# Patient Record
Sex: FEMALE | Race: WHITE | NOT HISPANIC OR LATINO | Employment: OTHER | URBAN - METROPOLITAN AREA
[De-identification: names, ages, dates, MRNs, and addresses within clinical notes are randomized per-mention and may not be internally consistent; named-entity substitution may affect disease eponyms.]

---

## 2017-01-03 ENCOUNTER — ANTICOAGULATION VISIT (OUTPATIENT)
Dept: CARDIOLOGY | Facility: CLINIC | Age: 77
End: 2017-01-03

## 2017-01-03 LAB — INR PPP: 3

## 2017-01-06 NOTE — PROGRESS NOTES
I have reviewed the anticoagulation track calender, labs, and dosage adjustments made by Sameera Parada RN and I agree.    Electronically signed by KAILASH Sutotn 01/06/17 12:51 PM

## 2017-01-24 ENCOUNTER — TRANSCRIBE ORDERS (OUTPATIENT)
Dept: LAB | Facility: HOSPITAL | Age: 77
End: 2017-01-24

## 2017-01-24 ENCOUNTER — LAB (OUTPATIENT)
Dept: LAB | Facility: HOSPITAL | Age: 77
End: 2017-01-24
Attending: INTERNAL MEDICINE

## 2017-01-24 DIAGNOSIS — Z79.899 HIGH RISK MEDICATION USE: ICD-10-CM

## 2017-01-24 DIAGNOSIS — E13.8 DIABETES MELLITUS OF OTHER TYPE WITH COMPLICATION: ICD-10-CM

## 2017-01-24 DIAGNOSIS — N18.9 CHRONIC KIDNEY DISEASE, UNSPECIFIED: Primary | ICD-10-CM

## 2017-01-24 DIAGNOSIS — N18.9 CHRONIC KIDNEY DISEASE, UNSPECIFIED: ICD-10-CM

## 2017-01-24 LAB
ALBUMIN SERPL-MCNC: 3.9 G/DL (ref 3.2–4.8)
ALBUMIN/GLOB SERPL: 1.1 G/DL (ref 1.5–2.5)
ALP SERPL-CCNC: 86 U/L (ref 25–100)
ALT SERPL W P-5'-P-CCNC: 20 U/L (ref 7–40)
ANION GAP SERPL CALCULATED.3IONS-SCNC: 6 MMOL/L (ref 3–11)
AST SERPL-CCNC: 32 U/L (ref 0–33)
BASOPHILS # BLD AUTO: 0.11 10*3/MM3 (ref 0–0.2)
BASOPHILS # BLD AUTO: 0.13 10*3/MM3 (ref 0–0.2)
BASOPHILS NFR BLD AUTO: 2 % (ref 0–1)
BASOPHILS NFR BLD AUTO: 2.4 % (ref 0–1)
BILIRUB SERPL-MCNC: 0.6 MG/DL (ref 0.3–1.2)
BUN BLD-MCNC: 54 MG/DL (ref 9–23)
BUN/CREAT SERPL: 31.8 (ref 7–25)
CALCIUM SPEC-SCNC: 10.2 MG/DL (ref 8.7–10.4)
CHLORIDE SERPL-SCNC: 103 MMOL/L (ref 99–109)
CO2 SERPL-SCNC: 32 MMOL/L (ref 20–31)
CREAT BLD-MCNC: 1.7 MG/DL (ref 0.6–1.3)
DEPRECATED RDW RBC AUTO: 57.8 FL (ref 37–54)
DEPRECATED RDW RBC AUTO: 58 FL (ref 37–54)
EOSINOPHIL # BLD AUTO: 0.38 10*3/MM3 (ref 0.1–0.3)
EOSINOPHIL # BLD AUTO: 0.39 10*3/MM3 (ref 0.1–0.3)
EOSINOPHIL NFR BLD AUTO: 7 % (ref 0–3)
EOSINOPHIL NFR BLD AUTO: 7.3 % (ref 0–3)
ERYTHROCYTE [DISTWIDTH] IN BLOOD BY AUTOMATED COUNT: 18.8 % (ref 11.3–14.5)
ERYTHROCYTE [DISTWIDTH] IN BLOOD BY AUTOMATED COUNT: 18.8 % (ref 11.3–14.5)
GFR SERPL CREATININE-BSD FRML MDRD: 29 ML/MIN/1.73
GLOBULIN UR ELPH-MCNC: 3.4 GM/DL
GLUCOSE BLD-MCNC: 139 MG/DL (ref 70–100)
HBA1C MFR BLD: 5.4 % (ref 4.8–5.6)
HCT VFR BLD AUTO: 32.3 % (ref 34.5–44)
HCT VFR BLD AUTO: 32.6 % (ref 34.5–44)
HGB BLD-MCNC: 9.7 G/DL (ref 11.5–15.5)
HGB BLD-MCNC: 9.7 G/DL (ref 11.5–15.5)
IMM GRANULOCYTES # BLD: 0.01 10*3/MM3 (ref 0–0.03)
IMM GRANULOCYTES # BLD: 0.01 10*3/MM3 (ref 0–0.03)
IMM GRANULOCYTES NFR BLD: 0.2 % (ref 0–0.6)
IMM GRANULOCYTES NFR BLD: 0.2 % (ref 0–0.6)
INR PPP: 1.41
IRON 24H UR-MRATE: 42 MCG/DL (ref 50–175)
IRON SATN MFR SERPL: 12 % (ref 15–50)
LYMPHOCYTES # BLD AUTO: 1.57 10*3/MM3 (ref 0.6–4.8)
LYMPHOCYTES # BLD AUTO: 1.58 10*3/MM3 (ref 0.6–4.8)
LYMPHOCYTES NFR BLD AUTO: 28.9 % (ref 24–44)
LYMPHOCYTES NFR BLD AUTO: 29.5 % (ref 24–44)
MCH RBC QN AUTO: 24.9 PG (ref 27–31)
MCH RBC QN AUTO: 25.1 PG (ref 27–31)
MCHC RBC AUTO-ENTMCNC: 29.8 G/DL (ref 32–36)
MCHC RBC AUTO-ENTMCNC: 30 G/DL (ref 32–36)
MCV RBC AUTO: 83.7 FL (ref 80–99)
MCV RBC AUTO: 83.8 FL (ref 80–99)
MONOCYTES # BLD AUTO: 0.47 10*3/MM3 (ref 0–1)
MONOCYTES # BLD AUTO: 0.56 10*3/MM3 (ref 0–1)
MONOCYTES NFR BLD AUTO: 10.3 % (ref 0–12)
MONOCYTES NFR BLD AUTO: 8.8 % (ref 0–12)
NEUTROPHILS # BLD AUTO: 2.78 10*3/MM3 (ref 1.5–8.3)
NEUTROPHILS # BLD AUTO: 2.8 10*3/MM3 (ref 1.5–8.3)
NEUTROPHILS NFR BLD AUTO: 51.6 % (ref 41–71)
NEUTROPHILS NFR BLD AUTO: 51.8 % (ref 41–71)
PLATELET # BLD AUTO: 244 10*3/MM3 (ref 150–450)
PLATELET # BLD AUTO: 261 10*3/MM3 (ref 150–450)
PMV BLD AUTO: 8.8 FL (ref 6–12)
PMV BLD AUTO: 9 FL (ref 6–12)
POTASSIUM BLD-SCNC: 5.8 MMOL/L (ref 3.5–5.5)
PROT SERPL-MCNC: 7.3 G/DL (ref 5.7–8.2)
PROTHROMBIN TIME: 15.5 SECONDS (ref 9.6–11.5)
RBC # BLD AUTO: 3.86 10*6/MM3 (ref 3.89–5.14)
RBC # BLD AUTO: 3.89 10*6/MM3 (ref 3.89–5.14)
SODIUM BLD-SCNC: 141 MMOL/L (ref 132–146)
TIBC SERPL-MCNC: 362 MCG/DL (ref 250–450)
WBC NRBC COR # BLD: 5.36 10*3/MM3 (ref 3.5–10.8)
WBC NRBC COR # BLD: 5.43 10*3/MM3 (ref 3.5–10.8)

## 2017-01-24 PROCEDURE — 83036 HEMOGLOBIN GLYCOSYLATED A1C: CPT | Performed by: INTERNAL MEDICINE

## 2017-01-24 PROCEDURE — 36415 COLL VENOUS BLD VENIPUNCTURE: CPT

## 2017-01-24 PROCEDURE — 85025 COMPLETE CBC W/AUTO DIFF WBC: CPT | Performed by: INTERNAL MEDICINE

## 2017-01-24 PROCEDURE — 80053 COMPREHEN METABOLIC PANEL: CPT | Performed by: INTERNAL MEDICINE

## 2017-01-24 PROCEDURE — 83550 IRON BINDING TEST: CPT | Performed by: INTERNAL MEDICINE

## 2017-01-24 PROCEDURE — 83540 ASSAY OF IRON: CPT | Performed by: INTERNAL MEDICINE

## 2017-01-24 PROCEDURE — 85610 PROTHROMBIN TIME: CPT | Performed by: INTERNAL MEDICINE

## 2017-01-25 ENCOUNTER — OFFICE VISIT (OUTPATIENT)
Dept: PULMONOLOGY | Facility: CLINIC | Age: 77
End: 2017-01-25

## 2017-01-25 ENCOUNTER — ANTICOAGULATION VISIT (OUTPATIENT)
Dept: CARDIOLOGY | Facility: CLINIC | Age: 77
End: 2017-01-25

## 2017-01-25 VITALS
DIASTOLIC BLOOD PRESSURE: 70 MMHG | BODY MASS INDEX: 24.42 KG/M2 | HEIGHT: 67 IN | OXYGEN SATURATION: 99 % | TEMPERATURE: 97.5 F | SYSTOLIC BLOOD PRESSURE: 122 MMHG | RESPIRATION RATE: 16 BRPM | WEIGHT: 155.6 LBS | HEART RATE: 72 BPM

## 2017-01-25 DIAGNOSIS — R06.02 SHORTNESS OF BREATH: Primary | ICD-10-CM

## 2017-01-25 PROCEDURE — 94620 PR PULMONARY STRESS TESTING,SIMPLE: CPT | Performed by: INTERNAL MEDICINE

## 2017-01-25 PROCEDURE — 99215 OFFICE O/P EST HI 40 MIN: CPT | Performed by: INTERNAL MEDICINE

## 2017-01-25 RX ORDER — ALBUTEROL SULFATE 90 UG/1
2 AEROSOL, METERED RESPIRATORY (INHALATION) EVERY 6 HOURS PRN
Qty: 1 INHALER | Refills: 5 | Status: SHIPPED | OUTPATIENT
Start: 2017-01-25 | End: 2017-04-21

## 2017-01-25 RX ORDER — FLUTICASONE PROPIONATE 50 MCG
2 SPRAY, SUSPENSION (ML) NASAL DAILY
Qty: 1 EACH | Refills: 11 | Status: SHIPPED | OUTPATIENT
Start: 2017-01-25 | End: 2017-02-24

## 2017-01-25 NOTE — LETTER
January 25, 2017     Wanda Matthews MD  [address] [address]    Patient: Sarita Alegre   YOB: 1940   Date of Visit: 1/25/2017       Dear Dr. Byron MD:    Sarita Alegre was in my office today. Below are the relevant portions of my assessment and plan of care.       Impression & Plan    1) COPD / Asthma - change to breo to help w/ compliance, d/c nebs, fortunately she doesn't need oxygen with exertion or at rest.  Will still keep it at night.    2) CIELO - Sleep study performed 11 2007 with an AHI of 57.8. She underwent a split-night polysomnogram on 3/10/2016 which demonstrated severe obstructive sleep apnea with an AHI of 33 and oxygen desaturations as low as 64%. She failed CPAP therapy and was switched to an auto titrating BiPAP which she needs to continue.  Bring card data next visit.    3) Allergic Rhinitis - flonase    4) Ascites - I think her most pressing issue is figuring out the etiology of her cirrhosis and recurrent large volume ascites.  Some workup has been completed at [facility].  Will refer to Dr. Espinoza.  Instructed her to hold aldactone given the hyperkalemia on recent labs.    5) Group 2+3 Pulmonary HTN - treat underlying causes, I do not think she has portopulmonary hypertension    RTC 6 months    If you have questions, please do not hesitate to call me. I look forward to following Sarita along with you.         Sincerely,        Johan Thao MD        CC: No Recipients

## 2017-01-25 NOTE — MR AVS SNAPSHOT
Sarita Jimenesl   1/25/2017 2:00 PM   Office Visit    Dept Phone:  [phone]   Encounter #:  [id number]    Provider:  Johan Thao MD   Department:  [facility]                Your Full Care Plan              Today's Medication Changes          These changes are accurate as of: 1/25/17  5:20 PM.  If you have any questions, ask your nurse or doctor.               New Medication(s)Ordered:     fluticasone 50 MCG/ACT nasal spray   Commonly known as:  FLONASE   2 sprays into each nostril Daily for 30 days.   Started by:  Johan Thao MD       Fluticasone Furoate-Vilanterol 100-25 MCG/INH aerosol powder    Inhale 1 puff Daily.   Started by:  Johan Thao MD            Where to Get Your Medications      These medications were sent to [address] - [facility]  - [phone] [place] [id number] [address] [address]     Phone:  [phone]     albuterol 108 (90 BASE) MCG/ACT inhaler    fluticasone 50 MCG/ACT nasal spray    Fluticasone Furoate-Vilanterol 100-25 MCG/INH aerosol powder                   Your Updated Medication List          This list is accurate as of: 1/25/17  5:20 PM.  Always use your most recent med list.                albuterol 108 (90 BASE) MCG/ACT inhaler   Commonly known as:  PROVENTIL HFA;VENTOLIN HFA   Inhale 2 puffs Every 6 (Six) Hours As Needed for wheezing or shortness of air.       ALIGN 4 MG capsule       arformoterol 15 MCG/2ML nebulizer solution   Commonly known as:  BROVANA       atorvastatin 40 MG tablet   Commonly known as:  LIPITOR   TAKE ONE TABLET BY MOUTH AT BEDTIME       bisacodyl 5 MG EC tablet   Commonly known as:  DULCOLAX       budesonide 0.5 MG/2ML nebulizer solution   Commonly known as:  PULMICORT       bumetanide 1 MG tablet   Commonly known as:  BUMEX       cholecalciferol 1000 UNITS tablet   Commonly known as:  VITAMIN D3        DULoxetine 30 MG capsule   Commonly known as:  CYMBALTA       fluticasone 50 MCG/ACT nasal spray   Commonly known as:  FLONASE   2 sprays into each nostril Daily for 30 days.       Fluticasone Furoate-Vilanterol 100-25 MCG/INH aerosol powder    Inhale 1 puff Daily.       HYDROcodone-acetaminophen 5-325 MG per tablet   Commonly known as:  NORCO       levothyroxine 100 MCG tablet   Commonly known as:  SYNTHROID, LEVOTHROID       metoprolol tartrate 25 MG tablet   Commonly known as:  LOPRESSOR   Take 1 tablet by mouth every 12 (twelve) hours.       montelukast 10 MG tablet   Commonly known as:  SINGULAIR       pantoprazole 40 MG EC tablet   Commonly known as:  PROTONIX   Take 1 tablet by mouth daily.       spironolactone 25 MG tablet   Commonly known as:  ALDACTONE       warfarin 1 MG tablet   Commonly known as:  COUMADIN   Take 1 tablet by mouth Daily. 1 tab po daily or as directed, Wed/ sat.  2 mg               We Performed the Following     Walking Oximetry       You Were Diagnosed With        Codes Comments    Shortness of breath    -  Primary ICD-10-CM: R06.02  ICD-9-CM: 786.05       Instructions     None    Patient Instructions History      Upcoming Appointments     Visit Type Date Time Department    FOLLOW UP 1/25/2017  2:00 PM [facility]    FOLLOW UP 6/22/2017  2:30 PM [facility]      Insta[employer] Signup     Our records indicate that you have an active [employer] account.    You can view your After Visit Summary by going to [employer] and logging in with your InstaJob username and password.  If you don't have a Insta[employer] username and password but a parent or guardian has access to your record, the parent or guardian should login with their own [employer] username and password and access your record to view the After Visit Summary.    If you have questions, you can email [email or web address] or call [phone] to talk to our Insta[employer] staff.  Remember, InstaJob is  "NOT to be used for urgent needs.  For medical emergencies, dial 911.               Other Info from Your Visit           Your Appointments     Jun 22, 2017  2:30 PM EDT   Follow Up with Radha Cooper MD   [facility]ARDIOLOG[facility] (--)    [address] [place] [address] [address]  [facility] [address] [phone]           Arrive 15 minutes prior to appointment.              Allergies     Grass  Cough    Molds & Smuts  Cough    Propafenone      Hepatic failure      Reason for Visit     Breathing Problem           Vital Signs     Blood Pressure Pulse Temperature Respirations Height Weight    122/70 72 97.5 °F (36.4 °C) 16 67\" (170.2 cm) 155 lb 9.6 oz (70.6 kg)    Oxygen Saturation Body Mass Index Smoking Status             99% 24.37 kg/m2 Former Smoker         Problems and Diagnoses Noted     Shortness of breath      Results     Walking Oximetry               "

## 2017-01-25 NOTE — PATIENT INSTRUCTIONS
Spoke with patient's . She was in the hospital 1 1/2-2 weeks ago with anemia. Has had multiple blood transfusions and waiting on results of a pill cam. She was sent home from the hospital on 1 mg daily except 2 mg Sunday and Wednesday. We will only increase to 2 mg Sun, Wed, and Fri with 1 mg the other days and recheck in one week. Further adjustments pending anemia workup.

## 2017-01-25 NOTE — PROGRESS NOTES
CHIEF COMPLAINT: Followup for COPD.    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 Cooper, hypertension, hyperlipidemia, type 2 diabetes, prior tobacco abuse, obesity, CIELO, asthma, depression, GERD, hypothyroidism, osteoarthritis, who has also had recurrent ascites requiring recurrent large volume paracentesis. She also has chronic kidney disease followed by [facility]. Patient has also had ongoing iron deficiency anemia, serial transfusions. She is being followed by Dr. Muñoz and recently had a capsule study. Her autoimmune workup to date appears to be negative from the records in Epic. Her hepatitis panel is negative. Patients most recent paracentesis was performed in 11/2016 with almost 7.5 L removed. Patient returns today for followup. From a breathing standpoint, she rarely uses her inhalers. She never uses the nebulizers. She seems to be doing okay. She is on home oxygen; however, her saturation on 2L is 99%. She has no cough. She is no longer smoking. She has really no other complaints from a pulmonary standpoint. She did recently has some labs drawn and incidentally her potassium was found to be 5.8.     Past Medical, Family, Social History, Medications, Allergies, and Vitals Reviewed    ROS  All other systems were reviewed and negative except per HPI    Physical Exam   Constitutional: Oriented to person, place, and time. Appears well-developed and well-nourished.   Head: Normocephalic and atraumatic.   Nose: Nose normal.   Mouth/Throat: Oropharynx is clear and moist.   Eyes: Conjunctivae are normal.   Neck: No tracheal deviation  present.   Cardiovascular: Normal rate, regular rhythm, normal heart sounds and intact distal pulses.  Exam reveals no gallop and no friction rub.    No murmur heard.  Pulmonary/Chest: Effort normal and breath sounds normal. No stridor. No respiratory distress. No wheezes. No rales. No tenderness.   Abdominal: Soft. Bowel sounds are normal. No distended w/ ascites.   Musculoskeletal: Normal range of motion. No edema.   Lymphadenopathy:  No cervical adenopathy.   Neurological: Alert and oriented to person, place, and time.  No Focal Neurological Deficits Observed   Skin: Skin is warm and dry. No rash noted.   Psychiatric: Normal mood and affect.  Behavior is normal. Judgment normal.     6MW reviewed and interpreted by me, resting room air sat 98%, minimum sat 91%, no O2 needed    Prior Full PFT form 12/2014:  FEV1/FVC = 65%  FEV1 = 65%  FVC = 76%  TLC = 85%  RV/TLC = 47%  DLCO = 66-->124% for alveolar volume    RHC 6/2016  HEMODYNAMIC DATA (Pressures in mmHg):  PULMONARY CAPILLARY WEDGE PRESSURE: 18.  PA: 70/22.   RV: 70/10.  RA: Mean of 16.  OXYGEN SATURATIONS: IVC 51%, SVC 59%, RA 56%, RV 54%, PA 60%, and arterial 92%.         NITRIC OXIDE CHALLENGE:  CARDIAC OUTPUT: Baseline 4.23 with cardiac index of 2.28. Systemic blood  pressure 140/89 with a PA pressure of 78/25 and a mean of 37.      POST- NITRIC OXIDE CHALLENGE:  CARDIAC OUTPUT: 3.9 with a cardiac index of 2.1. Systemic pressure 151/75. PA  pressure is 70/20 with a mean of 36.     Impression & Plan    1) COPD / Asthma - change to breo to help w/ compliance, d/c nebs, fortunately she doesn't need oxygen with exertion or at rest.  Will still keep it at night.    2) CILEO - Sleep study performed 11 2007 with an AHI of 57.8. She underwent a split-night polysomnogram on 3/10/2016 which demonstrated severe obstructive sleep apnea with an AHI of 33 and oxygen desaturations as low as 64%. She failed CPAP therapy and was switched to an auto titrating BiPAP which she  needs to continue.  Bring card data next visit.    3) Allergic Rhinitis - flonase    4) Ascites - I think her most pressing issue is figuring out the etiology of her cirrhosis and recurrent large volume ascites.  Some workup has been completed at [facility].  Will refer to Dr. Espinoza.  Instructed her to hold aldactone given the hyperkalemia on recent labs.    5) Group 2+3 Pulmonary HTN - treat underlying causes, I do not think she has portopulmonary hypertension    RTC 6 months    Johan Thao MD  Pulmonology and Critical Care Medicine  01/25/17 4:14 PM  Electronically Signed

## 2017-01-30 ENCOUNTER — ANTICOAGULATION VISIT (OUTPATIENT)
Dept: CARDIOLOGY | Facility: CLINIC | Age: 77
End: 2017-01-30

## 2017-01-30 LAB — INR PPP: 1.6

## 2017-01-30 NOTE — PROGRESS NOTES
I have reviewed the anticoagulation track calender, labs, and dosage adjustments made by Radha Baeza RN and I agree.    Electronically signed by KAILASH Sutton 01/30/17 4:35 PM

## 2017-01-30 NOTE — PATIENT INSTRUCTIONS
Spoke with  and HH nurse Yodit - verified dose- instructed in new dose and to recheck in 1 week- LC

## 2017-01-30 NOTE — PROGRESS NOTES
I have reviewed the anticoagulation track calender, labs, and dosage adjustments made by Bing Parikh and I agree.    Electronically signed by KAILASH Sutton 01/30/17 4:40 PM

## 2017-02-08 DIAGNOSIS — K74.60 CIRRHOSIS OF LIVER WITH ASCITES, UNSPECIFIED HEPATIC CIRRHOSIS TYPE (HCC): Primary | ICD-10-CM

## 2017-02-08 DIAGNOSIS — R18.8 CIRRHOSIS OF LIVER WITH ASCITES, UNSPECIFIED HEPATIC CIRRHOSIS TYPE (HCC): Primary | ICD-10-CM

## 2017-02-10 ENCOUNTER — ANTICOAGULATION VISIT (OUTPATIENT)
Dept: CARDIOLOGY | Facility: CLINIC | Age: 77
End: 2017-02-10

## 2017-02-10 LAB — INR PPP: 2.2

## 2017-02-14 ENCOUNTER — APPOINTMENT (OUTPATIENT)
Dept: LAB | Facility: HOSPITAL | Age: 77
End: 2017-02-14
Attending: INTERNAL MEDICINE

## 2017-02-14 ENCOUNTER — OFFICE VISIT (OUTPATIENT)
Dept: GASTROENTEROLOGY | Facility: CLINIC | Age: 77
End: 2017-02-14

## 2017-02-14 VITALS
DIASTOLIC BLOOD PRESSURE: 70 MMHG | BODY MASS INDEX: 22.73 KG/M2 | SYSTOLIC BLOOD PRESSURE: 124 MMHG | TEMPERATURE: 97.6 F | RESPIRATION RATE: 18 BRPM | HEART RATE: 72 BPM | OXYGEN SATURATION: 97 % | HEIGHT: 68 IN | WEIGHT: 150 LBS

## 2017-02-14 DIAGNOSIS — D50.9 NORMOCYTIC HYPOCHROMIC ANEMIA: ICD-10-CM

## 2017-02-14 DIAGNOSIS — R18.8 OTHER ASCITES: ICD-10-CM

## 2017-02-14 DIAGNOSIS — K74.69 OTHER CIRRHOSIS OF LIVER (HCC): Primary | ICD-10-CM

## 2017-02-14 DIAGNOSIS — Z79.01 LONG TERM CURRENT USE OF ANTICOAGULANT: ICD-10-CM

## 2017-02-14 LAB
ALBUMIN SERPL-MCNC: 4.2 G/DL (ref 3.2–4.8)
ALBUMIN/GLOB SERPL: 1.2 G/DL (ref 1.5–2.5)
ALP SERPL-CCNC: 90 U/L (ref 25–100)
ALT SERPL W P-5'-P-CCNC: 16 U/L (ref 7–40)
ANION GAP SERPL CALCULATED.3IONS-SCNC: 5 MMOL/L (ref 3–11)
AST SERPL-CCNC: 32 U/L (ref 0–33)
BASOPHILS # BLD AUTO: 0.07 10*3/MM3 (ref 0–0.2)
BASOPHILS NFR BLD AUTO: 0.9 % (ref 0–1)
BILIRUB SERPL-MCNC: 0.5 MG/DL (ref 0.3–1.2)
BUN BLD-MCNC: 51 MG/DL (ref 9–23)
BUN/CREAT SERPL: 28.3 (ref 7–25)
CALCIUM SPEC-SCNC: 10.5 MG/DL (ref 8.7–10.4)
CHLORIDE SERPL-SCNC: 99 MMOL/L (ref 99–109)
CO2 SERPL-SCNC: 31 MMOL/L (ref 20–31)
CREAT BLD-MCNC: 1.8 MG/DL (ref 0.6–1.3)
DEPRECATED RDW RBC AUTO: 54.7 FL (ref 37–54)
EOSINOPHIL # BLD AUTO: 0.57 10*3/MM3 (ref 0.1–0.3)
EOSINOPHIL NFR BLD AUTO: 7.6 % (ref 0–3)
ERYTHROCYTE [DISTWIDTH] IN BLOOD BY AUTOMATED COUNT: 18.1 % (ref 11.3–14.5)
FERRITIN SERPL-MCNC: 41 NG/ML (ref 10–291)
GFR SERPL CREATININE-BSD FRML MDRD: 27 ML/MIN/1.73
GLOBULIN UR ELPH-MCNC: 3.6 GM/DL
GLUCOSE BLD-MCNC: 111 MG/DL (ref 70–100)
HCT VFR BLD AUTO: 30.7 % (ref 34.5–44)
HGB BLD-MCNC: 9.7 G/DL (ref 11.5–15.5)
IMM GRANULOCYTES # BLD: 0.02 10*3/MM3 (ref 0–0.03)
IMM GRANULOCYTES NFR BLD: 0.3 % (ref 0–0.6)
IRON 24H UR-MRATE: 37 MCG/DL (ref 50–175)
IRON SATN MFR SERPL: 10 % (ref 15–50)
LYMPHOCYTES # BLD AUTO: 1.74 10*3/MM3 (ref 0.6–4.8)
LYMPHOCYTES NFR BLD AUTO: 23.2 % (ref 24–44)
MCH RBC QN AUTO: 25.9 PG (ref 27–31)
MCHC RBC AUTO-ENTMCNC: 31.6 G/DL (ref 32–36)
MCV RBC AUTO: 82.1 FL (ref 80–99)
MONOCYTES # BLD AUTO: 0.86 10*3/MM3 (ref 0–1)
MONOCYTES NFR BLD AUTO: 11.5 % (ref 0–12)
NEUTROPHILS # BLD AUTO: 4.24 10*3/MM3 (ref 1.5–8.3)
NEUTROPHILS NFR BLD AUTO: 56.5 % (ref 41–71)
PLATELET # BLD AUTO: 256 10*3/MM3 (ref 150–450)
PMV BLD AUTO: 8.3 FL (ref 6–12)
POTASSIUM BLD-SCNC: 5 MMOL/L (ref 3.5–5.5)
PROT SERPL-MCNC: 7.8 G/DL (ref 5.7–8.2)
RBC # BLD AUTO: 3.74 10*6/MM3 (ref 3.89–5.14)
SODIUM BLD-SCNC: 135 MMOL/L (ref 132–146)
TIBC SERPL-MCNC: 377 MCG/DL (ref 250–450)
WBC NRBC COR # BLD: 7.5 10*3/MM3 (ref 3.5–10.8)

## 2017-02-14 PROCEDURE — 83540 ASSAY OF IRON: CPT | Performed by: NURSE PRACTITIONER

## 2017-02-14 PROCEDURE — 86376 MICROSOMAL ANTIBODY EACH: CPT | Performed by: NURSE PRACTITIONER

## 2017-02-14 PROCEDURE — 82103 ALPHA-1-ANTITRYPSIN TOTAL: CPT | Performed by: NURSE PRACTITIONER

## 2017-02-14 PROCEDURE — 36415 COLL VENOUS BLD VENIPUNCTURE: CPT | Performed by: NURSE PRACTITIONER

## 2017-02-14 PROCEDURE — 85025 COMPLETE CBC W/AUTO DIFF WBC: CPT | Performed by: NURSE PRACTITIONER

## 2017-02-14 PROCEDURE — 83516 IMMUNOASSAY NONANTIBODY: CPT | Performed by: NURSE PRACTITIONER

## 2017-02-14 PROCEDURE — 82728 ASSAY OF FERRITIN: CPT | Performed by: NURSE PRACTITIONER

## 2017-02-14 PROCEDURE — 82104 ALPHA-1-ANTITRYPSIN PHENO: CPT | Performed by: NURSE PRACTITIONER

## 2017-02-14 PROCEDURE — 80053 COMPREHEN METABOLIC PANEL: CPT | Performed by: NURSE PRACTITIONER

## 2017-02-14 PROCEDURE — 99214 OFFICE O/P EST MOD 30 MIN: CPT | Performed by: NURSE PRACTITIONER

## 2017-02-14 PROCEDURE — 83550 IRON BINDING TEST: CPT | Performed by: NURSE PRACTITIONER

## 2017-02-14 RX ORDER — CYANOCOBALAMIN (VITAMIN B-12) 2500 MCG
1 TABLET, SUBLINGUAL SUBLINGUAL DAILY
COMMUNITY
Start: 2016-11-01 | End: 2017-09-25 | Stop reason: SDUPTHER

## 2017-02-14 NOTE — PATIENT INSTRUCTIONS
Cirrhosis  Cirrhosis is long-term (chronic) liver injury. The liver is your largest internal organ, and it performs many functions. The liver converts food into energy, removes toxic material from your blood, makes important proteins, and absorbs necessary vitamins from your diet.  If you have cirrhosis, it means many of your healthy liver cells have been replaced by scar tissue. This prevents blood from flowing through your liver, which makes it difficult for your liver to function. This scarring is not reversible, but treatment can prevent it from getting worse.   CAUSES   Hepatitis C and long-term alcohol abuse are the most common causes of cirrhosis. Other causes include:  · Nonalcoholic fatty liver disease.  · Hepatitis B infection.  · Autoimmune hepatitis.  · Diseases that cause blockage of ducts inside the liver.  · Inherited liver diseases.  · Reactions to certain long-term medicines.  · Parasitic infections.  · Long-term exposure to certain toxins.  RISK FACTORS  You may have a higher risk of cirrhosis if you:  · Have certain hepatitis viruses.  · Abuse alcohol, especially if you are female.  · Are overweight.  · Share needles.  · Have unprotected sex with someone who has hepatitis.  SYMPTOMS   You may not have any signs and symptoms at first. Symptoms may not develop until the damage to your liver starts to get worse. Signs and symptoms of cirrhosis may include:   · Tenderness in the right-upper part of your abdomen.  · Weakness and tiredness (fatigue).  · Loss of appetite.  · Nausea.  · Weight loss and muscle loss.  · Itchiness.  · Yellow skin and eyes (jaundice).  · Buildup of fluid in the abdomen (ascites).  · Swelling of the feet and ankles (edema).  · Appearance of tiny blood vessels under the skin.  · Mental confusion.  · Easy bruising and bleeding.  DIAGNOSIS   Your health care provider may suspect cirrhosis based on your symptoms and medical history, especially if you have other medical conditions  or a history of alcohol abuse. Your health care provider will do a physical exam to feel your liver and check for signs of cirrhosis. Your health care provider may perform other tests, including:   · Blood tests to check:      Whether you have hepatitis B or C.      Kidney function.    Liver function.  · Imaging tests such as:    MRI or CT scan to look for changes seen in advanced cirrhosis.    Ultrasound to see if normal liver tissue is being replaced by scar tissue.  · A procedure using a long needle to take a sample of liver tissue (biopsy) for examination under a microscope. Liver biopsy can confirm the diagnosis of cirrhosis.    TREATMENT   Treatment depends on how damaged your liver is and what caused the damage. Treatment may include treating cirrhosis symptoms or treating the underlying causes of the condition to try to slow the progression of the damage. Treatment may include:  · Making lifestyle changes, such as:      Eating a healthy diet.    Restricting salt intake.     Maintaining a healthy weight.      Not abusing drugs or alcohol.  · Taking medicines to:    Treat liver infections or other infections.    Control itching.    Reduce fluid buildup.    Reduce certain blood toxins.    Reduce risk of bleeding from enlarged blood vessels in the stomach or esophagus (varices).  · If varices are causing bleeding problems, you may need treatment with a procedure that ties up the vessels causing them to fall off (band ligation).  · If cirrhosis is causing your liver to fail, your health care provider may recommend a liver transplant.  · Other treatments may be recommended depending on any complications of cirrhosis, such as liver-related kidney failure (hepatorenal syndrome).  HOME CARE INSTRUCTIONS   · Take medicines only as directed by your health care provider. Do not use drugs that are toxic to your liver. Ask your health care provider before taking any new medicines, including over-the-counter medicines.     · Rest as needed.  · Eat a well-balanced diet. Ask your health care provider or dietitian for more information.    · You may have to follow a low-salt diet or restrict your water intake as directed.  · Do not drink alcohol. This is especially important if you are taking acetaminophen.  · Keep all follow-up visits as directed by your health care provider. This is important.  SEEK MEDICAL CARE IF:  · You have fatigue or weakness that is getting worse.  · You develop swelling of the hands, feet, legs, or face.  · You have a fever.  · You develop loss of appetite.  · You have nausea or vomiting.  · You develop jaundice.  · You develop easy bruising or bleeding.  SEEK IMMEDIATE MEDICAL CARE IF:  · You vomit bright red blood or a material that looks like coffee grounds.  · You have blood in your stools.  · Your stools appear black and tarry.  · You become confused.  · You have chest pain or trouble breathing.     This information is not intended to replace advice given to you by your health care provider. Make sure you discuss any questions you have with your health care provider.     Document Released: 12/18/2006 Document Revised: 01/08/2016 Document Reviewed: 08/26/2015  [employer] Interactive Patient Education ©2016 [employer]    Fatty Liver  Fatty liver, also called hepatic steatosis or steatohepatitis, is a condition in which too much fat has built up in your liver cells. The liver removes harmful substances from your bloodstream. It produces fluids your body needs. It also helps your body use and store energy from the food you eat.  In many cases, fatty liver does not cause symptoms or problems. It is often diagnosed when tests are being done for other reasons. However, over time, fatty liver can cause inflammation that may lead to more serious liver problems, such as scarring of the liver (cirrhosis).  CAUSES   Causes of fatty liver may include:   · Drinking too much alcohol.  · Poor  nutrition.  · Obesity.  · Cushing syndrome.  · Diabetes.  · Hyperlipidemia.  · Pregnancy.  · Certain drugs.  · Poisons.  · Some viral infections.  RISK FACTORS  You may be more likely to develop fatty liver if you:  · Abuse alcohol.  · Are pregnant.  · Are overweight.  · Have diabetes.  · Have hepatitis.  · Have a high triglyceride level.    SIGNS AND SYMPTOMS   Fatty liver often does not cause any symptoms. In cases where symptoms develop, they can include:  · Fatigue.  · Weakness.  · Weight loss.  · Confusion.    · Abdominal pain.  · Yellowing of your skin and the white parts of your eyes (jaundice).  · Nausea and vomiting.  DIAGNOSIS   Fatty liver may be diagnosed by:   · Physical exam and medical history.  · Blood tests.  · Imaging tests, such as an ultrasound, CT scan, or MRI.  · Liver biopsy. A small sample of liver tissue is removed using a needle. The sample is then looked at under a microscope.  TREATMENT   Fatty liver is often caused by other health conditions. Treatment for fatty liver may involve medicines and lifestyle changes to manage conditions such as:   · Alcoholism.  · High cholesterol.  · Diabetes.  · Being overweight or obese.    HOME CARE INSTRUCTIONS  · Eat a healthy diet as directed by your health care provider.  · Exercise regularly. This can help you lose weight and control your cholesterol and diabetes. Talk to your health care provider about an exercise plan and which activities are best for you.  · Do not drink alcohol.    · Take medicines only as directed by your health care provider.  SEEK MEDICAL CARE IF:  You have difficulty controlling your:  · Blood sugar.  · Cholesterol.  · Alcohol consumption.  SEEK IMMEDIATE MEDICAL CARE IF:  · You have abdominal pain.  · You have jaundice.  · You have nausea and vomiting.     This information is not intended to replace advice given to you by your health care provider. Make sure you discuss any questions you have with your health care  "provider.     Document Released: 02/02/2007 Document Revised: 01/08/2016 Document Reviewed: 04/29/2015  [employer] Interactive Patient Education ©2016 [employer]    Low-Sodium Eating Plan  Sodium raises blood pressure and causes water to be held in the body. Getting less sodium from food will help lower your blood pressure, reduce any swelling, and protect your heart, liver, and kidneys. We get sodium by adding salt (sodium chloride) to food. Most of our sodium comes from canned, boxed, and frozen foods. Restaurant foods, fast foods, and pizza are also very high in sodium. Even if you take medicine to lower your blood pressure or to reduce fluid in your body, getting less sodium from your food is important.  WHAT IS MY PLAN?  Most people should limit their sodium intake to 2,300 mg a day. Your health care provider recommends that you limit your sodium intake to __________ a day.   WHAT DO I NEED TO KNOW ABOUT THIS EATING PLAN?  For the low-sodium eating plan, you will follow these general guidelines:  · Choose foods with a % Daily Value for sodium of less than 5% (as listed on the food label).    · Use salt-free seasonings or herbs instead of table salt or sea salt.    · Check with your health care provider or pharmacist before using salt substitutes.    · Eat fresh foods.  · Eat more vegetables and fruits.  · Limit canned vegetables. If you do use them, rinse them well to decrease the sodium.    · Limit cheese to 1 oz (28 g) per day.     · Eat lower-sodium products, often labeled as \"lower sodium\" or \"no salt added.\"  · Avoid foods that contain monosodium glutamate (MSG). MSG is sometimes added to Chinese food and some canned foods.    · Check food labels (Nutrition Facts labels) on foods to learn how much sodium is in one serving.  · Eat more home-cooked food and less restaurant, buffet, and fast food.   · When eating at a restaurant, ask that your food be prepared with less salt, or no salt if possible.    HOW " DO I READ FOOD LABELS FOR SODIUM INFORMATION?  The Nutrition Facts label lists the amount of sodium in one serving of the food. If you eat more than one serving, you must multiply the listed amount of sodium by the number of servings.  Food labels may also identify foods as:  · Sodium free--Less than 5 mg in a serving.  · Very low sodium--35 mg or less in a serving.  · Low sodium--140 mg or less in a serving.  · Light in sodium--50% less sodium in a serving. For example, if a food that usually has 300 mg of sodium is changed to become light in sodium, it will have 150 mg of sodium.  · Reduced sodium--25% less sodium in a serving. For example, if a food that usually has 400 mg of sodium is changed to reduced sodium, it will have 300 mg of sodium.  WHAT FOODS CAN I EAT?  Grains   Low-sodium cereals, including oats, puffed wheat and rice, and shredded wheat cereals. Low-sodium crackers. Unsalted rice and pasta. Lower-sodium bread.   Vegetables   Frozen or fresh vegetables. Low-sodium or reduced-sodium canned vegetables. Low-sodium or reduced-sodium tomato sauce and paste. Low-sodium or reduced-sodium tomato and vegetable juices.   Fruits   Fresh, frozen, and canned fruit. Fruit juice.   Meat and Other Protein Products   Low-sodium canned tuna and salmon. Fresh or frozen meat, poultry, seafood, and fish. Lamb. Unsalted nuts. Dried beans, peas, and lentils without added salt. Unsalted canned beans. Homemade soups without salt. Eggs.   Dairy   Milk. Soy milk. Ricotta cheese. Low-sodium or reduced-sodium cheeses. Yogurt.   Condiments   Fresh and dried herbs and spices. Salt-free seasonings. Onion and garlic powders. Low-sodium varieties of mustard and ketchup. Fresh or refrigerated horseradish. Lemon juice.   Fats and Oils    Reduced-sodium salad dressings. Unsalted butter.    Other   Unsalted popcorn and pretzels.   The items listed above may not be a complete list of recommended foods or beverages. Contact your dietitian  for more options.  WHAT FOODS ARE NOT RECOMMENDED?  Grains   Instant hot cereals. Bread stuffing, pancake, and biscuit mixes. Croutons. Seasoned rice or pasta mixes. Noodle soup cups. Boxed or frozen macaroni and cheese. Self-rising flour. Regular salted crackers.  Vegetables   Regular canned vegetables. Regular canned tomato sauce and paste. Regular tomato and vegetable juices. Frozen vegetables in sauces. Salted French fries. Olives. Pickles. Relishes. Sauerkraut. Salsa.  Meat and Other Protein Products   Salted, canned, smoked, spiced, or pickled meats, seafood, or fish. Holloway, ham, sausage, hot dogs, corned beef, chipped beef, and packaged luncheon meats. Salt pork. Jerky. Pickled herring. Anchovies, regular canned tuna, and sardines. Salted nuts.  Dairy   Processed cheese and cheese spreads. Cheese curds. Blue cheese and cottage cheese. Buttermilk.   Condiments   Onion and garlic salt, seasoned salt, table salt, and sea salt. Canned and packaged gravies. Worcestershire sauce. Tartar sauce. Barbecue sauce. Teriyaki sauce. Soy sauce, including reduced sodium. Steak sauce. Fish sauce. Oyster sauce. Cocktail sauce. Horseradish that you find on the shelf. Regular ketchup and mustard. Meat flavorings and tenderizers. Bouillon cubes. Hot sauce. Tabasco sauce. Marinades. Taco seasonings. Relishes.  Fats and Oils    Regular salad dressings. Salted butter. Margarine. Ghee. Holloway fat.   Other   Potato and tortilla chips. Corn chips and puffs. Salted popcorn and pretzels. Canned or dried soups. Pizza. Frozen entrees and pot pies.    The items listed above may not be a complete list of foods and beverages to avoid. Contact your dietitian for more information.     This information is not intended to replace advice given to you by your health care provider. Make sure you discuss any questions you have with your health care provider.     Document Released: 06/09/2003 Document Revised: 01/08/2016 Document Reviewed:  10/22/2014  [employer] Interactive Patient Education ©2016 [employer]

## 2017-02-14 NOTE — PROGRESS NOTES
NEW PATIENT NOTE  Patient: LIDIA MARROQUIN : 1940  Date of Service: 2017  Dear Dr.Kristina ARYAN Matthews MD   Thank you for your referral of this patient for evaluation of cirrhosis  CC: Establish Care (Cirrhosis with ascites )  Assessment/Plan                                             ASSESSMENT & PLANS     Recurrent ascites w/ unknown etiology w/o liver cirrhosis (per imaging) or LFT elevation.  Ascites is likely to be cardiac, pulmonary, and renal issues.  She has atrial fibrillation (s/p EVITA cardioversion x1, pacemaker implantation for tachybrady syndrome 2016), diastolic heart failure, moderate pulmonary hypertension, status post right heart cath with mean PA pressure of 38, wedge of 18, cardiac index of 2.1  -     Anti-microsomal Antibody  -     Alpha - 1 - Antitrypsin Phenotype  -     Celiac Ab tTG DGP TIgA  -     Ferritin  -     Iron Profile  -     Comprehensive Metabolic Panel  -     Ambulatory Referral to Nutrition Service for education on low sodium diet  -     CBC Auto Differential   -     US Liver; Future.  If there is sufficient ascitic fluid, will consider doing a diagnositic paracentesis to evaluate source of ascites: fr liver cirrhosis vs heart failure vs nephrotic syndrome    Normocytic, Hypochromic Anemia. Normal TIBC; normal ferritin; Low iron. Normal B12 and folate.  It is likely that anemia is r/t chronic diseases. With a calculated retic Production Index at 2.38, it is unclear whether the bone marrow is producing an appropriate response to an anemic state or there is a defect w/ RBC production in the BM. May consider further eval w/ Serum Transferrin Receptor, Hemoglobin Electrophoresis, erythropoietin     Long term current use of anticoagulant    Follow Up: Return in about 4 weeks (around 3/14/2017) for Recheck.  Dr Du recommends pt to f/u w/ Dr Espinoza and myself or [facility] since Dr Du does not sees pts w/ cirrhosis       DISCUSSION: The  patient’s case was discussed with Dr. Du and Dr Espinoza, and we collaborated pt’s plan of care.The above plan was delineated in details with patient and spouse and all questions and concerns were answered.  Patient is also given contact information.  Patient is to return as scheduled or sooner if new problems arise  Subjective                                                     SUBJECTIVE   History of Present Illness  Ms. Sarita Alegre is a 77 y.o. female, pt of dr Du's, presents to the clinic today for an evaluation of Establish Care (Cirrhosis with ascites )  Pt has multiple medical problems (including atrial fibrillation (s/p pacemaker), diastolic heart failure, pulmonary hypertension, hyperlipidemia, type 2 diabetes, CKD w/ hx of dialysis, etc.), who has also had recurrent ascites requiring recurrent large volume paracentesis x 3 (April, September, and November 2016. Most recent paracentesis was performed in 11/2016 with almost 7.5 L removed). She was previously followed by Dr. Muñoz and has had an EGD, colonoscopy, and capsule study done.      The cause of her ascites is unclear.  Mitochondrial antibody, smooth muscle antibody, and MARTHA are negative, but other autoimmune hepatitis workup has yet been done. Her hepatitis panel is negative. Seldom alcohol hx. No autoimmune hx.  Used to be 200 lbs until recurrent hospitalizations x 5 or 6 in 2016 then lost about 50 lbs .  Pt feels like fluid is coming back but no wt gain yet.  No SOB yet.  Currently on Bumex and Spirolactone.     Has both constipation and diarrhea. Sometimes has has BM 3x a day.  Sometimes she has a BM once ever 3-4 days. C>D.  To her, constipation means not having a BM often, but quality of stool is not severely constipated. No straining or hard/lumpy stools.  No rectal pain or painful defection.  She has bright red stools once on the day after pill cam (couple wks ago), but no recurrence. Mostly, she takes probitiotic and peanuts w/  good sx control.  Takes OTC laxatives PRN occasionally w/ sx relief.      RUQ abd pain (intermittent).  TTP. No specific trigger.  She has anemia and required multiple transfusion in the past (prbc x 3 at [facility] 6/2016 and [facility]).  Pt denies gum bleed, hemoptysis, hematemesis, or gross hematuria.  Pt denies SOB, chest pain, of dictation, dizziness, vertigo, syncope, fall, or near fall. Reportedly by pt, no source of bleeding identified fr prior EGD, colonoscopy, and capsule study.     ROS:Review of Systems   Constitutional: Positive for appetite change and fever. Negative for activity change, chills, diaphoresis, fatigue and unexpected weight change.   HENT: Negative for congestion, dental problem, drooling, ear discharge, ear pain, facial swelling, hearing loss, mouth sores, nosebleeds, postnasal drip, rhinorrhea, sinus pressure, sneezing, sore throat, tinnitus, trouble swallowing and voice change.    Eyes: Negative for photophobia, pain, discharge, redness, itching and visual disturbance.   Respiratory: Positive for shortness of breath and wheezing. Negative for apnea, cough, choking, chest tightness and stridor.    Cardiovascular: Negative for chest pain, palpitations and leg swelling.   Gastrointestinal: Positive for abdominal distention, abdominal pain, blood in stool, constipation, diarrhea, nausea and vomiting. Negative for anal bleeding and rectal pain.   Endocrine: Negative for cold intolerance, heat intolerance, polydipsia, polyphagia and polyuria.   Genitourinary: Negative for decreased urine volume, difficulty urinating, dysuria, enuresis, flank pain, frequency, genital sores, hematuria and urgency.   Musculoskeletal: Positive for arthralgias and back pain. Negative for gait problem, joint swelling, myalgias, neck pain and neck stiffness.   Skin: Negative for color change, pallor, rash and wound.   Allergic/Immunologic: Negative for environmental allergies, food  allergies and immunocompromised state.   Neurological: Positive for syncope and headaches. Negative for dizziness, tremors, seizures, facial asymmetry, speech difficulty, weakness, light-headedness and numbness.   Hematological: Negative for adenopathy. Bruises/bleeds easily.   Psychiatric/Behavioral: Negative for agitation, behavioral problems, confusion, decreased concentration, dysphoric mood, hallucinations, self-injury, sleep disturbance and suicidal ideas. The patient is not nervous/anxious and is not hyperactive.      PAST MED HX: Pt  has a past medical history of Abdominal pain; Acute suppurative otitis media (06/08/2015); Adenomatous polyp of stomach; Allergic rhinitis; Asthma; Atrial fibrillation; Chest pain (9/7/2016); CHF (congestive heart failure) for several yrs; CKD (chronic kidney disease), stage III; Closed fracture of fibula; COPD (chronic obstructive pulmonary disease); Depression (9/7/2016); Diabetes mellitus; Dyslipidemia; Dyspnea; Encounter for gynecological examination with Papanicolaou smear of cervix (2012); GERD (gastroesophageal reflux disease); H/O chest x-ray (12/30/2015); H/O chest x-ray (10/12/2015); H/O chest x-ray (10/11/2015); H/O echocardiogram (11/07/2015); Hearing deficit (9/7/2016); Hiatal hernia; History of abnormal electrocardiogram; History of acute bronchitis (06/08/2015); History of acute gastritis; History of acute pharyngitis; History of bilateral renal artery occlusion; History of cataract; History of deafness; History of fracture of ankle (07/2015); History of herpes zoster; History of PFTs (08/13/2015); History of PFTs (06/17/2015); History of PFTs (12/04/2014); History of pneumonia; Hoarseness; Hospital discharge follow-up; Hyperparathyroidism; Hypertension; Hypothyroidism (9/7/2016); Insomnia; Joint pain, knee; Low back pain; Lower extremity edema; Malleolar fracture; Obesity; CIELO (obstructive sleep apnea); Osteoarthritis (9/7/2016); Pain in thoracic spine; Pleural  effusion; Recurrent oral ulcers; Secondary pulmonary hypertension; Tricuspid regurgitation; and Type 2 diabetes mellitus (9/7/2016).  PAST SURG HX: Pt  has a past surgical history that includes Appendectomy; Bladder surgery; Cataract Extraction (Bilateral); Elbow surgery (Left, 2006); Hysterectomy; Knee surgery (Right, 1982); Parathyroidectomy; Upper gastrointestinal endoscopy; Colonoscopy (N/A, 6/17/2016); Cardiac electrophysiology procedure (N/A, 9/1/2016); Cardiac pacemaker placement; Cardioversion; and Other surgical history.  FAM HX: family history includes Allergies in her mother; Asthma in her mother; Colonic polyp in her mother; Diabetes in her other; Heart disease in her father; Lung cancer in her father; Mitral valve prolapse in her mother; Other in her mother.  SOC HX: Pt   Objective                                                           OBJECTIVE   MEDS: •  albuterol (PROVENTIL HFA;VENTOLIN HFA) 108 (90 BASE) MCG/ACT inhaler, Inhale 2 puffs Every 6 (Six) Hours As Needed for wheezing or shortness of air., Disp: 1 inhaler, Rfl: 5  •  arformoterol (BROVANA) 15 MCG/2ML nebulizer solution, Take 15 mcg by nebulization 2 (two) times a day., Disp: , Rfl:   •  atorvastatin (LIPITOR) 40 MG tablet, TAKE ONE TABLET BY MOUTH AT BEDTIME, Disp: 90 tablet, Rfl: 1  •  Biotin 5000 MCG sublingual tablet, , Disp: , Rfl:   •  bisacodyl (DULCOLAX) 5 MG EC tablet, Take 5 mg by mouth As Needed for constipation., Disp: , Rfl:   •  bumetanide (BUMEX) 1 MG tablet, Take 1 mg by mouth daily., Disp: , Rfl:   •  cholecalciferol (VITAMIN D3) 1000 UNITS tablet, Take 1,000 Units by mouth daily., Disp: , Rfl:   •  DULoxetine (CYMBALTA) 30 MG capsule, Take 30 mg by mouth daily., Disp: , Rfl:   •  fluticasone (FLONASE) 50 MCG/ACT nasal spray, 2 sprays into each nostril Daily for 30 days., Disp: 1 each, Rfl: 11  •  Fluticasone Furoate-Vilanterol 100-25 MCG/INH aerosol powder , Inhale 1 puff Daily., Disp: 1 each, Rfl: 11  •   HYDROcodone-acetaminophen (NORCO) 5-325 MG per tablet, Take 1 tablet by mouth Every 6 (Six) Hours As Needed. 11/20 pm, Disp: , Rfl:   •  levothyroxine (SYNTHROID, LEVOTHROID) 100 MCG tablet, Take 100 mcg by mouth daily., Disp: , Rfl:   •  metoprolol tartrate (LOPRESSOR) 25 MG tablet, Take 1 tablet by mouth every 12 (twelve) hours., Disp: 180 tablet, Rfl: 3  •  montelukast (SINGULAIR) 10 MG tablet, Take 10 mg by mouth every night., Disp: , Rfl:   •  Probiotic Product (ALIGN) 4 MG capsule, Take  by mouth Daily., Disp: , Rfl:   •  spironolactone (ALDACTONE) 25 MG tablet, Take 25 mg by mouth Daily., Disp: , Rfl:   •  warfarin (COUMADIN) 1 MG tablet, Take 1 tablet by mouth Daily. 1 tab po daily or as directed, Wed/ sat.  2 mg, Disp: 90 tablet, Rfl: 1  Lab Results   Component Value Date    WBC 5.36 01/24/2017    WBC 5.43 01/24/2017    HGB 9.7 (L) 01/24/2017    HGB 9.7 (L) 01/24/2017    HCT 32.6 (L) 01/24/2017    HCT 32.3 (L) 01/24/2017    MCV 83.8 01/24/2017    MCV 83.7 01/24/2017     01/24/2017     01/24/2017   Peripheral blood smear 09/9/15:  Reactive leukocytosis.  No specific etiology for anemia is seen on review.    TIBC (Normal 250 - 450 ug/dL)  Lab Results   Component Value Date    TIBC 377 02/14/2017    TIBC 362 01/24/2017    TIBC 418 12/14/2016    TIBC 341 06/03/2016    TIBC 389 04/12/2016     Serum Iron (Normal 38 - 169 ug/dL)  Lab Results   Component Value Date    IRON 37 (L) 02/14/2017    IRON 42 (L) 01/24/2017    IRON 32 (L) 12/14/2016    IRON 72 06/03/2016    IRON 23 (L) 04/12/2016    IRON 27 (L) 10/13/2015    IRON 13 (L) 08/26/2015    IRON 32 (L) 06/29/2015    IRON 16 (L) 05/20/2015    IRON 47 (L) 04/21/2015     Transferrin or Iron Saturation % (Normal 15 - 55 %)  Lab Results   Component Value Date    LABIRON 10 (L) 02/14/2017    LABIRON 12 (L) 01/24/2017    LABIRON 8 (L) 12/14/2016     Ferritin (Normal 20.00 - 291.00 ng/mL)  Lab Results   Component Value Date    FERRITIN 41.00 02/14/2017     FERRITIN 22.00 12/14/2016    FERRITIN 344 (H) 06/03/2016    FERRITIN 37 04/12/2016    FERRITIN 72 10/13/2015     Reticulocytes (Normal 0.50 - 1.50 %)  Lab Results   Component Value Date    RETICCTPCT 5.24 (H) 06/03/2016    RETICCTPCT 2.67 (H) 09/12/2015     LDH   Lab Results   Component Value Date     (H) 09/12/2015     (H) 09/10/2015     (H) 09/09/2015     (H) 08/27/2015     (H) 06/29/2015     Haptoglobin  Lab Results   Component Value Date    HAPTOGLOBIN 62 09/09/2015    HAPTOGLOBIN 62 09/09/2015    HAPTOGLOBIN 205 (H) 06/29/2015     Vitamin B12  Lab Results   Component Value Date    VRIDTYOZ28 464 06/03/2016    IKNPHAMF63 1504 (H) 11/05/2015    BNCDWAGL47 >2000 (H) 08/26/2015    UZBQTIDE07 572 06/29/2015    UWGQPLCP05 516 05/20/2015      Folate  Lab Results   Component Value Date    FOLATE 7.6 06/03/2016    FOLATE 15.9 08/26/2015    FOLATE 15.6 06/29/2015    FOLATE 10.3 05/20/2015     Lab Results   Component Value Date    AST 32 01/24/2017    ALT 20 01/24/2017    ALKPHOS 86 01/24/2017    BILITOT 0.6 01/24/2017    ALBUMIN 3.90 01/24/2017   Mitochondrial Antibody  Lab Results   Component Value Date    MITOAB <20.0 08/27/2015     Smooth Muscle Antibody  Lab Results   Component Value Date    SMOOTHMUSCAB 11 08/27/2015       Lab Results   Component Value Date    LIPASE 26 02/26/2016    LIPASE 38 11/05/2015    LIPASE 33 08/21/2015    LIPASE 79 (H) 01/16/2015     Lab Results   Component Value Date     01/24/2017    K 5.8 (H) 01/24/2017     01/24/2017    CO2 32.0 (H) 01/24/2017    BUN 54 (H) 01/24/2017    CREATININE 1.70 (H) 01/24/2017    GLUCOSE 139 (H) 01/24/2017    CALCIUM 10.2 01/24/2017    ANIONGAP 6.0 01/24/2017     Lab Results   Component Value Date    HGBA1C 5.40 01/24/2017     Lab Results   Component Value Date    HEPBSAG NonReactive 08/28/2015    HEPAIGM NonReactive 08/26/2015    HEPBIGMCORE NonReactive 08/28/2015   CT abdomen and pelvis without contrast on  11/21/16 (before paracentesis)  FINDINGS:   1. The patient has a substantial amount of ascites in the peritoneal  cavity producing abdominal distention which is quite marked.  2. Bowel has migrated to the central portion of the peritoneal cavity,  however, there is no inflammatory bowel disease or bowel obstruction.  3. The liver is somewhat contracted and small. There is no focal liver  mass. The spleen is unremarkable. The adrenal glands are normal.  4. The pancreas is negative for mass, cyst or stranding and there is no  high-grade pancreatic atrophy. Patient does have scattered periaortic  retroperitoneal adenopathy without bulky mass. Kidneys are negative for  obstruction or dominant mass. There is moderate anasarca.      IMPRESSION:  1. Marked amount of ascites in the peritoneal cavity with distention.  2. Contracted liver without focal mass.  3. Periaortic retroperitoneal adenopathy.  4. Bilateral small pleural effusions with anasarca  5. Four-chamber cardiomegaly with trace pericardial effusion.  6. No evidence of pancreatic mass, cyst or edema.    Wt Readings from Last 20 Encounters:   02/14/17 150 lb (68 kg)   01/25/17 155 lb 9.6 oz (70.6 kg)   12/07/16 151 lb (68.5 kg)   09/29/16 165 lb 12.6 oz (75.2 kg)   08/31/16 153 lb 4 oz (69.5 kg)   07/25/16 147 lb (66.7 kg)   06/29/16 163 lb 12.8 oz (74.3 kg)   06/21/16 167 lb 1.6 oz (75.8 kg)   06/18/16 159 lb 8 oz (72.3 kg)   05/18/16 164 lb 12.8 oz (74.8 kg)   03/23/16 169 lb 6.4 oz (76.8 kg)   02/10/16 184 lb 4.2 oz (83.6 kg)   06/17/15 199 lb 0.2 oz (90.3 kg)   06/08/15 204 lb 4.1 oz (92.7 kg)   04/21/15 211 lb (95.7 kg)   03/26/15 208 lb 15.9 oz (94.8 kg)   03/16/15 206 lb (93.4 kg)   03/03/15 219 lb 0.1 oz (99.3 kg)   02/13/15 206 lb (93.4 kg)   01/30/15 205 lb 0.1 oz (93 kg)     body mass index is 23.15 kg/(m^2).,Temp: 97.6 °F (36.4 °C),BP: 124/70,Heart Rate: 72   Physical Exam  General Well developed; well nourished; no acute distress.   ENT Good  dentition.  Oral mucosa pink & moist without thrush or lesions.    Neck Neck supple; trachea midline. No thyromegaly   Resp CTA; no rhonchi, rales, or wheezes.  Respiration effort normal  CV RRR; normal S1, S2; no M/R/G. No lower extremity edema  GI Abd soft, TTP in RUQ, ND, small ascites, normal active bowel sounds.  No HSM.  No abd hernia  Skin No rash; no lesions; no bruises.  Skin turgor normal  Musc No clubbing; no cyanosis.  Gait steady  Psych Oriented to time, place, and person.  Appropriate affect  Thank you kindly for allowing me to be part of this patient’s care.    Pt care team: Radha LINDER & Yamilex Self MA  [facility]--Gastroenterology  [phone]    CC: Dr.Kristina ARYAN Matthews MD  [address] / [facility] [address] FAX:[phone]  Dr Camacho  [facility].

## 2017-02-15 LAB
ALP LIVER CFR SERPL: <1 UNITS (ref 0–20)
GLIADIN PEPTIDE IGA SER-ACNC: 3 UNITS (ref 0–19)
GLIADIN PEPTIDE IGG SER-ACNC: 5 UNITS (ref 0–19)
IGA SERPL-MCNC: 175 MG/DL (ref 64–422)
TTG IGA SER-ACNC: <2 U/ML (ref 0–3)
TTG IGG SER-ACNC: 7 U/ML (ref 0–5)

## 2017-02-17 LAB
A1AT SERPL-MCNC: 219 MG/DL (ref 90–200)
PHENOTYPE: ABNORMAL

## 2017-02-23 ENCOUNTER — ANTICOAGULATION VISIT (OUTPATIENT)
Dept: CARDIOLOGY | Facility: CLINIC | Age: 77
End: 2017-02-23

## 2017-02-23 ENCOUNTER — HOSPITAL ENCOUNTER (OUTPATIENT)
Dept: ULTRASOUND IMAGING | Facility: HOSPITAL | Age: 77
Discharge: HOME | End: 2017-02-23
Admitting: NURSE PRACTITIONER

## 2017-02-23 DIAGNOSIS — K74.69 OTHER CIRRHOSIS OF LIVER (HCC): ICD-10-CM

## 2017-02-23 LAB — INR PPP: 3

## 2017-02-23 PROCEDURE — 76705 ECHO EXAM OF ABDOMEN: CPT

## 2017-02-24 NOTE — PROGRESS NOTES
I have reviewed the anticoagulation track calender, labs, and dosage adjustments made by Kat Low RN and I agree.    Electronically signed by KAILASH Sutton 02/23/17 10:41 PM

## 2017-02-27 ENCOUNTER — TELEPHONE (OUTPATIENT)
Dept: GASTROENTEROLOGY | Facility: CLINIC | Age: 77
End: 2017-02-27

## 2017-02-27 DIAGNOSIS — R18.8 ASCITES OF LIVER: Primary | ICD-10-CM

## 2017-02-27 RX ORDER — ALBUMIN (HUMAN) 12.5 G/50ML
SOLUTION INTRAVENOUS
Qty: 1 ML | Refills: 0 | Status: SHIPPED | OUTPATIENT
Start: 2017-02-27 | End: 2017-04-15

## 2017-02-27 NOTE — TELEPHONE ENCOUNTER
Informed patient's  of and ultrasound results.  voiced understanding. Also give him the number to Central Sched[employer] in case patient doesn't hear from them in a 2-3 days. To follow up with them concerning the appointment. Patient will get lab drawn the same day as paracentesis.

## 2017-02-27 NOTE — TELEPHONE ENCOUNTER
----- Message from KAILASH Hinds sent at 2/27/2017  7:28 AM EST -----  Pls let pt know that abd ultrasound shows fluid accumulation around the liver (ascites) again.  I'm ordering for fluid removal (paracentesis) and fluid testing.

## 2017-03-03 ENCOUNTER — APPOINTMENT (OUTPATIENT)
Dept: LAB | Facility: HOSPITAL | Age: 77
End: 2017-03-03
Attending: INTERNAL MEDICINE

## 2017-03-03 LAB
ALBUMIN SERPL-MCNC: 3.7 G/DL (ref 3.2–4.8)
ALBUMIN/GLOB SERPL: 1.1 G/DL (ref 1.5–2.5)
ALP SERPL-CCNC: 90 U/L (ref 25–100)
ALT SERPL W P-5'-P-CCNC: 18 U/L (ref 7–40)
ANION GAP SERPL CALCULATED.3IONS-SCNC: 4 MMOL/L (ref 3–11)
AST SERPL-CCNC: 27 U/L (ref 0–33)
BILIRUB SERPL-MCNC: 0.5 MG/DL (ref 0.3–1.2)
BUN BLD-MCNC: 60 MG/DL (ref 9–23)
BUN/CREAT SERPL: 35.3 (ref 7–25)
CALCIUM SPEC-SCNC: 9.3 MG/DL (ref 8.7–10.4)
CHLORIDE SERPL-SCNC: 103 MMOL/L (ref 99–109)
CO2 SERPL-SCNC: 30 MMOL/L (ref 20–31)
CREAT BLD-MCNC: 1.7 MG/DL (ref 0.6–1.3)
GFR SERPL CREATININE-BSD FRML MDRD: 29 ML/MIN/1.73
GLOBULIN UR ELPH-MCNC: 3.5 GM/DL
GLUCOSE BLD-MCNC: 122 MG/DL (ref 70–100)
POTASSIUM BLD-SCNC: 5 MMOL/L (ref 3.5–5.5)
PROT SERPL-MCNC: 7.2 G/DL (ref 5.7–8.2)
SODIUM BLD-SCNC: 137 MMOL/L (ref 132–146)

## 2017-03-03 PROCEDURE — 36415 COLL VENOUS BLD VENIPUNCTURE: CPT | Performed by: NURSE PRACTITIONER

## 2017-03-03 PROCEDURE — 80053 COMPREHEN METABOLIC PANEL: CPT | Performed by: NURSE PRACTITIONER

## 2017-03-06 ENCOUNTER — HOSPITAL ENCOUNTER (OUTPATIENT)
Dept: CT IMAGING | Facility: HOSPITAL | Age: 77
Discharge: HOME | End: 2017-03-06
Attending: INTERNAL MEDICINE | Admitting: RADIOLOGY

## 2017-03-06 VITALS
TEMPERATURE: 98 F | WEIGHT: 151.6 LBS | BODY MASS INDEX: 23.39 KG/M2 | HEART RATE: 74 BPM | SYSTOLIC BLOOD PRESSURE: 105 MMHG | OXYGEN SATURATION: 100 % | DIASTOLIC BLOOD PRESSURE: 46 MMHG | RESPIRATION RATE: 18 BRPM

## 2017-03-06 DIAGNOSIS — R18.8 ASCITES OF LIVER: ICD-10-CM

## 2017-03-06 LAB
APPEARANCE FLD: CLEAR
APTT PPP: 29.4 SECONDS (ref 24–31)
COLOR FLD: YELLOW
GLUCOSE BLDC GLUCOMTR-MCNC: 111 MG/DL (ref 70–130)
INR PPP: 1.35
LDH FLD-CCNC: 87 U/L
LYMPHOCYTES NFR FLD MANUAL: 62 %
MACROPHAGE FLUID: 6 %
MESOTHL CELL NFR FLD MANUAL: 15 %
MONOCYTES NFR FLD: 8 %
NEUTROPHILS NFR FLD MANUAL: 9 %
PLATELET # BLD AUTO: 255 10*3/MM3 (ref 150–450)
PROT FLD-MCNC: 3.9 G/DL
PROTHROMBIN TIME: 14.8 SECONDS (ref 9.6–11.5)
RBC # FLD AUTO: <1000 /MM3
WBC # FLD: 240 /MM3

## 2017-03-06 PROCEDURE — 25010000002 ALBUMIN HUMAN 25% PER 50 ML: Performed by: RADIOLOGY

## 2017-03-06 PROCEDURE — A9270 NON-COVERED ITEM OR SERVICE: HCPCS | Performed by: RADIOLOGY

## 2017-03-06 PROCEDURE — 85730 THROMBOPLASTIN TIME PARTIAL: CPT | Performed by: RADIOLOGY

## 2017-03-06 PROCEDURE — P9046 ALBUMIN (HUMAN), 25%, 20 ML: HCPCS | Performed by: RADIOLOGY

## 2017-03-06 PROCEDURE — 85610 PROTHROMBIN TIME: CPT | Performed by: RADIOLOGY

## 2017-03-06 PROCEDURE — 87070 CULTURE OTHR SPECIMN AEROBIC: CPT | Performed by: RADIOLOGY

## 2017-03-06 PROCEDURE — 83615 LACTATE (LD) (LDH) ENZYME: CPT | Performed by: NURSE PRACTITIONER

## 2017-03-06 PROCEDURE — 82042 OTHER SOURCE ALBUMIN QUAN EA: CPT | Performed by: NURSE PRACTITIONER

## 2017-03-06 PROCEDURE — 85049 AUTOMATED PLATELET COUNT: CPT | Performed by: RADIOLOGY

## 2017-03-06 PROCEDURE — 75989 ABSCESS DRAINAGE UNDER X-RAY: CPT

## 2017-03-06 PROCEDURE — 63710000001 ALPRAZOLAM 0.5 MG TABLET: Performed by: RADIOLOGY

## 2017-03-06 PROCEDURE — 84157 ASSAY OF PROTEIN OTHER: CPT | Performed by: NURSE PRACTITIONER

## 2017-03-06 PROCEDURE — 87015 SPECIMEN INFECT AGNT CONCNTJ: CPT | Performed by: RADIOLOGY

## 2017-03-06 PROCEDURE — 89051 BODY FLUID CELL COUNT: CPT | Performed by: NURSE PRACTITIONER

## 2017-03-06 PROCEDURE — 82962 GLUCOSE BLOOD TEST: CPT

## 2017-03-06 PROCEDURE — 87205 SMEAR GRAM STAIN: CPT | Performed by: RADIOLOGY

## 2017-03-06 RX ORDER — LIDOCAINE HYDROCHLORIDE 10 MG/ML
10 INJECTION, SOLUTION INFILTRATION; PERINEURAL ONCE
Status: COMPLETED | OUTPATIENT
Start: 2017-03-06 | End: 2017-03-06

## 2017-03-06 RX ORDER — ALBUMIN (HUMAN) 12.5 G/50ML
12.5 SOLUTION INTRAVENOUS ONCE
Status: DISCONTINUED | OUTPATIENT
Start: 2017-03-06 | End: 2017-03-06

## 2017-03-06 RX ORDER — ALBUMIN (HUMAN) 12.5 G/50ML
12.5 SOLUTION INTRAVENOUS AS NEEDED
Status: DISCONTINUED | OUTPATIENT
Start: 2017-03-06 | End: 2017-03-06 | Stop reason: HOSPADM

## 2017-03-06 RX ORDER — ALPRAZOLAM 0.5 MG/1
0.5 TABLET ORAL
Status: COMPLETED | OUTPATIENT
Start: 2017-03-06 | End: 2017-03-06

## 2017-03-06 RX ORDER — SODIUM CHLORIDE 0.9 % (FLUSH) 0.9 %
1-10 SYRINGE (ML) INJECTION AS NEEDED
Status: DISCONTINUED | OUTPATIENT
Start: 2017-03-06 | End: 2017-03-06 | Stop reason: HOSPADM

## 2017-03-06 RX ADMIN — ALBUMIN HUMAN 6.25 G: 250 SOLUTION INTRAVENOUS at 11:30

## 2017-03-06 RX ADMIN — ALPRAZOLAM 0.5 MG: 0.5 TABLET ORAL at 08:02

## 2017-03-06 RX ADMIN — LIDOCAINE HYDROCHLORIDE 10 ML: 10 INJECTION, SOLUTION INFILTRATION; PERINEURAL at 09:35

## 2017-03-06 RX ADMIN — ALBUMIN HUMAN 12.5 G: 250 SOLUTION INTRAVENOUS at 12:19

## 2017-03-06 RX ADMIN — ALBUMIN HUMAN 12.5 G: 250 SOLUTION INTRAVENOUS at 10:49

## 2017-03-07 ENCOUNTER — TELEPHONE (OUTPATIENT)
Dept: INFUSION THERAPY | Facility: HOSPITAL | Age: 77
End: 2017-03-07

## 2017-03-07 LAB — ALBUMIN FLD-MCNC: 2.3 G/DL

## 2017-03-07 NOTE — TELEPHONE ENCOUNTER
@FLOW([id number])@    Other Comments:

## 2017-03-08 ENCOUNTER — OFFICE VISIT (OUTPATIENT)
Dept: GASTROENTEROLOGY | Facility: CLINIC | Age: 77
End: 2017-03-08

## 2017-03-08 VITALS
WEIGHT: 145.6 LBS | BODY MASS INDEX: 22.07 KG/M2 | HEART RATE: 73 BPM | DIASTOLIC BLOOD PRESSURE: 60 MMHG | OXYGEN SATURATION: 97 % | SYSTOLIC BLOOD PRESSURE: 90 MMHG | TEMPERATURE: 97.7 F | HEIGHT: 68 IN

## 2017-03-08 DIAGNOSIS — R18.8 OTHER ASCITES: Primary | ICD-10-CM

## 2017-03-08 DIAGNOSIS — Z79.01 LONG TERM CURRENT USE OF ANTICOAGULANT: ICD-10-CM

## 2017-03-08 DIAGNOSIS — D64.9 NORMOCYTIC NORMOCHROMIC ANEMIA: ICD-10-CM

## 2017-03-08 PROCEDURE — 99214 OFFICE O/P EST MOD 30 MIN: CPT | Performed by: NURSE PRACTITIONER

## 2017-03-08 NOTE — PROGRESS NOTES
ESTABLISHED PATIENT NOTE  Patient: LIDIA MARROQUIN : 1940  Date of Service: 2017  CC: Follow-up (Cirrhosis)    Assessment/Plan                                             ASSESSMENT & PLANS     Recurrent ascites.  No liver cirrhosis (per imaging). No LFT elevation. No autoimmune hepatitis or hepatitis infection.  (s/p paracentesis April, September, and 2016.  Mar 2017)    · SAAG gradient was 1.4 (Serum albumin = 3.7 and ascitic fluid albumin = 2.3), and ascitic fluid protein was 3.6.  This indicates that pt's ascites is d/t heart failure (right sided) or nephrotic syndrome. Pt has multiple co-morbidities w/ atrial fibrillation (s/p EVITA cardioversion x1, pacemaker implantation for tachybrady syndrome 2016), diastolic heart failure, moderate pulmonary hypertension  · Continue current diuretics.   · Follow up with recent referral for dietician for low sodium diet education.   · Minimize paracentesis if possible to prevent spontaneous bacterial peritonitis. If must do paracentesis, give 5 g of albumin per each liter over 4 L of ascitic fluid.  I told pt that I can order for it or her cardiologist/pulmonologist can as well.  · Follow up with cardiologist and pulmonologist closely for mgt of heart failure and pulm HTN.  Pt is not scheduled to see Dr Cooper (cardiology) until 2017.  No established appt w/ pulmonologist at this time.  Pt previously saw Dr Johan Thao. I ask pt to f/u w/ cardiologist and pulmonologist sooner than 2017.  Will also send them my note.     Chronic, stable normocytic normochromic anemia.  Normal TIBC; normal ferritin; Low iron. Normal B12 and folate. It is likely that anemia is r/t chronic diseases.  Prior readings noted w/ elevated LDH and low haptoglobin.  Poss some form of hemolysis, but no recent result. No evidence of gross bleeding.  Monitor.     Long term current use of anticoagulant     Follow Up: Return if symptoms worsen or fail to  improve, for Recheck.      DISCUSSION: The patient’s case was discussed with Dr. Espinoza, and we collaborated pt’s plan of care. I spent 30 minutes face to face w/ the patient and spend over 50% spend on counseling & coordination of care.The above plan was delineated in details with patient and son and all questions and concerns were answered.  Patient is also given contact information.  Patient is to return as scheduled or sooner if new problems arise.   Subjective                                                     SUBJECTIVE   History of Present Illness  Ms. Sarita Alegre is a 77 y.o.  female, distant pt of Dr Carrillo, w/ multiple medical problems (including atrial fibrillation (s/p pacemaker), diastolic heart failure, pulmonary hypertension, hyperlipidemia, type 2 diabetes, CKD w/ hx of dialysis, etc.).  In 2016, she developed recurrent ascites, requiring recurrent large volume paracentesis. She was previously also followed by Dr. Muñoz and has had an EGD, colonoscopy, and capsule study done. No liver cirrhosis (per imaging) or LFT elevation. The cause of her ascites is unclear.  She was then referred here for further eval. Pt presents to clinic today for follow-up.    Autoimmune hepatitis workup was negative. Her hepatitis panel is negative. Seldom alcohol hx.  Repeat liver US showed ascites again.  I sent pt for CT-guided paracentesis, which was done on 3/6/17 and had 4.2 L of fluid removed.  Albumin was also given.   Pt denies any abdominal pain, including right upper quadrant abdominal pain. Pt has went not gone to dietitian as recently consulted for low sodium diet.     Pt denies jaundice, pruritus, stool or urine color changes, palmar erythema, or any skin changes. No change in bowel habits. Pt denies dark black stools or bright red blood in the stools, in the toilet bowl, or on the toilet tissue.  No diarrhea or constipation.  Stool character & consistency have been normal. Pt reports of  being scheduled w/ Dr Muñoz for another test, which pt's son does not know the name, in the next couple wk's.  Son does not know the reason for the test either, but son thought that it was done d/t anemia, which she has had at least since 11/2015.     ROS:Review of Systems   Constitutional: Positive for unexpected weight change (loss).        Pt currently or recently takes NSAIDS ( (i.e Ibuprofen, Aleve, Advil, Exedrin, BC Powder, diclofenac, meloxicam, & Naproxen, etc)? No    Pt currently or recently takes abx? No   HENT: Negative.    Eyes: Negative.    Respiratory: Positive for shortness of breath (better).    Cardiovascular: Negative.    Gastrointestinal: Positive for abdominal pain.   Endocrine: Negative.    Genitourinary: Negative.    Musculoskeletal: Positive for back pain.   Skin: Negative.    Allergic/Immunologic: Negative.    Neurological: Positive for weakness and light-headedness.   Hematological: Negative.    Psychiatric/Behavioral: Negative.      Objective                                                           OBJECTIVE   MEDS: •  albumin human (ALBUMIN-ZLB) 25 % 25%, pls give 8 gram of albumin IV for every liter of ascitic fluid removed, Disp: 1 mL, Rfl: 0  •  albuterol (PROVENTIL HFA;VENTOLIN HFA) 108 (90 BASE) MCG/ACT inhaler, Inhale 2 puffs Every 6 (Six) Hours As Needed for wheezing or shortness of air., Disp: 1 inhaler, Rfl: 5  •  atorvastatin (LIPITOR) 40 MG tablet, TAKE ONE TABLET BY MOUTH AT BEDTIME, Disp: 90 tablet, Rfl: 1  •  Biotin 5000 MCG sublingual tablet, , Disp: , Rfl:   •  bumetanide (BUMEX) 1 MG tablet, Take 1 mg by mouth daily., Disp: , Rfl:   •  cholecalciferol (VITAMIN D3) 1000 UNITS tablet, Take 1,000 Units by mouth daily., Disp: , Rfl:   •  DULoxetine (CYMBALTA) 30 MG capsule, Take 30 mg by mouth daily., Disp: , Rfl:   •  Fluticasone Furoate-Vilanterol 100-25 MCG/INH aerosol powder , Inhale 1 puff Daily., Disp: 1 each, Rfl: 11  •  HYDROcodone-acetaminophen (NORCO) 5-325 MG  per tablet, Take 1 tablet by mouth Every 6 (Six) Hours As Needed. 11/20 pm, Disp: , Rfl:   •  levothyroxine (SYNTHROID, LEVOTHROID) 100 MCG tablet, Take 100 mcg by mouth daily., Disp: , Rfl:   •  metoprolol tartrate (LOPRESSOR) 25 MG tablet, Take 1 tablet by mouth every 12 (twelve) hours., Disp: 180 tablet, Rfl: 3  •  montelukast (SINGULAIR) 10 MG tablet, Take 10 mg by mouth every night., Disp: , Rfl:   •  Probiotic Product (ALIGN) 4 MG capsule, Take  by mouth Daily., Disp: , Rfl:   •  spironolactone (ALDACTONE) 25 MG tablet, Take 25 mg by mouth Daily., Disp: , Rfl:   •  warfarin (COUMADIN) 1 MG tablet, Take 1 tablet by mouth Daily. 1 tab po daily or as directed, Wed/ sat.  2 mg, Disp: 90 tablet, Rfl: 1    Lab Results   Component Value Date     03/03/2017    K 5.0 03/03/2017     03/03/2017    CO2 30.0 03/03/2017    BUN 60 (H) 03/03/2017    CREATININE 1.70 (H) 03/03/2017    GLUCOSE 122 (H) 03/03/2017    CALCIUM 9.3 03/03/2017    ANIONGAP 4.0 03/03/2017     Autoimmune Markers  No results found for: ANADIRECT  Anti-Mitochondrial Antibody (AMA)   Lab Results   Component Value Date    MITOAB <20.0 08/27/2015     Anti-Smooth Muscle Antibody  Lab Results   Component Value Date    SMOOTHMUSCAB 11 08/27/2015     Liver-Kidney Antibody  Lab Results   Component Value Date    LABLIVE <1.0 02/14/2017     Alpha-1 Antitrypsin Phenotype  Lab Results   Component Value Date    AFPTM 219 (H) 02/14/2017    PHENOTYPE MM 02/14/2017     Lab Results   Component Value Date    AST 27 03/03/2017    AST 32 02/14/2017    AST 32 01/24/2017    ALT 18 03/03/2017    ALT 16 02/14/2017    ALT 20 01/24/2017    ALKPHOS 90 03/03/2017    ALKPHOS 90 02/14/2017    ALKPHOS 86 01/24/2017    BILITOT 0.5 03/03/2017    ALBUMIN 3.70 03/03/2017    LIPASE 26 02/26/2016    LIPASE 38 11/05/2015    LIPASE 33 08/21/2015    LIPASE 79 (H) 01/16/2015     Lab Results   Component Value Date    HEPAIGM NonReactive 08/26/2015    HEPBSAG NonReactive 08/28/2015     HEPBIGMCORE NonReactive 08/28/2015    HEPCVIRUSABY NonReactive 08/28/2015     Celiac Panel  Lab Results   Component Value Date    GDPABIGA 3 02/14/2017    DEAMIGG 5 02/14/2017    TTRANSGLIGA <2 02/14/2017    TSUTRANIGG 7 (H) 02/14/2017     02/14/2017     Anemia Profile            Lab Results   Component Value Date     HGB 9.7 (L) 02/14/2017     HGB 9.7 (L) 01/24/2017     HGB 9.7 (L) 01/24/2017     HGB 8.5 (L) 09/21/2016     HGB 9.2 (L) 08/31/2016     HGB 11.1 (L) 06/21/2016     HGB 10.6 (L) 06/19/2016     HGB 10.7 (L) 06/18/2016     HGB 10.7 (L) 06/16/2016     HGB 10.8 (L) 06/15/2016     HGB 8.3 (L) 06/04/2016     HGB 8.3 (L) 06/03/2016     HGB 8.5 (L) 06/02/2016     HGB 8.3 (L) 06/02/2016     HGB 9.3 (L) 05/26/2016     HGB 9.2 (L) 04/12/2016     HGB 9.5 (L) 02/26/2016     HGB 9.0 (L) 11/10/2015     HGB 9.0 (L) 11/07/2015     HGB 9.8 (L) 11/06/2015            Lab Results   Component Value Date     HCT 30.7 (L) 02/14/2017     HCT 32.6 (L) 01/24/2017     HCT 32.3 (L) 01/24/2017     HCT 28.6 (L) 09/21/2016     HCT 28.8 (L) 08/31/2016     HCT 36.7 06/21/2016     HCT 33.9 (L) 06/19/2016     HCT 33.9 (L) 06/18/2016     HCT 33.7 (L) 06/16/2016     HCT 33.8 (L) 06/15/2016     HCT 26.7 (L) 06/04/2016     HCT 27.1 (L) 06/03/2016     HCT 27.2 (L) 06/02/2016     HCT 27.2 (L) 06/02/2016     HCT 31.0 (L) 05/26/2016     HCT 30.3 (L) 04/12/2016     HCT 30.6 (L) 02/26/2016     HCT 28.6 (L) 11/10/2015     HCT 28.2 (L) 11/07/2015     HCT 30.9 (L) 11/06/2015            Lab Results   Component Value Date     MCV 82.1 02/14/2017     MCV 83.8 01/24/2017     MCV 83.7 01/24/2017     MCHC 31.6 (L) 02/14/2017     MCHC 29.8 (L) 01/24/2017     MCHC 30.0 (L) 01/24/2017            Lab Results   Component Value Date     WBC 7.50 02/14/2017     WBC 5.36 01/24/2017     WBC 5.43 01/24/2017      03/06/2017      02/14/2017      01/24/2017      01/24/2017            Lab Results   Component Value Date     INR 1.35  03/06/2017     INR 3.00 02/23/2017     INR 2.20 02/10/2017      PRBC Transfusions  No results found for: UNITABO  BUN Creatine Ratio (Normal 7.0 - 25.0)        Lab Results   Component Value Date     BCR 35.3 (H) 03/03/2017     BCR 28.3 (H) 02/14/2017      Stool Hemoccult Testing        Lab Results   Component Value Date     OCCULTBLD Positive (A) 06/02/2016     OCCULTBLD Positive (A) 07/05/2015     OCCULTBLD No specimen received. 07/05/2015     OCCULTBLD No specimen received. 07/05/2015         TIBC (Normal 250 - 450 ug/dL)        Lab Results   Component Value Date     TIBC 377 02/14/2017     TIBC 362 01/24/2017     TIBC 418 12/14/2016     TIBC 341 06/03/2016     TIBC 389 04/12/2016      Serum Iron (Normal 38 - 169 ug/dL)  No results found for: IRONSERUM  Transferrin or Iron Saturation % (Normal 15 - 55 %)        Lab Results   Component Value Date     LABIRON 10 (L) 02/14/2017     LABIRON 12 (L) 01/24/2017     LABIRON 8 (L) 12/14/2016      Ferritin (Normal 20.00 - 291.00 ng/mL)        Lab Results   Component Value Date     FERRITIN 41.00 02/14/2017     FERRITIN 22.00 12/14/2016     FERRITIN 344 (H) 06/03/2016     FERRITIN 37 04/12/2016     FERRITIN 72 10/13/2015      Reticulocytes (Normal 0.50 - 1.50 %)        Lab Results   Component Value Date     RETICCTPCT 5.24 (H) 06/03/2016     RETICCTPCT 2.67 (H) 09/12/2015     RETIC 0 06/03/2016     RETIC 0 09/12/2015       Erythrocyte Sedimentation Rate (ESR) (Normal 0 - 20 mm/hr)        Lab Results   Component Value Date     SEDRATE 19 04/21/2015      Peripheral Blood Smear  No results found for: PATHREVIEW, PERFORMEDBY, PATHINTERP  Soluble Transferrin Receptor (Normal 12.2 - 27.3 nmol/L)  No results found for: TRNFRCP  Vitamin B 12 (Normal 211 - 946 pg/mL)  Lab Results   Component Value Date     TCHDCJBO61 464 06/03/2016     BVWBSHKB36 1504 (H) 11/05/2015     IFXPVNVV13 >2000 (H) 08/26/2015     UJLLVCIF29 572 06/29/2015     OBEJSPBP74 516 05/20/2015      Folate (Normal  >3.0 ng/mL)  Lab Results   Component Value Date     FOLATE 7.6 06/03/2016     FOLATE 15.9 08/26/2015     FOLATE 15.6 06/29/2015     FOLATE 10.3 05/20/2015      LDH (Normal 120 - 246 units/L)        Lab Results   Component Value Date      (H) 09/12/2015      (H) 09/10/2015      (H) 09/09/2015      (H) 08/27/2015      (H) 06/29/2015      Haptoglobin (Normal 34 - 200 mg/dL)        Lab Results   Component Value Date     HAPTOGLOBIN 62 09/09/2015     HAPTOGLOBIN 62 09/09/2015     HAPTOGLOBIN 205 (H) 06/29/2015       Wt Readings from Last 5 Encounters:   03/08/17 145 lb 9.6 oz (66 kg)   03/06/17 151 lb 9.6 oz (68.8 kg)   02/14/17 150 lb (68 kg)   01/25/17 155 lb 9.6 oz (70.6 kg)   12/07/16 151 lb (68.5 kg)   body mass index is 22.47 kg/(m^2).,Temp: 97.7 °F (36.5 °C),BP: 90/60,Heart Rate: 73   Physical Exam  General Well developed; well nourished; no acute distress.   ENT Good dentition.  Oral mucosa pink & moist without thrush or lesions.    GI Abd soft, NT, ND, normal active bowel sounds.  No HSM.  No abd hernia    Pt care team: Radha LINDER & Ca[facility]--Gastroenterology  [phone]    CC: Dr.Thomas CINDI Moreland MD   [address] [address] FAX:[phone]

## 2017-03-08 NOTE — PATIENT INSTRUCTIONS
Cirrhosis  Cirrhosis is long-term (chronic) liver injury. The liver is your largest internal organ, and it performs many functions. The liver converts food into energy, removes toxic material from your blood, makes important proteins, and absorbs necessary vitamins from your diet.  If you have cirrhosis, it means many of your healthy liver cells have been replaced by scar tissue. This prevents blood from flowing through your liver, which makes it difficult for your liver to function. This scarring is not reversible, but treatment can prevent it from getting worse.   CAUSES   Hepatitis C and long-term alcohol abuse are the most common causes of cirrhosis. Other causes include:  · Nonalcoholic fatty liver disease.  · Hepatitis B infection.  · Autoimmune hepatitis.  · Diseases that cause blockage of ducts inside the liver.  · Inherited liver diseases.  · Reactions to certain long-term medicines.  · Parasitic infections.  · Long-term exposure to certain toxins.  RISK FACTORS  You may have a higher risk of cirrhosis if you:  · Have certain hepatitis viruses.  · Abuse alcohol, especially if you are female.  · Are overweight.  · Share needles.  · Have unprotected sex with someone who has hepatitis.  SYMPTOMS   You may not have any signs and symptoms at first. Symptoms may not develop until the damage to your liver starts to get worse. Signs and symptoms of cirrhosis may include:   · Tenderness in the right-upper part of your abdomen.  · Weakness and tiredness (fatigue).  · Loss of appetite.  · Nausea.  · Weight loss and muscle loss.  · Itchiness.  · Yellow skin and eyes (jaundice).  · Buildup of fluid in the abdomen (ascites).  · Swelling of the feet and ankles (edema).  · Appearance of tiny blood vessels under the skin.  · Mental confusion.  · Easy bruising and bleeding.  DIAGNOSIS   Your health care provider may suspect cirrhosis based on your symptoms and medical history, especially if you have other medical conditions  or a history of alcohol abuse. Your health care provider will do a physical exam to feel your liver and check for signs of cirrhosis. Your health care provider may perform other tests, including:   · Blood tests to check:      Whether you have hepatitis B or C.      Kidney function.    Liver function.  · Imaging tests such as:    MRI or CT scan to look for changes seen in advanced cirrhosis.    Ultrasound to see if normal liver tissue is being replaced by scar tissue.  · A procedure using a long needle to take a sample of liver tissue (biopsy) for examination under a microscope. Liver biopsy can confirm the diagnosis of cirrhosis.    TREATMENT   Treatment depends on how damaged your liver is and what caused the damage. Treatment may include treating cirrhosis symptoms or treating the underlying causes of the condition to try to slow the progression of the damage. Treatment may include:  · Making lifestyle changes, such as:      Eating a healthy diet.    Restricting salt intake.     Maintaining a healthy weight.      Not abusing drugs or alcohol.  · Taking medicines to:    Treat liver infections or other infections.    Control itching.    Reduce fluid buildup.    Reduce certain blood toxins.    Reduce risk of bleeding from enlarged blood vessels in the stomach or esophagus (varices).  · If varices are causing bleeding problems, you may need treatment with a procedure that ties up the vessels causing them to fall off (band ligation).  · If cirrhosis is causing your liver to fail, your health care provider may recommend a liver transplant.  · Other treatments may be recommended depending on any complications of cirrhosis, such as liver-related kidney failure (hepatorenal syndrome).  HOME CARE INSTRUCTIONS   · Take medicines only as directed by your health care provider. Do not use drugs that are toxic to your liver. Ask your health care provider before taking any new medicines, including over-the-counter medicines.     · Rest as needed.  · Eat a well-balanced diet. Ask your health care provider or dietitian for more information.    · You may have to follow a low-salt diet or restrict your water intake as directed.  · Do not drink alcohol. This is especially important if you are taking acetaminophen.  · Keep all follow-up visits as directed by your health care provider. This is important.  SEEK MEDICAL CARE IF:  · You have fatigue or weakness that is getting worse.  · You develop swelling of the hands, feet, legs, or face.  · You have a fever.  · You develop loss of appetite.  · You have nausea or vomiting.  · You develop jaundice.  · You develop easy bruising or bleeding.  SEEK IMMEDIATE MEDICAL CARE IF:  · You vomit bright red blood or a material that looks like coffee grounds.  · You have blood in your stools.  · Your stools appear black and tarry.  · You become confused.  · You have chest pain or trouble breathing.     This information is not intended to replace advice given to you by your health care provider. Make sure you discuss any questions you have with your health care provider.     Document Released: 12/18/2006 Document Revised: 11/28/2016 Document Reviewed: 08/26/2015  [employer] Interactive Patient Education ©2016 [employer]

## 2017-03-08 NOTE — PROGRESS NOTES
Anemia Profile     Lab Results   Component Value Date    HGB 9.7 (L) 02/14/2017    HGB 9.7 (L) 01/24/2017    HGB 9.7 (L) 01/24/2017    HGB 8.5 (L) 09/21/2016    HGB 9.2 (L) 08/31/2016    HGB 11.1 (L) 06/21/2016    HGB 10.6 (L) 06/19/2016    HGB 10.7 (L) 06/18/2016    HGB 10.7 (L) 06/16/2016    HGB 10.8 (L) 06/15/2016    HGB 8.3 (L) 06/04/2016    HGB 8.3 (L) 06/03/2016    HGB 8.5 (L) 06/02/2016    HGB 8.3 (L) 06/02/2016    HGB 9.3 (L) 05/26/2016    HGB 9.2 (L) 04/12/2016    HGB 9.5 (L) 02/26/2016    HGB 9.0 (L) 11/10/2015    HGB 9.0 (L) 11/07/2015    HGB 9.8 (L) 11/06/2015     Lab Results   Component Value Date    HCT 30.7 (L) 02/14/2017    HCT 32.6 (L) 01/24/2017    HCT 32.3 (L) 01/24/2017    HCT 28.6 (L) 09/21/2016    HCT 28.8 (L) 08/31/2016    HCT 36.7 06/21/2016    HCT 33.9 (L) 06/19/2016    HCT 33.9 (L) 06/18/2016    HCT 33.7 (L) 06/16/2016    HCT 33.8 (L) 06/15/2016    HCT 26.7 (L) 06/04/2016    HCT 27.1 (L) 06/03/2016    HCT 27.2 (L) 06/02/2016    HCT 27.2 (L) 06/02/2016    HCT 31.0 (L) 05/26/2016    HCT 30.3 (L) 04/12/2016    HCT 30.6 (L) 02/26/2016    HCT 28.6 (L) 11/10/2015    HCT 28.2 (L) 11/07/2015    HCT 30.9 (L) 11/06/2015     Lab Results   Component Value Date    MCV 82.1 02/14/2017    MCV 83.8 01/24/2017    MCV 83.7 01/24/2017    MCHC 31.6 (L) 02/14/2017    MCHC 29.8 (L) 01/24/2017    MCHC 30.0 (L) 01/24/2017     Lab Results   Component Value Date    WBC 7.50 02/14/2017    WBC 5.36 01/24/2017    WBC 5.43 01/24/2017     03/06/2017     02/14/2017     01/24/2017     01/24/2017     Lab Results   Component Value Date    INR 1.35 03/06/2017    INR 3.00 02/23/2017    INR 2.20 02/10/2017     PRBC Transfusions  No results found for: UNITABO  BUN Creatine Ratio (Normal 7.0 - 25.0)  Lab Results   Component Value Date    BCR 35.3 (H) 03/03/2017    BCR 28.3 (H) 02/14/2017     Stool Hemoccult Testing  Lab Results   Component Value Date    OCCULTBLD Positive (A) 06/02/2016    OCCULTBLD  Positive (A) 07/05/2015    OCCULTBLD No specimen received. 07/05/2015    OCCULTBLD No specimen received. 07/05/2015       TIBC (Normal 250 - 450 ug/dL)  Lab Results   Component Value Date    TIBC 377 02/14/2017    TIBC 362 01/24/2017    TIBC 418 12/14/2016    TIBC 341 06/03/2016    TIBC 389 04/12/2016     Serum Iron (Normal 38 - 169 ug/dL)  No results found for: IRONSERUM  Transferrin or Iron Saturation % (Normal 15 - 55 %)  Lab Results   Component Value Date    LABIRON 10 (L) 02/14/2017    LABIRON 12 (L) 01/24/2017    LABIRON 8 (L) 12/14/2016     Ferritin (Normal 20.00 - 291.00 ng/mL)  Lab Results   Component Value Date    FERRITIN 41.00 02/14/2017    FERRITIN 22.00 12/14/2016    FERRITIN 344 (H) 06/03/2016    FERRITIN 37 04/12/2016    FERRITIN 72 10/13/2015     Reticulocytes (Normal 0.50 - 1.50 %)  Lab Results   Component Value Date    RETICCTPCT 5.24 (H) 06/03/2016    RETICCTPCT 2.67 (H) 09/12/2015    RETIC 0 06/03/2016    RETIC 0 09/12/2015      Erythrocyte Sedimentation Rate (ESR) (Normal 0 - 20 mm/hr)  Lab Results   Component Value Date    SEDRATE 19 04/21/2015     Peripheral Blood Smear  No results found for: PATHREVIEW, PERFORMEDBY, PATHINTERP  Soluble Transferrin Receptor (Normal 12.2 - 27.3 nmol/L)  No results found for: TRNFRCP  Vitamin B 12 (Normal 211 - 946 pg/mL)  Lab Results   Component Value Date    [id number] 464 06/03/2016    HQYSUODN16 1504 (H) 11/05/2015    JTTQTJWK73 >2000 (H) 08/26/2015    GKLMRRFN37 572 06/29/2015    NXVJRDCO18 516 05/20/2015     Folate (Normal >3.0 ng/mL)  Lab Results   Component Value Date    FOLATE 7.6 06/03/2016    FOLATE 15.9 08/26/2015    FOLATE 15.6 06/29/2015    FOLATE 10.3 05/20/2015     LDH (Normal 120 - 246 units/L)  Lab Results   Component Value Date     (H) 09/12/2015     (H) 09/10/2015     (H) 09/09/2015     (H) 08/27/2015     (H) 06/29/2015     Haptoglobin (Normal 34 - 200 mg/dL)  Lab Results   Component Value Date     HAPTOGLOBIN 62 09/09/2015    HAPTOGLOBIN 62 09/09/2015    HAPTOGLOBIN 205 (H) 06/29/2015     Erythropoietin (Normal 2.6 - 18.5 mIU/mL)  No results found for: EPOREFLAB  Methylmalonic Acid (Normal 0 - 378 nmol/L)  No results found for: METHYLACID  TSH (Normal 0.350 - 5.350 mIU/mL)  Lab Results   Component Value Date    TSH 3.538 08/31/2016    TSH 4.438 06/15/2016    TSH 3.485 06/03/2016    TSH 2.797 11/05/2015    TSH 8.509 (H) 08/26/2015    FREET4 1.19 08/31/2016    FREET4 0.86 (L) 08/30/2015    FREET4 1.26 05/17/2015

## 2017-03-09 ENCOUNTER — ANTICOAGULATION VISIT (OUTPATIENT)
Dept: CARDIOLOGY | Facility: CLINIC | Age: 77
End: 2017-03-09

## 2017-03-09 LAB — INR PPP: 1.3

## 2017-03-09 NOTE — PROGRESS NOTES
I have reviewed the anticoagulation track calender, labs, and dosage adjustments made by Kat Low RN and I agree.    Electronically signed by KAILASH Sutton 03/09/17 4:11 PM

## 2017-03-09 NOTE — PATIENT INSTRUCTIONS
Yodit  nurse given new dosage and says that she is being dismissed from  and she will get her work done at [facility].  I will an order in her chart.  She had been of for a thoracentesis.

## 2017-03-13 LAB
BACTERIA FLD CULT: NORMAL
GRAM STN SPEC: NORMAL

## 2017-03-15 DIAGNOSIS — I48.0 PAROXYSMAL ATRIAL FIBRILLATION (HCC): Primary | ICD-10-CM

## 2017-03-16 ENCOUNTER — LAB (OUTPATIENT)
Dept: LAB | Facility: HOSPITAL | Age: 77
End: 2017-03-16
Attending: INTERNAL MEDICINE

## 2017-03-16 ENCOUNTER — TELEPHONE (OUTPATIENT)
Dept: CARDIOLOGY | Facility: CLINIC | Age: 77
End: 2017-03-16

## 2017-03-16 DIAGNOSIS — I48.0 PAROXYSMAL ATRIAL FIBRILLATION (HCC): ICD-10-CM

## 2017-03-16 LAB
INR PPP: 1.61
PROTHROMBIN TIME: 17.8 SECONDS (ref 9.6–11.5)

## 2017-03-16 PROCEDURE — 85610 PROTHROMBIN TIME: CPT | Performed by: NURSE PRACTITIONER

## 2017-03-17 ENCOUNTER — ANTICOAGULATION VISIT (OUTPATIENT)
Dept: CARDIOLOGY | Facility: CLINIC | Age: 77
End: 2017-03-17

## 2017-03-17 NOTE — PATIENT INSTRUCTIONS
Mr Alegre says that Sarita is having an EGD on the 21st and to stop the Warfarin 3 days prior.  Advised him to have her restart same day unless other instructions are given.  To restart 2 mg qd and to recheck 1 week  AH

## 2017-03-20 NOTE — PROGRESS NOTES
I have reviewed the anticoagulation track calender, labs, and dosage adjustments made by Kat Low RN and I agree.    Electronically signed by KAILASH Sutton 03/20/17 11:45 AM

## 2017-03-23 ENCOUNTER — CLINICAL SUPPORT NO REQUIREMENTS (OUTPATIENT)
Dept: CARDIOLOGY | Facility: CLINIC | Age: 77
End: 2017-03-23

## 2017-03-23 DIAGNOSIS — I48.19 ATRIAL FIBRILLATION, PERSISTENT (HCC): ICD-10-CM

## 2017-03-23 PROCEDURE — 93294 REM INTERROG EVL PM/LDLS PM: CPT | Performed by: INTERNAL MEDICINE

## 2017-03-23 PROCEDURE — 93296 REM INTERROG EVL PM/IDS: CPT | Performed by: INTERNAL MEDICINE

## 2017-03-28 LAB — INR PPP: 1.9

## 2017-04-03 ENCOUNTER — ANTICOAGULATION VISIT (OUTPATIENT)
Dept: CARDIOLOGY | Facility: CLINIC | Age: 77
End: 2017-04-03

## 2017-04-15 ENCOUNTER — HOSPITAL ENCOUNTER (INPATIENT)
Facility: HOSPITAL | Age: 77
LOS: 4 days | Discharge: HOME | End: 2017-04-19
Attending: EMERGENCY MEDICINE | Admitting: INTERNAL MEDICINE

## 2017-04-15 ENCOUNTER — APPOINTMENT (OUTPATIENT)
Dept: GENERAL RADIOLOGY | Facility: HOSPITAL | Age: 77
End: 2017-04-15

## 2017-04-15 DIAGNOSIS — K92.2 UPPER GI HEMORRHAGE: ICD-10-CM

## 2017-04-15 DIAGNOSIS — R79.1 ELEVATED INR: ICD-10-CM

## 2017-04-15 DIAGNOSIS — D62 ANEMIA DUE TO ACUTE BLOOD LOSS: ICD-10-CM

## 2017-04-15 DIAGNOSIS — K92.2 ACUTE UPPER GI BLEED: Primary | ICD-10-CM

## 2017-04-15 PROBLEM — I50.9 CHF (CONGESTIVE HEART FAILURE) (HCC): Status: ACTIVE | Noted: 2017-04-15

## 2017-04-15 PROBLEM — J44.9 COPD (CHRONIC OBSTRUCTIVE PULMONARY DISEASE) (HCC): Status: ACTIVE | Noted: 2017-04-15

## 2017-04-15 PROBLEM — N18.30 CKD (CHRONIC KIDNEY DISEASE), STAGE III (HCC): Status: ACTIVE | Noted: 2017-04-15

## 2017-04-15 PROBLEM — G47.33 OSA (OBSTRUCTIVE SLEEP APNEA): Status: ACTIVE | Noted: 2017-04-15

## 2017-04-15 PROBLEM — E11.9 DIABETES MELLITUS (HCC): Status: ACTIVE | Noted: 2017-04-15

## 2017-04-15 PROBLEM — N18.30 CKD (CHRONIC KIDNEY DISEASE), STAGE III (HCC): Status: RESOLVED | Noted: 2017-04-15 | Resolved: 2017-04-15

## 2017-04-15 PROBLEM — J44.9 COPD (CHRONIC OBSTRUCTIVE PULMONARY DISEASE) (HCC): Status: RESOLVED | Noted: 2017-04-15 | Resolved: 2017-04-15

## 2017-04-15 PROBLEM — IMO0002 SECONDARY PULMONARY HYPERTENSION: Status: ACTIVE | Noted: 2017-04-15

## 2017-04-15 PROBLEM — E11.9 DIABETES MELLITUS (HCC): Status: RESOLVED | Noted: 2017-04-15 | Resolved: 2017-04-15

## 2017-04-15 PROBLEM — I48.91 ATRIAL FIBRILLATION (HCC): Status: RESOLVED | Noted: 2017-04-15 | Resolved: 2017-04-15

## 2017-04-15 PROBLEM — I48.91 ATRIAL FIBRILLATION (HCC): Status: ACTIVE | Noted: 2017-04-15

## 2017-04-15 LAB
ABO GROUP BLD: NORMAL
ALBUMIN SERPL-MCNC: 3.9 G/DL (ref 3.2–4.8)
ALBUMIN/GLOB SERPL: 1.1 G/DL (ref 1.5–2.5)
ALP SERPL-CCNC: 72 U/L (ref 25–100)
ALT SERPL W P-5'-P-CCNC: 12 U/L (ref 7–40)
ANION GAP SERPL CALCULATED.3IONS-SCNC: 5 MMOL/L (ref 3–11)
AST SERPL-CCNC: 24 U/L (ref 0–33)
BACTERIA UR QL AUTO: ABNORMAL /HPF
BASOPHILS # BLD AUTO: 0.11 10*3/MM3 (ref 0–0.2)
BASOPHILS NFR BLD AUTO: 1.2 % (ref 0–1)
BILIRUB SERPL-MCNC: 0.4 MG/DL (ref 0.3–1.2)
BILIRUB UR QL STRIP: NEGATIVE
BLD GP AB SCN SERPL QL: NEGATIVE
BUN BLD-MCNC: 89 MG/DL (ref 9–23)
BUN/CREAT SERPL: 40.5 (ref 7–25)
CALCIUM SPEC-SCNC: 9.7 MG/DL (ref 8.7–10.4)
CHLORIDE SERPL-SCNC: 101 MMOL/L (ref 99–109)
CLARITY UR: CLEAR
CO2 SERPL-SCNC: 25 MMOL/L (ref 20–31)
COLOR UR: YELLOW
CREAT BLD-MCNC: 2.2 MG/DL (ref 0.6–1.3)
DEPRECATED RDW RBC AUTO: 56.7 FL (ref 37–54)
EOSINOPHIL # BLD AUTO: 0.33 10*3/MM3 (ref 0.1–0.3)
EOSINOPHIL NFR BLD AUTO: 3.6 % (ref 0–3)
ERYTHROCYTE [DISTWIDTH] IN BLOOD BY AUTOMATED COUNT: 18.8 % (ref 11.3–14.5)
GFR SERPL CREATININE-BSD FRML MDRD: 22 ML/MIN/1.73
GLOBULIN UR ELPH-MCNC: 3.5 GM/DL
GLUCOSE BLD-MCNC: 144 MG/DL (ref 70–100)
GLUCOSE BLDC GLUCOMTR-MCNC: 126 MG/DL (ref 70–130)
GLUCOSE UR STRIP-MCNC: NEGATIVE MG/DL
HCT VFR BLD AUTO: 16.5 % (ref 34.5–44)
HGB BLD-MCNC: 4.6 G/DL (ref 11.5–15.5)
HGB UR QL STRIP.AUTO: NEGATIVE
HOLD SPECIMEN: NORMAL
HOLD SPECIMEN: NORMAL
HYALINE CASTS UR QL AUTO: ABNORMAL /LPF
IMM GRANULOCYTES # BLD: 0.06 10*3/MM3 (ref 0–0.03)
IMM GRANULOCYTES NFR BLD: 0.7 % (ref 0–0.6)
INR PPP: 4.42
KETONES UR QL STRIP: NEGATIVE
LEUKOCYTE ESTERASE UR QL STRIP.AUTO: ABNORMAL
LYMPHOCYTES # BLD AUTO: 2.22 10*3/MM3 (ref 0.6–4.8)
LYMPHOCYTES NFR BLD AUTO: 24.2 % (ref 24–44)
MAGNESIUM SERPL-MCNC: 3.4 MG/DL (ref 1.3–2.7)
MCH RBC QN AUTO: 23.2 PG (ref 27–31)
MCHC RBC AUTO-ENTMCNC: 27.9 G/DL (ref 32–36)
MCV RBC AUTO: 83.3 FL (ref 80–99)
MONOCYTES # BLD AUTO: 0.81 10*3/MM3 (ref 0–1)
MONOCYTES NFR BLD AUTO: 8.8 % (ref 0–12)
NEUTROPHILS # BLD AUTO: 5.66 10*3/MM3 (ref 1.5–8.3)
NEUTROPHILS NFR BLD AUTO: 61.5 % (ref 41–71)
NITRITE UR QL STRIP: NEGATIVE
PH UR STRIP.AUTO: 5.5 [PH] (ref 5–8)
PLATELET # BLD AUTO: 390 10*3/MM3 (ref 150–450)
PMV BLD AUTO: 8 FL (ref 6–12)
POTASSIUM BLD-SCNC: 5.6 MMOL/L (ref 3.5–5.5)
PROT SERPL-MCNC: 7.4 G/DL (ref 5.7–8.2)
PROT UR QL STRIP: NEGATIVE
PROTHROMBIN TIME: 50.5 SECONDS (ref 9.6–11.5)
RBC # BLD AUTO: 1.98 10*6/MM3 (ref 3.89–5.14)
RBC # UR: ABNORMAL /HPF
REF LAB TEST METHOD: ABNORMAL
RH BLD: POSITIVE
SODIUM BLD-SCNC: 131 MMOL/L (ref 132–146)
SP GR UR STRIP: 1.01 (ref 1–1.03)
SQUAMOUS #/AREA URNS HPF: ABNORMAL /HPF
TROPONIN I SERPL-MCNC: 0 NG/ML (ref 0–0.07)
UROBILINOGEN UR QL STRIP: ABNORMAL
WBC NRBC COR # BLD: 9.19 10*3/MM3 (ref 3.5–10.8)
WBC UR QL AUTO: ABNORMAL /HPF
WHOLE BLOOD HOLD SPECIMEN: NORMAL
WHOLE BLOOD HOLD SPECIMEN: NORMAL

## 2017-04-15 PROCEDURE — 86901 BLOOD TYPING SEROLOGIC RH(D): CPT | Performed by: EMERGENCY MEDICINE

## 2017-04-15 PROCEDURE — 94760 N-INVAS EAR/PLS OXIMETRY 1: CPT

## 2017-04-15 PROCEDURE — 25010000002 PROTHROMBIN COMPLEX CONC HUMAN 1000 UNITS KIT

## 2017-04-15 PROCEDURE — 94640 AIRWAY INHALATION TREATMENT: CPT

## 2017-04-15 PROCEDURE — 99285 EMERGENCY DEPT VISIT HI MDM: CPT

## 2017-04-15 PROCEDURE — 83735 ASSAY OF MAGNESIUM: CPT | Performed by: EMERGENCY MEDICINE

## 2017-04-15 PROCEDURE — C9132 KCENTRA, PER I.U.: HCPCS

## 2017-04-15 PROCEDURE — 85018 HEMOGLOBIN: CPT | Performed by: NURSE PRACTITIONER

## 2017-04-15 PROCEDURE — 82962 GLUCOSE BLOOD TEST: CPT

## 2017-04-15 PROCEDURE — 25010000002 ONDANSETRON PER 1 MG: Performed by: EMERGENCY MEDICINE

## 2017-04-15 PROCEDURE — 81003 URINALYSIS AUTO W/O SCOPE: CPT | Performed by: NURSE PRACTITIONER

## 2017-04-15 PROCEDURE — 85610 PROTHROMBIN TIME: CPT | Performed by: EMERGENCY MEDICINE

## 2017-04-15 PROCEDURE — 86900 BLOOD TYPING SEROLOGIC ABO: CPT | Performed by: EMERGENCY MEDICINE

## 2017-04-15 PROCEDURE — 25010000002 HYDROMORPHONE PER 4 MG: Performed by: EMERGENCY MEDICINE

## 2017-04-15 PROCEDURE — P9016 RBC LEUKOCYTES REDUCED: HCPCS

## 2017-04-15 PROCEDURE — 36430 TRANSFUSION BLD/BLD COMPNT: CPT

## 2017-04-15 PROCEDURE — 94799 UNLISTED PULMONARY SVC/PX: CPT

## 2017-04-15 PROCEDURE — 80053 COMPREHEN METABOLIC PANEL: CPT | Performed by: EMERGENCY MEDICINE

## 2017-04-15 PROCEDURE — 85014 HEMATOCRIT: CPT | Performed by: NURSE PRACTITIONER

## 2017-04-15 PROCEDURE — 81001 URINALYSIS AUTO W/SCOPE: CPT | Performed by: EMERGENCY MEDICINE

## 2017-04-15 PROCEDURE — 93005 ELECTROCARDIOGRAM TRACING: CPT | Performed by: EMERGENCY MEDICINE

## 2017-04-15 PROCEDURE — 71010 HC CHEST PA OR AP: CPT

## 2017-04-15 PROCEDURE — 86920 COMPATIBILITY TEST SPIN: CPT

## 2017-04-15 PROCEDURE — 25010000002 PROTHROMBIN COMPLEX CONC HUMAN 500 UNITS KIT

## 2017-04-15 PROCEDURE — 25010000002 VITAMIN K1 PER 1 MG: Performed by: EMERGENCY MEDICINE

## 2017-04-15 PROCEDURE — 99291 CRITICAL CARE FIRST HOUR: CPT | Performed by: INTERNAL MEDICINE

## 2017-04-15 PROCEDURE — 86900 BLOOD TYPING SEROLOGIC ABO: CPT

## 2017-04-15 PROCEDURE — 85025 COMPLETE CBC W/AUTO DIFF WBC: CPT | Performed by: EMERGENCY MEDICINE

## 2017-04-15 PROCEDURE — 86850 RBC ANTIBODY SCREEN: CPT | Performed by: EMERGENCY MEDICINE

## 2017-04-15 PROCEDURE — 84484 ASSAY OF TROPONIN QUANT: CPT

## 2017-04-15 RX ORDER — ONDANSETRON 2 MG/ML
4 INJECTION INTRAMUSCULAR; INTRAVENOUS ONCE
Status: COMPLETED | OUTPATIENT
Start: 2017-04-15 | End: 2017-04-15

## 2017-04-15 RX ORDER — SODIUM CHLORIDE 9 MG/ML
50 INJECTION, SOLUTION INTRAVENOUS CONTINUOUS
Status: DISCONTINUED | OUTPATIENT
Start: 2017-04-15 | End: 2017-04-18

## 2017-04-15 RX ORDER — SODIUM CHLORIDE 0.9 % (FLUSH) 0.9 %
1-10 SYRINGE (ML) INJECTION AS NEEDED
Status: DISCONTINUED | OUTPATIENT
Start: 2017-04-15 | End: 2017-04-19 | Stop reason: HOSPADM

## 2017-04-15 RX ORDER — SODIUM CHLORIDE 0.9 % (FLUSH) 0.9 %
10 SYRINGE (ML) INJECTION AS NEEDED
Status: DISCONTINUED | OUTPATIENT
Start: 2017-04-15 | End: 2017-04-19 | Stop reason: HOSPADM

## 2017-04-15 RX ORDER — DULOXETIN HYDROCHLORIDE 60 MG/1
60 CAPSULE, DELAYED RELEASE ORAL DAILY
COMMUNITY
End: 2017-09-25 | Stop reason: SDUPTHER

## 2017-04-15 RX ORDER — LEVOTHYROXINE SODIUM 0.1 MG/1
100 TABLET ORAL
Status: DISCONTINUED | OUTPATIENT
Start: 2017-04-16 | End: 2017-04-19 | Stop reason: HOSPADM

## 2017-04-15 RX ORDER — HYDROMORPHONE HYDROCHLORIDE 1 MG/ML
0.25 INJECTION, SOLUTION INTRAMUSCULAR; INTRAVENOUS; SUBCUTANEOUS
Status: COMPLETED | OUTPATIENT
Start: 2017-04-15 | End: 2017-04-15

## 2017-04-15 RX ORDER — ACETAMINOPHEN 650 MG/1
650 SUPPOSITORY RECTAL EVERY 4 HOURS PRN
Status: DISCONTINUED | OUTPATIENT
Start: 2017-04-15 | End: 2017-04-19 | Stop reason: HOSPADM

## 2017-04-15 RX ORDER — IPRATROPIUM BROMIDE AND ALBUTEROL SULFATE 2.5; .5 MG/3ML; MG/3ML
3 SOLUTION RESPIRATORY (INHALATION)
Status: DISCONTINUED | OUTPATIENT
Start: 2017-04-15 | End: 2017-04-17

## 2017-04-15 RX ORDER — PANTOPRAZOLE SODIUM 40 MG/10ML
80 INJECTION, POWDER, LYOPHILIZED, FOR SOLUTION INTRAVENOUS ONCE
Status: COMPLETED | OUTPATIENT
Start: 2017-04-15 | End: 2017-04-15

## 2017-04-15 RX ORDER — ONDANSETRON 2 MG/ML
4 INJECTION INTRAMUSCULAR; INTRAVENOUS EVERY 6 HOURS PRN
Status: DISCONTINUED | OUTPATIENT
Start: 2017-04-15 | End: 2017-04-19 | Stop reason: HOSPADM

## 2017-04-15 RX ORDER — WARFARIN SODIUM 2 MG/1
2 TABLET ORAL
COMMUNITY
End: 2017-04-19 | Stop reason: HOSPADM

## 2017-04-15 RX ORDER — ACETAMINOPHEN 325 MG/1
650 TABLET ORAL EVERY 4 HOURS PRN
Status: DISCONTINUED | OUTPATIENT
Start: 2017-04-15 | End: 2017-04-19 | Stop reason: HOSPADM

## 2017-04-15 RX ADMIN — IPRATROPIUM BROMIDE AND ALBUTEROL SULFATE 3 ML: .5; 3 SOLUTION RESPIRATORY (INHALATION) at 19:43

## 2017-04-15 RX ADMIN — PANTOPRAZOLE SODIUM 80 MG: 40 INJECTION, POWDER, FOR SOLUTION INTRAVENOUS at 16:34

## 2017-04-15 RX ADMIN — SODIUM CHLORIDE 8 MG/HR: 900 INJECTION INTRAVENOUS at 21:30

## 2017-04-15 RX ADMIN — ONDANSETRON 4 MG: 2 INJECTION INTRAMUSCULAR; INTRAVENOUS at 16:52

## 2017-04-15 RX ADMIN — PHYTONADIONE 10 MG: 10 INJECTION, EMULSION INTRAMUSCULAR; INTRAVENOUS; SUBCUTANEOUS at 17:40

## 2017-04-15 RX ADMIN — HYDROMORPHONE HYDROCHLORIDE 0.25 MG: 1 INJECTION, SOLUTION INTRAMUSCULAR; INTRAVENOUS; SUBCUTANEOUS at 16:56

## 2017-04-15 RX ADMIN — HYDROMORPHONE HYDROCHLORIDE 0.25 MG: 1 INJECTION, SOLUTION INTRAMUSCULAR; INTRAVENOUS; SUBCUTANEOUS at 20:04

## 2017-04-15 NOTE — H&P
Critical Care History & Physical    Patient name: Sarita Alegre  CSN: [id number]  MRN: [id number]  : 1940  Today's date: 4/15/2017    Primary Care Physician: Percy Moreland MD    Chief complaint:  Fatigue, black tarry stools    HPI:   Mrs. Alegre is a 78 y/o WF who was admitted into the ED with black tarry stools and increasing fatigue over the last week.  She has been followed by Dr. Haley and has had an EGD, colonoscopy and capsule endoscopy over the last year.  She just had a push enteroscopy 3/21/17 with Dr. Haley. They have found some spots that have been cauterized.   She had a temp 97.7, P 71, R 14, 86/51 and 100% 2L. She reports that she got up to the bathroom last night and felt dizzy, and nearly lost consciousness.  She has c/o progressive weakness, SOA.  Her stool has been dark for 2 weeks. She felt that it was from her iron pills.  She has secondary pulmonary hypertension COPD, and diastolic CHF.  In early March she underwent large volume paracentesis with fluid showing albumin 2.3, suspicious for nephrotic syndrome.    PMH:   Past Medical History:   Diagnosis Date   • Adenomatous polyp of stomach    • Allergic rhinitis    • Anemia    • Atrial fibrillation     Hospitalized at [facility] 2015, presented after dizziness and atrial fibrillation, treated with Rythmol and a beta blocker converted to sinus prior to discharge.   • Chronic anticoagulation 2016   • Chronic diastolic congestive heart failure 2016   • CKD (chronic kidney disease), stage III      Status post temporary hemodialysis at [facility], 2015 through 2015, worsening 11/15  with hosp chf   • Closed fracture of fibula     Followed by Dr. Kennedy   • COPD (chronic obstructive pulmonary disease)    • Depression 2016   • Diabetes mellitus    • Diabetic peripheral neuropathy 2016   • Dyslipidemia    • Gastrointestinal bleeding, lower 6/15/2016   • GERD  (gastroesophageal reflux disease)    • Hearing deficit 9/7/2016   • Hiatal hernia    • History of abnormal electrocardiogram     a bib, inferior infarct, st-t changes 10/12-referred to ER   • History of acute gastritis    • History of bilateral renal artery occlusion    • History of cataract    • History of deafness    • History of fracture of ankle 07/2015   • History of herpes zoster    • History of pneumonia    • History of transfusion    • Hyperparathyroidism    • Hypertension    • Hypothyroidism 9/7/2016   • Insomnia    • Joint pain, knee    • Low back pain    • Obesity    • CIELO (obstructive sleep apnea)     UNTREATED; appeared to attempt to wear it sporadically 2007,  2008, split-night study of 03/10/2016 reported an overall AHI of 32.7/hr which required BiPAP therapy after failed CPAP attempt with final BiPAP 17 and EPAP 11 which still reported oxygen desaturations on 2 L O2, her lowest oxygen saturation was documented at 64% and several arrhythmias were observed during the study.   • Osteoarthritis 9/7/2016   • Pleural effusion      CT scan of the chest 8/28/2015, reports bilateral pleural effusions, right greater than left, simple in appearance with atelectatic changes identified of the right lung base.  Chest x-ray 10/12/2015, left pleural effusion still seen.   • Recurrent oral ulcers    • Retinal artery occlusion 12/29/2016    h/o   • Secondary pulmonary hypertension     EVITA: 6/24/15, RVSP 66mmhg; moderate MR, LVEF 55â‚¬â€œ60%, no pericardial effusion or atrial thrombus, no ASD.   • Tachy-marcela syndrome 09/01/2016    PPM   • Tricuspid valve insufficiency 5/17/2016    Description: A.  Severe.   • Type 2 diabetes mellitus 9/7/2016   • Vision loss 12/29/2016    Of the left eye     PSH:   Past Surgical History:   Procedure Laterality Date   • APPENDECTOMY     • BLADDER SURGERY     • CARDIAC ELECTROPHYSIOLOGY PROCEDURE N/A 9/1/2016    Procedure: Pacemaker DC new;  Surgeon: Zafar Hudson MD;  Location:   [facility];  Service:    • CARDIAC PACEMAKER PLACEMENT     • CARDIOVERSION      .  Status post cardioversion, 8/25/2015 and 9/3/2015, secondary to atrial fibrillation.   • CATARACT EXTRACTION Bilateral     1996  and 1997   • COLONOSCOPY N/A 6/17/2016    Procedure: COLONOSCOPY;  Surgeon: Edy Muñoz MD;  Location: [facility] ENDOSCOPY;  Service:    • ELBOW PROCEDURE Left 2006    Left elbow repair   • HYSTERECTOMY     • KNEE SURGERY Right 1982   • OTHER SURGICAL HISTORY      History of hearing aid check   • PARATHYROIDECTOMY     • UPPER GASTROINTESTINAL ENDOSCOPY       PFH:   Family History   Problem Relation Age of Onset   • Asthma Mother    • Allergies Mother    • Colonic polyp Mother    • Mitral valve prolapse Mother    • Other Mother      a fib- rain knee replacement   • Heart disease Father    • Lung cancer Father      he was smoker   • Diabetes Other      SH:   Social History     Social History   • Marital status:      Spouse name: N/A   • Number of children: N/A   • Years of education: N/A     Social History Main Topics   • Smoking status: Former Smoker     Packs/day: 3.00     Years: 25.00     Types: Cigarettes   • Smokeless tobacco: Never Used      Comment: quit 30+ years ago   • Alcohol use No   • Drug use: No   • Sexual activity: Defer      Comment:      Other Topics Concern   • None     Social History Narrative     Allergies:   Allergies   Allergen Reactions   • Grass Cough   • Molds & Smuts Cough   • Propafenone      Hepatic failure     Code Status:  Full Code    Home Medications:  Prescriptions Prior to Admission   Medication Sig Dispense Refill Last Dose   • DULoxetine (CYMBALTA) 60 MG capsule Take 60 mg by mouth Daily.      • Fluticasone Furoate-Vilanterol 100-25 MCG/INH aerosol powder  Inhale 1 puff Daily.      • warfarin (COUMADIN) 2 MG tablet Take 2 mg by mouth Daily.      • albuterol (PROVENTIL HFA;VENTOLIN HFA) 108 (90 BASE) MCG/ACT inhaler Inhale 2 puffs Every 6 (Six)  "Hours As Needed for wheezing or shortness of air. 1 inhaler 5 Taking   • atorvastatin (LIPITOR) 40 MG tablet TAKE ONE TABLET BY MOUTH AT BEDTIME 90 tablet 1 Taking   • Biotin 5000 MCG sublingual tablet    Taking   • bumetanide (BUMEX) 1 MG tablet Take 1 mg by mouth daily.   Taking   • cholecalciferol (VITAMIN D3) 1000 UNITS tablet Take 1,000 Units by mouth daily.   Taking   • HYDROcodone-acetaminophen (NORCO) 5-325 MG per tablet Take 1 tablet by mouth Every 6 (Six) Hours As Needed. 11/20 pm   Taking   • levothyroxine (SYNTHROID, LEVOTHROID) 100 MCG tablet Take 100 mcg by mouth daily.   Taking   • metoprolol tartrate (LOPRESSOR) 25 MG tablet Take 1 tablet by mouth every 12 (twelve) hours. 180 tablet 3 Taking   • montelukast (SINGULAIR) 10 MG tablet Take 10 mg by mouth every night.   Taking   • Probiotic Product (ALIGN) 4 MG capsule Take  by mouth Daily.   Taking   • spironolactone (ALDACTONE) 25 MG tablet Take 25 mg by mouth Daily.   Taking     Review of Systems  Negative systems except for what is noted in HPI     Vital Signs:  Blood pressure 116/59, pulse 67, temperature 97.7 °F (36.5 °C), temperature source Oral, resp. rate 16, height 67\" (170.2 cm), weight 145 lb 8.1 oz (66 kg), SpO2 100 %.    Physical Exam:  Constitutional:  Appears very pale, chronically ill  No distress.   HEENT:  Normocephalic and atraumatic. PERRL. Conjunctiva very pale.  No jaundice. Hard of hearing  Neck:  Neck supple. No JVD present. No palpable thyroid  CV: Normal rate, irregular rhythm, intact distal pulses. Systolic murmer  No gallop, or rub  Pulmonary/Chest: Effort normal and breath sounds normal. No respiratory distress. No wheezes, rhonchi or rales.   Abdominal: Soft. +BS. + fluid wave  No distension and no mass. There is no tenderness.   Musculoskeletal: generalized weakness  Neurological: Alert and oriented to person, place, and time.  No focal deficits  Skin: Skin is warm and dry. No rash noted.   Extremities: 2+ edema or no " cyanosis  Psychiatric: Normal mood and affect.oriented x3    Labs:    Results from last 7 days  Lab Units 04/15/17  1447   WBC 10*3/mm3 9.19   HEMOGLOBIN g/dL 4.6*   HEMATOCRIT % 16.5*   PLATELETS 10*3/mm3 390       Results from last 7 days  Lab Units 04/15/17  1447   SODIUM mmol/L 131*   POTASSIUM mmol/L 5.6*   CHLORIDE mmol/L 101   TOTAL CO2 mmol/L 25.0   BUN mg/dL 89*   CREATININE mg/dL 2.20*   CALCIUM mg/dL 9.7   BILIRUBIN mg/dL 0.4   ALK PHOS U/L 72   ALT (SGPT) U/L 12   AST (SGOT) U/L 24   GLUCOSE mg/dL 144*     Magnesium   Date Value Ref Range Status   04/15/2017 3.4 (H) 1.3 - 2.7 mg/dL Final     Imaging:  CXR IMPRESSION:  Heart is enlarged with no evidence of acute parenchymal  disease.      DICTATED: 04/15/2017    Assessment:  Hospital Problem List     * (Principal)Upper GI hemorrhage    Overview Signed 4/15/2017  7:03 PM by KAILASH Mazariegos     S/p push endoscopy 3/21/17 by Dr. Haley  S/p EGD, colonoscopy and capsule endoscopy by Dr. Haley           Anemia due to acute blood loss    Overview Signed 4/15/2017  7:03 PM by KAILASH Mazariegos     H/O multiple blood transfusions         Supratherapeutic INR    Atrial fibrillation, persistent    Overview Addendum 9/7/2016 11:24 AM by Iron wade. Newly diagnosed atrial fibrillation with controlled rate of unknown duration, [facility] ER presentation, 06/21/2015.    b. Recent history of echocardiogram, 05/18/2015, Dr. Dorantes, revealing normal LVEF.  Mild AR.  Mild MR.  Moderate to severe TR.  c. EVITA/ECV, 06/24/2015.  EF 55% to 60%.  No thrombus.  Mild to moderate mitral regurgitation.  Moderate tricuspid regurgitation.  RVSP 66 mmHg.  External cardioversion with 2 joules to normal sinus rhythm.    Impression: 01/16/2015 - rate seems to be controlled currently- baseline 85 pulse, to 100 with ambulation; Description: A.  Hospitalized at [facility] 6/2015, presented after dizziness and atrial fibrillation, treated with  Rythmol and a beta blocker converted to sinus prior to discharge.  B. ECHO: EVITA: Moderate MR, moderate TR, external cardioversion with 200 J resulting in normal sinus, subsequent RVSP calculated at 66 mmHg.  8/11/15 back in atrial fibrillation when she was seen by cardiology, remains on Coumadin at this time.  C.  Status post cardioversion, 8/25/2015 and 9/3/2015.         Chronic kidney disease (CKD), stage III (moderate)    Overview Signed 12/29/2016 10:29 AM by Sumaya Sanabria     Status post temporary hemodialysis at [facility], 8/24/2015 through 9/29/2015, worsening      11/15  with hosp chf           CHF (congestive heart failure)    Secondary pulmonary hypertension    Overview Addendum 4/15/2017  6:11 PM by KAILASH Mazariegos     EVITA: 6/24/15, RVSP 66mmhg; moderate MR, LVEF 55-60%, no pericardial effusion or atrial thrombus, no ASD.         CIELO (obstructive sleep apnea)    Overview Signed 4/15/2017  6:15 PM by KAILASH Mazariegos     UNTREATED; appeared to attempt to wear it sporadically 2007,  2008, split-night study of 03/10/2016 reported an overall AHI of 32.7/hr which required BiPAP therapy after failed CPAP attempt with final BiPAP 17 and EPAP 11 which still reported oxygen desaturations on 2 L O2, her lowest oxygen saturation was documented at 64% and several arrhythmias were observed during the study.         Pulmonary emphysema    Overview Signed 5/17/2016 12:48 PM by Aleah Shin,      Impression: 04/21/2015 - with wheezing, PND and GLASER  ?exac by bystolic- felt like she did better with hctz- now using lasix  weight stable   states breathng and breathlessness are still her major problems   echo ok   may be worse with higher dose bystolic  having problems affording medications including inhalers; Description: A. FEV1 65%;  12/2014. H/O asthma; also long h/o cigs   B. Prior cigs; quit in 1980's after smoking 2-3 ppd for 25 yrs.- hosp for exacerbation 5/15 - PULM / NON CARDIAC  DESPITE ELEVATED BNP, NORMAL EF         Obstructive sleep apnea syndrome    Overview Addendum 12/29/2016 10:17 AM by Sumaya Sanabria     Severe  Description: A. UNTREATED; appeared to attempt to wear it sporadically 2007,  2008, split-night study of 03/10/2016 reported an overall AHI of 32.7/hr which required BiPAP therapy after failed CPAP attempt with final BiPAP 17 and EPAP 11 which still reported oxygen desaturations on 2 L O2, her lowest oxygen saturation was documented at 64% and several arrhythmias were observed during the study.  B. SLEEP STUDY; 11/2007, w/ AHI 57.8; followed by Dr. rader until 2008, during her sleep study CPAP of 7 treated her well; giving her an AHI of < 2.  Follow-up visits in the sleep clinic indicate she was still sleepy, he increased the pressure to 9, referred her to ENT to consider surgery.  C. Her weight at the time of the sleep study was the same as now; at 206 lbs.             76y/o woman with tachybrady syndrome, PPM, COPD, Diastolic CHF, CIELO, DM, CKD who has recurrent GI blood loss. She just had capsule colonoscopy and EGD with push enteroscopy last month.  Now she presents with recurrent melana, Hg 4.6 and over anticoagulated with INR  4.42. She received Kcentra in the ED.  Her 1st unit of blood is hanging. She has underlying COPD, but no active wheezing or cough. She is paced with underlying atrial fibrillation. Her LVEF is 55%. RHC revealed severe pulmonary HTN felt to be from liver disease, possibly from her chronic lung disease as well. Her chronic blood loss, anemia will also result in worsening right heart failure.     Plan:   ICU admission  Transfuse PRBC for hgb <7  Recheck PT/INR  Serial H/H  Protonix drip  Thyroid replacement  Hold diuretics  Sudha to evaluate      Rachel Simon, APRN, AGACNP-BC, FNP-BC  Pulmonary & Critical Care Medicine  04/15/17 7:42 PM    I interviewed the patient, examined the patient reviewed her chart records formulated an assessment and  plan.  I amended the above note to reflect my findings.     Varsha Matthews MD    Time: 65min                                                   *Please note that portions of this note were completed with a voice recognition program. Efforts were made to edit the dictations, but occasionally words are mistranscribed.

## 2017-04-15 NOTE — ED PROVIDER NOTES
Subjective   HPI Comments: 77 y.o. Pleasant but pale and frail looking female presents to ED with c/o fatigue. She reports that she hasn't had the energy to go outside of he house and walk around for a month now but she's been feeling even weaker than normal the past few days. She also complains of very dark stools for the past few weeks. She states that she hasn't been taking her diuretics or iron pills. She has hx of internal bleeding and has had endoscopies, colonoscopies and a pill cam done to try and find the source. They were able to find some that were then cauterized. No other acute complaints at this time.    Patient is a 77 y.o. female presenting with fatigue.   History provided by:  Patient  Fatigue   Severity:  Moderate  Onset quality:  Gradual  Timing:  Constant  Progression:  Worsening  Chronicity:  Recurrent  Associated symptoms: fatigue    Associated symptoms: no congestion, no cough, no fever, no loss of consciousness, no rhinorrhea and no shortness of breath      Review of Old Reports  3:33 PM  EGD in 2006 was negative    Assessment and Plan from 3/8/17  Recurrent ascites. No liver cirrhosis (per imaging). No LFT elevation. No autoimmune hepatitis or hepatitis infection.  (s/p paracentesis April, September, and November 2016. Mar 2017)     · SAAG gradient was 1.4 (Serum albumin = 3.7 and ascitic fluid albumin = 2.3), and ascitic fluid protein was 7.2. This indicates that pt's ascites is d/t heart failure (right sided) or nephrotic syndrome. Pt has multiple co-morbidities w/ atrial fibrillation (s/p EVITA cardioversion x1, pacemaker implantation for tachybrady syndrome 09/2016), diastolic heart failure, moderate pulmonary hypertension  · Continue current diuretics.   · Follow up with recent referral for dietician for low sodium diet education.   · Minimize paracentesis if possible to prevent spontaneous bacterial peritonitis. If must do paracentesis, give 5 g of albumin per each liter over 4 L of  ascitic fluid. I told pt that I can order for it or her cardiologist/pulmonologist can as well.  · Follow up with cardiologist and pulmonologist closely for mgt of heart failure and pulm HTN. Pt is not scheduled to see Dr Cooper (cardiology) until 6/2017. No established appt w/ pulmonologist at this time. Pt previously saw Dr Johan Thao. I ask pt to f/u w/ cardiologist and pulmonologist sooner than 6/2017. Will also send them my note.      Chronic, stable normocytic normochromic anemia. Normal TIBC; normal ferritin; Low iron. Normal B12 and folate. It is likely that anemia is r/t chronic diseases. Prior readings noted w/ elevated LDH and low haptoglobin. Poss some form of hemolysis, but no recent result. No evidence of gross bleeding. Monitor.      Long term current use of anticoagulant     Allison 12/7/16  PROBLEM LIST:  1. Atrial fibrillation:  a. Newly diagnosed atrial fibrillation with controlled rate of unknown duration, [facility] ER presentation, 06/21/2015.   b. Recent history of echocardiogram, 05/18/2015, Dr. Dorantes, revealing normal LVEF. Mild AR. Mild MR. Moderate to severe TR.  c. EVITA/ECV, 06/24/2015. EF 55% to 60%. No thrombus. Mild to moderate mitral regurgitation. Moderate tricuspid regurgitation. RVSP 66 mmHg. External cardioversion with 2 joules to normal sinus rhythm.  d. Cardiac event monitor, 8/31/2016: placed for falls. 6.5 second pause.   e. PM implantation, 9/1/2016: The PM is a [employer] model L101 serial number [id number] generator, the atrial lead is a Guidant model 4135 serial number [id number] lead, the right ventricular lead is a Guidant model 4136 serial number [id number] lead. The bradycardia pacing mode is DDIR with a lower rate of 70 upper rate of 130 bpm.    2. Diastolic congestive failure.  3. Moderate pulmonary hypertension.  4. Chest pain:  a. History of normal echocardiogram as well as Cardiolite GXT, March 2011, Sridhar Bustamante MD.  b. Abnormal  EKG revealing normal sinus rhythm with first degree AV block and poor precordial R-wave progression (no evidence of anterior infarct).  5. Hypertension.  6. Hyperlipidemia.  7. Type 2 diabetes mellitus.  8. History of tobacco abuse with cessation 30 years ago.  9. Obesity.  10. Obstructive sleep apnea with no CPAP usage.  11. Asthma.  12. Depression.  13. Gastroesophageal reflux disease.  14. Hypothyroidism on chronic replacement therapy.  15. Bilateral hearing deficit with hearing aids.  16. Osteoarthritis.  17. History of left ankle fracture followed by Dr. Kennedy.  18. Surgical history:  a. Bilateral cataract extraction, 1996 and 1997.  b. Right knee repair, 1982.  c. Left elbow repair, 2006.  d. Hysterectomy.  e. Parathyroid gland removal  Review of Systems   Constitutional: Positive for fatigue. Negative for fever.   HENT: Negative for congestion and rhinorrhea.    Respiratory: Negative for cough and shortness of breath.    Gastrointestinal: Positive for blood in stool.   Neurological: Negative for loss of consciousness.   All other systems reviewed and are negative.      Past Medical History:   Diagnosis Date   • Abdominal pain    • Acute suppurative otitis media 06/08/2015    shot rocephin, levaquin 250 mg bid continue mucinex, breathing treatments   • Adenomatous polyp of stomach    • Allergic rhinitis    • Anemia    • Asthma    • Atrial fibrillation     Hospitalized at [facility] 6/2015, presented after dizziness and atrial fibrillation, treated with Rythmol and a beta blocker converted to sinus prior to discharge.   • Chest pain 9/7/2016   • CHF (congestive heart failure)    • CKD (chronic kidney disease), stage III      Status post temporary hemodialysis at [facility], 8/24/2015 through 9/29/2015, worsening 11/15  with hosp chf   • Closed fracture of fibula     Followed by Dr. Kennedy   • COPD (chronic obstructive pulmonary disease)    • Depression 9/7/2016   • Diabetes mellitus    • Dyslipidemia    • Dyspnea    • Encounter  for gynecological examination with Papanicolaou smear of cervix 2012    [id number], normal DEXA same visit   • GERD (gastroesophageal reflux disease)    • H/O chest x-ray 12/30/2015    Modest LLpneumonia. There is a slight Left pleyral effusion. the findings are worse than 12/09/15   • H/O chest x-ray 10/12/2015    Slight worsening in the interval of the increased markings at the riht upperlung fields and right lung base as well as within the left lung base. the pleural effusion of the left is unchanged   • H/O chest x-ray 10/11/2015    Lung vpolumes are low with mild increased markings at the lung bases bilaterally left greater than right. the heart remains borderline enlarged   • H/O echocardiogram 11/07/2015    Est EF 55-60%. Mod concentric LVH. Trace of aortic and mitral reg. Mod tricuspid reg. RCSP 36mmHg. Trivial pericardial effusion. Left pleural effusion. Abnormal LVdiastolic function    • Hearing deficit 9/7/2016   • Hiatal hernia    • History of abnormal electrocardiogram     a bib, inferior infarct, st-t changes 10/12-referred to ER   • History of acute bronchitis 06/08/2015   • History of acute gastritis    • History of acute pharyngitis    • History of bilateral renal artery occlusion    • History of cataract    • History of deafness    • History of fracture of ankle 07/2015   • History of herpes zoster    • History of PFTs 08/13/2015    Severe OAD. FEV dec from prir visit. MVV decreased . Restriction pattern. No sign response to BDRX   • History of PFTs 06/17/2015    Very severe AO. NSC FEV after BD. Cannot exclude restrictions   • History of PFTs 12/04/2014    Mod AO, NSC after BD. No restriciton. Nl diffusion   • History of pneumonia    • Hoarseness    • Hospital discharge follow-up    • Hyperparathyroidism    • Hypertension    • Hypothyroidism 9/7/2016   • Insomnia    • Joint pain, knee    • Low back pain    • Lower extremity edema    • Malleolar fracture    • Obesity    • CIELO (obstructive sleep  apnea)     UNTREATED; appeared to attempt to wear it sporadically 2007,  2008, split-night study of 03/10/2016 reported an overall AHI of 32.7/hr which required BiPAP therapy after failed CPAP attempt with final BiPAP 17 and EPAP 11 which still reported oxygen desaturations on 2 L O2, her lowest oxygen saturation was documented at 64% and several arrhythmias were observed during the study.   • Osteoarthritis 9/7/2016   • Pain in thoracic spine    • Pleural effusion      CT scan of the chest 8/28/2015, reports bilateral pleural effusions, right greater than left, simple in appearance with atelectatic changes identified of the right lung base.  Chest x-ray 10/12/2015, left pleural effusion still seen.   • Recurrent oral ulcers    • Secondary pulmonary hypertension     EVITA: 6/24/15, RVSP 66mmhg; moderate MR, LVEF 55â‚¬â€œ60%, no pericardial effusion or atrial thrombus, no ASD.   • Tricuspid regurgitation    • Type 2 diabetes mellitus 9/7/2016       Allergies   Allergen Reactions   • Grass Cough   • Molds & Smuts Cough   • Propafenone      Hepatic failure       Past Surgical History:   Procedure Laterality Date   • APPENDECTOMY     • BLADDER SURGERY     • CARDIAC ELECTROPHYSIOLOGY PROCEDURE N/A 9/1/2016    Procedure: Pacemaker DC new;  Surgeon: Zafar Hudson MD;  Location:  [facility] EP INVASIVE LOCATION;  Service:    • CARDIAC PACEMAKER PLACEMENT     • CARDIOVERSION      .  Status post cardioversion, 8/25/2015 and 9/3/2015, secondary to atrial fibrillation.   • CATARACT EXTRACTION Bilateral     1996  and 1997   • COLONOSCOPY N/A 6/17/2016    Procedure: COLONOSCOPY;  Surgeon: Edy Muñoz MD;  Location:  [facility] ENDOSCOPY;  Service:    • ELBOW PROCEDURE Left 2006    Left elbow repair   • HYSTERECTOMY     • KNEE SURGERY Right 1982   • OTHER SURGICAL HISTORY      History of hearing aid check   • PARATHYROIDECTOMY     • UPPER GASTROINTESTINAL ENDOSCOPY         Family History   Problem Relation Age of Onset   • Asthma  Mother    • Allergies Mother    • Colonic polyp Mother    • Mitral valve prolapse Mother    • Other Mother      a fib- rain knee replacement   • Heart disease Father    • Lung cancer Father      he was smoker   • Diabetes Other        Social History     Social History   • Marital status:      Spouse name: N/A   • Number of children: N/A   • Years of education: N/A     Social History Main Topics   • Smoking status: Former Smoker     Packs/day: 3.00     Years: 25.00     Types: Cigarettes   • Smokeless tobacco: Never Used      Comment: quit 30+ years ago   • Alcohol use No   • Drug use: No   • Sexual activity: Defer      Comment:      Other Topics Concern   • None     Social History Narrative         Objective   Physical Exam   Constitutional: She is oriented to person, place, and time. She appears well-developed and well-nourished. No distress.   Patient appears very pale and her bp was trending downwards while in the room.   HENT:   Head: Normocephalic and atraumatic.   Eyes: Pupils are equal, round, and reactive to light.   Conjunctivae were pale.   Neck: Normal range of motion. Carotid bruit is not present.   Cardiovascular: Normal rate and normal heart sounds.  An irregular rhythm present. Exam reveals no gallop and no friction rub.    No murmur heard.  Heart rhythm is normal with occasional irregular beats.   Pulmonary/Chest: Effort normal. No respiratory distress.   Abdominal: Soft. There is no hepatomegaly. There is no tenderness.   Genitourinary:   Genitourinary Comments: Rectal sphincter tone was normal. Melanotic appearing, guaiac positive stool in rectal vault.   Musculoskeletal: Normal range of motion. She exhibits edema (Mild edema in ankles bilaterally). She exhibits no tenderness.   Lymphadenopathy:     She has no cervical adenopathy.   Neurological: She is alert and oriented to person, place, and time. She has normal reflexes. No cranial nerve deficit or sensory deficit.   Moderate  bilateral weakness.   Skin: Skin is warm and dry. No rash noted. There is pallor.   Psychiatric: She has a normal mood and affect. Her behavior is normal.   Nursing note and vitals reviewed.      Critical Care  Performed by: OLIVIER RENEE  Authorized by: OLIVIER RENEE   Total critical care time: 60 minutes  Critical care was necessary to treat or prevent imminent or life-threatening deterioration of the following conditions: Profound anemia with GI bleeding.  Elevated INR.               ED Course  ED Course                     MDM  Number of Diagnoses or Management Options  Acute upper GI bleed:   Elevated INR:   Diagnosis management comments:       I reviewed all available studies at the bedside with the patient and her .  She is profoundly anemic and has an elevated INR.    I suspect her anemia is been present from an upper GI bleeding source of the course the past 2 weeks.  Her blood pressure is a little bit on the low side but surprisingly she is not in cruz shock with this.    Sounds like she's had cryptogenic GI bleeding in the past with negative upper endoscopy negative lower endoscopy and what sounds like a pill cam and push enteroscopy a few weeks ago.    Given the elevated BUN/creatinine 89 and the melanotic nature of her stool I suspect this is an upper GI source given her IV Protonix.  Also ordered transfusion of 3 units of packed cells for her and treatment of her elevated INR.  I did speak about all this with the patient and her  treatment of her elevated INR will increase her risk for stroke but I think in the short-term the benefit of this far outweighs the risk.    I spoke Dr. Haley, who is on call today for GI, he agrees with the plan.    I spoke Dr. Bowling, pulmonary critical care medicine, will admit the patient.    On reexamination the patient is resting fairly comfortably but has some pain and shoulder we'll give her some pain medicine for this.    All are agreeable  with the plan       Amount and/or Complexity of Data Reviewed  Clinical lab tests: reviewed  Tests in the radiology section of CPT®: reviewed  Tests in the medicine section of CPT®: reviewed  Decide to obtain previous medical records or to obtain history from someone other than the patient: yes        Final diagnoses:   Acute upper GI bleed   Elevated INR     EMR Dragon/Transcription disclaimer:   Much of this encounter note is an electronic transcription/translation of spoken language to printed text. The electronic translation of spoken language may permit erroneous, or at times, nonsensical words or phrases to be inadvertently transcribed; Although I have reviewed the note for such errors, some may still exist.       Documentation assistance provided by lucy Oswald.  Information recorded by the lucy was done at my direction and has been verified and validated by me.     Molly Oswald  04/15/17 1530       Molly Oswald  04/15/17 1551       Jose Phillip MD  04/15/17 4032

## 2017-04-16 LAB
ALBUMIN SERPL-MCNC: 3.6 G/DL (ref 3.2–4.8)
ALBUMIN/GLOB SERPL: 1.2 G/DL (ref 1.5–2.5)
ALP SERPL-CCNC: 66 U/L (ref 25–100)
ALT SERPL W P-5'-P-CCNC: 9 U/L (ref 7–40)
ANION GAP SERPL CALCULATED.3IONS-SCNC: 7 MMOL/L (ref 3–11)
AST SERPL-CCNC: 16 U/L (ref 0–33)
BASOPHILS # BLD AUTO: 0.09 10*3/MM3 (ref 0–0.2)
BASOPHILS NFR BLD AUTO: 1.1 % (ref 0–1)
BILIRUB SERPL-MCNC: 3.2 MG/DL (ref 0.3–1.2)
BILIRUB UR QL STRIP: NEGATIVE
BUN BLD-MCNC: 87 MG/DL (ref 9–23)
BUN/CREAT SERPL: 37.8 (ref 7–25)
CALCIUM SPEC-SCNC: 9 MG/DL (ref 8.7–10.4)
CHLORIDE SERPL-SCNC: 101 MMOL/L (ref 99–109)
CLARITY UR: CLEAR
CO2 SERPL-SCNC: 24 MMOL/L (ref 20–31)
COLOR UR: YELLOW
CREAT BLD-MCNC: 2.3 MG/DL (ref 0.6–1.3)
DEPRECATED RDW RBC AUTO: 49.3 FL (ref 37–54)
EOSINOPHIL # BLD AUTO: 0.23 10*3/MM3 (ref 0.1–0.3)
EOSINOPHIL NFR BLD AUTO: 2.8 % (ref 0–3)
ERYTHROCYTE [DISTWIDTH] IN BLOOD BY AUTOMATED COUNT: 16.1 % (ref 11.3–14.5)
GFR SERPL CREATININE-BSD FRML MDRD: 20 ML/MIN/1.73
GLOBULIN UR ELPH-MCNC: 3 GM/DL
GLUCOSE BLD-MCNC: 114 MG/DL (ref 70–100)
GLUCOSE BLDC GLUCOMTR-MCNC: 105 MG/DL (ref 70–130)
GLUCOSE BLDC GLUCOMTR-MCNC: 108 MG/DL (ref 70–130)
GLUCOSE BLDC GLUCOMTR-MCNC: 112 MG/DL (ref 70–130)
GLUCOSE BLDC GLUCOMTR-MCNC: 120 MG/DL (ref 70–130)
GLUCOSE BLDC GLUCOMTR-MCNC: 128 MG/DL (ref 70–130)
GLUCOSE UR STRIP-MCNC: NEGATIVE MG/DL
HCT VFR BLD AUTO: 25.9 % (ref 34.5–44)
HCT VFR BLD AUTO: 26.3 % (ref 34.5–44)
HCT VFR BLD AUTO: 28.3 % (ref 34.5–44)
HCT VFR BLD AUTO: 29.4 % (ref 34.5–44)
HGB BLD-MCNC: 8 G/DL (ref 11.5–15.5)
HGB BLD-MCNC: 8.4 G/DL (ref 11.5–15.5)
HGB BLD-MCNC: 9.3 G/DL (ref 11.5–15.5)
HGB BLD-MCNC: 9.5 G/DL (ref 11.5–15.5)
HGB UR QL STRIP.AUTO: NEGATIVE
IMM GRANULOCYTES # BLD: 0.05 10*3/MM3 (ref 0–0.03)
IMM GRANULOCYTES NFR BLD: 0.6 % (ref 0–0.6)
INR PPP: 1.24
INR PPP: 1.36
KETONES UR QL STRIP: NEGATIVE
LEUKOCYTE ESTERASE UR QL STRIP.AUTO: NEGATIVE
LYMPHOCYTES # BLD AUTO: 2.03 10*3/MM3 (ref 0.6–4.8)
LYMPHOCYTES NFR BLD AUTO: 24.4 % (ref 24–44)
MAGNESIUM SERPL-MCNC: 3.3 MG/DL (ref 1.3–2.7)
MCH RBC QN AUTO: 28.3 PG (ref 27–31)
MCHC RBC AUTO-ENTMCNC: 33.6 G/DL (ref 32–36)
MCV RBC AUTO: 84.2 FL (ref 80–99)
MONOCYTES # BLD AUTO: 0.89 10*3/MM3 (ref 0–1)
MONOCYTES NFR BLD AUTO: 10.7 % (ref 0–12)
NEUTROPHILS # BLD AUTO: 5.04 10*3/MM3 (ref 1.5–8.3)
NEUTROPHILS NFR BLD AUTO: 60.4 % (ref 41–71)
NITRITE UR QL STRIP: NEGATIVE
PH UR STRIP.AUTO: <=5 [PH] (ref 5–8)
PHOSPHATE SERPL-MCNC: 5.4 MG/DL (ref 2.4–5.1)
PLATELET # BLD AUTO: 292 10*3/MM3 (ref 150–450)
PMV BLD AUTO: 8.3 FL (ref 6–12)
POTASSIUM BLD-SCNC: 4.7 MMOL/L (ref 3.5–5.5)
POTASSIUM BLD-SCNC: 6.2 MMOL/L (ref 3.5–5.5)
PROT SERPL-MCNC: 6.6 G/DL (ref 5.7–8.2)
PROT UR QL STRIP: NEGATIVE
PROTHROMBIN TIME: 13.6 SECONDS (ref 9.6–11.5)
PROTHROMBIN TIME: 15 SECONDS (ref 9.6–11.5)
RBC # BLD AUTO: 3.36 10*6/MM3 (ref 3.89–5.14)
SODIUM BLD-SCNC: 132 MMOL/L (ref 132–146)
SP GR UR STRIP: 1.02 (ref 1–1.03)
UROBILINOGEN UR QL STRIP: NORMAL
WBC NRBC COR # BLD: 8.33 10*3/MM3 (ref 3.5–10.8)

## 2017-04-16 PROCEDURE — 84100 ASSAY OF PHOSPHORUS: CPT | Performed by: NURSE PRACTITIONER

## 2017-04-16 PROCEDURE — 85610 PROTHROMBIN TIME: CPT | Performed by: NURSE PRACTITIONER

## 2017-04-16 PROCEDURE — 99233 SBSQ HOSP IP/OBS HIGH 50: CPT | Performed by: INTERNAL MEDICINE

## 2017-04-16 PROCEDURE — 94760 N-INVAS EAR/PLS OXIMETRY 1: CPT

## 2017-04-16 PROCEDURE — 25010000002 HYDROMORPHONE PER 4 MG: Performed by: NURSE PRACTITIONER

## 2017-04-16 PROCEDURE — 80053 COMPREHEN METABOLIC PANEL: CPT | Performed by: NURSE PRACTITIONER

## 2017-04-16 PROCEDURE — 94799 UNLISTED PULMONARY SVC/PX: CPT

## 2017-04-16 PROCEDURE — 84132 ASSAY OF SERUM POTASSIUM: CPT | Performed by: INTERNAL MEDICINE

## 2017-04-16 PROCEDURE — 85018 HEMOGLOBIN: CPT | Performed by: NURSE PRACTITIONER

## 2017-04-16 PROCEDURE — 82962 GLUCOSE BLOOD TEST: CPT

## 2017-04-16 PROCEDURE — 85014 HEMATOCRIT: CPT | Performed by: NURSE PRACTITIONER

## 2017-04-16 PROCEDURE — 83735 ASSAY OF MAGNESIUM: CPT | Performed by: NURSE PRACTITIONER

## 2017-04-16 PROCEDURE — 94640 AIRWAY INHALATION TREATMENT: CPT

## 2017-04-16 PROCEDURE — 85025 COMPLETE CBC W/AUTO DIFF WBC: CPT | Performed by: NURSE PRACTITIONER

## 2017-04-16 RX ORDER — SODIUM POLYSTYRENE SULFONATE 15 G/60ML
30 SUSPENSION ORAL; RECTAL ONCE
Status: COMPLETED | OUTPATIENT
Start: 2017-04-16 | End: 2017-04-16

## 2017-04-16 RX ORDER — HYDROCODONE BITARTRATE AND ACETAMINOPHEN 5; 325 MG/1; MG/1
1 TABLET ORAL EVERY 6 HOURS PRN
Status: DISCONTINUED | OUTPATIENT
Start: 2017-04-16 | End: 2017-04-19 | Stop reason: HOSPADM

## 2017-04-16 RX ORDER — HYDROMORPHONE HYDROCHLORIDE 1 MG/ML
0.25 INJECTION, SOLUTION INTRAMUSCULAR; INTRAVENOUS; SUBCUTANEOUS
Status: DISCONTINUED | OUTPATIENT
Start: 2017-04-16 | End: 2017-04-19 | Stop reason: HOSPADM

## 2017-04-16 RX ORDER — NICOTINE POLACRILEX 4 MG
15 LOZENGE BUCCAL
Status: DISCONTINUED | OUTPATIENT
Start: 2017-04-16 | End: 2017-04-19 | Stop reason: HOSPADM

## 2017-04-16 RX ORDER — DEXTROSE MONOHYDRATE 25 G/50ML
25 INJECTION, SOLUTION INTRAVENOUS
Status: DISCONTINUED | OUTPATIENT
Start: 2017-04-16 | End: 2017-04-19 | Stop reason: HOSPADM

## 2017-04-16 RX ADMIN — IPRATROPIUM BROMIDE AND ALBUTEROL SULFATE 3 ML: .5; 3 SOLUTION RESPIRATORY (INHALATION) at 06:40

## 2017-04-16 RX ADMIN — SODIUM CHLORIDE 8 MG/HR: 900 INJECTION INTRAVENOUS at 20:21

## 2017-04-16 RX ADMIN — SODIUM CHLORIDE 8 MG/HR: 900 INJECTION INTRAVENOUS at 07:14

## 2017-04-16 RX ADMIN — SODIUM CHLORIDE 8 MG/HR: 900 INJECTION INTRAVENOUS at 11:04

## 2017-04-16 RX ADMIN — SODIUM CHLORIDE 8 MG/HR: 900 INJECTION INTRAVENOUS at 15:52

## 2017-04-16 RX ADMIN — SODIUM CHLORIDE 125 ML/HR: 9 INJECTION, SOLUTION INTRAVENOUS at 06:08

## 2017-04-16 RX ADMIN — SODIUM POLYSTYRENE SULFONATE 30 G: 15 SUSPENSION ORAL; RECTAL at 09:22

## 2017-04-16 RX ADMIN — SODIUM CHLORIDE 125 ML/HR: 9 INJECTION, SOLUTION INTRAVENOUS at 20:21

## 2017-04-16 RX ADMIN — HYDROCODONE BITARTRATE AND ACETAMINOPHEN 1 TABLET: 5; 325 TABLET ORAL at 13:09

## 2017-04-16 RX ADMIN — HYDROMORPHONE HYDROCHLORIDE 0.25 MG: 1 INJECTION, SOLUTION INTRAMUSCULAR; INTRAVENOUS; SUBCUTANEOUS at 02:17

## 2017-04-16 RX ADMIN — SODIUM CHLORIDE 125 ML/HR: 9 INJECTION, SOLUTION INTRAVENOUS at 13:09

## 2017-04-16 RX ADMIN — IPRATROPIUM BROMIDE AND ALBUTEROL SULFATE 3 ML: .5; 3 SOLUTION RESPIRATORY (INHALATION) at 12:07

## 2017-04-16 RX ADMIN — SODIUM CHLORIDE 8 MG/HR: 900 INJECTION INTRAVENOUS at 02:17

## 2017-04-16 NOTE — PLAN OF CARE
Problem: Gastrointestinal Bleeding (Adult)  Goal: Signs and Symptoms of Listed Potential Problems Will be Absent or Manageable (Gastrointestinal Bleeding)  Outcome: Ongoing (interventions implemented as appropriate)    04/16/17 1617   Gastrointestinal Bleeding   Problems Assessed (GI Bleeding) all   Problems Present (GI Bleeding) fluid imbalance         Problem: Chronic Kidney Disease/End Stage Renal Disease (Adult)  Goal: Signs and Symptoms of Listed Potential Problems Will be Absent or Manageable (Chronic Kidney Disease/End Stage Renal Disease)  Outcome: Ongoing (interventions implemented as appropriate)    04/16/17 1617   Chronic Kidney Disease/End Stage Renal Disease   Problems Assessed (Chronic Kidney Disease/ESRD) all   Problems Present (Chronic Kidney Disease/ESRD) electrolyte imbalance;fluid imbalance

## 2017-04-16 NOTE — PROGRESS NOTES
"Intensive Care Follow-up     Hospital:  LOS: 1 day   Ms. Sarita Alegre, 77 y.o. female is followed for:   Upper GI hemorrhage        Subjective   Interval History:  The chart is been reviewed. The patient has done well overnight. She states that she is breathing without difficulties. She has had some Kayexalate for hyperkalemia however has not yet had a bowel movement.    The patient's relevant past medical, surgical and social history were reviewed and updated in Epic as appropriate.        Objective     Infusions:    pantoprazole 8 mg/hr Last Rate: 8 mg/hr (04/16/17 1104)   sodium chloride 125 mL/hr Last Rate: 125 mL/hr (04/16/17 0608)     Medications:    insulin lispro 0-7 Units Subcutaneous 4x Daily With Meals & Nightly   ipratropium-albuterol 3 mL Nebulization Q6H - RT   levothyroxine 100 mcg Oral Q AM   sodium chloride 500 mL Intravenous Once       Vital Sign Min/Max for last 24 hours  Temp  Min: 96.8 °F (36 °C)  Max: 98.2 °F (36.8 °C)   BP  Min: 86/51  Max: 139/81   Pulse  Min: 65  Max: 85   Resp  Min: 14  Max: 17   SpO2  Min: 90 %  Max: 100 %   Flow (L/min)  Min: 2  Max: 2       Input/Output for last 24 hour shift  04/15 0701 - 04/16 0700  In: 2277.8 [I.V.:950.8]  Out: 340 [Urine:340]      Objective:  General Appearance:  Comfortable, well-appearing and in no acute distress.    Vital signs: (most recent): Blood pressure 113/65, pulse 72, temperature 97.8 °F (36.6 °C), temperature source Oral, resp. rate 14, height 67\" (170.2 cm), weight 145 lb 8.1 oz (66 kg), SpO2 100 %.  No fever.    Output: Producing urine.    HEENT: Normal HEENT exam.    Lungs:  Normal respiratory rate and normal effort.  She is not in respiratory distress.  Breath sounds clear to auscultation.  No stridor.  No wheezes, rales, rhonchi or decreased breath sounds.    Heart: Normal rate.  Irregular rhythm.  S1 normal and S2 normal.  No murmur, gallop or friction rub.   Chest: Symmetric chest wall expansion.   Abdomen: Abdomen is soft and " non-distended.  Bowel sounds are normal.   There is no abdominal tenderness.     Extremities: Normal range of motion.  There is no deformity or dependent edema.    Neurological: Patient is alert and oriented to person, place and time.  Normal strength.    Pupils:  Pupils are equal, round, and reactive to light.  Pupils are equal.   Skin:  Warm.  No rash.               Results from last 7 days  Lab Units 04/16/17  0322 04/15/17  2353 04/15/17  1447   WBC 10*3/mm3 8.33  --  9.19   HEMOGLOBIN g/dL 9.5* 9.3* 4.6*   PLATELETS 10*3/mm3 292  --  390       Results from last 7 days  Lab Units 04/16/17  0322 04/15/17  1447   SODIUM mmol/L 132 131*   POTASSIUM mmol/L 6.2* 5.6*   TOTAL CO2 mmol/L 24.0 25.0   BUN mg/dL 87* 89*   CREATININE mg/dL 2.30* 2.20*   MAGNESIUM mg/dL 3.3* 3.4*   PHOSPHORUS mg/dL 5.4*  --    GLUCOSE mg/dL 114* 144*     Estimated Creatinine Clearance: 19.3 mL/min (by C-G formula based on Cr of 2.3).            I reviewed the patient's results and images.     Assessment/Plan   Impression      Principal Problem:    Upper GI hemorrhage  Active Problems:    Atrial fibrillation, persistent    Pulmonary emphysema    Obstructive sleep apnea syndrome    Chronic kidney disease (CKD), stage III (moderate)    CHF (congestive heart failure)    Secondary pulmonary hypertension    CIELO (obstructive sleep apnea)    Anemia due to acute blood loss    Supratherapeutic INR       Plan        The patient is being treated with Kayexalate and we will recheck her potassium this afternoon.  Continue to follow renal function and hemoglobin levels.  Gastroenterology will evaluate her later this afternoon.  She will remain in the intensive care unit today.  She is asked that we consult her nephrologist and I will do this for tomorrow morning.  Follow-up labs and orders have been placed.    Plan of care and goals reviewed with mulitdisciplinary team at daily rounds.   I discussed the patient's findings and my recommendations with  patient, family and nursing staff         Migel Kirk MD, [facility]  Pulmonary and Critical Care Medicine  04/16/17 11:21 AM

## 2017-04-16 NOTE — PLAN OF CARE
Problem: Gastrointestinal Bleeding (Pediatric)  Goal: Signs and Symptoms of Listed Potential Problems Will be Absent or Manageable (Gastrointestinal Bleeding)  Outcome: Ongoing (interventions implemented as appropriate)    04/15/17 8916   Gastrointestinal Bleeding   Problems Assessed (GI Bleeding) fluid imbalance   Problems Present (GI Bleeding) fluid imbalance

## 2017-04-16 NOTE — PLAN OF CARE
Problem: Patient Care Overview (Adult)  Goal: Plan of Care Review  Outcome: Ongoing (interventions implemented as appropriate)    04/16/17 0550   Coping/Psychosocial Response Interventions   Plan Of Care Reviewed With patient   Patient Care Overview   Progress improving   Outcome Evaluation   Outcome Summary/Follow up Plan Pt. received 3 units PRBC. H/H improved.

## 2017-04-16 NOTE — PROGRESS NOTES
"Adult Nutrition  Assessment/PES    Patient Name:  Sarita Alegre  YOB: 1940  MRN: [id number]  Admit Date:  4/15/2017    Assessment Date:  4/16/2017        Reason for Assessment       04/16/17 1349    Reason for Assessment    Reason For Assessment/Visit identified at risk by screening criteria    Identified At Risk By Screening Criteria reduced oral intake over the last month;unintentional loss of 10 lbs or more in the past 2 mos    Time Spent (min) 20    Diagnosis Diagnosis    Gastrointestinal Gastrointestinal bleed    Hematological Acute blood loss    Renal ARF;CKD                Anthropometrics       04/16/17 1351    Anthropometrics    Height 170.2 cm (67.01\")    Weight 65.8 kg (145 lb)    Ideal Body Weight (IBW)    Ideal Body Weight (IBW), Female 62.28    % Ideal Body Weight 105.83    Body Mass Index (BMI)    BMI (kg/m2) 22.75            Labs/Tests/Procedures/Meds       04/16/17 1351    Labs/Tests/Procedures/Meds    Labs/Tests Review Reviewed    Medication Review Reviewed, pertinent                Nutrition Prescription Ordered       04/16/17 1351    Nutrition Prescription PO    Current PO Diet Clear Liquid   no red food coloring.             Evaluation of Received Nutrient/Fluid Intake       04/16/17 1352    PO Evaluation    Number of Days PO Intake Evaluated Insufficient Data              Problem/Interventions:        Problem 1       04/16/17 1352    Nutrition Diagnoses Problem 1    Problem 1 Inadequate Nutrient Intake    Etiology (related to) --   clinical condition.     Signs/Symptoms (evidenced by) Report of Mnimal PO Intake                    Intervention Goal       04/16/17 1353    Intervention Goal    General Nutrition support treatment    PO Tolerate PO;Advance diet            Nutrition Intervention       04/16/17 1353    Nutrition Intervention    RD/Tech Action Follow Tx progress;Care plan reviewd              Education/Evaluation       04/16/17 1353    Monitor/Evaluation    Monitor " Per protocol          Electronically signed by:  Josephine Jiang RD  04/16/17 1:53 PM

## 2017-04-16 NOTE — PLAN OF CARE
Problem: Patient Care Overview (Adult)  Goal: Plan of Care Review  Outcome: Ongoing (interventions implemented as appropriate)    04/16/17 1617   Coping/Psychosocial Response Interventions   Plan Of Care Reviewed With patient;spouse;family   Patient Care Overview   Progress improving   Outcome Evaluation   Outcome Summary/Follow up Plan Pt's K+ 6.2, Kayexalate x1 given, no BM but K+ recheck 4.7. H&H 9.5/28.3 -> 8.4/26.3 ->8/25.9. 500mL UOP. Renal consulted to see in the AM. VSS, pt feeling improved.          Problem: Gastrointestinal Bleeding (Adult)  Goal: Signs and Symptoms of Listed Potential Problems Will be Absent or Manageable (Gastrointestinal Bleeding)  Outcome: Ongoing (interventions implemented as appropriate)    Problem: Chronic Kidney Disease/End Stage Renal Disease (Adult)  Goal: Signs and Symptoms of Listed Potential Problems Will be Absent or Manageable (Chronic Kidney Disease/End Stage Renal Disease)  Outcome: Ongoing (interventions implemented as appropriate)

## 2017-04-17 LAB
ANION GAP SERPL CALCULATED.3IONS-SCNC: 3 MMOL/L (ref 3–11)
BASOPHILS # BLD AUTO: 0.05 10*3/MM3 (ref 0–0.2)
BASOPHILS NFR BLD AUTO: 0.7 % (ref 0–1)
BUN BLD-MCNC: 66 MG/DL (ref 9–23)
BUN/CREAT SERPL: 33 (ref 7–25)
CALCIUM SPEC-SCNC: 8.8 MG/DL (ref 8.7–10.4)
CHLORIDE SERPL-SCNC: 108 MMOL/L (ref 99–109)
CO2 SERPL-SCNC: 26 MMOL/L (ref 20–31)
CREAT BLD-MCNC: 2 MG/DL (ref 0.6–1.3)
DEPRECATED RDW RBC AUTO: 53.5 FL (ref 37–54)
EOSINOPHIL # BLD AUTO: 0.24 10*3/MM3 (ref 0.1–0.3)
EOSINOPHIL NFR BLD AUTO: 3.2 % (ref 0–3)
ERYTHROCYTE [DISTWIDTH] IN BLOOD BY AUTOMATED COUNT: 17.2 % (ref 11.3–14.5)
GFR SERPL CREATININE-BSD FRML MDRD: 24 ML/MIN/1.73
GLUCOSE BLD-MCNC: 124 MG/DL (ref 70–100)
GLUCOSE BLDC GLUCOMTR-MCNC: 107 MG/DL (ref 70–130)
GLUCOSE BLDC GLUCOMTR-MCNC: 107 MG/DL (ref 70–130)
GLUCOSE BLDC GLUCOMTR-MCNC: 113 MG/DL (ref 70–130)
GLUCOSE BLDC GLUCOMTR-MCNC: 129 MG/DL (ref 70–130)
HCT VFR BLD AUTO: 27 % (ref 34.5–44)
HCT VFR BLD AUTO: 27.8 % (ref 34.5–44)
HGB BLD-MCNC: 8.2 G/DL (ref 11.5–15.5)
HGB BLD-MCNC: 8.6 G/DL (ref 11.5–15.5)
IMM GRANULOCYTES # BLD: 0.03 10*3/MM3 (ref 0–0.03)
IMM GRANULOCYTES NFR BLD: 0.4 % (ref 0–0.6)
LYMPHOCYTES # BLD AUTO: 1.42 10*3/MM3 (ref 0.6–4.8)
LYMPHOCYTES NFR BLD AUTO: 19.2 % (ref 24–44)
MCH RBC QN AUTO: 26.1 PG (ref 27–31)
MCHC RBC AUTO-ENTMCNC: 30.4 G/DL (ref 32–36)
MCV RBC AUTO: 86 FL (ref 80–99)
MONOCYTES # BLD AUTO: 0.86 10*3/MM3 (ref 0–1)
MONOCYTES NFR BLD AUTO: 11.6 % (ref 0–12)
NEUTROPHILS # BLD AUTO: 4.8 10*3/MM3 (ref 1.5–8.3)
NEUTROPHILS NFR BLD AUTO: 64.9 % (ref 41–71)
PLATELET # BLD AUTO: 244 10*3/MM3 (ref 150–450)
PMV BLD AUTO: 8.1 FL (ref 6–12)
POTASSIUM BLD-SCNC: 4.6 MMOL/L (ref 3.5–5.5)
POTASSIUM BLD-SCNC: 4.7 MMOL/L (ref 3.5–5.5)
RBC # BLD AUTO: 3.14 10*6/MM3 (ref 3.89–5.14)
SODIUM BLD-SCNC: 137 MMOL/L (ref 132–146)
WBC NRBC COR # BLD: 7.4 10*3/MM3 (ref 3.5–10.8)

## 2017-04-17 PROCEDURE — 0W3P8ZZ CONTROL BLEEDING IN GASTROINTESTINAL TRACT, VIA NATURAL OR ARTIFICIAL OPENING ENDOSCOPIC: ICD-10-PCS | Performed by: INTERNAL MEDICINE

## 2017-04-17 PROCEDURE — 80048 BASIC METABOLIC PNL TOTAL CA: CPT | Performed by: INTERNAL MEDICINE

## 2017-04-17 PROCEDURE — 25010000002 FENTANYL CITRATE (PF) 100 MCG/2ML SOLUTION

## 2017-04-17 PROCEDURE — 25010000002 FENTANYL CITRATE (PF) 100 MCG/2ML SOLUTION: Performed by: INTERNAL MEDICINE

## 2017-04-17 PROCEDURE — 99233 SBSQ HOSP IP/OBS HIGH 50: CPT | Performed by: INTERNAL MEDICINE

## 2017-04-17 PROCEDURE — 84132 ASSAY OF SERUM POTASSIUM: CPT | Performed by: INTERNAL MEDICINE

## 2017-04-17 PROCEDURE — 94799 UNLISTED PULMONARY SVC/PX: CPT

## 2017-04-17 PROCEDURE — 85018 HEMOGLOBIN: CPT | Performed by: NURSE PRACTITIONER

## 2017-04-17 PROCEDURE — 25010000002 MIDAZOLAM PER 1 MG: Performed by: INTERNAL MEDICINE

## 2017-04-17 PROCEDURE — 82962 GLUCOSE BLOOD TEST: CPT

## 2017-04-17 PROCEDURE — 85025 COMPLETE CBC W/AUTO DIFF WBC: CPT | Performed by: INTERNAL MEDICINE

## 2017-04-17 PROCEDURE — 85014 HEMATOCRIT: CPT | Performed by: NURSE PRACTITIONER

## 2017-04-17 PROCEDURE — 25010000002 MIDAZOLAM PER 1 MG

## 2017-04-17 RX ORDER — MIDAZOLAM HYDROCHLORIDE 5 MG/ML
INJECTION INTRAMUSCULAR; INTRAVENOUS
Status: COMPLETED | OUTPATIENT
Start: 2017-04-17 | End: 2017-04-17

## 2017-04-17 RX ORDER — MIDAZOLAM HYDROCHLORIDE 1 MG/ML
INJECTION INTRAMUSCULAR; INTRAVENOUS
Status: DISPENSED
Start: 2017-04-17 | End: 2017-04-18

## 2017-04-17 RX ORDER — FENTANYL CITRATE 50 UG/ML
50 INJECTION, SOLUTION INTRAMUSCULAR; INTRAVENOUS ONCE
Status: COMPLETED | OUTPATIENT
Start: 2017-04-17 | End: 2017-04-17

## 2017-04-17 RX ORDER — FENTANYL CITRATE 50 UG/ML
INJECTION, SOLUTION INTRAMUSCULAR; INTRAVENOUS
Status: COMPLETED | OUTPATIENT
Start: 2017-04-17 | End: 2017-04-17

## 2017-04-17 RX ORDER — IPRATROPIUM BROMIDE AND ALBUTEROL SULFATE 2.5; .5 MG/3ML; MG/3ML
3 SOLUTION RESPIRATORY (INHALATION) EVERY 6 HOURS PRN
Status: DISCONTINUED | OUTPATIENT
Start: 2017-04-17 | End: 2017-04-19 | Stop reason: HOSPADM

## 2017-04-17 RX ORDER — MIDAZOLAM HYDROCHLORIDE 1 MG/ML
2 INJECTION INTRAMUSCULAR; INTRAVENOUS ONCE
Status: COMPLETED | OUTPATIENT
Start: 2017-04-17 | End: 2017-04-17

## 2017-04-17 RX ORDER — FENTANYL CITRATE 50 UG/ML
INJECTION, SOLUTION INTRAMUSCULAR; INTRAVENOUS
Status: COMPLETED
Start: 2017-04-17 | End: 2017-04-17

## 2017-04-17 RX ORDER — MIDAZOLAM HYDROCHLORIDE 1 MG/ML
INJECTION INTRAMUSCULAR; INTRAVENOUS
Status: COMPLETED
Start: 2017-04-17 | End: 2017-04-17

## 2017-04-17 RX ORDER — SODIUM CHLORIDE 0.9 % (FLUSH) 0.9 %
1-10 SYRINGE (ML) INJECTION AS NEEDED
Status: DISCONTINUED | OUTPATIENT
Start: 2017-04-17 | End: 2017-04-19 | Stop reason: HOSPADM

## 2017-04-17 RX ADMIN — MIDAZOLAM HYDROCHLORIDE 2 MG: 1 INJECTION, SOLUTION INTRAMUSCULAR; INTRAVENOUS at 14:49

## 2017-04-17 RX ADMIN — MIDAZOLAM HYDROCHLORIDE 2 MG: 1 INJECTION INTRAMUSCULAR; INTRAVENOUS at 14:49

## 2017-04-17 RX ADMIN — MIDAZOLAM HYDROCHLORIDE 2 MG: 5 INJECTION, SOLUTION INTRAMUSCULAR; INTRAVENOUS at 14:52

## 2017-04-17 RX ADMIN — SODIUM CHLORIDE 8 MG/HR: 900 INJECTION INTRAVENOUS at 01:09

## 2017-04-17 RX ADMIN — FENTANYL CITRATE 50 MCG: 50 INJECTION INTRAMUSCULAR; INTRAVENOUS at 14:49

## 2017-04-17 RX ADMIN — FENTANYL CITRATE 50 MCG: 50 INJECTION, SOLUTION INTRAMUSCULAR; INTRAVENOUS at 14:49

## 2017-04-17 RX ADMIN — SODIUM CHLORIDE 8 MG/HR: 900 INJECTION INTRAVENOUS at 06:17

## 2017-04-17 RX ADMIN — LEVOTHYROXINE SODIUM 100 MCG: 100 TABLET ORAL at 06:19

## 2017-04-17 RX ADMIN — IPRATROPIUM BROMIDE AND ALBUTEROL SULFATE 3 ML: .5; 3 SOLUTION RESPIRATORY (INHALATION) at 00:20

## 2017-04-17 RX ADMIN — MIDAZOLAM HYDROCHLORIDE 2 MG: 5 INJECTION, SOLUTION INTRAMUSCULAR; INTRAVENOUS at 15:05

## 2017-04-17 RX ADMIN — SODIUM CHLORIDE 125 ML/HR: 9 INJECTION, SOLUTION INTRAVENOUS at 04:10

## 2017-04-17 RX ADMIN — FENTANYL CITRATE 50 MCG: 50 INJECTION, SOLUTION INTRAMUSCULAR; INTRAVENOUS at 15:12

## 2017-04-17 NOTE — PROGRESS NOTES
Adult Nutrition  Assessment/PES    Patient Name:  Sarita Alegre  YOB: 1940  MRN: [id number]  Admit Date:  4/15/2017    Assessment Date:  4/17/2017   Comments:   RD follow up. MST of 3 on adm per past medical records pts weight appears stable for the past 10 months. RD to continue to follow.           Reason for Assessment       04/17/17 0901    Reason for Assessment    Reason For Assessment/Visit multidisciplinary rounds;follow up protocol    Time Spent (min) 30    Diagnosis Diagnosis   Per notes this adm/MD diagnosis list noted    Cardiac --   Hx CHF LVEF 55-60%    Gastrointestinal --   Hx of recurrent GI blood    Pulmonary/Critical Care    Labs COPD   Hx COPD, emphysema, CIELO  Hyperkalemia, Hyperphosphatemia    Principal Problem:    Upper GI hemorrhage  Active Problems:    Atrial fibrillation, persistent    Pulmonary emphysema    Obstructive sleep apnea syndrome    Chronic kidney disease (CKD), stage III (moderate)    CHF (congestive heart failure)    Secondary pulmonary hypertension    CIELO (obstructive sleep apnea)    Anemia due to acute blood loss    Supratherapeutic INR       *K now WDL, no Phos lab obtained today        Nutrition/Diet History       04/17/17 0920    Nutrition/Diet History    Factors Affecting Nutritional Intake Factors    Reported/Observed By RN;MD    Other EGD at bedside planned today-pt now NPO. Renal consult. RD notes kayexalate given 4/16 K+ now 4.6mmol/L. RN reports no further signs of bleeding and includes H/H stable. Pt and pt family report pt with poor po intake x 1 month r/t recurrent GIB, they include pt had lost weight previously r/t stay at the [facility] where her po intake was poor, they include pts weight has been stable for the past 10-12 months. Pt states she is hungry and is ready for diet advancement pending EGD.             Anthropometrics       04/17/17 0921    Anthropometrics    RD Documented Current Weight  71.2 kg (157 lb)   note wt noted as obatined by  "bedscale, adm wt noted as stated.    Anthropometrics (Special Considerations)    Height Used for Calculations 1.702 m (5' 7\")    Weight Used for Calculations 71.2 kg (157 lb)    RD Calculated BMI (kg/m2) 24.68    Body Mass Index (BMI)    BMI Grade 19.1 - 24.9 - normal            Labs/Tests/Procedures/Meds       04/17/17 0922    Labs/Tests/Procedures/Meds    Labs/Tests Review Reviewed;BUN;Creat    Medication Review Reviewed, pertinent   GTT:Protonix, NaCl@125mL/hr. Other meds:SSI     Results from last 7 days  Lab Units 04/17/17  0405  04/16/17  0322   SODIUM mmol/L 137  --  132   POTASSIUM mmol/L 4.6  < > 6.2*   CHLORIDE mmol/L 108  --  101   TOTAL CO2 mmol/L 26.0  --  24.0   BUN mg/dL 66*  --  87*   CREATININE mg/dL 2.00*  --  2.30*   CALCIUM mg/dL 8.8  --  9.0   BILIRUBIN mg/dL  --   --  3.2*   ALK PHOS U/L  --   --  66   ALT (SGPT) U/L  --   --  9   AST (SGOT) U/L  --   --  16   GLUCOSE mg/dL 124*  --  114*   < > = values in this interval not displayed.       Per [facility] progress note 2/14/2017:    Wt Readings from Last 20 Encounters:   02/14/17 150 lb (68 kg)   01/25/17 155 lb 9.6 oz (70.6 kg)   12/07/16 151 lb (68.5 kg)   09/29/16 165 lb 12.6 oz (75.2 kg)   08/31/16 153 lb 4 oz (69.5 kg)   07/25/16 147 lb (66.7 kg)   06/29/16 163 lb 12.8 oz (74.3 kg)   06/21/16 167 lb 1.6 oz (75.8 kg)   06/18/16 159 lb 8 oz (72.3 kg)   05/18/16 164 lb 12.8 oz (74.8 kg)   03/23/16 169 lb 6.4 oz (76.8 kg)   02/10/16 184 lb 4.2 oz (83.6 kg)   06/17/15 199 lb 0.2 oz (90.3 kg)   06/08/15 204 lb 4.1 oz (92.7 kg)   04/21/15 211 lb (95.7 kg)   03/26/15 208 lb 15.9 oz (94.8 kg)   03/16/15 206 lb (93.4 kg)   03/03/15 219 lb 0.1 oz (99.3 kg)   02/13/15 206 lb (93.4 kg)   01/30/15 205 lb 0.1 oz (93 kg)                Nutrition Prescription Ordered       04/17/17 0923    Nutrition Prescription PO    Current PO Diet NPO   for EGD            Evaluation of Received Nutrient/Fluid Intake       04/17/17 0923    PO Evaluation    Number of Days PO " Intake Evaluated Insufficient Data              Problem/Interventions:        Problem 1       04/17/17 0924    Nutrition Diagnoses Problem 1    Problem 1 Inadequate Nutrient Intake   kcal and pro    Etiology (related to) --   clinical status/GI status/diet order    Signs/Symptoms (evidenced by) NPO   NPO for pending EGD, adm dx of GIB                    Intervention Goal       04/17/17 0925    Intervention Goal    General Nutrition support treatment    PO Other (comment)   Restart PO once medically feasible            Nutrition Intervention       04/17/17 0925    Nutrition Intervention    RD/Tech Action Care plan reviewd;Follow Tx progress              Education/Evaluation       04/17/17 0926    Monitor/Evaluation    Monitor Per protocol;I&O;Other (comment)   GI status            Electronically signed by:  Brielle Laboy RDN, RODRÍGUEZ  04/17/17 9:26 AM

## 2017-04-17 NOTE — PLAN OF CARE
Problem: Patient Care Overview (Adult)  Goal: Plan of Care Review  Outcome: Ongoing (interventions implemented as appropriate)    04/17/17 0706   Coping/Psychosocial Response Interventions   Plan Of Care Reviewed With patient   Patient Care Overview   Progress improving   Outcome Evaluation   Outcome Summary/Follow up Plan H/H stable through night.

## 2017-04-17 NOTE — PROGRESS NOTES
"INTENSIVIST NOTE     Hospital Day: 2      Ms. Sarita Alegre, 77 y.o. female is followed for:   Upper GI hemorrhage       SUBJECTIVE     77-year-old female with a history of atrial fibrillation and prior GI bleeding who was admitted with an excessively elevated INR and apparent upper GI bleeding.      Interval history:    No further clinical evidence of GI bleed.  Plans are for EGD today.    The patient's relevant past medical, surgical and social history were reviewed and updated in Epic as appropriate.       OBJECTIVE     Vital Sign Min/Max for last 24 hours  Temp  Min: 98.1 °F (36.7 °C)  Max: 98.4 °F (36.9 °C)   BP  Min: 90/70  Max: 128/73   Pulse  Min: 69  Max: 91   Resp  Min: 12  Max: 20   SpO2  Min: 91 %  Max: 100 %   Flow (L/min)  Min: 2  Max: 2   Weight  Min: 157 lb 10.1 oz (71.5 kg)  Max: 157 lb 10.1 oz (71.5 kg)      Intake/Output Summary (Last 24 hours) at 04/17/17 1605  Last data filed at 04/17/17 1500   Gross per 24 hour   Intake           3614.3 ml   Output             1040 ml   Net           2574.3 ml      Flowsheet Rows         First Filed Value    Admission Height  68\" (172.7 cm) Documented at 04/15/2017 1432    Admission Weight  144 lb (65.3 kg) Documented at 04/15/2017 1432         Last 3 weights    04/15/17  1854 04/16/17  1351 04/17/17  0600   Weight: 145 lb 8.1 oz (66 kg) 145 lb (65.8 kg) 157 lb 10.1 oz (71.5 kg)            Objective:  General Appearance:  In no acute distress.    Vital signs: (most recent): Blood pressure 108/60, pulse 70, temperature 98.1 °F (36.7 °C), temperature source Oral, resp. rate 12, height 67.01\" (170.2 cm), weight 157 lb 10.1 oz (71.5 kg), SpO2 100 %.    HEENT: Normal HEENT exam.    Lungs:  Normal respiratory rate and normal effort.  She is not in respiratory distress.  Breath sounds clear to auscultation.  No wheezes, rales or rhonchi.    Heart: Normal rate.  Irregular rhythm.  S1 normal and S2 normal.  No murmur, gallop or friction rub.   Chest: Symmetric chest " wall expansion.   Abdomen: Abdomen is soft and non-distended.  Bowel sounds are normal.   There is no abdominal tenderness.   There is no mass. There is no splenomegaly. There is no hepatomegaly.   Extremities: There is no deformity or dependent edema.    Neurological: Patient is alert and oriented to person, place and time.    Pupils:  Pupils are equal, round, and reactive to light.    Skin:  Warm and dry.              I reviewed the patient's new clinical results.  I reviewed the patient's new imaging results/reports including actual images and agree with reports.      Chest X-Ray:  4/15 enlarged heart    INFUSIONS    sodium chloride 50 mL/hr Last Rate: 50 mL/hr (04/17/17 0907)         Results from last 7 days  Lab Units 04/17/17  0405 04/17/17  0006 04/16/17  1739  04/16/17  0322  04/15/17  1447   WBC 10*3/mm3 7.40  --   --   --  8.33  --  9.19   HEMOGLOBIN g/dL 8.2* 8.6* 8.0*  < > 9.5*  < > 4.6*   HEMATOCRIT % 27.0* 27.8* 25.9*  < > 28.3*  < > 16.5*   PLATELETS 10*3/mm3 244  --   --   --  292  --  390   < > = values in this interval not displayed.    Results from last 7 days  Lab Units 04/17/17  0405 04/17/17  0006 04/16/17  1346 04/16/17  0322 04/15/17  1447   SODIUM mmol/L 137  --   --  132 131*   POTASSIUM mmol/L 4.6 4.7 4.7 6.2* 5.6*   CHLORIDE mmol/L 108  --   --  101 101   TOTAL CO2 mmol/L 26.0  --   --  24.0 25.0   BUN mg/dL 66*  --   --  87* 89*   GLUCOSE mg/dL 124*  --   --  114* 144*   CREATININE mg/dL 2.00*  --   --  2.30* 2.20*   MAGNESIUM mg/dL  --   --   --  3.3* 3.4*   CALCIUM mg/dL 8.8  --   --  9.0 9.7                 [facility] Ventilation:      I reviewed the patient's medications.    Assessment/Plan   ASSESSMENT      Hospital Problem List     * (Principal)Upper GI hemorrhage    Overview Signed 4/15/2017  7:03 PM by KAILASH Mazariegos     S/p push endoscopy 3/21/17 by Dr. Haley  S/p EGD, colonoscopy and capsule endoscopy by Dr. Haley           Atrial fibrillation, persistent    Overview  Addendum 9/7/2016 11:24 AM by Iron Ryna     a. Newly diagnosed atrial fibrillation with controlled rate of unknown duration, [facility] ER presentation, 06/21/2015.    b. Recent history of echocardiogram, 05/18/2015, Dr. Dorantes, revealing normal LVEF.  Mild AR.  Mild MR.  Moderate to severe TR.  c. EVITA/ECV, 06/24/2015.  EF 55% to 60%.  No thrombus.  Mild to moderate mitral regurgitation.  Moderate tricuspid regurgitation.  RVSP 66 mmHg.  External cardioversion with 2 joules to normal sinus rhythm.    Impression: 01/16/2015 - rate seems to be controlled currently- baseline 85 pulse, to 100 with ambulation; Description: A.  Hospitalized at [facility] 6/2015, presented after dizziness and atrial fibrillation, treated with Rythmol and a beta blocker converted to sinus prior to discharge.  B. ECHO: EVITA: Moderate MR, moderate TR, external cardioversion with 200 J resulting in normal sinus, subsequent RVSP calculated at 66 mmHg.  8/11/15 back in atrial fibrillation when she was seen by cardiology, remains on Coumadin at this time.  C.  Status post cardioversion, 8/25/2015 and 9/3/2015.         Pulmonary emphysema    Overview Signed 5/17/2016 12:48 PM by Aleah Shin DO     Impression: 04/21/2015 - with wheezing, PND and GLASER  ?exac by bystolic- felt like she did better with hctz- now using lasix  weight stable   states breathng and breathlessness are still her major problems   echo ok   may be worse with higher dose bystolic  having problems affording medications including inhalers; Description: A. FEV1 65%;  12/2014. H/O asthma; also long h/o cigs   B. Prior cigs; quit in 1980's after smoking 2-3 ppd for 25 yrs.- hosp for exacerbation 5/15 - PULM / NON CARDIAC DESPITE ELEVATED BNP, NORMAL EF         Obstructive sleep apnea syndrome    Overview Addendum 12/29/2016 10:17 AM by Rachel Sorrow Severe  Description: A. UNTREATED; appeared to attempt to wear it sporadically 2007,  2008,  split-night study of 03/10/2016 reported an overall AHI of 32.7/hr which required BiPAP therapy after failed CPAP attempt with final BiPAP 17 and EPAP 11 which still reported oxygen desaturations on 2 L O2, her lowest oxygen saturation was documented at 64% and several arrhythmias were observed during the study.  B. SLEEP STUDY; 11/2007, w/ AHI 57.8; followed by Dr. rader until 2008, during her sleep study CPAP of 7 treated her well; giving her an AHI of < 2.  Follow-up visits in the sleep clinic indicate she was still sleepy, he increased the pressure to 9, referred her to ENT to consider surgery.  C. Her weight at the time of the sleep study was the same as now; at 206 lbs.         Chronic kidney disease (CKD), stage III (moderate)    Overview Signed 12/29/2016 10:29 AM by Sumaya Sanabria     [facility] through 9/29/2015, worsening      11/15  with hosp chf           CHF (congestive heart failure)    Secondary pulmonary hypertension    Overview Addendum 4/15/2017  6:11 PM by KAILASH Mazariegos     EVITA: 6/24/15, RVSP 66mmhg; moderate MR, LVEF 55-60%, no pericardial effusion or atrial thrombus, no ASD.         CIELO (obstructive sleep apnea)    Overview Signed 4/15/2017  6:15 PM by KAILASH Mazariegos     UNTREATED; appeared to attempt to wear it sporadically 2007, 2008, split-night study of 03/10/2016 reported an overall AHI of 32.7/hr which required BiPAP therapy after failed CPAP attempt with final BiPAP 17 and EPAP 11 which still reported oxygen desaturations on 2 L O2, her lowest oxygen saturation was documented at 64% and several arrhythmias were observed during the study.         Anemia due to acute blood loss    Overview Signed 4/15/2017  7:03 PM by KAILASH Mazariegos     H/O multiple blood transfusions         Supratherapeutic INR            Plan EGD today to evaluate upper tract and then decide on anticoagulation and further workup based upon  that additional information         PLAN     -EGD   -Renal consultation   -Decrease fluids   -Monitor hemoglobin   -AND/full support          I discussed the patient's findings and my recommendations with patient, family, nursing staff and consulting provider     Plan of care and goals reviewed with mulitdisciplinary team at daily rounds.    Ministerio Urias MD  Pulmonary and Critical Care Medicine  04/17/17 4:05 PM

## 2017-04-17 NOTE — PLAN OF CARE
Problem: Patient Care Overview (Adult)  Goal: Plan of Care Review  Outcome: Ongoing (interventions implemented as appropriate)    04/17/17 6977   Coping/Psychosocial Response Interventions   Plan Of Care Reviewed With patient;spouse   Patient Care Overview   Progress improving   Outcome Evaluation   Outcome Summary/Follow up Plan Pt VSS for shift, EGD at bedside w/ Christopher CAMPBELL, no complications. Regular diet started, FC d/c'd.         Problem: Gastrointestinal Bleeding (Adult)  Goal: Signs and Symptoms of Listed Potential Problems Will be Absent or Manageable (Gastrointestinal Bleeding)  Outcome: Ongoing (interventions implemented as appropriate)    Problem: Chronic Kidney Disease/End Stage Renal Disease (Adult)  Goal: Signs and Symptoms of Listed Potential Problems Will be Absent or Manageable (Chronic Kidney Disease/End Stage Renal Disease)  Outcome: Ongoing (interventions implemented as appropriate)

## 2017-04-17 NOTE — CONSULTS
NAL Consult Note    Sarita Alegre  1940  [id number]    Date of Admit:  4/15/2017    Date of Consult: 4/17/2017        Requesting Provider: No ref. provider found  Evaluating Physician: Zeyad Barber MD        Reason for Consultation: BINTA, GI BLEED    History of present illness:    Patient is a 77 y.o.  Yr old female who was admitted into the ED with black tarry stools and increasing fatigue over the last week. She has been followed by Dr. Haley and has had an EGD, colonoscopy and capsule endoscopy over the last year. She just had a push enteroscopy 3/21/17 with Dr. Haley. They have found some spots that have been cauterized.  She had a temp 97.7, P 71, R 14, 86/51 and 100% 2L. She reports that she got up to the bathroom last night and felt dizzy, and nearly lost consciousness. She has c/o progressive weakness, SOA. Her stool has been dark for 2 weeks. She felt that it was from her iron pills. She has secondary pulmonary hypertension COPD, and diastolic CHF. She has CKD with baseline cr around 1.7, last seen in our [place] office in feb 2017.  In early March she underwent large volume paracentesis with fluid showing albumin 2.3, suspicious for poor nutrition- no proteinuria in recent most urinalysis    Past Medical History:   Diagnosis Date   • Adenomatous polyp of stomach    • Allergic rhinitis    • Anemia    • Atrial fibrillation     Hospitalized at [facility] 6/2015, presented after dizziness and atrial fibrillation, treated with Rythmol and a beta blocker converted to sinus prior to discharge.   • Chronic anticoagulation 6/14/2016   • Chronic diastolic congestive heart failure 6/21/2016   • CKD (chronic kidney disease), stage III      Status post temporary hemodialysis at [facility], 8/24/2015 through 9/29/2015, worsening 11/15  with hosp chf   • Closed fracture of fibula     Followed by Dr. Kennedy   • COPD (chronic obstructive pulmonary disease)    • Depression 9/7/2016   • Diabetes mellitus    • Diabetic  peripheral neuropathy 12/29/2016   • Dyslipidemia    • Gastrointestinal bleeding, lower 6/15/2016   • GERD (gastroesophageal reflux disease)    • Hearing deficit 9/7/2016   • Hiatal hernia    • History of abnormal electrocardiogram     a bib, inferior infarct, st-t changes 10/12-referred to ER   • History of acute gastritis    • History of bilateral renal artery occlusion    • History of cataract    • History of deafness    • History of fracture of ankle 07/2015   • History of herpes zoster    • History of pneumonia    • History of transfusion    • Hyperparathyroidism    • Hypertension    • Hypothyroidism 9/7/2016   • Insomnia    • Joint pain, knee    • Low back pain    • Obesity    • CIELO (obstructive sleep apnea)     UNTREATED; appeared to attempt to wear it sporadically 2007,  2008, split-night study of 03/10/2016 reported an overall AHI of 32.7/hr which required BiPAP therapy after failed CPAP attempt with final BiPAP 17 and EPAP 11 which still reported oxygen desaturations on 2 L O2, her lowest oxygen saturation was documented at 64% and several arrhythmias were observed during the study.   • Osteoarthritis 9/7/2016   • Pleural effusion      CT scan of the chest 8/28/2015, reports bilateral pleural effusions, right greater than left, simple in appearance with atelectatic changes identified of the right lung base.  Chest x-ray 10/12/2015, left pleural effusion still seen.   • Recurrent oral ulcers    • Retinal artery occlusion 12/29/2016    h/o   • Secondary pulmonary hypertension     EVITA: 6/24/15, RVSP 66mmhg; moderate MR, LVEF 55â‚¬â€œ60%, no pericardial effusion or atrial thrombus, no ASD.   • Tachy-marcela syndrome 09/01/2016    PPM   • Tricuspid valve insufficiency 5/17/2016    Description: A.  Severe.   • Type 2 diabetes mellitus 9/7/2016   • Vision loss 12/29/2016    Of the left eye       Past Surgical History:   Procedure Laterality Date   • APPENDECTOMY     • BLADDER SURGERY     • CARDIAC ELECTROPHYSIOLOGY  PROCEDURE N/A 9/1/2016    Procedure: Pacemaker DC new;  Surgeon: Zafar Hudson MD;  Location:  [facility];  Service:    • CARDIAC PACEMAKER PLACEMENT     • CARDIOVERSION      .  Status post cardioversion, 8/25/2015 and 9/3/2015, secondary to atrial fibrillation.   • CATARACT EXTRACTION Bilateral     1996  and 1997   • COLONOSCOPY N/A 6/17/2016    Procedure: COLONOSCOPY;  Surgeon: Edy Muñoz MD;  Location:  [facility];  Service:    • ELBOW PROCEDURE Left 2006    Left elbow repair   • HYSTERECTOMY     • KNEE SURGERY Right 1982   • OTHER SURGICAL HISTORY      History of hearing aid check   • PARATHYROIDECTOMY     • UPPER GASTROINTESTINAL ENDOSCOPY         Social History     Social History   • Marital status:      Spouse name: N/A   • Number of children: N/A   • Years of education: N/A     Social History Main Topics   • Smoking status: Former Smoker     Packs/day: 3.00     Years: 25.00     Types: Cigarettes   • Smokeless tobacco: Never Used      Comment: quit 30+ years ago   • Alcohol use No   • Drug use: No   • Sexual activity: Defer      Comment:      Other Topics Concern   • None     Social History Narrative       family history includes Allergies in her mother; Asthma in her mother; Colonic polyp in her mother; Diabetes in her other; Heart disease in her father; Lung cancer in her father; Mitral valve prolapse in her mother; Other in her mother.    Allergies   Allergen Reactions   • Grass Cough   • Molds & Smuts Cough   • Propafenone      Hepatic failure       Medication:    Current Facility-Administered Medications:   •  acetaminophen (TYLENOL) tablet 650 mg, 650 mg, Oral, Q4H PRN **OR** acetaminophen (TYLENOL) suppository 650 mg, 650 mg, Rectal, Q4H PRN, KAILASH Mazariegos  •  dextrose (D50W) solution 25 g, 25 g, Intravenous, Q15 Min PRN, Varsha Matthews MD  •  dextrose (GLUTOSE) oral gel 15 g, 15 g, Oral, Q15 Min PRN, Varsha Matthews MD  •   glucagon (GLUCAGEN) injection 1 mg, 1 mg, Subcutaneous, Q15 Min PRN, Varsha Matthews MD  •  HYDROcodone-acetaminophen (NORCO) 5-325 MG per tablet 1 tablet, 1 tablet, Oral, Q6H PRN, KAILASH Mazariegos, 1 tablet at 04/16/17 1309  •  HYDROmorphone (DILAUDID) injection 0.25 mg, 0.25 mg, Intravenous, Q2H PRN, KAILASH Roberson, 0.25 mg at 04/16/17 0217  •  insulin lispro (humaLOG) injection 0-7 Units, 0-7 Units, Subcutaneous, 4x Daily With Meals & Nightly, Varsha Matthews MD  •  ipratropium-albuterol (DUO-NEB) nebulizer solution 3 mL, 3 mL, Nebulization, Q6H PRN, Varsha Matthews MD  •  levothyroxine (SYNTHROID, LEVOTHROID) tablet 100 mcg, 100 mcg, Oral, Q AM, Varsha Matthews MD, 100 mcg at 04/17/17 0619  •  ondansetron (ZOFRAN) injection 4 mg, 4 mg, Intravenous, Q6H PRN, KAILASH Mazariegos  •  pantoprazole (PROTONIX) 40 mg in sodium chloride 0.9 % 100 mL IVPB-MBP, 8 mg/hr, Intravenous, Continuous, KAILASH Mazariegos, Last Rate: 20 mL/hr at 04/17/17 0617, 8 mg/hr at 04/17/17 0617  •  sodium chloride 0.9 % bolus 500 mL, 500 mL, Intravenous, Once, Jose Phillip MD  •  sodium chloride 0.9 % flush 1-10 mL, 1-10 mL, Intravenous, PRN, KAILASH Mazariegos  •  sodium chloride 0.9 % flush 10 mL, 10 mL, Intravenous, PRN, Jose Phillip MD  •  sodium chloride 0.9 % infusion, 50 mL/hr, Intravenous, Continuous, Ministerio Urias MD, Last Rate: 50 mL/hr at 04/17/17 0907, 50 mL/hr at 04/17/17 0907    Prescriptions Prior to Admission   Medication Sig Dispense Refill Last Dose   • DULoxetine (CYMBALTA) 60 MG capsule Take 60 mg by mouth Daily.      • Fluticasone Furoate-Vilanterol 100-25 MCG/INH aerosol powder  Inhale 1 puff Daily.      • warfarin (COUMADIN) 2 MG tablet Take 2 mg by mouth Daily.      • albuterol (PROVENTIL HFA;VENTOLIN HFA) 108 (90 BASE) MCG/ACT inhaler Inhale 2 puffs Every 6 (Six) Hours As Needed for wheezing or shortness of air. 1 inhaler 5 Taking    • atorvastatin (LIPITOR) 40 MG tablet TAKE ONE TABLET BY MOUTH AT BEDTIME 90 tablet 1 Taking   • Biotin 5000 MCG sublingual tablet Take 1 tablet by mouth Daily.   Taking   • bumetanide (BUMEX) 1 MG tablet Take 1 mg by mouth daily.   Taking   • cholecalciferol (VITAMIN D3) 1000 UNITS tablet Take 1,000 Units by mouth daily.   Taking   • HYDROcodone-acetaminophen (NORCO) 5-325 MG per tablet Take 1 tablet by mouth Every 6 (Six) Hours As Needed. 11/20 pm   Taking   • levothyroxine (SYNTHROID, LEVOTHROID) 100 MCG tablet Take 100 mcg by mouth daily.   Taking   • metoprolol tartrate (LOPRESSOR) 25 MG tablet Take 1 tablet by mouth every 12 (twelve) hours. 180 tablet 3 Taking   • montelukast (SINGULAIR) 10 MG tablet Take 10 mg by mouth every night.   Taking   • Probiotic Product (ALIGN) 4 MG capsule Take 1 capsule by mouth Daily.   Taking   • spironolactone (ALDACTONE) 25 MG tablet Take 25 mg by mouth Daily.   Taking       Review of Systems:    Constitutional-- No Fever, chills or sweats. N+ weakness  Eye-- no diplopia, no conjunctivitis  ENT-- No new hearing or throat complaints.  No epistaxis or oral sores. No odynophagia or dysphagia. No headache, photophobia or neck stiffness.  CV-- No chest pain, palpitations, soa, or edema  Resp-- No SOB/cough/Hemoptysis  GI- No nausea, vomiting, or diarrhea.  No hematochezia, melena, or hematemesis.  -- No dysuria, hematuria, or flank pain. No lower tract obstructive symptoms  Skin-- No rash, warm and dry  Lymph- no painful or swollen lymph nodes in neck/axilla or groin.   Heme- No active bruising or bleeding; no Hx of DVT or PE.  MS-- no swelling or pain in the joints  Neuro-- No acute focal weakness or numbness in the arms or legs.  No seizures.  Psych--No anxiety or depression  Endo--No cold or heat intolerance.  No polyuria, polydipsia, or polyphagia    Full review of systems reviewed and negative otherwise for acute complaints    Physical Exam:   Vital Signs   Blood pressure  "118/55, pulse 71, temperature 98.1 °F (36.7 °C), temperature source Oral, resp. rate 12, height 67.01\" (170.2 cm), weight 157 lb 10.1 oz (71.5 kg), SpO2 100 %.     GENERAL: Awake and alert, in no acute distress.   HEENT: Normocephalic, atraumatic.  PER.  No conjunctivitis. No icterus. Oropharynx clear without evidence of thrush or exudate. No evidence of peridontal disease.    NECK: Supple without nuchal rigidity. No mass.  LYMPH: No painful cervical, axillary or inguinal lymphadenopathy.  HEART: RRR; No murmur, rubs, gallops. No bruits in neck, abdomen, or groins, no edema  LUNGS: Normal respiratory effort. Nonlabored. No dullness.  No crepitus.  Clear to auscultation bilaterally without wheezing, rales, rhonchi.  ABDOMEN: Soft, nontender, nondistended. Positive bowel sounds. No rebound or guarding. NO mass or HSM.  JOINTS:  Full range of motion, no redness or tenderness.  EXT:  No cyanosis, clubbing or edema.  :  External genitalia without swelling or lesion  SKIN: Warm and dry without rash  NEURO: Oriented to PPT. No focal neurological deficits. Strength equal bilateral  PSYCHIATRIC: Normal insight and judgement. Cooperative with PE    Laboratory Data    Results from last 7 days  Lab Units 04/17/17  0405 04/17/17  0006 04/16/17  1739   HEMOGLOBIN g/dL 8.2* 8.6* 8.0*   HEMATOCRIT % 27.0* 27.8* 25.9*       Results from last 7 days  Lab Units 04/17/17  0405 04/17/17  0006 04/16/17  1346 04/16/17  0322 04/15/17  1447   SODIUM mmol/L 137  --   --  132 131*   POTASSIUM mmol/L 4.6 4.7 4.7 6.2* 5.6*   CHLORIDE mmol/L 108  --   --  101 101   TOTAL CO2 mmol/L 26.0  --   --  24.0 25.0   BUN mg/dL 66*  --   --  87* 89*   CREATININE mg/dL 2.00*  --   --  2.30* 2.20*   CALCIUM mg/dL 8.8  --   --  9.0 9.7   PHOSPHORUS mg/dL  --   --   --  5.4*  --    MAGNESIUM mg/dL  --   --   --  3.3* 3.4*   ALBUMIN g/dL  --   --   --  3.60 3.90       Results from last 7 days  Lab Units 04/17/17  0405   GLUCOSE mg/dL 124*       Results from " last 7 days  Lab Units 04/15/17  2328   COLOR UA  Yellow   CLARITY UA  Clear   PH, URINE  <=5.0   SPECIFIC GRAVITY, URINE  1.017   GLUCOSE UA  Negative   KETONES UA  Negative   BILIRUBIN UA  Negative   PROTEIN UA  Negative   BLOOD UA  Negative   LEUKOCYTES UA  Negative   NITRITE UA  Negative       Results from last 7 days  Lab Units 04/16/17  0322   ALK PHOS U/L 66   BILIRUBIN mg/dL 3.2*   ALT (SGPT) U/L 9   AST (SGOT) U/L 16     Estimated Creatinine Clearance: 22.2 mL/min (by C-G formula based on Cr of 2).    Radiology:      Renal Imaging:    I personally read  the radiographic studies    Impression:   BINTA- ATN from volume depletion   CKD- Stage 3-4 with baseline cr around 1.7  ANEMIA/ GI bleed  HTN    PLAN: Thank you for asking us to see Sarita Alegre, I recommend the following:   Continue IV fluids, already received blood so not checking iron studies  She does not do well with po iron  Labs in am    Zeyad Barber MD  4/17/2017  12:34 PM

## 2017-04-17 NOTE — PROGRESS NOTES
Discharge Planning Assessment  [facility]     Patient Name: Sarita Alegre  MRN: [id number]  Today's Date: 4/17/2017    Admit Date: 4/15/2017          Discharge Needs Assessment       04/17/17 7639    Living Environment    Lives With spouse    Living Arrangements house    Provides Primary Care For no one    Quality Of Family Relationships supportive    Able to Return to Prior Living Arrangements yes    Discharge Needs Assessment    Concerns To Be Addressed discharge planning concerns    Readmission Within The Last 30 Days no previous admission in last 30 days    Anticipated Changes Related to Illness none    Equipment Currently Used at Home oxygen;wheelchair;walker, rolling;shower chair;raised toilet;other (see comments)   stair lift    Equipment Needed After Discharge none    Transportation Available family or friend will provide            Discharge Plan       04/17/17 1400    Case Management/Social Work Plan    Plan Home vs home with home health    Patient/Family In Agreement With Plan yes    Additional Comments Spoke with patient and  at bedside.  Patient resides in [facility] with her .  Patient was discharged from [facility] in March 2016.  At that time she went home with [facility].  She was discharged from home health approximately 1 month ago.   states that patient was independent with ADL's and mobility using an assistive device.  Patient is on oxygen at home and this is provided by [facility] [employer].  Patient has portable tanks and  states he keeps one in the car.  Plan is to go home at discharge.  If home health should be needed patient would like to use [facility] again.  If Rehab is required patient would like to go to [facility].  CM will continue to follow.         Discharge Placement     No information found        Expected Discharge Date and Time     Expected Discharge Date Expected Discharge Time    Apr 21, 2017               Demographic Summary       04/17/17  1358    Referral Information    Admission Type inpatient    Arrived From home or self-care    Referral Source admission list    Reason For Consult discharge planning    Record Reviewed clinical discipline documentation;history and physical;medical record;patient profile    Contact Information    Permission Granted to Share Information With ;family/designee    Primary Care Physician Information    Name Percy Aldridge            Functional Status     None            Psychosocial     None            Abuse/Neglect     None            Legal     None            Substance Abuse     None            Patient Forms     None          Rose Bell RN

## 2017-04-18 LAB
ALBUMIN SERPL-MCNC: 3.3 G/DL (ref 3.2–4.8)
ANION GAP SERPL CALCULATED.3IONS-SCNC: 3 MMOL/L (ref 3–11)
BASOPHILS # BLD AUTO: 0.05 10*3/MM3 (ref 0–0.2)
BASOPHILS NFR BLD AUTO: 0.8 % (ref 0–1)
BUN BLD-MCNC: 49 MG/DL (ref 9–23)
BUN/CREAT SERPL: 27.2 (ref 7–25)
CALCIUM SPEC-SCNC: 8.7 MG/DL (ref 8.7–10.4)
CHLORIDE SERPL-SCNC: 111 MMOL/L (ref 99–109)
CO2 SERPL-SCNC: 25 MMOL/L (ref 20–31)
CREAT BLD-MCNC: 1.8 MG/DL (ref 0.6–1.3)
DEPRECATED RDW RBC AUTO: 55 FL (ref 37–54)
EOSINOPHIL # BLD AUTO: 0.28 10*3/MM3 (ref 0.1–0.3)
EOSINOPHIL NFR BLD AUTO: 4.2 % (ref 0–3)
ERYTHROCYTE [DISTWIDTH] IN BLOOD BY AUTOMATED COUNT: 17.3 % (ref 11.3–14.5)
GFR SERPL CREATININE-BSD FRML MDRD: 27 ML/MIN/1.73
GLUCOSE BLD-MCNC: 103 MG/DL (ref 70–100)
GLUCOSE BLDC GLUCOMTR-MCNC: 105 MG/DL (ref 70–130)
GLUCOSE BLDC GLUCOMTR-MCNC: 108 MG/DL (ref 70–130)
GLUCOSE BLDC GLUCOMTR-MCNC: 111 MG/DL (ref 70–130)
GLUCOSE BLDC GLUCOMTR-MCNC: 150 MG/DL (ref 70–130)
HCT VFR BLD AUTO: 26.9 % (ref 34.5–44)
HGB BLD-MCNC: 8.1 G/DL (ref 11.5–15.5)
IMM GRANULOCYTES # BLD: 0.02 10*3/MM3 (ref 0–0.03)
IMM GRANULOCYTES NFR BLD: 0.3 % (ref 0–0.6)
INR PPP: 1.54
LYMPHOCYTES # BLD AUTO: 0.99 10*3/MM3 (ref 0.6–4.8)
LYMPHOCYTES NFR BLD AUTO: 15 % (ref 24–44)
MAGNESIUM SERPL-MCNC: 2.7 MG/DL (ref 1.3–2.7)
MCH RBC QN AUTO: 26.5 PG (ref 27–31)
MCHC RBC AUTO-ENTMCNC: 30.1 G/DL (ref 32–36)
MCV RBC AUTO: 87.9 FL (ref 80–99)
MONOCYTES # BLD AUTO: 0.54 10*3/MM3 (ref 0–1)
MONOCYTES NFR BLD AUTO: 8.2 % (ref 0–12)
NEUTROPHILS # BLD AUTO: 4.74 10*3/MM3 (ref 1.5–8.3)
NEUTROPHILS NFR BLD AUTO: 71.5 % (ref 41–71)
PHOSPHATE SERPL-MCNC: 3.9 MG/DL (ref 2.4–5.1)
PLATELET # BLD AUTO: 234 10*3/MM3 (ref 150–450)
PMV BLD AUTO: 8 FL (ref 6–12)
POTASSIUM BLD-SCNC: 4.2 MMOL/L (ref 3.5–5.5)
PROTHROMBIN TIME: 17 SECONDS (ref 9.6–11.5)
RBC # BLD AUTO: 3.06 10*6/MM3 (ref 3.89–5.14)
SODIUM BLD-SCNC: 139 MMOL/L (ref 132–146)
WBC NRBC COR # BLD: 6.62 10*3/MM3 (ref 3.5–10.8)

## 2017-04-18 PROCEDURE — 85610 PROTHROMBIN TIME: CPT

## 2017-04-18 PROCEDURE — 63710000001 INSULIN LISPRO (HUMAN) PER 5 UNITS: Performed by: INTERNAL MEDICINE

## 2017-04-18 PROCEDURE — 85025 COMPLETE CBC W/AUTO DIFF WBC: CPT | Performed by: INTERNAL MEDICINE

## 2017-04-18 PROCEDURE — 82962 GLUCOSE BLOOD TEST: CPT

## 2017-04-18 PROCEDURE — 99233 SBSQ HOSP IP/OBS HIGH 50: CPT | Performed by: INTERNAL MEDICINE

## 2017-04-18 PROCEDURE — 80069 RENAL FUNCTION PANEL: CPT | Performed by: INTERNAL MEDICINE

## 2017-04-18 PROCEDURE — 83735 ASSAY OF MAGNESIUM: CPT | Performed by: INTERNAL MEDICINE

## 2017-04-18 RX ORDER — SENNA AND DOCUSATE SODIUM 50; 8.6 MG/1; MG/1
2 TABLET, FILM COATED ORAL 2 TIMES DAILY
Status: DISCONTINUED | OUTPATIENT
Start: 2017-04-18 | End: 2017-04-19 | Stop reason: HOSPADM

## 2017-04-18 RX ORDER — ATORVASTATIN CALCIUM 40 MG/1
40 TABLET, FILM COATED ORAL DAILY
Status: CANCELLED | OUTPATIENT
Start: 2017-04-18

## 2017-04-18 RX ORDER — BISACODYL 5 MG/1
10 TABLET, DELAYED RELEASE ORAL DAILY PRN
Status: DISCONTINUED | OUTPATIENT
Start: 2017-04-18 | End: 2017-04-19 | Stop reason: HOSPADM

## 2017-04-18 RX ORDER — MONTELUKAST SODIUM 10 MG/1
10 TABLET ORAL NIGHTLY
Status: CANCELLED | OUTPATIENT
Start: 2017-04-18

## 2017-04-18 RX ADMIN — LEVOTHYROXINE SODIUM 100 MCG: 100 TABLET ORAL at 06:31

## 2017-04-18 RX ADMIN — Medication 2 TABLET: at 10:04

## 2017-04-18 RX ADMIN — BISACODYL 10 MG: 5 TABLET, COATED ORAL at 10:04

## 2017-04-18 RX ADMIN — INSULIN LISPRO 2 UNITS: 100 INJECTION, SOLUTION INTRAVENOUS; SUBCUTANEOUS at 20:24

## 2017-04-18 NOTE — PROGRESS NOTES
"NAL Progress Note    4/15/2017        Reason for visit:    GI bleed  BINTA    Feels better  ROS:    Pulm:  No SOA  CV:  No CP    I&O:    Intake/Output Summary (Last 24 hours) at 04/18/17 1303  Last data filed at 04/18/17 1000   Gross per 24 hour   Intake           1118.9 ml   Output              450 ml   Net            668.9 ml       PE:   Blood pressure 145/63, pulse 75, temperature 97.6 °F (36.4 °C), temperature source Axillary, resp. rate 16, height 67.01\" (170.2 cm), weight 157 lb 10.1 oz (71.5 kg), SpO2 100 %.    GENERAL: Awake and alert, in no acute distress.   HEENT: PER, No conjunctivitis  NECK: Supple, no JVD     HEART: RRR; No murmur, rubs, gallops.   LUNGS: Clear to auscultation bilaterally without wheezing, rales, rhonchi. Normal respiratory effort. Nonlabored. No dullness.  ABDOMEN: Soft, nontender, nondistended. Positive bowel sounds. No rebound or guarding. NO mass or HSM.  EXT:  No cyanosis, clubbing or edema. No cord.  : Genitalia generally unremarkable.  Without Richard catheter.  SKIN: Warm and dry without cutaneous eruptions on Inspection/palpation.    NEURO: Alert and oriented x 3, Motor 5/5 bilaterally    Medications:    Current Facility-Administered Medications:   •  acetaminophen (TYLENOL) tablet 650 mg, 650 mg, Oral, Q4H PRN **OR** acetaminophen (TYLENOL) suppository 650 mg, 650 mg, Rectal, Q4H PRN, KAILASH Mazariegos  •  bisacodyl (DULCOLAX) EC tablet 10 mg, 10 mg, Oral, Daily PRN, Ministerio Urias MD, 10 mg at 04/18/17 1004  •  dextrose (D50W) solution 25 g, 25 g, Intravenous, Q15 Min PRN, Varsha Matthews MD  •  dextrose (GLUTOSE) oral gel 15 g, 15 g, Oral, Q15 Min PRN, Varsha Matthews MD  •  glucagon (GLUCAGEN) injection 1 mg, 1 mg, Subcutaneous, Q15 Min PRN, Varsha Matthews MD  •  HYDROcodone-acetaminophen (NORCO) 5-325 MG per tablet 1 tablet, 1 tablet, Oral, Q6H PRN, Rachel Simon, APRN, 1 tablet at 04/16/17 1309  •  HYDROmorphone (DILAUDID) " injection 0.25 mg, 0.25 mg, Intravenous, Q2H PRN, KAILASH Roberson, 0.25 mg at 04/16/17 0217  •  insulin lispro (humaLOG) injection 0-7 Units, 0-7 Units, Subcutaneous, 4x Daily With Meals & Nightly, Varsha Matthews MD  •  ipratropium-albuterol (DUO-NEB) nebulizer solution 3 mL, 3 mL, Nebulization, Q6H PRN, Varsha Matthews MD  •  levothyroxine (SYNTHROID, LEVOTHROID) tablet 100 mcg, 100 mcg, Oral, Q AM, Varsha Matthews MD, 100 mcg at 04/18/17 0631  •  ondansetron (ZOFRAN) injection 4 mg, 4 mg, Intravenous, Q6H PRN, KAILASH Mazariegos  •  sennosides-docusate sodium (SENOKOT-S) 8.6-50 MG tablet 2 tablet, 2 tablet, Oral, BID, Ministerio Urias MD, 2 tablet at 04/18/17 1004  •  sodium chloride 0.9 % bolus 500 mL, 500 mL, Intravenous, Once, Jose Phillip MD  •  sodium chloride 0.9 % flush 1-10 mL, 1-10 mL, Intravenous, PRN, KAILASH Mazariegos  •  sodium chloride 0.9 % flush 1-10 mL, 1-10 mL, Intravenous, PRN, Ollie White MD  •  sodium chloride 0.9 % flush 10 mL, 10 mL, Intravenous, PRN, Jose Phillip MD    Meds reviewed and doses adjusted for renal function    Labs:    Results from last 7 days  Lab Units 04/18/17  0327 04/17/17  0405 04/17/17  0006  04/16/17  0322   WBC 10*3/mm3 6.62 7.40  --   --  8.33   HEMOGLOBIN g/dL 8.1* 8.2* 8.6*  < > 9.5*   HEMATOCRIT % 26.9* 27.0* 27.8*  < > 28.3*   PLATELETS 10*3/mm3 234 244  --   --  292   < > = values in this interval not displayed.    Results from last 7 days  Lab Units 04/18/17  0327 04/17/17  0405 04/17/17  0006 04/16/17  1346 04/16/17  0322 04/15/17  1447   SODIUM mmol/L 139 137  --   --  132 131*   POTASSIUM mmol/L 4.2 4.6 4.7 4.7 6.2* 5.6*   CHLORIDE mmol/L 111* 108  --   --  101 101   TOTAL CO2 mmol/L 25.0 26.0  --   --  24.0 25.0   BUN mg/dL 49* 66*  --   --  87* 89*   CREATININE mg/dL 1.80* 2.00*  --   --  2.30* 2.20*   GLUCOSE mg/dL 103* 124*  --   --  114* 144*   CALCIUM mg/dL 8.7 8.8  --   --  9.0 9.7    PHOSPHORUS mg/dL 3.9  --   --   --  5.4*  --        Results from last 7 days  Lab Units 04/16/17  0322   ALK PHOS U/L 66   BILIRUBIN mg/dL 3.2*   ALT (SGPT) U/L 9   AST (SGOT) U/L 16     Invalid input(s): UCOL, UCLR, UPH, USG, UPRO, UBLD, UNITR, ELEU, URBC, UFC, UBACT    Estimated Creatinine Clearance: 24.6 mL/min (by C-G formula based on Cr of 1.8).    Radiology:  Imaging Results (last 72 hours)     Procedure Component Value Units Date/Time    XR Chest 1 View [[id number]] Collected:  04/15/17 1905     Updated:  04/17/17 1110    Narrative:          EXAMINATION: XR CHEST 1 VW - 04/15/2017     INDICATION: Weakness, dizziness, altered mental status.     COMPARISON: 9/02/2016.     FINDINGS: Portable chest reveals the heart to be enlarged. Cardiac pacer  leads in satisfactory position. No new focal parenchymal opacification  present. Improvement seen in the aeration at the left lung   base.  Vascular calcifications identified within the thoracic aorta.  Degenerative change is seen within the spine.            Impression:       Heart is enlarged with no evidence of acute parenchymal  disease.     DICTATED:     04/15/2017  EDITED:         04/15/2017     This report was finalized on 4/17/2017 11:08 AM by Dr. Vickie Brito MD.             Renal Imaging:        IMPRESSION:  BINTA- ATN from volume depletion   CKD- Stage 3-4 with baseline cr around 1.7  ANEMIA/ GI bleed  HTN      RECOMMENDATIONS:     stable cr at josee Barber MD  4/18/2017  1:03 PM

## 2017-04-18 NOTE — PLAN OF CARE
Problem: Patient Care Overview (Adult)  Goal: Plan of Care Review  Outcome: Ongoing (interventions implemented as appropriate)    Problem: Gastrointestinal Bleeding (Adult)  Goal: Signs and Symptoms of Listed Potential Problems Will be Absent or Manageable (Gastrointestinal Bleeding)  Outcome: Ongoing (interventions implemented as appropriate)    04/18/17 0524   Gastrointestinal Bleeding   Problems Assessed (GI Bleeding) all   Problems Present (GI Bleeding) none         Problem: Chronic Kidney Disease/End Stage Renal Disease (Adult)  Goal: Signs and Symptoms of Listed Potential Problems Will be Absent or Manageable (Chronic Kidney Disease/End Stage Renal Disease)    04/18/17 0524   Chronic Kidney Disease/End Stage Renal Disease   Problems Assessed (Chronic Kidney Disease/ESRD) all   Problems Present (Chronic Kidney Disease/ESRD) none

## 2017-04-18 NOTE — PLAN OF CARE
Problem: Patient Care Overview (Adult)  Goal: Plan of Care Review  Outcome: Ongoing (interventions implemented as appropriate)    04/18/17 0526 04/18/17 1600 04/18/17 1703   Coping/Psychosocial Response Interventions   Plan Of Care Reviewed With --  patient --    Patient Care Overview   Progress improving --  --    Outcome Evaluation   Outcome Summary/Follow up Plan --  --  DC'D IVF, ordered to Tele. If stable will go home tomorrow. Stool softeners and laxatives started today, had 3 BM's. Ambulated in hallway. Discussed coumdin with Allison, will restart after hospital discharge.        Goal: Adult Individualization and Mutuality  Outcome: Ongoing (interventions implemented as appropriate)  Goal: Discharge Needs Assessment  Outcome: Ongoing (interventions implemented as appropriate)    Problem: Gastrointestinal Bleeding (Adult)  Goal: Signs and Symptoms of Listed Potential Problems Will be Absent or Manageable (Gastrointestinal Bleeding)  Outcome: Ongoing (interventions implemented as appropriate)    Problem: Chronic Kidney Disease/End Stage Renal Disease (Adult)  Goal: Signs and Symptoms of Listed Potential Problems Will be Absent or Manageable (Chronic Kidney Disease/End Stage Renal Disease)  Outcome: Ongoing (interventions implemented as appropriate)

## 2017-04-18 NOTE — PLAN OF CARE
Problem: Patient Care Overview (Adult)  Goal: Plan of Care Review  Outcome: Ongoing (interventions implemented as appropriate)    04/18/17 0562   Coping/Psychosocial Response Interventions   Plan Of Care Reviewed With patient   Patient Care Overview   Progress improving   Outcome Evaluation   Outcome Summary/Follow up Plan PT remained stable throughout the night and was free of any discomfort. Pt urinated at bedside commode without complications CBC for today is DC

## 2017-04-18 NOTE — PROGRESS NOTES
Adult Nutrition  Assessment/PES    Patient Name:  Sarita Alegre  YOB: 1940  MRN: [id number]  Admit Date:  4/15/2017    Assessment Date:  4/18/2017   Comments: RD PO follow up. Ensure clear liquid supplement added twice daily to encourage po intake of kcals and protein. Diet order adjusted to minimize restrictions and liberalize diet as clinically apropriate for current pt status. RD to continue to follow.     Additional Recommendations:  1. Add daily MVI to meet 100% RDI'S        Reason for Assessment       04/18/17 0830    Reason for Assessment    Reason For Assessment/Visit multidisciplinary rounds;follow up protocol    Time Spent (min) 20    Diagnosis Diagnosis   Per notes this adm/MD diagnosis list nogted   Principal Problem:    Upper GI hemorrhage  Active Problems:    Atrial fibrillation, persistent    Pulmonary emphysema    Obstructive sleep apnea syndrome    Chronic kidney disease (CKD), stage III (moderate)    CHF (congestive heart failure)    Secondary pulmonary hypertension    CIELO (obstructive sleep apnea)    Anemia due to acute blood loss    Supratherapeutic INR               Nutrition/Diet History       04/18/17 0830    Nutrition/Diet History    Other RD notes pts diet adv 4/17. Phos and K+ WDL today, HgbA1C noted as 5.4%, no hx of DM FSBG 108/103/129/107/107. Diabetic and renal restriction omitted from diet order. Pt reports poor appetite similar to appetite long before admission though she and family stated she had been eating well PTA. Pt states she does not like milk based supplements, willing to try CLS.               Labs/Tests/Procedures/Meds       04/18/17 1038    Labs/Tests/Procedures/Meds    Labs/Tests Review Reviewed    Medication Review Reviewed, pertinent   NaCl@50mL/hr. Other meds: SSI     Results from last 7 days  Lab Units 04/18/17  0327  04/16/17  0322   SODIUM mmol/L 139  < > 132   POTASSIUM mmol/L 4.2  < > 6.2*   CHLORIDE mmol/L 111*  < > 101   TOTAL CO2 mmol/L 25.0   < > 24.0   BUN mg/dL 49*  < > 87*   CREATININE mg/dL 1.80*  < > 2.30*   CALCIUM mg/dL 8.7  < > 9.0   BILIRUBIN mg/dL  --   --  3.2*   ALK PHOS U/L  --   --  66   ALT (SGPT) U/L  --   --  9   AST (SGOT) U/L  --   --  16   GLUCOSE mg/dL 103*  < > 114*   < > = values in this interval not displayed.    Intake & Output (last day)       04/17 0701 - 04/18 0700 04/18 0701 - 04/19 0700    P.O. 100     I.V. (mL/kg) 1259.3 (17.6) 156.6 (2.2)    IV Piggyback 168     Total Intake(mL/kg) 1527.3 (21.4) 156.6 (2.2)    Urine (mL/kg/hr) 790 (0.5) 50 (0.2)    Stool  0 (0)    Total Output 790 50    Net +737.3 +106.6          Unmeasured Urine Occurrence 1 x     Unmeasured Stool Occurrence  1 x                   Nutrition Prescription Ordered       04/18/17 1039    Nutrition Prescription PO    Current PO Diet Regular    Common Modifiers Cardiac;Consistent Carbohydrate;Renal            Evaluation of Received Nutrient/Fluid Intake       04/18/17 1039    PO Evaluation    Number of Days PO Intake Evaluated 1 day    Number of Meals 1    % PO Intake 25              Problem/Interventions:        Problem 1       04/18/17 1039    Nutrition Diagnoses Problem 1    Problem 1 Inadequate Nutrient Intake   kcal and pro    Etiology (related to) --   clinical status/poor apetite    Signs/Symptoms (evidenced by) Report of Mnimal PO Intake   25% x 1 meal                    Intervention Goal       04/18/17 1040    Intervention Goal    General Nutrition support treatment    PO Increase intake;Meet estimated needs    Weight No significant weight loss            Nutrition Intervention       04/18/17 1043    Nutrition Intervention    RD/Tech Action Care plan reviewd;Follow Tx progress;Supplement provided;Encourage intake;Menu provided;Interview for preference      04/18/17 0834    Nutrition Intervention    RD/Tech Action Care plan reviewd;Follow Tx progress            Nutrition Prescription       04/18/17 1043    Nutrition Prescription PO    PO Prescription  Begin/change supplement    Supplement Boost Breeze    Supplement Frequency 2 times a day    New PO Prescription Ordered? Yes    Other Orders    Supplement Vitamin mineral supplement            Education/Evaluation       04/18/17 1043    Monitor/Evaluation    Monitor Per protocol;I&O;PO intake;Supplement intake;Pertinent labs;Weight      04/18/17 0834    Monitor/Evaluation    Monitor Per protocol             Electronically signed by:  Brielle Laboy RDN, LD  04/18/17 10:44 AM

## 2017-04-18 NOTE — PROGRESS NOTES
"INTENSIVIST NOTE     Hospital Day: 3      Ms. Sarita Alegre, 77 y.o. female is followed for:   Upper GI hemorrhage       SUBJECTIVE     77-year-old female with a history of atrial fibrillation and prior GI bleeding who was admitted with an excessively elevated INR and apparent upper GI bleeding.      Interval history:    No active bleeding.  EGD revealed angioectasias with cautery performed.    The patient's relevant past medical, surgical and social history were reviewed and updated in Epic as appropriate.       OBJECTIVE     Vital Sign Min/Max for last 24 hours  Temp  Min: 97.4 °F (36.3 °C)  Max: 98 °F (36.7 °C)   BP  Min: 108/60  Max: 145/63   Pulse  Min: 66  Max: 77   Resp  Min: 12  Max: 18   SpO2  Min: 93 %  Max: 100 %   Flow (L/min)  Min: 2  Max: 2   No Data Recorded      Intake/Output Summary (Last 24 hours) at 04/18/17 1444  Last data filed at 04/18/17 1000   Gross per 24 hour   Intake           1118.9 ml   Output              450 ml   Net            668.9 ml      Flowsheet Rows         First Filed Value    Admission Height  68\" (172.7 cm) Documented at 04/15/2017 1432    Admission Weight  144 lb (65.3 kg) Documented at 04/15/2017 1432         Last 3 weights    04/15/17  1854 04/16/17  1351 04/17/17  0600   Weight: 145 lb 8.1 oz (66 kg) 145 lb (65.8 kg) 157 lb 10.1 oz (71.5 kg)            Objective:  General Appearance:  In no acute distress.    Vital signs: (most recent): Blood pressure 133/59, pulse 67, temperature 97.6 °F (36.4 °C), temperature source Axillary, resp. rate 16, height 67.01\" (170.2 cm), weight 157 lb 10.1 oz (71.5 kg), SpO2 99 %.    HEENT: Normal HEENT exam.    Lungs:  Normal respiratory rate and normal effort.  She is not in respiratory distress.  Breath sounds clear to auscultation.  No wheezes, rales or rhonchi.    Heart: Normal rate.  Irregular rhythm.  S1 normal and S2 normal.  No murmur, gallop or friction rub.   Chest: Symmetric chest wall expansion.   Abdomen: Abdomen is soft " and non-distended.  Bowel sounds are normal.   There is no abdominal tenderness.   There is no mass. There is no splenomegaly. There is no hepatomegaly.   Extremities: There is no deformity or dependent edema.    Neurological: Patient is alert and oriented to person, place and time.    Pupils:  Pupils are equal, round, and reactive to light.    Skin:  Warm and dry.              I reviewed the patient's new clinical results.  I reviewed the patient's new imaging results/reports including actual images and agree with reports.      Chest X-Ray:  4/15 enlarged heart    INFUSIONS         Results from last 7 days  Lab Units 04/18/17  0327 04/17/17  0405 04/17/17  0006  04/16/17  0322   WBC 10*3/mm3 6.62 7.40  --   --  8.33   HEMOGLOBIN g/dL 8.1* 8.2* 8.6*  < > 9.5*   HEMATOCRIT % 26.9* 27.0* 27.8*  < > 28.3*   PLATELETS 10*3/mm3 234 244  --   --  292   < > = values in this interval not displayed.    Results from last 7 days  Lab Units 04/18/17  0327 04/17/17  0405 04/17/17  0006  04/16/17  0322 04/15/17  1447   SODIUM mmol/L 139 137  --   --  132 131*   POTASSIUM mmol/L 4.2 4.6 4.7  < > 6.2* 5.6*   CHLORIDE mmol/L 111* 108  --   --  101 101   TOTAL CO2 mmol/L 25.0 26.0  --   --  24.0 25.0   BUN mg/dL 49* 66*  --   --  87* 89*   GLUCOSE mg/dL 103* 124*  --   --  114* 144*   CREATININE mg/dL 1.80* 2.00*  --   --  2.30* 2.20*   MAGNESIUM mg/dL 2.7  --   --   --  3.3* 3.4*   CALCIUM mg/dL 8.7 8.8  --   --  9.0 9.7   < > = values in this interval not displayed.              [facility] Ventilation:      I reviewed the patient's medications.    Assessment/Plan   ASSESSMENT      Hospital Problem List     * (Principal)Upper GI hemorrhage    Overview Signed 4/15/2017  7:03 PM by KAILASH Mazariegos     S/p push endoscopy 3/21/17 by Dr. Haley  S/p EGD, colonoscopy and capsule endoscopy by Dr. Haley           Atrial fibrillation, persistent    Overview Addendum 9/7/2016 11:24 AM by Iron paez Newly diagnosed atrial  fibrillation with controlled rate of unknown duration, [facility] ER presentation, 06/21/2015.    b. Recent history of echocardiogram, 05/18/2015, Dr. Dorantes, revealing normal LVEF.  Mild AR.  Mild MR.  Moderate to severe TR.  c. EVITA/ECV, 06/24/2015.  EF 55% to 60%.  No thrombus.  Mild to moderate mitral regurgitation.  Moderate tricuspid regurgitation.  RVSP 66 mmHg.  External cardioversion with 2 joules to normal sinus rhythm.    Impression: 01/16/2015 - rate seems to be controlled currently- baseline 85 pulse, to 100 with ambulation; Description: A.  Hospitalized at [facility] 6/2015, presented after dizziness and atrial fibrillation, treated with Rythmol and a beta blocker converted to sinus prior to discharge.  B. ECHO: EVITA: Moderate MR, moderate TR, external cardioversion with 200 J resulting in normal sinus, subsequent RVSP calculated at 66 mmHg.  8/11/15 back in atrial fibrillation when she was seen by cardiology, remains on Coumadin at this time.  C.  Status post cardioversion, 8/25/2015 and 9/3/2015.         Pulmonary emphysema    Overview Signed 5/17/2016 12:48 PM by Aleah Shin DO     Impression: 04/21/2015 - with wheezing, PND and GLASER  ?exac by bystolic- felt like she did better with hctz- now using lasix  weight stable   states breathng and breathlessness are still her major problems   echo ok   may be worse with higher dose bystolic  having problems affording medications including inhalers; Description: A. FEV1 65%;  12/2014. H/O asthma; also long h/o cigs   B. Prior cigs; quit in 1980's after smoking 2-3 ppd for 25 yrs.- hosp for exacerbation 5/15 - PULM / NON CARDIAC DESPITE ELEVATED BNP, NORMAL EF         Obstructive sleep apnea syndrome    Overview Addendum 12/29/2016 10:17 AM by Rachel Sorrow Severe  Description: A. UNTREATED; appeared to attempt to wear it sporadically 2007,  2008, split-night study of 03/10/2016 reported an overall AHI of 32.7/hr which required  BiPAP therapy after failed CPAP attempt with final BiPAP 17 and EPAP 11 which still reported oxygen desaturations on 2 L O2, her lowest oxygen saturation was documented at 64% and several arrhythmias were observed during the study.  B. SLEEP STUDY; 11/2007, w/ AHI 57.8; followed by Dr. rader until 2008, during her sleep study CPAP of 7 treated her well; giving her an AHI of < 2.  Follow-up visits in the sleep clinic indicate she was still sleepy, he increased the pressure to 9, referred her to ENT to consider surgery.  C. Her weight at the time of the sleep study was the same as now; at 206 lbs.         Chronic kidney disease (CKD), stage III (moderate)    Overview Signed 12/29/2016 10:29 AM by Sumaya Sanabria     Status post temporary hemodialysis at [facility], 8/24/2015 through 9/29/2015, worsening      11/15  with hosp chf           CHF (congestive heart failure)    Secondary pulmonary hypertension    Overview Addendum 4/15/2017  6:11 PM by KAILASH Mazariegos     EVITA: 6/24/15, RVSP 66mmhg; moderate MR, LVEF 55-60%, no pericardial effusion or atrial thrombus, no ASD.         CIELO (obstructive sleep apnea)    Overview Signed 4/15/2017  6:15 PM by KAILASH Mazariegos     UNTREATED; appeared to attempt to wear it sporadically 2007,  2008, split-night study of 03/10/2016 reported an overall AHI of 32.7/hr which required BiPAP therapy after failed CPAP attempt with final BiPAP 17 and EPAP 11 which still reported oxygen desaturations on 2 L O2, her lowest oxygen saturation was documented at 64% and several arrhythmias were observed during the study.         Anemia due to acute blood loss    Overview Signed 4/15/2017  7:03 PM by KAILASH Mazariegos     H/O multiple blood transfusions         Supratherapeutic INR            EGD reveals angioectasias in the stomach.  Cautery performed by Dr. White.  No further active bleeding.      I discussed the case with Dr. Cooper and we both felt that the     risks exceeded the benefits of  Coumadin currently, particularly in light of the fact that she recently got vitamin K and her INR would likely be quite volatile in the near future.  Dr. Cooper is considering a left atrial appendage ablation.    PLAN     -No immediate plans for resumption of Coumadin as discussed above   -probable discharge in a.m. if remains stable overnight  -She will follow-up with Dr. Cooper as an outpatient    -Monitor hemoglobin   -AND/full support          I discussed the patient's findings and my recommendations with patient, family, nursing staff and consulting provider     Plan of care and goals reviewed with mulitdisciplinary team at daily rounds.    Ministerio Urias MD  Pulmonary and Critical Care Medicine  04/18/17 2:44 PM

## 2017-04-19 VITALS
SYSTOLIC BLOOD PRESSURE: 118 MMHG | WEIGHT: 157.63 LBS | RESPIRATION RATE: 14 BRPM | OXYGEN SATURATION: 96 % | DIASTOLIC BLOOD PRESSURE: 61 MMHG | TEMPERATURE: 98.4 F | BODY MASS INDEX: 24.74 KG/M2 | HEART RATE: 75 BPM | HEIGHT: 67 IN

## 2017-04-19 LAB
ABO + RH BLD: NORMAL
ANION GAP SERPL CALCULATED.3IONS-SCNC: 0 MMOL/L (ref 3–11)
BASOPHILS # BLD AUTO: 0.05 10*3/MM3 (ref 0–0.2)
BASOPHILS NFR BLD AUTO: 0.8 % (ref 0–1)
BH BB BLOOD EXPIRATION DATE: NORMAL
BH BB BLOOD TYPE BARCODE: 600
BH BB BLOOD TYPE BARCODE: NORMAL
BH BB DISPENSE STATUS: NORMAL
BH BB PRODUCT CODE: NORMAL
BH BB UNIT NUMBER: NORMAL
BUN BLD-MCNC: 39 MG/DL (ref 9–23)
BUN/CREAT SERPL: 22.9 (ref 7–25)
CALCIUM SPEC-SCNC: 9.1 MG/DL (ref 8.7–10.4)
CHLORIDE SERPL-SCNC: 112 MMOL/L (ref 99–109)
CO2 SERPL-SCNC: 25 MMOL/L (ref 20–31)
CREAT BLD-MCNC: 1.7 MG/DL (ref 0.6–1.3)
CROSSMATCH INTERPRETATION: NORMAL
DEPRECATED RDW RBC AUTO: 55.4 FL (ref 37–54)
EOSINOPHIL # BLD AUTO: 0.38 10*3/MM3 (ref 0.1–0.3)
EOSINOPHIL NFR BLD AUTO: 6.1 % (ref 0–3)
ERYTHROCYTE [DISTWIDTH] IN BLOOD BY AUTOMATED COUNT: 17.7 % (ref 11.3–14.5)
GFR SERPL CREATININE-BSD FRML MDRD: 29 ML/MIN/1.73
GLUCOSE BLD-MCNC: 121 MG/DL (ref 70–100)
GLUCOSE BLDC GLUCOMTR-MCNC: 112 MG/DL (ref 70–130)
HCT VFR BLD AUTO: 26.6 % (ref 34.5–44)
HGB BLD-MCNC: 8 G/DL (ref 11.5–15.5)
IMM GRANULOCYTES # BLD: 0.03 10*3/MM3 (ref 0–0.03)
IMM GRANULOCYTES NFR BLD: 0.5 % (ref 0–0.6)
LYMPHOCYTES # BLD AUTO: 1.08 10*3/MM3 (ref 0.6–4.8)
LYMPHOCYTES NFR BLD AUTO: 17.3 % (ref 24–44)
MCH RBC QN AUTO: 26.6 PG (ref 27–31)
MCHC RBC AUTO-ENTMCNC: 30.1 G/DL (ref 32–36)
MCV RBC AUTO: 88.4 FL (ref 80–99)
MONOCYTES # BLD AUTO: 0.65 10*3/MM3 (ref 0–1)
MONOCYTES NFR BLD AUTO: 10.4 % (ref 0–12)
NEUTROPHILS # BLD AUTO: 4.06 10*3/MM3 (ref 1.5–8.3)
NEUTROPHILS NFR BLD AUTO: 64.9 % (ref 41–71)
PLATELET # BLD AUTO: 244 10*3/MM3 (ref 150–450)
PMV BLD AUTO: 8 FL (ref 6–12)
POTASSIUM BLD-SCNC: 4.1 MMOL/L (ref 3.5–5.5)
RBC # BLD AUTO: 3.01 10*6/MM3 (ref 3.89–5.14)
SODIUM BLD-SCNC: 137 MMOL/L (ref 132–146)
UNIT  ABO: NORMAL
UNIT  RH: NORMAL
WBC NRBC COR # BLD: 6.25 10*3/MM3 (ref 3.5–10.8)

## 2017-04-19 PROCEDURE — 80048 BASIC METABOLIC PNL TOTAL CA: CPT | Performed by: INTERNAL MEDICINE

## 2017-04-19 PROCEDURE — 99238 HOSP IP/OBS DSCHRG MGMT 30/<: CPT | Performed by: INTERNAL MEDICINE

## 2017-04-19 PROCEDURE — 85025 COMPLETE CBC W/AUTO DIFF WBC: CPT | Performed by: INTERNAL MEDICINE

## 2017-04-19 PROCEDURE — 82962 GLUCOSE BLOOD TEST: CPT

## 2017-04-19 NOTE — PROGRESS NOTES
"NAL Progress Note    4/15/2017        Reason for visit:    GI bleed  BINTA    Feels better, plan to go home  ROS:    Pulm:  No SOA  CV:  No CP    I&O:    Intake/Output Summary (Last 24 hours) at 04/19/17 1126  Last data filed at 04/19/17 0800   Gross per 24 hour   Intake              240 ml   Output              200 ml   Net               40 ml       PE:   Blood pressure 118/61, pulse 75, temperature 98.4 °F (36.9 °C), temperature source Axillary, resp. rate 14, height 67.01\" (170.2 cm), weight 157 lb 10.1 oz (71.5 kg), SpO2 96 %.    GENERAL: Awake and alert, in no acute distress.   HEENT: PER, No conjunctivitis  NECK: Supple, no JVD     HEART: RRR; No murmur, rubs, gallops.   LUNGS: Clear to auscultation bilaterally without wheezing, rales, rhonchi. Normal respiratory effort. Nonlabored. No dullness.  ABDOMEN: Soft, nontender, nondistended. Positive bowel sounds. No rebound or guarding. NO mass or HSM.  EXT:  No cyanosis, clubbing or edema. No cord.  : Genitalia generally unremarkable.  Without Richard catheter.  SKIN: Warm and dry without cutaneous eruptions on Inspection/palpation.    NEURO: Alert and oriented x 3, Motor 5/5 bilaterally    Medications:    Current Facility-Administered Medications:   •  acetaminophen (TYLENOL) tablet 650 mg, 650 mg, Oral, Q4H PRN **OR** acetaminophen (TYLENOL) suppository 650 mg, 650 mg, Rectal, Q4H PRN, KAILASH Mazariegos  •  bisacodyl (DULCOLAX) EC tablet 10 mg, 10 mg, Oral, Daily PRN, Ministerio Urias MD, 10 mg at 04/18/17 1004  •  dextrose (D50W) solution 25 g, 25 g, Intravenous, Q15 Min PRN, Varsha Matthews MD  •  dextrose (GLUTOSE) oral gel 15 g, 15 g, Oral, Q15 Min PRN, Varsha Matthews MD  •  glucagon (GLUCAGEN) injection 1 mg, 1 mg, Subcutaneous, Q15 Min PRN, Varsha Matthews MD  •  HYDROcodone-acetaminophen (NORCO) 5-325 MG per tablet 1 tablet, 1 tablet, Oral, Q6H PRN, Rachel Simon, APRN, 1 tablet at 04/16/17 1309  •  HYDROmorphone " (DILAUDID) injection 0.25 mg, 0.25 mg, Intravenous, Q2H PRN, KAILASH Roberson, 0.25 mg at 04/16/17 0217  •  insulin lispro (humaLOG) injection 0-7 Units, 0-7 Units, Subcutaneous, 4x Daily With Meals & Nightly, Varsha Matthews MD, 2 Units at 04/18/17 2024  •  ipratropium-albuterol (DUO-NEB) nebulizer solution 3 mL, 3 mL, Nebulization, Q6H PRN, Varsha Matthews MD  •  levothyroxine (SYNTHROID, LEVOTHROID) tablet 100 mcg, 100 mcg, Oral, Q AM, Varsha Matthews MD, 100 mcg at 04/18/17 0631  •  ondansetron (ZOFRAN) injection 4 mg, 4 mg, Intravenous, Q6H PRN, KAILASH Mazariegos  •  sennosides-docusate sodium (SENOKOT-S) 8.6-50 MG tablet 2 tablet, 2 tablet, Oral, BID, Ministerio Urias MD, 2 tablet at 04/18/17 1004  •  sodium chloride 0.9 % bolus 500 mL, 500 mL, Intravenous, Once, Jose Phillip MD  •  sodium chloride 0.9 % flush 1-10 mL, 1-10 mL, Intravenous, PRN, KAILASH Mazariegos  •  sodium chloride 0.9 % flush 1-10 mL, 1-10 mL, Intravenous, PRN, Ollie White MD  •  sodium chloride 0.9 % flush 10 mL, 10 mL, Intravenous, PRN, Jose Phillip MD    Current Outpatient Prescriptions:   •  DULoxetine (CYMBALTA) 60 MG capsule, Take 60 mg by mouth Daily., Disp: , Rfl:   •  Fluticasone Furoate-Vilanterol 100-25 MCG/INH aerosol powder , Inhale 1 puff Daily., Disp: , Rfl:   •  albuterol (PROVENTIL HFA;VENTOLIN HFA) 108 (90 BASE) MCG/ACT inhaler, Inhale 2 puffs Every 6 (Six) Hours As Needed for wheezing or shortness of air., Disp: 1 inhaler, Rfl: 5  •  atorvastatin (LIPITOR) 40 MG tablet, TAKE ONE TABLET BY MOUTH AT BEDTIME, Disp: 90 tablet, Rfl: 1  •  Biotin 5000 MCG sublingual tablet, Take 1 tablet by mouth Daily., Disp: , Rfl:   •  bumetanide (BUMEX) 1 MG tablet, Take 1 mg by mouth daily., Disp: , Rfl:   •  cholecalciferol (VITAMIN D3) 1000 UNITS tablet, Take 1,000 Units by mouth daily., Disp: , Rfl:   •  HYDROcodone-acetaminophen (NORCO) 5-325 MG per tablet, Take 1 tablet  by mouth Every 6 (Six) Hours As Needed. 11/20 pm, Disp: , Rfl:   •  levothyroxine (SYNTHROID, LEVOTHROID) 100 MCG tablet, Take 100 mcg by mouth daily., Disp: , Rfl:   •  metoprolol tartrate (LOPRESSOR) 25 MG tablet, Take 1 tablet by mouth every 12 (twelve) hours., Disp: 180 tablet, Rfl: 3  •  montelukast (SINGULAIR) 10 MG tablet, Take 10 mg by mouth every night., Disp: , Rfl:   •  Probiotic Product (ALIGN) 4 MG capsule, Take 1 capsule by mouth Daily., Disp: , Rfl:   •  spironolactone (ALDACTONE) 25 MG tablet, Take 25 mg by mouth Daily., Disp: , Rfl:     Meds reviewed and doses adjusted for renal function    Labs:    Results from last 7 days  Lab Units 04/19/17 0325 04/18/17 0327 04/17/17  0405   WBC 10*3/mm3 6.25 6.62 7.40   HEMOGLOBIN g/dL 8.0* 8.1* 8.2*   HEMATOCRIT % 26.6* 26.9* 27.0*   PLATELETS 10*3/mm3 244 234 244       Results from last 7 days  Lab Units 04/19/17  0325 04/18/17  0327 04/17/17  0405 04/17/17  0006  04/16/17  0322   SODIUM mmol/L 137 139 137  --   --  132   POTASSIUM mmol/L 4.1 4.2 4.6 4.7  < > 6.2*   CHLORIDE mmol/L 112* 111* 108  --   --  101   TOTAL CO2 mmol/L 25.0 25.0 26.0  --   --  24.0   BUN mg/dL 39* 49* 66*  --   --  87*   CREATININE mg/dL 1.70* 1.80* 2.00*  --   --  2.30*   GLUCOSE mg/dL 121* 103* 124*  --   --  114*   CALCIUM mg/dL 9.1 8.7 8.8  --   --  9.0   PHOSPHORUS mg/dL  --  3.9  --   --   --  5.4*   < > = values in this interval not displayed.    Results from last 7 days  Lab Units 04/16/17  0322   ALK PHOS U/L 66   BILIRUBIN mg/dL 3.2*   ALT (SGPT) U/L 9   AST (SGOT) U/L 16     Invalid input(s): UCOL, UCLR, UPH, USG, UPRO, UBLD, UNITR, ELEU, URBC, UFC, UBACT    Estimated Creatinine Clearance: 26.1 mL/min (by C-G formula based on Cr of 1.7).    Radiology:  Imaging Results (last 72 hours)     Procedure Component Value Units Date/Time    XR Chest 1 View [[id number]] Collected:  04/15/17 1905     Updated:  04/17/17 1110    Narrative:          EXAMINATION: XR CHEST 1 VW -  04/15/2017     INDICATION: Weakness, dizziness, altered mental status.     COMPARISON: 9/02/2016.     FINDINGS: Portable chest reveals the heart to be enlarged. Cardiac pacer  leads in satisfactory position. No new focal parenchymal opacification  present. Improvement seen in the aeration at the left lung   base.  Vascular calcifications identified within the thoracic aorta.  Degenerative change is seen within the spine.            Impression:       Heart is enlarged with no evidence of acute parenchymal  disease.     DICTATED:     04/15/2017  EDITED:         04/15/2017     This report was finalized on 4/17/2017 11:08 AM by Dr. Vickie Brito MD.             Renal Imaging:        IMPRESSION:  BINTA- ATN from volume depletion   CKD- Stage 3-4 with baseline cr around 1.7 at it now  ANEMIA/ GI bleed  HTN      RECOMMENDATIONS:     stable cr at baseline      Zeyad Barber MD  4/19/2017  11:26 AM

## 2017-04-19 NOTE — DISCHARGE INSTR - APPOINTMENTS
Per Dr. White's 4/17/17 note for a 2-week follow-up appointment with Dr. Sudha olguin with [facility], a previous appointment for April 24, 2017 at 2:00 will be held for the follow-up   appointment.

## 2017-04-19 NOTE — PLAN OF CARE
Problem: Pressure Ulcer Risk (Johnathon Q Scale) (Pediatric)  Goal: Identify Related Risk Factors and Signs and Symptoms  Outcome: Ongoing (interventions implemented as appropriate)    04/19/17 0547   Pressure Ulcer Risk (Johnathon Q Scale)   Related Risk Factors (Pressure Ulcer Risk (Johnathon Q Scale)) body weight extremes;critical care admission       Goal: Skin Integrity  Outcome: Ongoing (interventions implemented as appropriate)    04/19/17 0547   Pressure Ulcer Risk (Johnathon Q Scale) (Pediatric)   Skin Integrity making progress toward outcome

## 2017-04-19 NOTE — DISCHARGE INSTR - OTHER ORDERS
If you have any questions or concerns about you recovery have you have gone home.  Please call the Nurse Call Center at [phone].  A registered nurse is available 24/7.

## 2017-04-19 NOTE — PLAN OF CARE
Problem: Patient Care Overview (Adult)  Goal: Plan of Care Review  Outcome: Ongoing (interventions implemented as appropriate)    04/19/17 0453   Coping/Psychosocial Response Interventions   Plan Of Care Reviewed With patient   Patient Care Overview   Progress improving   Outcome Evaluation   Outcome Summary/Follow up Plan Pt rested throughout the night without any discomfort or complaint. Vitals all stable throughout the shift. Expectation is discharge today.         Problem: Gastrointestinal Bleeding (Adult)  Goal: Signs and Symptoms of Listed Potential Problems Will be Absent or Manageable (Gastrointestinal Bleeding)  Outcome: Ongoing (interventions implemented as appropriate)    04/19/17 0453   Gastrointestinal Bleeding   Problems Assessed (GI Bleeding) all   Problems Present (GI Bleeding) none         Problem: Chronic Kidney Disease/End Stage Renal Disease (Adult)  Goal: Signs and Symptoms of Listed Potential Problems Will be Absent or Manageable (Chronic Kidney Disease/End Stage Renal Disease)  Outcome: Ongoing (interventions implemented as appropriate)    04/19/17 0453   Chronic Kidney Disease/End Stage Renal Disease   Problems Assessed (Chronic Kidney Disease/ESRD) all   Problems Present (Chronic Kidney Disease/ESRD) none

## 2017-04-19 NOTE — PROGRESS NOTES
Continued Stay Note  [facility]     Patient Name: Sarita Alegre  MRN: [id number]  Today's Date: 4/19/2017    Admit Date: 4/15/2017          Discharge Plan       04/19/17 1401    Case Management/Social Work Plan    Plan Home today.    Additional Comments Spoke with patient prior to discharge.  Nurse Call Center card with number given to patient.              Discharge Codes     None        Expected Discharge Date and Time     Expected Discharge Date Expected Discharge Time    Apr 19, 2017             Rose Bell RN

## 2017-04-19 NOTE — PLAN OF CARE
Problem: Patient Care Overview (Adult)  Goal: Plan of Care Review  Outcome: Ongoing (interventions implemented as appropriate)    04/19/17 0938   Coping/Psychosocial Response Interventions   Plan Of Care Reviewed With patient;spouse   Patient Care Overview   Progress improving   Outcome Evaluation   Outcome Summary/Follow up Plan Pt doing well, to be discharged home today. Has follow-up appointments with Dr. Haley and Dr. Cooper for 2 weeks. H/H stable, VS stable. No new meds at discharge.        Goal: Adult Individualization and Mutuality  Outcome: Ongoing (interventions implemented as appropriate)  Goal: Discharge Needs Assessment  Outcome: Ongoing (interventions implemented as appropriate)    Problem: Gastrointestinal Bleeding (Adult)  Goal: Signs and Symptoms of Listed Potential Problems Will be Absent or Manageable (Gastrointestinal Bleeding)  Outcome: Ongoing (interventions implemented as appropriate)    Problem: Chronic Kidney Disease/End Stage Renal Disease (Adult)  Goal: Signs and Symptoms of Listed Potential Problems Will be Absent or Manageable (Chronic Kidney Disease/End Stage Renal Disease)  Outcome: Ongoing (interventions implemented as appropriate)    Problem: Pressure Ulcer Risk (Johnathon Q Scale) (Pediatric)  Goal: Identify Related Risk Factors and Signs and Symptoms  Outcome: Outcome(s) achieved Date Met:  04/19/17  Goal: Skin Integrity  Outcome: Outcome(s) achieved Date Met:  04/19/17

## 2017-04-19 NOTE — DISCHARGE SUMMARY
DISCHARGE SUMMARY     Admit date: 4/15/2017  Date of Discharge:  4/19/2017    Discharge Diagnoses  Hospital Problem List     * (Principal)Upper GI hemorrhage    Overview Signed 4/15/2017  7:03 PM by KAILASH Mazariegos     S/p push endoscopy 3/21/17 by Dr. Haley  S/p EGD, colonoscopy and capsule endoscopy by Dr. Haley           Atrial fibrillation, persistent    Overview Addendum 9/7/2016 11:24 AM by Iron wade. Newly diagnosed atrial fibrillation with controlled rate of unknown duration, [facility] ER presentation, 06/21/2015.    b. Recent history of echocardiogram, 05/18/2015, Dr. Dorantes, revealing normal LVEF.  Mild AR.  Mild MR.  Moderate to severe TR.  c. EVITA/ECV, 06/24/2015.  EF 55% to 60%.  No thrombus.  Mild to moderate mitral regurgitation.  Moderate tricuspid regurgitation.  RVSP 66 mmHg.  External cardioversion with 2 joules to normal sinus rhythm.    Impression: 01/16/2015 - rate seems to be controlled currently- baseline 85 pulse, to 100 with ambulation; Description: A.  Hospitalized at [facility] 6/2015, presented after dizziness and atrial fibrillation, treated with Rythmol and a beta blocker converted to sinus prior to discharge.  B. ECHO: EVITA: Moderate MR, moderate TR, external cardioversion with 200 J resulting in normal sinus, subsequent RVSP calculated at 66 mmHg.  8/11/15 back in atrial fibrillation when she was seen by cardiology, remains on Coumadin at this time.  C.  Status post cardioversion, 8/25/2015 and 9/3/2015.         Pulmonary emphysema    Overview Signed 5/17/2016 12:48 PM by Aleah Shin DO     Impression: 04/21/2015 - with wheezing, PND and GLASER  ?exac by bystolic- felt like she did better with hctz- now using lasix  weight stable   states breathng and breathlessness are still her major problems   echo ok   may be worse with higher dose bystolic  having problems affording medications including inhalers; Description: A. FEV1 65%;   12/2014. H/O asthma; also long h/o cigs   B. Prior cigs; quit in 1980's after smoking 2-3 ppd for 25 yrs.- hosp for exacerbation 5/15 - PULM / NON CARDIAC DESPITE ELEVATED BNP, NORMAL EF         Obstructive sleep apnea syndrome    Overview Addendum 12/29/2016 10:17 AM by Sumaya Sanabria     Severe  Description: A. UNTREATED; appeared to attempt to wear it sporadically 2007, 2008, split-night study of 03/10/2016 reported an overall AHI of 32.7/hr which required BiPAP therapy after failed CPAP attempt with final BiPAP 17 and EPAP 11 which still reported oxygen desaturations on 2 L O2, her lowest oxygen saturation was documented at 64% and several arrhythmias were observed during the study.  B. SLEEP STUDY; 11/2007, w/ AHI 57.8; followed by Dr. rader until 2008, during her sleep study CPAP of 7 treated her well; giving her an AHI of < 2.  Follow-up visits in the sleep clinic indicate she was still sleepy, he increased the pressure to 9, referred her to ENT to consider surgery.  C. Her weight at the time of the sleep study was the same as now; at 206 lbs.         Chronic kidney disease (CKD), stage III (moderate)    Overview Signed 12/29/2016 10:29 AM by Sumaya Sanabria     Status post temporary hemodialysis at [facility], 8/24/2015 through 9/29/2015, worsening      11/15  with hosp chf           CHF (congestive heart failure)    Secondary pulmonary hypertension    Overview Addendum 4/15/2017  6:11 PM by KAILASH Mazariegos     EVITA: 6/24/15, RVSP 66mmhg; moderate MR, LVEF 55-60%, no pericardial effusion or atrial thrombus, no ASD.         CIELO (obstructive sleep apnea)    Overview Signed 4/15/2017  6:15 PM by KAILASH Mazariegos     UNTREATED; appeared to attempt to wear it sporadically 2007, 2008, split-night study of 03/10/2016 reported an overall AHI of 32.7/hr which required BiPAP therapy after failed CPAP attempt with final BiPAP 17 and EPAP 11 which still reported oxygen desaturations on 2 L O2, her lowest  oxygen saturation was documented at 64% and several arrhythmias were observed during the study.         Anemia due to acute blood loss    Overview Signed 4/15/2017  7:03 PM by KAILASH Mazariegos     H/O multiple blood transfusions         Supratherapeutic INR          Past Surgical History:   Procedure Laterality Date   • APPENDECTOMY     • BLADDER SURGERY     • CARDIAC ELECTROPHYSIOLOGY PROCEDURE N/A 9/1/2016    Procedure: Pacemaker DC new;  Surgeon: Zafar Hudson MD;  Location:  [facility];  Service:    • CARDIAC PACEMAKER PLACEMENT     • CARDIOVERSION      .  Status post cardioversion, 8/25/2015 and 9/3/2015, secondary to atrial fibrillation.   • CATARACT EXTRACTION Bilateral     1996  and 1997   • COLONOSCOPY N/A 6/17/2016    Procedure: COLONOSCOPY;  Surgeon: Edy Muñoz MD;  Location:  [facility];  Service:    • ELBOW PROCEDURE Left 2006    Left elbow repair   • ENDOSCOPY N/A 4/17/2017    Procedure: ESOPHAGOGASTRODUODENOSCOPY AT BEDSIDE;  Surgeon: Ollie White MD;  Location:  [facility];  Service:    • HYSTERECTOMY     • KNEE SURGERY Right 1982   • OTHER SURGICAL HISTORY      History of hearing aid check   • PARATHYROIDECTOMY     • UPPER GASTROINTESTINAL ENDOSCOPY         History of Present Illness    Patient is a 77 y.o. female presented with: Upper GI hemorrhage    Mrs. Alegre is a 76 y/o WF who was admitted into the ED with black tarry stools and increasing fatigue over the last week. She has been followed by Dr. Haley and has had an EGD, colonoscopy and capsule endoscopy over the last year. She just had a push enteroscopy 3/21/17 with Dr. Haley. They have found some spots that have been cauterized.  She had a temp 97.7, P 71, R 14, 86/51 and 100% 2L. She reports that she got up to the bathroom last night and felt dizzy, and nearly lost consciousness. She has c/o progressive weakness, SOA. Her stool has been dark for 2 weeks. She felt that it was from  her iron pills. She has secondary pulmonary hypertension COPD, and diastolic CHF. In early March she underwent large volume paracentesis with fluid showing albumin 2.3, suspicious for nephrotic syndrome.    78y/o woman with tachybrady syndrome, PPM, COPD, Diastolic CHF, CIELO, DM, CKD who has recurrent GI blood loss. She just had capsule colonoscopy and EGD with push enteroscopy last month. Now she presents with recurrent melena, Hg 4.6 and over anticoagulated with INR 4.42. She received Kcentra in the ED. Her 1st unit of blood is hanging. She has underlying COPD, but no active wheezing or cough. She is paced with underlying atrial fibrillation. Her LVEF is 55%. RHC revealed severe pulmonary HTN felt to be from liver disease, possibly from her chronic lung disease as well. Her chronic blood loss, anemia will also result in worsening right heart failure.        Hospital Course  On admission her PT/INR were 50.5 and 4.42 and she was treated with K Centra which normalized her PT to 15 and INR to 1.36. Her potassium was 6.2 and she was given Kayexalate and a repeat potassium level was 4.7. She received a total of 3 units of PRBC's after an initial hemoglobin 4.6 and hematocrit 16.5 and her H/H improved to 8.4 and 26.3 and has remained stable throughout the remainder of hospitalization. Her renal function was elevated with a BUN of 89 and creatinine of 2.2 and she was followed by nephrology and treated with IV hydration.       She underwent an EGD on 4/17 which showed multiple non-bleeding angioectasias in the duodenum and a single non-bleeding angioectasia in the jejunum. Both were treated with argon plasma coagulation (APC) and she had no further bleeding.    The case was discussed with Dr. Cooper and a decision was made not to anticoagulate her on discharge.  It was felt the risks exceed the benefits of anticoagulation at this point.  This was especially in light of the use of vitamin K and it was felt her  warfarin will be very difficult to regulate in the near term.  Dr. Coopre was going to consider a watchman left atrial occlusion device in the future.    On 4/19 she was stable and had no further bleeding and was deemed stable for discharge home. She will follow up with Dr. Haley and Dr. Cooper in 2 weeks.          Procedures Performed: Procedure(s):  ESOPHAGOGASTRODUODENOSCOPY AT [id number]/17 1431 UPPER GI ENDOSCOPY    Consults:   Zeyad Amato MD, Nephrology           Ollie White MD, Gatroenterology    Pertinent Test Results:     Results from last 7 days  Lab Units 04/19/17  0325   WBC 10*3/mm3 6.25   HEMOGLOBIN g/dL 8.0*   HEMATOCRIT % 26.6*   PLATELETS 10*3/mm3 244       Results from last 7 days  Lab Units 04/19/17  0325 04/18/17  0327 04/17/17  0405  04/16/17  0322   SODIUM mmol/L 137 139 137  --  132   POTASSIUM mmol/L 4.1 4.2 4.6  < > 6.2*   CHLORIDE mmol/L 112* 111* 108  --  101   TOTAL CO2 mmol/L 25.0 25.0 26.0  --  24.0   BUN mg/dL 39* 49* 66*  --  87*   CREATININE mg/dL 1.70* 1.80* 2.00*  --  2.30*   GLUCOSE mg/dL 121* 103* 124*  --  114*   CALCIUM mg/dL 9.1 8.7 8.8  --  9.0   PHOSPHORUS mg/dL  --  3.9  --   --  5.4*   < > = values in this interval not displayed.        Condition on Discharge:  Stable    Discharge Disposition: Home or Self Care    Discharge Medications:  Sarita Alegre   Home Medication Instructions GA:[id number]    Printed on:04/19/17 0913   Medication Information                      albuterol (PROVENTIL HFA;VENTOLIN HFA) 108 (90 BASE) MCG/ACT inhaler  Inhale 2 puffs Every 6 (Six) Hours As Needed for wheezing or shortness of air.             atorvastatin (LIPITOR) 40 MG tablet  TAKE ONE TABLET BY MOUTH AT BEDTIME             Biotin 5000 MCG sublingual tablet  Take 1 tablet by mouth Daily.             bumetanide (BUMEX) 1 MG tablet  Take 1 mg by mouth daily.             cholecalciferol (VITAMIN D3) 1000 UNITS tablet  Take 1,000 Units by mouth daily.              DULoxetine (CYMBALTA) 60 MG capsule  Take 60 mg by mouth Daily.             Fluticasone Furoate-Vilanterol 100-25 MCG/INH aerosol powder   Inhale 1 puff Daily.             HYDROcodone-acetaminophen (NORCO) 5-325 MG per tablet  Take 1 tablet by mouth Every 6 (Six) Hours As Needed. 11/20 pm             levothyroxine (SYNTHROID, LEVOTHROID) 100 MCG tablet  Take 100 mcg by mouth daily.             metoprolol tartrate (LOPRESSOR) 25 MG tablet  Take 1 tablet by mouth every 12 (twelve) hours.             montelukast (SINGULAIR) 10 MG tablet  Take 10 mg by mouth every night.             Probiotic Product (ALIGN) 4 MG capsule  Take 1 capsule by mouth Daily.             spironolactone (ALDACTONE) 25 MG tablet  Take 25 mg by mouth Daily.                 Discharge Diet:     Activity at Discharge:     Follow-up Appointments  Future Appointments  Date Time Provider Department Center   4/25/2017 9:30 AM Radha Cooper MD [facility] None         Test Results Pending at Discharge       KAILASH Devlin  04/19/17  9:13 AM    Time: Discharge 30 min    I have seen and examined patient, performing a face-to-face diagnostic evaluation with plan of care reviewed and developed with APRN and nursing staff. I have addended and modified the above history of present illness, physical examination, and assessment and plan to reflect my findings and impressions.    Ministerio Urias MD  Pulmonary and Critical Care Medicine

## 2017-04-19 NOTE — PROGRESS NOTES
Multidisciplinary Rounds       Patient Name:  Sarita Alegre  YOB: 1940  MRN: [id number]  Admit Date:  4/15/2017    Assessment Date:  4/19/2017   Comments:  RASHMI. RAFFI to continue to follow per protocol.        Reason for Assessment       04/19/17 0902    Reason for Assessment    Reason For Assessment/Visit multidisciplinary rounds    Time Spent (min) 15              Nutrition/Diet History       04/19/17 0902    Nutrition/Diet History    Reported/Observed By RN;MD    Other H/H stable, Crea and BUN back to baseline.                     Nutrition Prescription Ordered       04/19/17 0902    Nutrition Prescription PO    Current PO Diet Regular    Supplement Boost Breeze    Supplement Frequency 2 times a day    Common Modifiers Cardiac        Problem/Interventions:          Nutrition Intervention       04/19/17 0903    Nutrition Intervention    RD/Tech Action Care plan reviewd;Follow Tx progress              Education/Evaluation       04/19/17 0903    Monitor/Evaluation    Monitor Per protocol            Electronically signed by:  Brielle Laboy RDN, RODRÍGUEZ  04/19/17 9:03 AM

## 2017-04-21 RX ORDER — ALBUTEROL SULFATE 90 UG/1
2 AEROSOL, METERED RESPIRATORY (INHALATION) EVERY 4 HOURS PRN
Qty: 3 INHALER | Refills: 3 | Status: SHIPPED | OUTPATIENT
Start: 2017-04-21 | End: 2017-09-25 | Stop reason: SDUPTHER

## 2017-04-24 ENCOUNTER — TELEPHONE (OUTPATIENT)
Dept: GASTROENTEROLOGY | Facility: CLINIC | Age: 77
End: 2017-04-24

## 2017-04-24 DIAGNOSIS — R18.8 OTHER ASCITES: Primary | ICD-10-CM

## 2017-04-24 RX ORDER — ALBUMIN (HUMAN) 12.5 G/50ML
SOLUTION INTRAVENOUS
Qty: 1 ML | Refills: 0 | Status: SHIPPED | OUTPATIENT
Start: 2017-04-24 | End: 2017-04-25

## 2017-04-24 NOTE — TELEPHONE ENCOUNTER
Per message fr , pt called and reported that she has gained 15 1/2 pounds and is having difficulty breathing.  I attempted to call pt back on both home and cell phone number.  No answer.  I left voicemail    Dorcas Fabian call pt back and have pt reports to local ER d/t c/o of SOB. I will not be able to arrange for paracentesis in a timely manner on the outpt basis.

## 2017-04-25 ENCOUNTER — TELEPHONE (OUTPATIENT)
Dept: GASTROENTEROLOGY | Facility: CLINIC | Age: 77
End: 2017-04-25

## 2017-04-25 ENCOUNTER — OFFICE VISIT (OUTPATIENT)
Dept: CARDIOLOGY | Facility: CLINIC | Age: 77
End: 2017-04-25

## 2017-04-25 VITALS
HEART RATE: 71 BPM | DIASTOLIC BLOOD PRESSURE: 60 MMHG | BODY MASS INDEX: 24.1 KG/M2 | SYSTOLIC BLOOD PRESSURE: 108 MMHG | HEIGHT: 68 IN | WEIGHT: 159 LBS

## 2017-04-25 DIAGNOSIS — Z79.899 HIGH RISK MEDICATION USE: ICD-10-CM

## 2017-04-25 DIAGNOSIS — I50.32 CHRONIC DIASTOLIC HEART FAILURE (HCC): ICD-10-CM

## 2017-04-25 DIAGNOSIS — I48.19 ATRIAL FIBRILLATION, PERSISTENT (HCC): Primary | ICD-10-CM

## 2017-04-25 DIAGNOSIS — I10 ESSENTIAL HYPERTENSION: ICD-10-CM

## 2017-04-25 DIAGNOSIS — IMO0002 SECONDARY PULMONARY HYPERTENSION: ICD-10-CM

## 2017-04-25 DIAGNOSIS — E78.00 HYPERCHOLESTEROLEMIA: ICD-10-CM

## 2017-04-25 PROCEDURE — 99213 OFFICE O/P EST LOW 20 MIN: CPT | Performed by: INTERNAL MEDICINE

## 2017-04-25 RX ORDER — SPIRONOLACTONE 50 MG/1
50 TABLET, FILM COATED ORAL DAILY
Qty: 90 TABLET | Refills: 1 | Status: SHIPPED | OUTPATIENT
Start: 2017-04-25 | End: 2017-06-27

## 2017-04-25 RX ORDER — WARFARIN SODIUM 1 MG/1
1 TABLET ORAL
COMMUNITY
End: 2017-05-23

## 2017-04-25 RX ORDER — ASCORBIC ACID 500 MG
500 TABLET ORAL DAILY
COMMUNITY
End: 2017-09-25 | Stop reason: SDUPTHER

## 2017-04-25 NOTE — PROGRESS NOTES
Sraita Alegre  1940  [phone]      04/25/2017    Percy Moreland MD    Chief Complaint   Patient presents with   • Atrial Fibrillation     PROBLEM LIST:  1. Atrial fibrillation:  a. Newly diagnosed atrial fibrillation with controlled rate of unknown duration, [facility] ER presentation, 06/21/2015.   b. Recent history of echocardiogram, 05/18/2015, Dr. Dorantes, revealing normal LVEF. Mild AR. Mild MR. Moderate to severe TR.  c. EVITA/ECV, 06/24/2015. EF 55% to 60%. No thrombus. Mild to moderate mitral regurgitation. Moderate tricuspid regurgitation. RVSP 66 mmHg. External cardioversion with 2 joules to normal sinus rhythm.  d. Cardiac event monitor, 8/31/2016: placed for falls. 6.5 second pause.   e. PM implantation, 9/1/2016: The PM is a [employer]IVINGtra[employer] model L101 serial number [id number] generator, the atrial lead is a Guidant model 4135 serial number [id number] lead, the right ventricular lead is a Guidant model 4136 serial number [id number] lead. The bradycardia pacing mode is DDIR with a lower rate of 70 upper rate of 130 bpm.    2. Diastolic congestive failure.  3. Moderate pulmonary hypertension.  4. Chest pain:  a. History of normal echocardiogram as well as Cardiolite GXT, March 2011, Sridhar Bustamante MD.  b. Abnormal EKG revealing normal sinus rhythm with first degree AV block and poor precordial R-wave progression (no evidence of anterior infarct).  5. Hypertension.  6. Hyperlipidemia.  7. Type 2 diabetes mellitus.  8. History of tobacco abuse with cessation 30 years ago.  9. Obesity.  10. Obstructive sleep apnea with no CPAP usage.  11. Asthma.  12. Depression.  13. Gastroesophageal reflux disease.  14. Hypothyroidism on chronic replacement therapy.  15. Bilateral hearing deficit with hearing aids.  16. Osteoarthritis.  17. History of left ankle fracture followed by Dr. Kennedy.  18. Surgical history:  a. Bilateral cataract extraction, 1996 and 1997.  b. Right knee repair, 1982.  c. Left  elbow repair, 2006.  d. Hysterectomy.  e. Parathyroid gland removal.    Allergies   Allergen Reactions   • Grass Cough   • Molds & Smuts Cough   • Propafenone      Hepatic failure       Current Medications:      Current Outpatient Prescriptions:   •  albuterol (PROAIR HFA) 108 (90 BASE) MCG/ACT inhaler, Inhale 2 puffs Every 4 (Four) Hours As Needed for Wheezing., Disp: 3 inhaler, Rfl: 3  •  atorvastatin (LIPITOR) 40 MG tablet, TAKE ONE TABLET BY MOUTH AT BEDTIME, Disp: 90 tablet, Rfl: 1  •  Biotin 5000 MCG sublingual tablet, Take 1 tablet by mouth Daily., Disp: , Rfl:   •  bumetanide (BUMEX) 1 MG tablet, Take 1 mg by mouth Daily. 1 tab on M/W/F, all other days take 1.5 tab, Disp: , Rfl:   •  cholecalciferol (VITAMIN D3) 1000 UNITS tablet, Take 1,000 Units by mouth daily., Disp: , Rfl:   •  DULoxetine (CYMBALTA) 60 MG capsule, Take 60 mg by mouth Daily., Disp: , Rfl:   •  HYDROcodone-acetaminophen (NORCO) 5-325 MG per tablet, Take 1 tablet by mouth 4 (Four) Times a Day. 11/20 pm, Disp: , Rfl:   •  levothyroxine (SYNTHROID, LEVOTHROID) 100 MCG tablet, Take 100 mcg by mouth daily., Disp: , Rfl:   •  metoprolol tartrate (LOPRESSOR) 25 MG tablet, Take 1 tablet by mouth every 12 (twelve) hours., Disp: 180 tablet, Rfl: 3  •  montelukast (SINGULAIR) 10 MG tablet, Take 10 mg by mouth every night., Disp: , Rfl:   •  Probiotic Product (ALIGN) 4 MG capsule, Take 1 capsule by mouth Daily., Disp: , Rfl:   •  spironolactone (ALDACTONE) 25 MG tablet, Take 25 mg by mouth Daily., Disp: , Rfl:   •  vitamin C (ASCORBIC ACID) 500 MG tablet, Take 500 mg by mouth Daily., Disp: , Rfl:   •  warfarin (COUMADIN) 1 MG tablet, Take 1 mg by mouth Daily. As directed, Disp: , Rfl:     HPI    Ms. Alegre presents today for follow up of afib. Since last visit, she has been experiencing GI bleeding while on warfarin as well as elevated INRs. She was transfused with 3 units of blood in January, and a few areas in her stomach believed to be the source  "of bleeding were cauterized at that time. She has also been experiencing some edema (mostly in her abdomen) and associated shortness of breath. Her current diuretics therapy has not been very effective for alleviating her abdominal edema. She has seen a hepatologist and was told that her edema is not related to a hepatic issue. Patient denies chest pain, palpitations, PND, orthopnea, dizziness, and syncope.     The following portions of the patient's history were reviewed and updated as appropriate: allergies, current medications and problem list.    Pertinent positives as listed in the HPI.  All other systems reviewed are negative.    Vitals:    04/25/17 0943   BP: 108/60   BP Location: Right arm   Patient Position: Sitting   Pulse: 71   Weight: 159 lb (72.1 kg)   Height: 68\" (172.7 cm)       Physical Exam:    General: Alert and oriented  Neck: Jugular venous pressure is within normal limits. Carotids have normal upstrokes without bruits.   Cardiovascular: Heart has a nondisplaced focal PMI. Irregularly irregular rate and rhythm without murmur, gallop or rub.  Lungs: Clear without rales or wheezes. Equal expansion is noted.   Extremities: Show trace edema.  Skin: warm and dry.  Neurologic: nonfocal    Diagnostic Data:    Lab Results   Component Value Date    GLUCOSE 121 (H) 04/19/2017    CALCIUM 9.1 04/19/2017     04/19/2017    K 4.1 04/19/2017    CO2 25.0 04/19/2017     (H) 04/19/2017    BUN 39 (H) 04/19/2017    CREATININE 1.70 (H) 04/19/2017    EGFRIFNONA 29 (L) 04/19/2017    BCR 22.9 04/19/2017    ANIONGAP 0.0 (L) 04/19/2017     Lab Results   Component Value Date    WBC 6.25 04/19/2017    HGB 8.0 (L) 04/19/2017    HCT 26.6 (L) 04/19/2017    MCV 88.4 04/19/2017     04/19/2017     Procedures    Assessment:      ICD-10-CM ICD-9-CM   1. Atrial fibrillation, persistent I48.1 427.31   2. Chronic diastolic heart failure I50.32 428.32   3. Secondary pulmonary hypertension I27.2 416.8   4. Essential " hypertension I10 401.9   5. Hypercholesterolemia E78.00 272.0       Plan:    1. Discussed Watchman procedure extensively with the patient and her . Will talk to Dr. Lopes concerning this recommendation.   2. Increase spirolactone to 50 mg daily to prevent formation of acites.   3. BMP and CBC in 2 weeks.   4. Continue all other current medications.  5. F/up in 1 month or sooner if needed.    Scribed for Radha Cooper MD by Denise Heck. 4/25/2017  10:18 AM    I Radha Cooper MD personally performed the services described in this documentation as scribed by the above individual in my presence, and it is both accurate and complete.    Radha Cooper MD, FACC

## 2017-04-25 NOTE — TELEPHONE ENCOUNTER
Returned patient's call.  No answer; left voice message on both home & cell numbers. When she called yesterday. I had already left for the day.. Patient needs to go to the nearest ER asap.

## 2017-05-01 ENCOUNTER — HOSPITAL ENCOUNTER (OUTPATIENT)
Dept: CT IMAGING | Facility: HOSPITAL | Age: 77
Discharge: HOME | End: 2017-05-01
Attending: INTERNAL MEDICINE | Admitting: INTERNAL MEDICINE

## 2017-05-01 VITALS
OXYGEN SATURATION: 99 % | WEIGHT: 155.2 LBS | SYSTOLIC BLOOD PRESSURE: 116 MMHG | HEART RATE: 76 BPM | TEMPERATURE: 98 F | RESPIRATION RATE: 20 BRPM | HEIGHT: 68 IN | BODY MASS INDEX: 23.52 KG/M2 | DIASTOLIC BLOOD PRESSURE: 56 MMHG

## 2017-05-01 DIAGNOSIS — R18.8 OTHER ASCITES: ICD-10-CM

## 2017-05-01 LAB
APPEARANCE FLD: CLEAR
APTT PPP: 27.9 SECONDS (ref 24–31)
COLOR FLD: YELLOW
GLUCOSE BLDC GLUCOMTR-MCNC: 98 MG/DL (ref 70–130)
INR PPP: 1.08
LYMPHOCYTES NFR FLD MANUAL: 62 %
MACROPHAGE FLUID: 11 %
MESOTHL CELL NFR FLD MANUAL: 5 %
MONOCYTES NFR FLD: 11 %
NEUTROPHILS NFR FLD MANUAL: 11 %
PLATELET # BLD AUTO: 305 10*3/MM3 (ref 150–450)
PROTHROMBIN TIME: 11.8 SECONDS (ref 9.6–11.5)
RBC # FLD AUTO: 1000 /MM3
WBC # FLD: 237 /MM3

## 2017-05-01 PROCEDURE — A9270 NON-COVERED ITEM OR SERVICE: HCPCS | Performed by: RADIOLOGY

## 2017-05-01 PROCEDURE — 82042 OTHER SOURCE ALBUMIN QUAN EA: CPT | Performed by: NURSE PRACTITIONER

## 2017-05-01 PROCEDURE — 85049 AUTOMATED PLATELET COUNT: CPT | Performed by: RADIOLOGY

## 2017-05-01 PROCEDURE — 75989 ABSCESS DRAINAGE UNDER X-RAY: CPT

## 2017-05-01 PROCEDURE — 89051 BODY FLUID CELL COUNT: CPT | Performed by: NURSE PRACTITIONER

## 2017-05-01 PROCEDURE — 63710000001 ALPRAZOLAM 0.5 MG TABLET: Performed by: RADIOLOGY

## 2017-05-01 PROCEDURE — 85730 THROMBOPLASTIN TIME PARTIAL: CPT | Performed by: RADIOLOGY

## 2017-05-01 PROCEDURE — 82962 GLUCOSE BLOOD TEST: CPT

## 2017-05-01 PROCEDURE — 85610 PROTHROMBIN TIME: CPT | Performed by: RADIOLOGY

## 2017-05-01 RX ORDER — LIDOCAINE HYDROCHLORIDE 10 MG/ML
10 INJECTION, SOLUTION INFILTRATION; PERINEURAL ONCE
Status: DISCONTINUED | OUTPATIENT
Start: 2017-05-01 | End: 2017-05-01 | Stop reason: HOSPADM

## 2017-05-01 RX ORDER — ALPRAZOLAM 0.5 MG/1
0.5 TABLET ORAL ONCE
Status: COMPLETED | OUTPATIENT
Start: 2017-05-01 | End: 2017-05-01

## 2017-05-01 RX ADMIN — ALPRAZOLAM 0.5 MG: 0.5 TABLET ORAL at 08:36

## 2017-05-01 RX ADMIN — ALPRAZOLAM 0.5 MG: 0.5 TABLET ORAL at 10:48

## 2017-05-02 ENCOUNTER — TELEPHONE (OUTPATIENT)
Dept: INFUSION THERAPY | Facility: HOSPITAL | Age: 77
End: 2017-05-02

## 2017-05-02 LAB — ALBUMIN FLD-MCNC: 2 G/DL

## 2017-05-09 ENCOUNTER — CONSULT (OUTPATIENT)
Dept: ONCOLOGY | Facility: CLINIC | Age: 77
End: 2017-05-09

## 2017-05-09 ENCOUNTER — LAB (OUTPATIENT)
Dept: LAB | Facility: HOSPITAL | Age: 77
End: 2017-05-09
Attending: INTERNAL MEDICINE

## 2017-05-09 VITALS
HEIGHT: 66 IN | TEMPERATURE: 97.6 F | DIASTOLIC BLOOD PRESSURE: 50 MMHG | BODY MASS INDEX: 22.34 KG/M2 | SYSTOLIC BLOOD PRESSURE: 110 MMHG | WEIGHT: 139 LBS | HEART RATE: 84 BPM | RESPIRATION RATE: 16 BRPM

## 2017-05-09 DIAGNOSIS — Z79.899 HIGH RISK MEDICATION USE: ICD-10-CM

## 2017-05-09 DIAGNOSIS — D50.0 IRON DEFICIENCY ANEMIA SECONDARY TO BLOOD LOSS (CHRONIC): Primary | ICD-10-CM

## 2017-05-09 DIAGNOSIS — R93.89 ABNORMAL FINDINGS ON DIAGNOSTIC IMAGING OF OTHER SPECIFIED BODY STRUCTURES: ICD-10-CM

## 2017-05-09 DIAGNOSIS — I50.32 CHRONIC DIASTOLIC HEART FAILURE (HCC): ICD-10-CM

## 2017-05-09 DIAGNOSIS — K90.9 IRON MALABSORPTION: ICD-10-CM

## 2017-05-09 DIAGNOSIS — D50.0 IRON DEFICIENCY ANEMIA SECONDARY TO BLOOD LOSS (CHRONIC): ICD-10-CM

## 2017-05-09 LAB
ANION GAP SERPL CALCULATED.3IONS-SCNC: 3 MMOL/L (ref 3–11)
BASOPHILS # BLD AUTO: 0.08 10*3/MM3 (ref 0–0.2)
BASOPHILS NFR BLD AUTO: 1.1 % (ref 0–1)
BUN BLD-MCNC: 49 MG/DL (ref 9–23)
BUN/CREAT SERPL: 25.8 (ref 7–25)
CALCIUM SPEC-SCNC: 10.6 MG/DL (ref 8.7–10.4)
CHLORIDE SERPL-SCNC: 101 MMOL/L (ref 99–109)
CO2 SERPL-SCNC: 34 MMOL/L (ref 20–31)
CREAT BLD-MCNC: 1.9 MG/DL (ref 0.6–1.3)
DEPRECATED RDW RBC AUTO: 52.5 FL (ref 37–54)
EOSINOPHIL # BLD AUTO: 0.5 10*3/MM3 (ref 0.1–0.3)
EOSINOPHIL NFR BLD AUTO: 7.2 % (ref 0–3)
ERYTHROCYTE [DISTWIDTH] IN BLOOD BY AUTOMATED COUNT: 16.5 % (ref 11.3–14.5)
FERRITIN SERPL-MCNC: 15 NG/ML (ref 10–291)
FOLATE SERPL-MCNC: 12.91 NG/ML (ref 3.2–20)
GFR SERPL CREATININE-BSD FRML MDRD: 26 ML/MIN/1.73
GLUCOSE BLD-MCNC: 107 MG/DL (ref 70–100)
HCT VFR BLD AUTO: 33 % (ref 34.5–44)
HGB BLD-MCNC: 9.6 G/DL (ref 11.5–15.5)
IMM GRANULOCYTES # BLD: 0.02 10*3/MM3 (ref 0–0.03)
IMM GRANULOCYTES NFR BLD: 0.3 % (ref 0–0.6)
IRON 24H UR-MRATE: 32 MCG/DL (ref 50–175)
IRON SATN MFR SERPL: 9 % (ref 15–50)
LYMPHOCYTES # BLD AUTO: 2.17 10*3/MM3 (ref 0.6–4.8)
LYMPHOCYTES NFR BLD AUTO: 31.1 % (ref 24–44)
MCH RBC QN AUTO: 25.1 PG (ref 27–31)
MCHC RBC AUTO-ENTMCNC: 29.1 G/DL (ref 32–36)
MCV RBC AUTO: 86.2 FL (ref 80–99)
MONOCYTES # BLD AUTO: 0.66 10*3/MM3 (ref 0–1)
MONOCYTES NFR BLD AUTO: 9.5 % (ref 0–12)
NEUTROPHILS # BLD AUTO: 3.54 10*3/MM3 (ref 1.5–8.3)
NEUTROPHILS NFR BLD AUTO: 50.8 % (ref 41–71)
PLAT MORPH BLD: NORMAL
PLATELET # BLD AUTO: 318 10*3/MM3 (ref 150–450)
PMV BLD AUTO: 8.6 FL (ref 6–12)
POTASSIUM BLD-SCNC: 5.9 MMOL/L (ref 3.5–5.5)
RBC # BLD AUTO: 3.83 10*6/MM3 (ref 3.89–5.14)
RBC MORPH BLD: NORMAL
RETICS/RBC NFR AUTO: 1.33 % (ref 0.5–1.5)
SODIUM BLD-SCNC: 138 MMOL/L (ref 132–146)
TIBC SERPL-MCNC: 376 MCG/DL (ref 250–450)
TSH SERPL DL<=0.05 MIU/L-ACNC: 2.23 MIU/ML (ref 0.35–5.35)
VIT B12 BLD-MCNC: 389 PG/ML (ref 211–911)
WBC MORPH BLD: NORMAL
WBC NRBC COR # BLD: 6.97 10*3/MM3 (ref 3.5–10.8)

## 2017-05-09 PROCEDURE — 82746 ASSAY OF FOLIC ACID SERUM: CPT | Performed by: INTERNAL MEDICINE

## 2017-05-09 PROCEDURE — 85025 COMPLETE CBC W/AUTO DIFF WBC: CPT | Performed by: INTERNAL MEDICINE

## 2017-05-09 PROCEDURE — 80048 BASIC METABOLIC PNL TOTAL CA: CPT | Performed by: INTERNAL MEDICINE

## 2017-05-09 PROCEDURE — 82728 ASSAY OF FERRITIN: CPT | Performed by: INTERNAL MEDICINE

## 2017-05-09 PROCEDURE — 83550 IRON BINDING TEST: CPT | Performed by: INTERNAL MEDICINE

## 2017-05-09 PROCEDURE — 85007 BL SMEAR W/DIFF WBC COUNT: CPT | Performed by: INTERNAL MEDICINE

## 2017-05-09 PROCEDURE — 85045 AUTOMATED RETICULOCYTE COUNT: CPT | Performed by: INTERNAL MEDICINE

## 2017-05-09 PROCEDURE — 84443 ASSAY THYROID STIM HORMONE: CPT | Performed by: INTERNAL MEDICINE

## 2017-05-09 PROCEDURE — 99204 OFFICE O/P NEW MOD 45 MIN: CPT | Performed by: INTERNAL MEDICINE

## 2017-05-09 PROCEDURE — 83540 ASSAY OF IRON: CPT | Performed by: INTERNAL MEDICINE

## 2017-05-09 PROCEDURE — 82607 VITAMIN B-12: CPT | Performed by: INTERNAL MEDICINE

## 2017-05-09 PROCEDURE — 36415 COLL VENOUS BLD VENIPUNCTURE: CPT

## 2017-05-09 RX ORDER — FAMOTIDINE 10 MG/ML
20 INJECTION, SOLUTION INTRAVENOUS ONCE
Status: CANCELLED | OUTPATIENT
Start: 2017-05-15

## 2017-05-09 RX ORDER — DIPHENHYDRAMINE HCL 25 MG
25 CAPSULE ORAL ONCE
Status: CANCELLED | OUTPATIENT
Start: 2017-05-22

## 2017-05-09 RX ORDER — SODIUM CHLORIDE 9 MG/ML
250 INJECTION, SOLUTION INTRAVENOUS ONCE
Status: CANCELLED | OUTPATIENT
Start: 2017-05-22

## 2017-05-09 RX ORDER — DIPHENHYDRAMINE HCL 25 MG
25 CAPSULE ORAL ONCE
Status: CANCELLED | OUTPATIENT
Start: 2017-05-15

## 2017-05-09 RX ORDER — SODIUM CHLORIDE 9 MG/ML
250 INJECTION, SOLUTION INTRAVENOUS ONCE
Status: CANCELLED | OUTPATIENT
Start: 2017-05-15

## 2017-05-09 RX ORDER — FAMOTIDINE 10 MG/ML
20 INJECTION, SOLUTION INTRAVENOUS ONCE
Status: CANCELLED | OUTPATIENT
Start: 2017-05-22

## 2017-05-15 ENCOUNTER — INFUSION (OUTPATIENT)
Dept: ONCOLOGY | Facility: HOSPITAL | Age: 77
End: 2017-05-15

## 2017-05-15 VITALS
BODY MASS INDEX: 22.02 KG/M2 | HEART RATE: 70 BPM | DIASTOLIC BLOOD PRESSURE: 46 MMHG | SYSTOLIC BLOOD PRESSURE: 118 MMHG | WEIGHT: 137 LBS | HEIGHT: 66 IN | RESPIRATION RATE: 18 BRPM | TEMPERATURE: 97.8 F

## 2017-05-15 DIAGNOSIS — K90.9 IRON MALABSORPTION: Primary | ICD-10-CM

## 2017-05-15 PROCEDURE — 25010000002 FERUMOXYTOL 510 MG/17ML SOLUTION 510 MG VIAL: Performed by: INTERNAL MEDICINE

## 2017-05-15 PROCEDURE — 96374 THER/PROPH/DIAG INJ IV PUSH: CPT

## 2017-05-15 PROCEDURE — 96375 TX/PRO/DX INJ NEW DRUG ADDON: CPT

## 2017-05-15 PROCEDURE — 63710000001 DIPHENHYDRAMINE PER 50 MG: Performed by: INTERNAL MEDICINE

## 2017-05-15 RX ORDER — FAMOTIDINE 10 MG/ML
20 INJECTION, SOLUTION INTRAVENOUS ONCE
Status: COMPLETED | OUTPATIENT
Start: 2017-05-15 | End: 2017-05-15

## 2017-05-15 RX ORDER — DIPHENHYDRAMINE HCL 25 MG
25 CAPSULE ORAL ONCE
Status: COMPLETED | OUTPATIENT
Start: 2017-05-15 | End: 2017-05-15

## 2017-05-15 RX ORDER — SODIUM CHLORIDE 9 MG/ML
250 INJECTION, SOLUTION INTRAVENOUS ONCE
Status: COMPLETED | OUTPATIENT
Start: 2017-05-15 | End: 2017-05-15

## 2017-05-15 RX ADMIN — DIPHENHYDRAMINE HYDROCHLORIDE 25 MG: 25 CAPSULE ORAL at 14:56

## 2017-05-15 RX ADMIN — FAMOTIDINE 20 MG: 10 INJECTION, SOLUTION INTRAVENOUS at 15:08

## 2017-05-15 RX ADMIN — FERUMOXYTOL 510 MG: 510 INJECTION INTRAVENOUS at 15:19

## 2017-05-15 RX ADMIN — SODIUM CHLORIDE 250 ML: 9 INJECTION, SOLUTION INTRAVENOUS at 15:07

## 2017-05-16 ENCOUNTER — TELEPHONE (OUTPATIENT)
Dept: CARDIOLOGY | Facility: CLINIC | Age: 77
End: 2017-05-16

## 2017-05-22 ENCOUNTER — INFUSION (OUTPATIENT)
Dept: ONCOLOGY | Facility: HOSPITAL | Age: 77
End: 2017-05-22

## 2017-05-22 VITALS
SYSTOLIC BLOOD PRESSURE: 112 MMHG | WEIGHT: 137 LBS | TEMPERATURE: 97.9 F | BODY MASS INDEX: 22.02 KG/M2 | HEIGHT: 66 IN | HEART RATE: 66 BPM | RESPIRATION RATE: 18 BRPM | DIASTOLIC BLOOD PRESSURE: 44 MMHG

## 2017-05-22 DIAGNOSIS — K90.9 IRON MALABSORPTION: Primary | ICD-10-CM

## 2017-05-22 PROCEDURE — 96375 TX/PRO/DX INJ NEW DRUG ADDON: CPT

## 2017-05-22 PROCEDURE — 96374 THER/PROPH/DIAG INJ IV PUSH: CPT

## 2017-05-22 PROCEDURE — 63710000001 DIPHENHYDRAMINE PER 50 MG: Performed by: INTERNAL MEDICINE

## 2017-05-22 PROCEDURE — 25010000002 FERUMOXYTOL 510 MG/17ML SOLUTION 510 MG VIAL: Performed by: INTERNAL MEDICINE

## 2017-05-22 RX ORDER — FAMOTIDINE 10 MG/ML
20 INJECTION, SOLUTION INTRAVENOUS ONCE
Status: COMPLETED | OUTPATIENT
Start: 2017-05-22 | End: 2017-05-22

## 2017-05-22 RX ORDER — DIPHENHYDRAMINE HCL 25 MG
25 CAPSULE ORAL ONCE
Status: COMPLETED | OUTPATIENT
Start: 2017-05-22 | End: 2017-05-22

## 2017-05-22 RX ORDER — SODIUM CHLORIDE 9 MG/ML
250 INJECTION, SOLUTION INTRAVENOUS ONCE
Status: COMPLETED | OUTPATIENT
Start: 2017-05-22 | End: 2017-05-22

## 2017-05-22 RX ADMIN — FAMOTIDINE 20 MG: 10 INJECTION, SOLUTION INTRAVENOUS at 15:22

## 2017-05-22 RX ADMIN — DIPHENHYDRAMINE HYDROCHLORIDE 25 MG: 25 CAPSULE ORAL at 15:20

## 2017-05-22 RX ADMIN — FERUMOXYTOL 510 MG: 510 INJECTION INTRAVENOUS at 15:31

## 2017-05-22 RX ADMIN — SODIUM CHLORIDE 250 ML: 9 INJECTION, SOLUTION INTRAVENOUS at 15:21

## 2017-05-23 ENCOUNTER — OFFICE VISIT (OUTPATIENT)
Dept: CARDIOLOGY | Facility: CLINIC | Age: 77
End: 2017-05-23

## 2017-05-23 VITALS
HEART RATE: 71 BPM | BODY MASS INDEX: 22.55 KG/M2 | WEIGHT: 140.3 LBS | HEIGHT: 66 IN | SYSTOLIC BLOOD PRESSURE: 100 MMHG | DIASTOLIC BLOOD PRESSURE: 52 MMHG

## 2017-05-23 DIAGNOSIS — I50.32 CHRONIC DIASTOLIC CONGESTIVE HEART FAILURE (HCC): ICD-10-CM

## 2017-05-23 DIAGNOSIS — E78.00 HYPERCHOLESTEROLEMIA: ICD-10-CM

## 2017-05-23 DIAGNOSIS — I10 ESSENTIAL HYPERTENSION: ICD-10-CM

## 2017-05-23 DIAGNOSIS — I48.19 ATRIAL FIBRILLATION, PERSISTENT (HCC): Primary | ICD-10-CM

## 2017-05-23 DIAGNOSIS — IMO0002 SECONDARY PULMONARY HYPERTENSION: ICD-10-CM

## 2017-05-23 PROCEDURE — 93000 ELECTROCARDIOGRAM COMPLETE: CPT | Performed by: INTERNAL MEDICINE

## 2017-05-23 PROCEDURE — 99213 OFFICE O/P EST LOW 20 MIN: CPT | Performed by: INTERNAL MEDICINE

## 2017-05-25 RX ORDER — ATORVASTATIN CALCIUM 40 MG/1
TABLET, FILM COATED ORAL
Qty: 90 TABLET | Refills: 2 | Status: SHIPPED | OUTPATIENT
Start: 2017-05-25 | End: 2017-09-25 | Stop reason: SDUPTHER

## 2017-06-02 ENCOUNTER — TRANSCRIBE ORDERS (OUTPATIENT)
Dept: LAB | Facility: HOSPITAL | Age: 77
End: 2017-06-02

## 2017-06-02 ENCOUNTER — LAB (OUTPATIENT)
Dept: LAB | Facility: HOSPITAL | Age: 77
End: 2017-06-02
Attending: INTERNAL MEDICINE

## 2017-06-02 DIAGNOSIS — N18.9 CHRONIC KIDNEY DISEASE, UNSPECIFIED: ICD-10-CM

## 2017-06-02 DIAGNOSIS — N18.4 CHRONIC KIDNEY DISEASE, STAGE IV (SEVERE) (HCC): Primary | ICD-10-CM

## 2017-06-02 DIAGNOSIS — N18.4 CHRONIC KIDNEY DISEASE, STAGE IV (SEVERE) (HCC): ICD-10-CM

## 2017-06-02 LAB
25(OH)D3 SERPL-MCNC: 25.1 NG/ML
ALBUMIN SERPL-MCNC: 3.9 G/DL (ref 3.2–4.8)
ANION GAP SERPL CALCULATED.3IONS-SCNC: 6 MMOL/L (ref 3–11)
BACTERIA UR QL AUTO: ABNORMAL /HPF
BILIRUB UR QL STRIP: NEGATIVE
BUN BLD-MCNC: 50 MG/DL (ref 9–23)
BUN/CREAT SERPL: 27.8 (ref 7–25)
CALCIUM SPEC-SCNC: 10.2 MG/DL (ref 8.7–10.4)
CHLORIDE SERPL-SCNC: 100 MMOL/L (ref 99–109)
CLARITY UR: ABNORMAL
CO2 SERPL-SCNC: 31 MMOL/L (ref 20–31)
COLOR UR: YELLOW
CREAT BLD-MCNC: 1.8 MG/DL (ref 0.6–1.3)
FERRITIN SERPL-MCNC: 360 NG/ML (ref 10–291)
GFR SERPL CREATININE-BSD FRML MDRD: 27 ML/MIN/1.73
GLUCOSE BLD-MCNC: 226 MG/DL (ref 70–100)
GLUCOSE UR STRIP-MCNC: NEGATIVE MG/DL
HGB UR QL STRIP.AUTO: NEGATIVE
HYALINE CASTS UR QL AUTO: ABNORMAL /LPF
IRON 24H UR-MRATE: 51 MCG/DL (ref 50–175)
IRON SATN MFR SERPL: 18 % (ref 15–50)
KETONES UR QL STRIP: NEGATIVE
LEUKOCYTE ESTERASE UR QL STRIP.AUTO: ABNORMAL
NITRITE UR QL STRIP: NEGATIVE
PH UR STRIP.AUTO: 7 [PH] (ref 5–8)
PHOSPHATE SERPL-MCNC: 4.1 MG/DL (ref 2.4–5.1)
POTASSIUM BLD-SCNC: 4.8 MMOL/L (ref 3.5–5.5)
PROT UR QL STRIP: NEGATIVE
RBC # UR: ABNORMAL /HPF
REF LAB TEST METHOD: ABNORMAL
SODIUM BLD-SCNC: 137 MMOL/L (ref 132–146)
SP GR UR STRIP: 1.01 (ref 1–1.03)
SQUAMOUS #/AREA URNS HPF: ABNORMAL /HPF
TIBC SERPL-MCNC: 281 MCG/DL (ref 250–450)
UROBILINOGEN UR QL STRIP: ABNORMAL
WBC UR QL AUTO: ABNORMAL /HPF

## 2017-06-02 PROCEDURE — 80069 RENAL FUNCTION PANEL: CPT | Performed by: INTERNAL MEDICINE

## 2017-06-02 PROCEDURE — 83970 ASSAY OF PARATHORMONE: CPT | Performed by: INTERNAL MEDICINE

## 2017-06-02 PROCEDURE — 82310 ASSAY OF CALCIUM: CPT | Performed by: INTERNAL MEDICINE

## 2017-06-02 PROCEDURE — 82306 VITAMIN D 25 HYDROXY: CPT | Performed by: INTERNAL MEDICINE

## 2017-06-02 PROCEDURE — 82728 ASSAY OF FERRITIN: CPT | Performed by: INTERNAL MEDICINE

## 2017-06-02 PROCEDURE — 83550 IRON BINDING TEST: CPT | Performed by: INTERNAL MEDICINE

## 2017-06-02 PROCEDURE — 36415 COLL VENOUS BLD VENIPUNCTURE: CPT

## 2017-06-02 PROCEDURE — 81001 URINALYSIS AUTO W/SCOPE: CPT | Performed by: INTERNAL MEDICINE

## 2017-06-02 PROCEDURE — 83540 ASSAY OF IRON: CPT | Performed by: INTERNAL MEDICINE

## 2017-06-05 ENCOUNTER — TRANSCRIBE ORDERS (OUTPATIENT)
Dept: ADMINISTRATIVE | Facility: HOSPITAL | Age: 77
End: 2017-06-05

## 2017-06-05 DIAGNOSIS — Z13.820 SCREENING FOR OSTEOPOROSIS: Primary | ICD-10-CM

## 2017-06-05 DIAGNOSIS — Z12.31 VISIT FOR SCREENING MAMMOGRAM: Primary | ICD-10-CM

## 2017-06-05 LAB
CALCIUM SERPL-MCNC: 9.6 MG/DL (ref 8.7–10.3)
INTACT PTH: NORMAL
PTH-INTACT SERPL-MCNC: 64 PG/ML (ref 15–65)

## 2017-06-07 ENCOUNTER — TELEPHONE (OUTPATIENT)
Dept: CARDIOLOGY | Facility: CLINIC | Age: 77
End: 2017-06-07

## 2017-06-07 DIAGNOSIS — I48.91 ATRIAL FIBRILLATION, UNSPECIFIED TYPE (HCC): Primary | ICD-10-CM

## 2017-06-07 NOTE — TELEPHONE ENCOUNTER
"Mrs. Alegre has spoken to Dr. Cooper about Watchman implant on multiple separate occasions.    Meets criteria, with CHADS-VASC = 6, currently not on anticoagulation secondary to GI bleed on warfarin as well as elevated INRs. Transfused with 3 units of blood in January 2017, AVMs cauterized.  Recurrent GI bleed April 2017 and warfarin was stopped.  She also has iron deficiency anemia, followed by Dr. Neal & currently on iron infusion.  She has renal insufficiency followed by [facility] with stable creatinine 1.8 on 6/2/17.  She has pulmonary HTN & COPD with recent improvement in pulmonary function that allowed her to D/C her O2 during the day & just wear at night.     She is interested in Watchman, as she is \"deathly afraid of a stroke.\"  Dr. Anderson had previously discussed Watchman implant using traditional medical regimen vs possible enrollment in the ASAP II trial.  I gave her information to Rhiannon Rolon to contact about the study.  She sees Dr. Moreland next week for her Shared Decision Making visit.  She will need to see neuro for her Multidisciplinary Team visit.      Do you want to see her in clinic or are you OK with a consult & treat?  Patient is OK with C&T.  "

## 2017-06-08 ENCOUNTER — HOSPITAL ENCOUNTER (OUTPATIENT)
Dept: MAMMOGRAPHY | Facility: HOSPITAL | Age: 77
Discharge: HOME | End: 2017-06-08
Attending: OBSTETRICS & GYNECOLOGY | Admitting: OBSTETRICS & GYNECOLOGY

## 2017-06-08 ENCOUNTER — APPOINTMENT (OUTPATIENT)
Dept: MAMMOGRAPHY | Facility: HOSPITAL | Age: 77
End: 2017-06-08
Attending: OBSTETRICS & GYNECOLOGY

## 2017-06-08 DIAGNOSIS — Z12.31 VISIT FOR SCREENING MAMMOGRAM: ICD-10-CM

## 2017-06-08 PROCEDURE — 77063 BREAST TOMOSYNTHESIS BI: CPT | Performed by: RADIOLOGY

## 2017-06-08 PROCEDURE — 77063 BREAST TOMOSYNTHESIS BI: CPT

## 2017-06-08 PROCEDURE — G0202 SCR MAMMO BI INCL CAD: HCPCS

## 2017-06-08 PROCEDURE — G0202 SCR MAMMO BI INCL CAD: HCPCS | Performed by: RADIOLOGY

## 2017-06-08 NOTE — TELEPHONE ENCOUNTER
Consult with Dr. Lopes scheduled for 7/19/17 at 3:00.  I left this information on Mrs. Alegre's cell phone voicemail.

## 2017-06-15 ENCOUNTER — HOSPITAL ENCOUNTER (OUTPATIENT)
Dept: BONE DENSITY | Facility: HOSPITAL | Age: 77
Discharge: HOME | End: 2017-06-15
Attending: OBSTETRICS & GYNECOLOGY | Admitting: OBSTETRICS & GYNECOLOGY

## 2017-06-15 DIAGNOSIS — Z13.820 SCREENING FOR OSTEOPOROSIS: ICD-10-CM

## 2017-06-15 PROCEDURE — 77080 DXA BONE DENSITY AXIAL: CPT | Performed by: RADIOLOGY

## 2017-06-15 PROCEDURE — 77080 DXA BONE DENSITY AXIAL: CPT

## 2017-06-27 ENCOUNTER — OFFICE VISIT (OUTPATIENT)
Dept: CARDIOLOGY | Facility: CLINIC | Age: 77
End: 2017-06-27

## 2017-06-27 VITALS
BODY MASS INDEX: 21.66 KG/M2 | SYSTOLIC BLOOD PRESSURE: 110 MMHG | HEART RATE: 72 BPM | DIASTOLIC BLOOD PRESSURE: 60 MMHG | HEIGHT: 68 IN | WEIGHT: 142.9 LBS

## 2017-06-27 DIAGNOSIS — I49.5 TACHY-BRADY SYNDROME (HCC): ICD-10-CM

## 2017-06-27 DIAGNOSIS — I10 ESSENTIAL HYPERTENSION: ICD-10-CM

## 2017-06-27 DIAGNOSIS — IMO0002 SECONDARY PULMONARY HYPERTENSION: ICD-10-CM

## 2017-06-27 DIAGNOSIS — I48.19 ATRIAL FIBRILLATION, PERSISTENT (HCC): Primary | ICD-10-CM

## 2017-06-27 DIAGNOSIS — K92.2 UPPER GI HEMORRHAGE: ICD-10-CM

## 2017-06-27 PROCEDURE — 99213 OFFICE O/P EST LOW 20 MIN: CPT | Performed by: INTERNAL MEDICINE

## 2017-06-27 RX ORDER — SPIRONOLACTONE 25 MG/1
25 TABLET ORAL DAILY
COMMUNITY
End: 2017-09-25 | Stop reason: SDUPTHER

## 2017-06-27 NOTE — PROGRESS NOTES
Sarita Alegre  1940  [phone]      06/27/2017    Percy Moreland MD    Chief Complaint   Patient presents with   • Atrial Fibrillation     PROBLEM LIST:  1. Atrial fibrillation:  a. Newly diagnosed atrial fibrillation with controlled rate of unknown duration, [facility] ER presentation, 06/21/2015.   b. Recent history of echocardiogram, 05/18/2015, Dr. Dorantes, revealing normal LVEF. Mild AR. Mild MR. Moderate to severe TR.  c. EVITA/ECV, 06/24/2015. EF 55% to 60%. No thrombus. Mild to moderate mitral regurgitation. Moderate tricuspid regurgitation. RVSP 66 mmHg. External cardioversion with 2 joules to normal sinus rhythm.  d. Cardiac event monitor, 8/31/2016: placed for falls. 6.5 second pause.   e. PM implantation, 9/1/2016: The PM is a [employer] model L101 serial number [id number] generator, the atrial lead is a Guidant model 4135 serial number [id number] lead, the right ventricular lead is a Guidant model 4136 serial number [id number] lead. The bradycardia pacing mode is DDIR with a lower rate of 70 upper rate of 130 bpm.    2. Diastolic congestive failure.  3. Moderate pulmonary hypertension.   a. Cardiac catheterization, 6/5/16: Normal cardiac output and index. Severe pulmonary hypertension. No evidence of improvement with nitric oxide. No evidence of significant intracardiac shunting. Elevated RA and wedge pressures.   4. Chest pain:  a. History of normal echocardiogram as well as Cardiolite GXT, March 2011, Sridhar Bustamante MD.  b. Abnormal EKG revealing normal sinus rhythm with first degree AV block and poor precordial R-wave progression (no evidence of anterior infarct).  5. Hypertension.  6. Hyperlipidemia.  7. Type 2 diabetes mellitus.  8. History of tobacco abuse with cessation 30 years ago.  9. Obesity.  10. Obstructive sleep apnea with no CPAP usage.  11. Asthma.  12. Depression.  13. Gastroesophageal reflux disease.  14. Hypothyroidism on chronic replacement  therapy.  15. Bilateral hearing deficit with hearing aids.  16. Osteoarthritis.  17. History of left ankle fracture followed by Dr. Kennedy.  18. Surgical history:  a. Bilateral cataract extraction, 1996 and 1997.  b. Right knee repair, 1982.  c. Left elbow repair, 2006.  d. Hysterectomy.  e. Parathyroid gland removal.      Allergies   Allergen Reactions   • Grass Cough   • Molds & Smuts Cough   • Propafenone      Hepatic failure       Current Medications:      Current Outpatient Prescriptions:   •  albuterol (PROAIR HFA) 108 (90 BASE) MCG/ACT inhaler, Inhale 2 puffs Every 4 (Four) Hours As Needed for Wheezing., Disp: 3 inhaler, Rfl: 3  •  atorvastatin (LIPITOR) 40 MG tablet, TAKE ONE TABLET BY MOUTH ONCE DAILY AT BEDTIME, Disp: 90 tablet, Rfl: 2  •  Biotin 5000 MCG sublingual tablet, Take 1 tablet by mouth Daily., Disp: , Rfl:   •  bumetanide (BUMEX) 1 MG tablet, Take 1 mg by mouth Daily. 1 tab on M/W/F, all other days take 1.5 tab, Disp: , Rfl:   •  cholecalciferol (VITAMIN D3) 1000 UNITS tablet, Take 1,000 Units by mouth daily., Disp: , Rfl:   •  DULoxetine (CYMBALTA) 60 MG capsule, Take 60 mg by mouth Daily., Disp: , Rfl:   •  HYDROcodone-acetaminophen (NORCO) 5-325 MG per tablet, Take 1 tablet by mouth 4 (Four) Times a Day. 11/20 pm, Disp: , Rfl:   •  levothyroxine (SYNTHROID, LEVOTHROID) 100 MCG tablet, Take 100 mcg by mouth daily., Disp: , Rfl:   •  metoprolol tartrate (LOPRESSOR) 25 MG tablet, Take 1 tablet by mouth every 12 (twelve) hours., Disp: 180 tablet, Rfl: 3  •  montelukast (SINGULAIR) 10 MG tablet, Take 10 mg by mouth every night., Disp: , Rfl:   •  Probiotic Product (ALIGN) 4 MG capsule, Take 1 capsule by mouth 3 (Three) Times a Week., Disp: , Rfl:   •  spironolactone (ALDACTONE) 25 MG tablet, Take 25 mg by mouth Daily., Disp: , Rfl:   •  vitamin C (ASCORBIC ACID) 500 MG tablet, Take 500 mg by mouth Daily., Disp: , Rfl:   •  Ketotifen Fumarate (ALAWAY OP), Apply  to eye As Needed., Disp: , Rfl:  "    HPI    Ms. Alegre presents today for 1 month follow up of atrial fibrillation and diastolic congestive heart failure. Since last visit, patient has been feeling well overall from a cardiovascular standpoint. She is currently taking Bumex once daily, but admittedly does not require it every day. She is weighing herself daily. Her last visit with Dr. Camacho, she was told everything was stable and did not need to follow up for 4 months. Patient denies chest pain, palpitations, shortness of breath, PND, orthopnea, dizziness, and syncope. She is curious if she is able to start taking calcium with vitamin D from a cardiac standpoint based on a recent bone density test she underwent. Patient is to see Dr. Bush and Dr. Marianne hinds for the ASAP-TOO trial.    The following portions of the patient's history were reviewed and updated as appropriate: allergies, current medications and problem list.    Pertinent positives as listed in the HPI.  All other systems reviewed are negative.    Vitals:    06/27/17 1445   BP: 110/60   BP Location: Left arm   Patient Position: Sitting   Pulse: 72   Weight: 142 lb 14.4 oz (64.8 kg)   Height: 68\" (172.7 cm)       Physical Exam:  General: Alert and oriented  Neck: Jugular venous pressure is within normal limits. Carotids have normal upstrokes without bruits.   Cardiovascular: Regular rate and rhythm without gallop or rub.  Lungs: Clear without rales or wheezes. Equal expansion is noted.   Extremities: Show no edema.  Skin: warm and dry.  Neurologic: nonfocal    Diagnostic Data:    Lab Results   Component Value Date    GLUCOSE 226 (H) 06/02/2017    CALCIUM 10.2 06/02/2017    CALCIUM 9.6 06/02/2017     06/02/2017    K 4.8 06/02/2017    CO2 31.0 06/02/2017     06/02/2017    BUN 50 (H) 06/02/2017    CREATININE 1.80 (H) 06/02/2017    EGFRIFNONA 27 (L) 06/02/2017    BCR 27.8 (H) 06/02/2017    ANIONGAP 6.0 06/02/2017     Lab Results   Component Value Date    TSH 2.234 " 05/09/2017     Lab Results   Component Value Date    WBC 6.97 05/09/2017    HGB 9.6 (L) 05/09/2017    HCT 33.0 (L) 05/09/2017    MCV 86.2 05/09/2017     05/09/2017     Lab Results   Component Value Date    HGBA1C 5.40 01/24/2017       Procedures    Assessment:      ICD-10-CM ICD-9-CM   1. Atrial fibrillation, persistent, awaiting ASAP-TOO trial I48.1 427.31   2. Essential hypertension I10 401.9   3. Secondary pulmonary hypertension I27.2 416.8   4. Tachy-marcela syndrome I49.5 427.81   5. Upper GI hemorrhage K92.2 578.9   6.      CKD stage IV    Plan:    1. Continue current medications.  2. F/up in 3 months or sooner if needed.    Scribed for Radha Cooper MD by Petey Hale. 6/27/2017  3:04 PM     I Radha Cooper MD personally performed the services described in this documentation as scribed by the above individual in my presence, and it is both accurate and complete.    Radha Cooper MD, [facility]

## 2017-07-19 ENCOUNTER — CONSULT (OUTPATIENT)
Dept: CARDIOLOGY | Facility: CLINIC | Age: 77
End: 2017-07-19

## 2017-07-19 VITALS
WEIGHT: 144 LBS | DIASTOLIC BLOOD PRESSURE: 70 MMHG | SYSTOLIC BLOOD PRESSURE: 124 MMHG | HEIGHT: 68 IN | HEART RATE: 71 BPM | BODY MASS INDEX: 21.82 KG/M2

## 2017-07-19 DIAGNOSIS — I49.5 TACHY-BRADY SYNDROME (HCC): ICD-10-CM

## 2017-07-19 DIAGNOSIS — I48.19 ATRIAL FIBRILLATION, PERSISTENT (HCC): Primary | ICD-10-CM

## 2017-07-19 DIAGNOSIS — I10 ESSENTIAL HYPERTENSION: ICD-10-CM

## 2017-07-19 PROCEDURE — 93288 INTERROG EVL PM/LDLS PM IP: CPT | Performed by: INTERNAL MEDICINE

## 2017-07-19 PROCEDURE — 99214 OFFICE O/P EST MOD 30 MIN: CPT | Performed by: INTERNAL MEDICINE

## 2017-07-19 NOTE — PROGRESS NOTES
Cardiology Consult     Sarita Alegre  1940  [phone]      07/19/17    DATE OF ADMISSION: (Not on file)  [facility] CARDIOLOGY    Percy Moreland MD  [address] / [place] [id number]    Chief Complaint   Patient presents with   • Atrial Fibrillation   • Slow Heart Rate       Referring M.ARYAN: Dr. Radha Cooper    Problem List    1. Atrial fibrillation:  a. Newly diagnosed atrial fibrillation with controlled rate of unknown duration, [facility] ER presentation, 06/21/2015.   b. Recent history of echocardiogram, 05/18/2015, Dr. Dorantes, revealing normal LVEF. Mild AR. Mild MR. Moderate to severe TR.  c. EVITA/ECV, 06/24/2015. EF 55% to 60%. No thrombus. Mild to moderate mitral regurgitation. Moderate tricuspid regurgitation. RVSP 66 mmHg. External cardioversion with 2 joules to normal sinus rhythm.  d. Cardiac event monitor, 8/31/2016: placed for falls. 6.5 second pause.   e. PM implantation, 9/1/2016: The PM is a [employer] model L101 serial number [id number] generator, the atrial lead is a Guidant model 4135 serial number [id number] lead, the right ventricular lead is a Guidant model 4136 serial number [id number] lead. The bradycardia pacing mode is DDIR with a lower rate of 70 upper rate of 130 bpm.    2. Diastolic congestive failure.  3. Moderate pulmonary hypertension.   a. Cardiac catheterization, 6/5/16: Normal cardiac output and index. Severe pulmonary hypertension. No evidence of improvement with nitric oxide. No evidence of significant intracardiac shunting. Elevated RA and wedge pressures.   4. Chest pain:  a. History of normal echocardiogram as well as Cardiolite GXT, March 2011, Sridhar Bustamante MD.  b. Abnormal EKG revealing normal sinus rhythm with first degree AV block and poor precordial R-wave progression (no evidence of anterior infarct).  5. Hypertension.  6. Hyperlipidemia.  7. Type 2 diabetes mellitus.  8. History of tobacco  abuse with cessation 30 years ago.  9. Obesity.  10. Obstructive sleep apnea with no CPAP usage.  11. Asthma.  12. Depression.  13. Gastroesophageal reflux disease.  14. Hypothyroidism on chronic replacement therapy.  15. Bilateral hearing deficit with hearing aids.  16. Osteoarthritis.  17. History of left ankle fracture followed by Dr. Kennedy.  18. Surgical history:  a. Bilateral cataract extraction, 1996 and 1997.  b. Right knee repair, 1982.  c. Left elbow repair, 2006.  d. Hysterectomy.  e. Parathyroid gland removal.      History of Present Illness:     Patient presents today for consultation regarding implant of a Watchman Device as part of the ASAP-TOO trial. She has a hx of PAF and DHF. She has been unable to take full anticoagulation due to severe anemia. Has undergone colonoscopy and endoscopy without any known source of bleeding. She continues to get iron infusions. Today, she is doing well from cardiac standpoint. She is euvolemic. No recent episodes of afib. No chest pain, SOA, pnd, edema, orthopnea, fevers, chills.  She is not aware of atrial fibrillation and does not know when she goes in and out of the arrhythmia.  She denies any increasing tachypalpitations or significant near-syncope or syncope.  No recent TIA or CVA type symptoms.  Her blood pressure has been well controlled overall.    Allergies   Allergen Reactions   • Grass Cough   • Molds & Smuts Cough   • Propafenone      Hepatic failure        Cannot display prior to admission medications because the patient has not been admitted in this contact.            Current Outpatient Prescriptions:   •  albuterol (PROAIR HFA) 108 (90 BASE) MCG/ACT inhaler, Inhale 2 puffs Every 4 (Four) Hours As Needed for Wheezing., Disp: 3 inhaler, Rfl: 3  •  atorvastatin (LIPITOR) 40 MG tablet, TAKE ONE TABLET BY MOUTH ONCE DAILY AT BEDTIME, Disp: 90 tablet, Rfl: 2  •  Biotin 5000 MCG sublingual tablet, Take 1 tablet by mouth Daily., Disp: , Rfl:   •  bumetanide  (BUMEX) 1 MG tablet, Take 1 mg by mouth Daily. 1 tab on M/W/F, all other days take 1.5 tab, Disp: , Rfl:   •  cholecalciferol (VITAMIN D3) 1000 UNITS tablet, Take 1,000 Units by mouth daily., Disp: , Rfl:   •  DULoxetine (CYMBALTA) 60 MG capsule, Take 60 mg by mouth Daily., Disp: , Rfl:   •  HYDROcodone-acetaminophen (NORCO) 5-325 MG per tablet, Take 1 tablet by mouth 4 (Four) Times a Day. 11/20 pm, Disp: , Rfl:   •  levothyroxine (SYNTHROID, LEVOTHROID) 100 MCG tablet, Take 100 mcg by mouth daily., Disp: , Rfl:   •  metoprolol tartrate (LOPRESSOR) 25 MG tablet, Take 1 tablet by mouth every 12 (twelve) hours., Disp: 180 tablet, Rfl: 3  •  montelukast (SINGULAIR) 10 MG tablet, Take 10 mg by mouth every night., Disp: , Rfl:   •  Probiotic Product (ALIGN) 4 MG capsule, Take 1 capsule by mouth 3 (Three) Times a Week., Disp: , Rfl:   •  spironolactone (ALDACTONE) 25 MG tablet, Take 25 mg by mouth Daily., Disp: , Rfl:   •  vitamin C (ASCORBIC ACID) 500 MG tablet, Take 500 mg by mouth Daily., Disp: , Rfl:     Social History     Social History   • Marital status:      Spouse name: N/A   • Number of children: N/A   • Years of education: N/A     Social History Main Topics   • Smoking status: Former Smoker     Packs/day: 3.00     Years: 25.00     Types: Cigarettes     Quit date: 4/25/1982   • Smokeless tobacco: Never Used   • Alcohol use No   • Drug use: No   • Sexual activity: Defer      Comment:      Other Topics Concern   • None     Social History Narrative       Family History   Problem Relation Age of Onset   • Asthma Mother    • Allergies Mother    • Colonic polyp Mother    • Mitral valve prolapse Mother    • Other Mother      a fib- rain knee replacement   • Heart disease Father    • Lung cancer Father      he was smoker   • Diabetes Other    • Colon cancer Son    • Breast cancer Neg Hx    • Ovarian cancer Neg Hx        REVIEW OF SYSTEMS:   CONST:  No weight loss, fever, chills, weakness or fatigue.  "  HEENT:  No visual loss, blurred vision, double vision, yellow sclerae.                   + hearing loss, No congestion, sore throat.   SKIN:      No rashes, urticaria, ulcers, sores.     RESP:     No shortness of breath, NO hemoptysis, cough, sputum.   GI:           No anorexia, nausea, vomiting, diarrhea. No abdominal pain, melena. + GERD  :         No burning on urination, hematuria or increased frequency.  ENDO:    No diaphoresis, cold or heat intolerance. No polyuria or polydipsia.   NEURO:  No headache, dizziness, syncope, paralysis, ataxia, or parasthesias.                  No change in bowel or bladder control. No history of CVA/TIA  MUSC:    No muscle, back pain, + joint pain and stiffness.   HEME:    + anemia, bleeding, bruising. No history of DVT/PE.  PSYCH:  + history of depression, anxiety    Vitals:    07/19/17 1501   BP: 124/70   BP Location: Right arm   Patient Position: Sitting   Pulse: 71   Weight: 144 lb (65.3 kg)   Height: 68\" (172.7 cm)       Physical Exam:  GEN: Well nourished, Well- developed  No acute distress  HEENT: Normocephalic, Atraumatic, PERRLA, moist mucous membranes  NECK: supple, NO JVD, no thyromegaly, no lymphadenopathy  CARD: S1S2  RRR no murmur, gallop, rub  LUNGS: Clear to auscultataion, normal respiratory effort  ABDOMEN: Soft, nontender, normal bowel sounds  EXTREMITIES:No gross deformities,  No clubbing, cyanosis, or edema  SKIN: Warm, dry  NEURO: No focal deficits  PSYCHIATRIC: Normal affect and mood      Device Interrogation: normal functioning DDD PPM. A paced 37%. V paced 78%. Stable thresholds and impedences. 5.5 years. No events- DDIR setting     Assessment and Plan:     1. PAF  - CHADS-VASC= 5, currently on no a/c due to significant anemia. Will arrange for Watchman Device implant as part of ASAP-TOO trial. -no recent episodes of afib     2. Diastolic Heart Failure   -stable. Continue current regimen     3. Tachybrady syndrome  - normal functioning DDD PPM.     4. " HTN, controlled.     Scribed for Sandeep Lopes MD by Vanessa Shah PA-C. 7/19/2017  5:10 PM    I, Sandeep Lopes MD, personally performed the services described in this documentation as scribed by the above named individual in my presence, and it is both accurate and complete.  7/20/2017  4:24 PM

## 2017-07-31 ENCOUNTER — OFFICE VISIT (OUTPATIENT)
Dept: NEUROLOGY | Facility: CLINIC | Age: 77
End: 2017-07-31

## 2017-07-31 VITALS
OXYGEN SATURATION: 96 % | WEIGHT: 151.4 LBS | HEART RATE: 72 BPM | HEIGHT: 68 IN | BODY MASS INDEX: 22.94 KG/M2 | SYSTOLIC BLOOD PRESSURE: 122 MMHG | DIASTOLIC BLOOD PRESSURE: 74 MMHG

## 2017-07-31 DIAGNOSIS — I48.19 ATRIAL FIBRILLATION, PERSISTENT (HCC): Primary | ICD-10-CM

## 2017-07-31 PROCEDURE — 99999 PR OFFICE/OUTPT VISIT,PROCEDURE ONLY: CPT | Performed by: PSYCHIATRY & NEUROLOGY

## 2017-07-31 NOTE — ASSESSMENT & PLAN NOTE
Patient is not a candidate for anti coagulation due to persistent GI bleeding.  Normal neurological examination.  Appropriated candidate for study.

## 2017-08-14 ENCOUNTER — LAB (OUTPATIENT)
Dept: LAB | Facility: HOSPITAL | Age: 77
End: 2017-08-14

## 2017-08-14 ENCOUNTER — OFFICE VISIT (OUTPATIENT)
Dept: ONCOLOGY | Facility: CLINIC | Age: 77
End: 2017-08-14

## 2017-08-14 VITALS
TEMPERATURE: 98.2 F | RESPIRATION RATE: 12 BRPM | BODY MASS INDEX: 22.88 KG/M2 | HEIGHT: 68 IN | SYSTOLIC BLOOD PRESSURE: 146 MMHG | DIASTOLIC BLOOD PRESSURE: 65 MMHG | WEIGHT: 151 LBS | HEART RATE: 70 BPM

## 2017-08-14 DIAGNOSIS — K90.9 IRON MALABSORPTION: ICD-10-CM

## 2017-08-14 DIAGNOSIS — D50.0 IRON DEFICIENCY ANEMIA SECONDARY TO BLOOD LOSS (CHRONIC): ICD-10-CM

## 2017-08-14 DIAGNOSIS — D62 ANEMIA DUE TO ACUTE BLOOD LOSS: Primary | ICD-10-CM

## 2017-08-14 DIAGNOSIS — D62 ANEMIA DUE TO ACUTE BLOOD LOSS: ICD-10-CM

## 2017-08-14 LAB
ERYTHROCYTE [DISTWIDTH] IN BLOOD BY AUTOMATED COUNT: 16.5 % (ref 11.3–14.5)
FERRITIN SERPL-MCNC: 69 NG/ML (ref 10–291)
HCT VFR BLD AUTO: 37.6 % (ref 34.5–44)
HGB BLD-MCNC: 11.9 G/DL (ref 11.5–15.5)
IRON 24H UR-MRATE: 129 MCG/DL (ref 50–175)
IRON SATN MFR SERPL: 47 % (ref 15–50)
LYMPHOCYTES # BLD AUTO: 1.9 10*3/MM3 (ref 0.6–4.8)
LYMPHOCYTES NFR BLD AUTO: 35.6 % (ref 24–44)
MCH RBC QN AUTO: 27.4 PG (ref 27–31)
MCHC RBC AUTO-ENTMCNC: 31.8 G/DL (ref 32–36)
MCV RBC AUTO: 86.1 FL (ref 80–99)
MONOCYTES # BLD AUTO: 0.5 10*3/MM3 (ref 0–1)
MONOCYTES NFR BLD AUTO: 9.5 % (ref 0–12)
NEUTROPHILS # BLD AUTO: 2.9 10*3/MM3 (ref 1.5–8.3)
NEUTROPHILS NFR BLD AUTO: 54.9 % (ref 41–71)
PLATELET # BLD AUTO: 201 10*3/MM3 (ref 150–450)
PMV BLD AUTO: 6.1 FL (ref 6–12)
RBC # BLD AUTO: 4.37 10*6/MM3 (ref 3.89–5.14)
TIBC SERPL-MCNC: 276 MCG/DL (ref 250–450)
WBC NRBC COR # BLD: 5.3 10*3/MM3 (ref 3.5–10.8)

## 2017-08-14 PROCEDURE — 99214 OFFICE O/P EST MOD 30 MIN: CPT | Performed by: INTERNAL MEDICINE

## 2017-08-14 PROCEDURE — 36415 COLL VENOUS BLD VENIPUNCTURE: CPT

## 2017-08-14 PROCEDURE — 85025 COMPLETE CBC W/AUTO DIFF WBC: CPT

## 2017-08-14 PROCEDURE — 82728 ASSAY OF FERRITIN: CPT | Performed by: INTERNAL MEDICINE

## 2017-08-14 PROCEDURE — 83550 IRON BINDING TEST: CPT | Performed by: INTERNAL MEDICINE

## 2017-08-14 PROCEDURE — 83540 ASSAY OF IRON: CPT | Performed by: INTERNAL MEDICINE

## 2017-08-14 NOTE — PROGRESS NOTES
DATE OF VISIT: 8/14/2017    REASON FOR VISIT: Followup for iron deficiency anemia      HISTORY OF PRESENT ILLNESS: The patient is a very pleasant 77 y.o. female  with past medical history significant for iron deficiency anemia diagnosed March 2017 fsecondary to GI bleed.  The patient is here today for scheduled follow-up visit.    SUBJECTIVE: The patient has been doing fairly well. she denied any fever or  chills, no night sweats, denied any headaches    PAST MEDICAL HISTORY/SOCIAL HISTORY/FAMILY HISTORY: Unchanged from my prior documentation done on May 9, 2017    Review of Systems   Constitutional: Negative for activity change, appetite change, chills, fatigue, fever and unexpected weight change.   HENT: Negative for hearing loss, mouth sores, nosebleeds, sore throat and trouble swallowing.    Eyes: Negative for visual disturbance.   Respiratory: Negative for cough, chest tightness, shortness of breath and wheezing.    Cardiovascular: Negative for chest pain, palpitations and leg swelling.   Gastrointestinal: Negative for abdominal distention, abdominal pain, blood in stool, constipation, diarrhea, nausea, rectal pain and vomiting.   Endocrine: Negative for cold intolerance and heat intolerance.   Genitourinary: Negative for difficulty urinating, dysuria, frequency and urgency.   Musculoskeletal: Negative for arthralgias, back pain, gait problem, joint swelling and myalgias.   Skin: Negative for rash.   Neurological: Negative for dizziness, tremors, syncope, weakness, light-headedness, numbness and headaches.   Hematological: Negative for adenopathy. Does not bruise/bleed easily.   Psychiatric/Behavioral: Negative for confusion, sleep disturbance and suicidal ideas. The patient is not nervous/anxious.          Current Outpatient Prescriptions:   •  Cyanocobalamin (VITAMIN B-12 IJ), Inject  as directed., Disp: , Rfl:   •  albuterol (PROAIR HFA) 108 (90 BASE) MCG/ACT inhaler, Inhale 2 puffs Every 4 (Four) Hours As  "Needed for Wheezing., Disp: 3 inhaler, Rfl: 3  •  atorvastatin (LIPITOR) 40 MG tablet, TAKE ONE TABLET BY MOUTH ONCE DAILY AT BEDTIME, Disp: 90 tablet, Rfl: 2  •  Biotin 5000 MCG sublingual tablet, Take 1 tablet by mouth Daily., Disp: , Rfl:   •  bumetanide (BUMEX) 1 MG tablet, Take 1 mg by mouth Daily. 1 tab on M/W/F, all other days take 1.5 tab, Disp: , Rfl:   •  cholecalciferol (VITAMIN D3) 1000 UNITS tablet, Take 1,000 Units by mouth daily., Disp: , Rfl:   •  DULoxetine (CYMBALTA) 60 MG capsule, Take 60 mg by mouth Daily., Disp: , Rfl:   •  HYDROcodone-acetaminophen (NORCO) 5-325 MG per tablet, Take 1 tablet by mouth 4 (Four) Times a Day. 11/20 pm, Disp: , Rfl:   •  levothyroxine (SYNTHROID, LEVOTHROID) 100 MCG tablet, Take 100 mcg by mouth daily., Disp: , Rfl:   •  metoprolol tartrate (LOPRESSOR) 25 MG tablet, Take 1 tablet by mouth every 12 (twelve) hours., Disp: 180 tablet, Rfl: 3  •  montelukast (SINGULAIR) 10 MG tablet, Take 10 mg by mouth every night., Disp: , Rfl:   •  Probiotic Product (ALIGN) 4 MG capsule, Take 1 capsule by mouth 3 (Three) Times a Week., Disp: , Rfl:   •  spironolactone (ALDACTONE) 25 MG tablet, Take 25 mg by mouth Daily., Disp: , Rfl:   •  vitamin C (ASCORBIC ACID) 500 MG tablet, Take 500 mg by mouth Daily., Disp: , Rfl:     PHYSICAL EXAMINATION:   /65  Pulse 70  Temp 98.2 °F (36.8 °C) (Temporal Artery )   Resp 12  Ht 68\" (172.7 cm)  Wt 151 lb (68.5 kg)  LMP  (LMP Unknown)  BMI 22.96 kg/m2   ECOG Performance Status: 0 - Asymptomatic  General Appearance:  alert, cooperative, no apparent distress and appears stated age   Neurologic/Psychiatric: A&O x 3, gait steady, appropriate affect, strength 5/5 in all muscle groups   HEENT:  Normocephalic, without obvious abnormality, mucous membranes moist   Neck: Supple, symmetrical, trachea midline, no adenopathy;  No thyromegaly, masses, or tenderness   Lungs:   Clear to auscultation bilaterally; respirations regular, even, and " unlabored bilaterally   Heart:  Regular rate and rhythm, no murmurs appreciated   Abdomen:   Soft, non-tender, non-distended and no organomegaly   Lymph nodes: No cervical, supraclavicular, inguinal or axillary adenopathy noted   Extremities: Normal, atraumatic; no clubbing, cyanosis, or edema    Skin: No rashes, ulcers, or suspicious lesions noted     Lab on 08/14/2017   Component Date Value Ref Range Status   • WBC 08/14/2017 5.30  3.50 - 10.80 10*3/mm3 Final   • RBC 08/14/2017 4.37  3.89 - 5.14 10*6/mm3 Final   • Hemoglobin 08/14/2017 11.9  11.5 - 15.5 g/dL Final   • Hematocrit 08/14/2017 37.6  34.5 - 44.0 % Final   • RDW 08/14/2017 16.5* 11.3 - 14.5 % Final   • MCV 08/14/2017 86.1  80.0 - 99.0 fL Final   • MCH 08/14/2017 27.4  27.0 - 31.0 pg Final   • MCHC 08/14/2017 31.8* 32.0 - 36.0 g/dL Final   • MPV 08/14/2017 6.1  6.0 - 12.0 fL Final   • Platelets 08/14/2017 201  150 - 450 10*3/mm3 Final      Verified by repeat analysis.    • Neutrophil % 08/14/2017 54.9  41.0 - 71.0 % Final   • Lymphocyte % 08/14/2017 35.6  24.0 - 44.0 % Final   • Monocyte % 08/14/2017 9.5  0.0 - 12.0 % Final   • Neutrophils, Absolute 08/14/2017 2.90  1.50 - 8.30 10*3/mm3 Final   • Lymphocytes, Absolute 08/14/2017 1.90  0.60 - 4.80 10*3/mm3 Final   • Monocytes, Absolute 08/14/2017 0.50  0.00 - 1.00 10*3/mm3 Final        No results found.    ASSESSMENT: The patient is a very pleasant 77 y.o. female  With iron deficiency anemia.     PROBLEM LIST:   1. Iron deficiency anemia  2. 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  3. Status post acute GI bleeding  4. Repeat endoscopy with cauterization  5. Atrial fibrillation with previous Coumadin use- stopped 4/15/2017  6. Congestive heart failure with cardiac ascites  7. Hypothyroid  8.  Hypercholesterolemia  9.  Hypertension    PLAN:  1.  I did go over the blood results with the patient from today, I reassured her that her hemoglobin is back to normal, MCV is normal.  I would follow-up on the  results of the labs including iron profile.  2.  The patient follow-up with me in 6 month with repeated blood work.  3.  We'll continue Synthroid  4.  We'll continue Lipitor 40 mg daily  5.  We'll continue Lopressor 25 mg daily      Mable Neal MD  8/14/2017

## 2017-08-15 DIAGNOSIS — I48.19 PERSISTENT ATRIAL FIBRILLATION (HCC): Primary | ICD-10-CM

## 2017-08-15 DIAGNOSIS — I38 VALVULAR HEART DISEASE: ICD-10-CM

## 2017-08-31 ENCOUNTER — OFFICE VISIT (OUTPATIENT)
Dept: PULMONOLOGY | Facility: CLINIC | Age: 77
End: 2017-08-31

## 2017-08-31 ENCOUNTER — HOSPITAL ENCOUNTER (OUTPATIENT)
Dept: CARDIOLOGY | Facility: HOSPITAL | Age: 77
Discharge: HOME | End: 2017-08-31
Attending: INTERNAL MEDICINE | Admitting: INTERNAL MEDICINE

## 2017-08-31 VITALS
DIASTOLIC BLOOD PRESSURE: 70 MMHG | OXYGEN SATURATION: 95 % | TEMPERATURE: 97.7 F | HEIGHT: 68 IN | WEIGHT: 152.6 LBS | BODY MASS INDEX: 23.13 KG/M2 | SYSTOLIC BLOOD PRESSURE: 120 MMHG | RESPIRATION RATE: 14 BRPM | HEART RATE: 73 BPM

## 2017-08-31 VITALS — BODY MASS INDEX: 23.04 KG/M2 | WEIGHT: 152 LBS | HEIGHT: 68 IN

## 2017-08-31 DIAGNOSIS — J44.9 CHRONIC OBSTRUCTIVE PULMONARY DISEASE, UNSPECIFIED COPD TYPE (HCC): ICD-10-CM

## 2017-08-31 DIAGNOSIS — I48.19 PERSISTENT ATRIAL FIBRILLATION (HCC): ICD-10-CM

## 2017-08-31 DIAGNOSIS — G47.33 OSA TREATED WITH BIPAP: ICD-10-CM

## 2017-08-31 DIAGNOSIS — IMO0002 SECONDARY PULMONARY HYPERTENSION: Primary | ICD-10-CM

## 2017-08-31 DIAGNOSIS — R06.09 DYSPNEA ON EXERTION: ICD-10-CM

## 2017-08-31 DIAGNOSIS — I38 VALVULAR HEART DISEASE: ICD-10-CM

## 2017-08-31 PROCEDURE — 93306 TTE W/DOPPLER COMPLETE: CPT | Performed by: INTERNAL MEDICINE

## 2017-08-31 PROCEDURE — 93306 TTE W/DOPPLER COMPLETE: CPT

## 2017-08-31 PROCEDURE — 99214 OFFICE O/P EST MOD 30 MIN: CPT | Performed by: INTERNAL MEDICINE

## 2017-09-01 LAB
BH CV ECHO MEAS - AI DEC SLOPE: 230.5 CM/SEC^2
BH CV ECHO MEAS - AI MAX PG: 73.2 MMHG
BH CV ECHO MEAS - AI MAX VEL: 427.7 CM/SEC
BH CV ECHO MEAS - AI P1/2T: 543.6 MSEC
BH CV ECHO MEAS - AO MAX PG (FULL): 6.3 MMHG
BH CV ECHO MEAS - AO MAX PG: 8.4 MMHG
BH CV ECHO MEAS - AO ROOT AREA (BSA CORRECTED): 1.7
BH CV ECHO MEAS - AO ROOT AREA: 7.1 CM^2
BH CV ECHO MEAS - AO ROOT DIAM: 3 CM
BH CV ECHO MEAS - AO V2 MAX: 144.8 CM/SEC
BH CV ECHO MEAS - AVA(V,A): 1.6 CM^2
BH CV ECHO MEAS - AVA(V,D): 1.6 CM^2
BH CV ECHO MEAS - BSA(HAYCOCK): 1.8 M^2
BH CV ECHO MEAS - BSA: 1.8 M^2
BH CV ECHO MEAS - BZI_BMI: 23.1 KILOGRAMS/M^2
BH CV ECHO MEAS - BZI_METRIC_HEIGHT: 172.7 CM
BH CV ECHO MEAS - BZI_METRIC_WEIGHT: 68.9 KG
BH CV ECHO MEAS - EDV(CUBED): 60.4 ML
BH CV ECHO MEAS - EDV(TEICH): 66.9 ML
BH CV ECHO MEAS - EF(CUBED): 59.7 %
BH CV ECHO MEAS - EF(TEICH): 51.9 %
BH CV ECHO MEAS - ESV(CUBED): 24.3 ML
BH CV ECHO MEAS - ESV(TEICH): 32.1 ML
BH CV ECHO MEAS - FS: 26.2 %
BH CV ECHO MEAS - IVS/LVPW: 0.66
BH CV ECHO MEAS - IVSD: 1.3 CM
BH CV ECHO MEAS - LA DIMENSION: 2.7 CM
BH CV ECHO MEAS - LA/AO: 0.91
BH CV ECHO MEAS - LAT PEAK E' VEL: 12.5 CM/SEC
BH CV ECHO MEAS - LV MASS(C)D: 138.4 GRAMS
BH CV ECHO MEAS - LV MASS(C)DI: 76.1 GRAMS/M^2
BH CV ECHO MEAS - LV MAX PG: 2.1 MMHG
BH CV ECHO MEAS - LV MEAN PG: 1.4 MMHG
BH CV ECHO MEAS - LV V1 MAX: 72.7 CM/SEC
BH CV ECHO MEAS - LV V1 MEAN: 56.7 CM/SEC
BH CV ECHO MEAS - LV V1 VTI: 14.2 CM
BH CV ECHO MEAS - LVIDD: 3.9 CM
BH CV ECHO MEAS - LVIDS: 2.9 CM
BH CV ECHO MEAS - LVOT AREA (M): 3.1 CM^2
BH CV ECHO MEAS - LVOT AREA: 3.2 CM^2
BH CV ECHO MEAS - LVOT DIAM: 2 CM
BH CV ECHO MEAS - LVPWD: 1.3 CM
BH CV ECHO MEAS - MED PEAK E' VEL: 6.5 CM/SEC
BH CV ECHO MEAS - MV E MAX VEL: 94 CM/SEC
BH CV ECHO MEAS - PA ACC SLOPE: 715.9 CM/SEC^2
BH CV ECHO MEAS - PA ACC TIME: 0.1 SEC
BH CV ECHO MEAS - PA MAX PG: 5.1 MMHG
BH CV ECHO MEAS - PA PR(ACCEL): 34.6 MMHG
BH CV ECHO MEAS - PA V2 MAX: 112.5 CM/SEC
BH CV ECHO MEAS - PI END-D VEL: 180.3 CM/SEC
BH CV ECHO MEAS - RVDD: 3.4 CM
BH CV ECHO MEAS - SI(CUBED): 19.8 ML/M^2
BH CV ECHO MEAS - SI(LVOT): 25.1 ML/M^2
BH CV ECHO MEAS - SI(TEICH): 19.1 ML/M^2
BH CV ECHO MEAS - SV(CUBED): 36.1 ML
BH CV ECHO MEAS - SV(LVOT): 45.7 ML
BH CV ECHO MEAS - SV(TEICH): 34.7 ML
BH CV ECHO MEAS - TAPSE (>1.6): 1.3 CM2
BH CV ECHO MEAS - TR MAX VEL: 250.4 CM/SEC
BH CV XLRA - RV BASE: 3.6 CM
BH CV XLRA - RV LENGTH: 7 CM
BH CV XLRA - RV MID: 3 CM
BH CV XLRA - TDI S': 8.9 CM/SEC
E/E' RATIO: 10.5
LEFT ATRIUM VOLUME INDEX: 30 ML/M2
LEFT ATRIUM VOLUME: 56 CM3
LV EF 2D ECHO EST: 56 %

## 2017-09-06 ENCOUNTER — PREP FOR SURGERY (OUTPATIENT)
Dept: OTHER | Facility: HOSPITAL | Age: 77
End: 2017-09-06

## 2017-09-06 DIAGNOSIS — I48.19 PERSISTENT ATRIAL FIBRILLATION (HCC): Primary | ICD-10-CM

## 2017-09-06 RX ORDER — NITROGLYCERIN 0.4 MG/1
0.4 TABLET SUBLINGUAL
Status: CANCELLED | OUTPATIENT
Start: 2017-09-06

## 2017-09-06 RX ORDER — SODIUM CHLORIDE 0.9 % (FLUSH) 0.9 %
1-10 SYRINGE (ML) INJECTION AS NEEDED
Status: CANCELLED | OUTPATIENT
Start: 2017-09-06

## 2017-09-06 RX ORDER — ACETAMINOPHEN 325 MG/1
650 TABLET ORAL EVERY 4 HOURS PRN
Status: CANCELLED | OUTPATIENT
Start: 2017-09-06

## 2017-09-06 RX ORDER — CEFAZOLIN SODIUM 2 G/100ML
2 INJECTION, SOLUTION INTRAVENOUS
Status: CANCELLED | OUTPATIENT
Start: 2017-09-07 | End: 2017-09-08

## 2017-09-06 RX ORDER — ONDANSETRON 2 MG/ML
4 INJECTION INTRAMUSCULAR; INTRAVENOUS EVERY 6 HOURS PRN
Status: CANCELLED | OUTPATIENT
Start: 2017-09-06

## 2017-09-06 RX ORDER — PROMETHAZINE HYDROCHLORIDE 25 MG/ML
12.5 INJECTION, SOLUTION INTRAMUSCULAR; INTRAVENOUS EVERY 4 HOURS PRN
Status: CANCELLED | OUTPATIENT
Start: 2017-09-06

## 2017-09-18 ENCOUNTER — LAB (OUTPATIENT)
Dept: PULMONOLOGY | Facility: CLINIC | Age: 77
End: 2017-09-18

## 2017-09-18 DIAGNOSIS — IMO0002 SECONDARY PULMONARY HYPERTENSION: Primary | ICD-10-CM

## 2017-09-18 DIAGNOSIS — J43.9 PULMONARY EMPHYSEMA, UNSPECIFIED EMPHYSEMA TYPE (HCC): ICD-10-CM

## 2017-09-18 LAB
ANION GAP SERPL CALCULATED.3IONS-SCNC: 10 MMOL/L (ref 3–11)
BUN BLD-MCNC: 51 MG/DL (ref 9–23)
BUN/CREAT SERPL: 25.5 (ref 7–25)
CALCIUM SPEC-SCNC: 10.4 MG/DL (ref 8.7–10.4)
CHLORIDE SERPL-SCNC: 99 MMOL/L (ref 99–109)
CO2 SERPL-SCNC: 30 MMOL/L (ref 20–31)
CREAT BLD-MCNC: 2 MG/DL (ref 0.6–1.3)
GFR SERPL CREATININE-BSD FRML MDRD: 24 ML/MIN/1.73
GLUCOSE BLD-MCNC: 274 MG/DL (ref 70–100)
POTASSIUM BLD-SCNC: 4.7 MMOL/L (ref 3.5–5.5)
SODIUM BLD-SCNC: 139 MMOL/L (ref 132–146)

## 2017-09-18 PROCEDURE — 36415 COLL VENOUS BLD VENIPUNCTURE: CPT | Performed by: INTERNAL MEDICINE

## 2017-09-18 PROCEDURE — 80048 BASIC METABOLIC PNL TOTAL CA: CPT | Performed by: INTERNAL MEDICINE

## 2017-09-22 ENCOUNTER — TELEPHONE (OUTPATIENT)
Dept: CARDIOLOGY | Facility: CLINIC | Age: 77
End: 2017-09-22

## 2017-09-22 NOTE — TELEPHONE ENCOUNTER
Spoke with Mrs. Alegre about Watchman.  She verbalized understanding of everything.  She will start ASA 81mg on Monday.

## 2017-09-25 ENCOUNTER — APPOINTMENT (OUTPATIENT)
Dept: PREADMISSION TESTING | Facility: HOSPITAL | Age: 77
End: 2017-09-25

## 2017-09-25 DIAGNOSIS — N18.9 CKD (CHRONIC KIDNEY DISEASE), UNSPECIFIED STAGE: Primary | ICD-10-CM

## 2017-09-25 DIAGNOSIS — E11.9 TYPE 2 DIABETES MELLITUS WITHOUT COMPLICATION, WITHOUT LONG-TERM CURRENT USE OF INSULIN (HCC): ICD-10-CM

## 2017-09-25 DIAGNOSIS — I48.19 PERSISTENT ATRIAL FIBRILLATION (HCC): ICD-10-CM

## 2017-09-25 LAB
ANION GAP SERPL CALCULATED.3IONS-SCNC: 11 MMOL/L (ref 3–11)
BUN BLD-MCNC: 45 MG/DL (ref 9–23)
BUN/CREAT SERPL: 25 (ref 7–25)
CALCIUM SPEC-SCNC: 10.1 MG/DL (ref 8.7–10.4)
CHLORIDE SERPL-SCNC: 101 MMOL/L (ref 99–109)
CO2 SERPL-SCNC: 28 MMOL/L (ref 20–31)
CREAT BLD-MCNC: 1.8 MG/DL (ref 0.6–1.3)
DEPRECATED RDW RBC AUTO: 45.2 FL (ref 37–54)
ERYTHROCYTE [DISTWIDTH] IN BLOOD BY AUTOMATED COUNT: 13.8 % (ref 11.3–14.5)
GFR SERPL CREATININE-BSD FRML MDRD: 27 ML/MIN/1.73
GLUCOSE BLD-MCNC: 178 MG/DL (ref 70–100)
HCT VFR BLD AUTO: 35.7 % (ref 34.5–44)
HGB BLD-MCNC: 12 G/DL (ref 11.5–15.5)
INR PPP: 1.01
MCH RBC QN AUTO: 30.2 PG (ref 27–31)
MCHC RBC AUTO-ENTMCNC: 33.6 G/DL (ref 32–36)
MCV RBC AUTO: 89.7 FL (ref 80–99)
PLATELET # BLD AUTO: 176 10*3/MM3 (ref 150–450)
PMV BLD AUTO: 8.7 FL (ref 6–12)
POTASSIUM BLD-SCNC: 4.9 MMOL/L (ref 3.5–5.5)
PROTHROMBIN TIME: 11 SECONDS (ref 9.6–11.5)
RBC # BLD AUTO: 3.98 10*6/MM3 (ref 3.89–5.14)
SODIUM BLD-SCNC: 140 MMOL/L (ref 132–146)
WBC NRBC COR # BLD: 6.97 10*3/MM3 (ref 3.5–10.8)

## 2017-09-25 RX ORDER — SPIRONOLACTONE 25 MG/1
25 TABLET ORAL DAILY
COMMUNITY
End: 2020-01-01 | Stop reason: HOSPADM

## 2017-09-25 RX ORDER — MELATONIN
5000 DAILY
COMMUNITY
End: 2018-04-12

## 2017-09-25 RX ORDER — ANTIARTHRITIC COMBINATION NO.2 900 MG
1 TABLET ORAL DAILY
COMMUNITY
End: 2018-04-12

## 2017-09-25 RX ORDER — BUMETANIDE 1 MG/1
1 TABLET ORAL DAILY
COMMUNITY
End: 2020-01-01 | Stop reason: HOSPADM

## 2017-09-25 RX ORDER — KETOTIFEN FUMARATE 0.35 MG/ML
1 SOLUTION/ DROPS OPHTHALMIC 2 TIMES DAILY PRN
COMMUNITY
End: 2020-01-01 | Stop reason: HOSPADM

## 2017-09-25 RX ORDER — MONTELUKAST SODIUM 10 MG/1
10 TABLET ORAL DAILY
COMMUNITY
End: 2020-01-01 | Stop reason: SDUPTHER

## 2017-09-25 RX ORDER — ASCORBIC ACID 500 MG
500 TABLET ORAL DAILY
Status: ON HOLD | COMMUNITY
End: 2020-01-01

## 2017-09-25 RX ORDER — FLUOCINONIDE TOPICAL SOLUTION USP, 0.05% 0.5 MG/ML
1 SOLUTION TOPICAL 2 TIMES DAILY
COMMUNITY
End: 2018-01-03

## 2017-09-25 RX ORDER — LEVOTHYROXINE SODIUM 0.1 MG/1
75 TABLET ORAL DAILY
COMMUNITY
End: 2020-01-01 | Stop reason: CLARIF

## 2017-09-25 RX ORDER — DULOXETIN HYDROCHLORIDE 60 MG/1
60 CAPSULE, DELAYED RELEASE ORAL DAILY
COMMUNITY
End: 2020-01-01 | Stop reason: SDUPTHER

## 2017-09-25 RX ORDER — HYDROCODONE BITARTRATE AND ACETAMINOPHEN 5; 325 MG/1; MG/1
1 TABLET ORAL EVERY 6 HOURS PRN
COMMUNITY
End: 2020-01-01 | Stop reason: SDUPTHER

## 2017-09-25 RX ORDER — ATORVASTATIN CALCIUM 40 MG/1
40 TABLET, FILM COATED ORAL NIGHTLY
COMMUNITY
End: 2018-04-12 | Stop reason: SDUPTHER

## 2017-09-25 RX ORDER — ALBUTEROL SULFATE 90 UG/1
2 AEROSOL, METERED RESPIRATORY (INHALATION) EVERY 4 HOURS PRN
COMMUNITY
End: 2018-02-16 | Stop reason: SDUPTHER

## 2017-09-25 RX ORDER — DOCUSATE SODIUM 100 MG/1
100 CAPSULE, LIQUID FILLED ORAL DAILY
COMMUNITY
End: 2018-04-12

## 2017-09-26 ENCOUNTER — HOSPITAL ENCOUNTER (OUTPATIENT)
Dept: CARDIOLOGY | Facility: HOSPITAL | Age: 77
Setting detail: HOSPITAL OUTPATIENT SURGERY
Discharge: HOME | End: 2017-09-26
Attending: INTERNAL MEDICINE

## 2017-09-26 ENCOUNTER — HOSPITAL ENCOUNTER (INPATIENT)
Facility: HOSPITAL | Age: 77
LOS: 1 days | Discharge: HOME | End: 2017-09-27
Attending: INTERNAL MEDICINE | Admitting: INTERNAL MEDICINE

## 2017-09-26 DIAGNOSIS — I48.19 ATRIAL FIBRILLATION, PERSISTENT (HCC): ICD-10-CM

## 2017-09-26 LAB
GLUCOSE BLDC GLUCOMTR-MCNC: 171 MG/DL (ref 70–130)
GLUCOSE BLDC GLUCOMTR-MCNC: 171 MG/DL (ref 70–130)
GLUCOSE BLDC GLUCOMTR-MCNC: 186 MG/DL (ref 70–130)
HBA1C MFR BLD: 7.4 % (ref 4.8–5.6)
PROT UR-MCNC: 10 MG/DL (ref 1–14)

## 2017-09-26 PROCEDURE — 85347 COAGULATION TIME ACTIVATED: CPT

## 2017-09-26 PROCEDURE — 25010000002 PROTAMINE SULFATE PER 10 MG: Performed by: INTERNAL MEDICINE

## 2017-09-26 PROCEDURE — 25010000003 CEFAZOLIN IN DEXTROSE 2-4 GM/100ML-% SOLUTION: Performed by: PHYSICIAN ASSISTANT

## 2017-09-26 PROCEDURE — 02L73DK OCCLUSION OF LEFT ATRIAL APPENDAGE WITH INTRALUMINAL DEVICE, PERCUTANEOUS APPROACH: ICD-10-PCS | Performed by: INTERNAL MEDICINE

## 2017-09-26 PROCEDURE — 93662 INTRACARDIAC ECG (ICE): CPT

## 2017-09-26 PROCEDURE — B245ZZ3 ULTRASONOGRAPHY OF LEFT HEART, INTRAVASCULAR: ICD-10-PCS | Performed by: INTERNAL MEDICINE

## 2017-09-26 PROCEDURE — 93355 ECHO TRANSESOPHAGEAL (TEE): CPT | Performed by: INTERNAL MEDICINE

## 2017-09-26 PROCEDURE — C1894 INTRO/SHEATH, NON-LASER: HCPCS | Performed by: INTERNAL MEDICINE

## 2017-09-26 PROCEDURE — 33340 PERQ CLSR TCAT L ATR APNDGE: CPT | Performed by: INTERNAL MEDICINE

## 2017-09-26 PROCEDURE — 25010000002 FENTANYL CITRATE (PF) 100 MCG/2ML SOLUTION: Performed by: INTERNAL MEDICINE

## 2017-09-26 PROCEDURE — 36415 COLL VENOUS BLD VENIPUNCTURE: CPT

## 2017-09-26 PROCEDURE — 25010000002 HEPARIN (PORCINE) PER 1000 UNITS: Performed by: INTERNAL MEDICINE

## 2017-09-26 PROCEDURE — 83036 HEMOGLOBIN GLYCOSYLATED A1C: CPT | Performed by: PHYSICIAN ASSISTANT

## 2017-09-26 PROCEDURE — C1759 CATH, INTRA ECHOCARDIOGRAPHY: HCPCS | Performed by: INTERNAL MEDICINE

## 2017-09-26 PROCEDURE — C1769 GUIDE WIRE: HCPCS | Performed by: INTERNAL MEDICINE

## 2017-09-26 PROCEDURE — 93355 ECHO TRANSESOPHAGEAL (TEE): CPT

## 2017-09-26 PROCEDURE — 82962 GLUCOSE BLOOD TEST: CPT

## 2017-09-26 PROCEDURE — 84156 ASSAY OF PROTEIN URINE: CPT | Performed by: INTERNAL MEDICINE

## 2017-09-26 PROCEDURE — 25010000002 ONDANSETRON PER 1 MG: Performed by: INTERNAL MEDICINE

## 2017-09-26 PROCEDURE — 0 IOPAMIDOL PER 1 ML: Performed by: INTERNAL MEDICINE

## 2017-09-26 PROCEDURE — 25010000002 MIDAZOLAM PER 1 MG: Performed by: INTERNAL MEDICINE

## 2017-09-26 PROCEDURE — B24BZZ4 ULTRASONOGRAPHY OF HEART WITH AORTA, TRANSESOPHAGEAL: ICD-10-PCS | Performed by: INTERNAL MEDICINE

## 2017-09-26 PROCEDURE — C1893 INTRO/SHEATH, FIXED,NON-PEEL: HCPCS | Performed by: INTERNAL MEDICINE

## 2017-09-26 RX ORDER — DOCUSATE SODIUM 100 MG/1
100 CAPSULE, LIQUID FILLED ORAL DAILY
Status: DISCONTINUED | OUTPATIENT
Start: 2017-09-26 | End: 2017-09-27 | Stop reason: HOSPADM

## 2017-09-26 RX ORDER — CLOPIDOGREL BISULFATE 75 MG/1
75 TABLET ORAL DAILY
Status: DISCONTINUED | OUTPATIENT
Start: 2017-09-26 | End: 2017-09-27 | Stop reason: HOSPADM

## 2017-09-26 RX ORDER — ONDANSETRON 2 MG/ML
INJECTION INTRAMUSCULAR; INTRAVENOUS AS NEEDED
Status: DISCONTINUED | OUTPATIENT
Start: 2017-09-26 | End: 2017-09-26 | Stop reason: HOSPADM

## 2017-09-26 RX ORDER — PROMETHAZINE HYDROCHLORIDE 25 MG/ML
12.5 INJECTION, SOLUTION INTRAMUSCULAR; INTRAVENOUS EVERY 4 HOURS PRN
Status: DISCONTINUED | OUTPATIENT
Start: 2017-09-26 | End: 2017-09-26 | Stop reason: HOSPADM

## 2017-09-26 RX ORDER — ACETAMINOPHEN 325 MG/1
650 TABLET ORAL EVERY 4 HOURS PRN
Status: DISCONTINUED | OUTPATIENT
Start: 2017-09-26 | End: 2017-09-27 | Stop reason: HOSPADM

## 2017-09-26 RX ORDER — ONDANSETRON 2 MG/ML
4 INJECTION INTRAMUSCULAR; INTRAVENOUS EVERY 6 HOURS PRN
Status: DISCONTINUED | OUTPATIENT
Start: 2017-09-26 | End: 2017-09-27 | Stop reason: HOSPADM

## 2017-09-26 RX ORDER — ONDANSETRON 2 MG/ML
4 INJECTION INTRAMUSCULAR; INTRAVENOUS EVERY 6 HOURS PRN
Status: DISCONTINUED | OUTPATIENT
Start: 2017-09-26 | End: 2017-09-26 | Stop reason: HOSPADM

## 2017-09-26 RX ORDER — ATORVASTATIN CALCIUM 40 MG/1
40 TABLET, FILM COATED ORAL NIGHTLY
Status: DISCONTINUED | OUTPATIENT
Start: 2017-09-26 | End: 2017-09-27 | Stop reason: HOSPADM

## 2017-09-26 RX ORDER — SODIUM CHLORIDE 9 MG/ML
INJECTION, SOLUTION INTRAVENOUS CONTINUOUS PRN
Status: DISCONTINUED | OUTPATIENT
Start: 2017-09-26 | End: 2017-09-26 | Stop reason: HOSPADM

## 2017-09-26 RX ORDER — ASPIRIN 81 MG/1
81 TABLET, CHEWABLE ORAL DAILY
Qty: 30 TABLET | Refills: 11 | Status: CANCELLED | OUTPATIENT
Start: 2017-09-26

## 2017-09-26 RX ORDER — DULOXETIN HYDROCHLORIDE 60 MG/1
60 CAPSULE, DELAYED RELEASE ORAL DAILY
Status: DISCONTINUED | OUTPATIENT
Start: 2017-09-27 | End: 2017-09-27 | Stop reason: HOSPADM

## 2017-09-26 RX ORDER — CEFAZOLIN SODIUM 2 G/100ML
2 INJECTION, SOLUTION INTRAVENOUS
Status: COMPLETED | OUTPATIENT
Start: 2017-09-26 | End: 2017-09-26

## 2017-09-26 RX ORDER — HEPARIN SODIUM 1000 [USP'U]/ML
INJECTION, SOLUTION INTRAVENOUS; SUBCUTANEOUS AS NEEDED
Status: DISCONTINUED | OUTPATIENT
Start: 2017-09-26 | End: 2017-09-26 | Stop reason: HOSPADM

## 2017-09-26 RX ORDER — MIDAZOLAM HYDROCHLORIDE 1 MG/ML
INJECTION INTRAMUSCULAR; INTRAVENOUS AS NEEDED
Status: DISCONTINUED | OUTPATIENT
Start: 2017-09-26 | End: 2017-09-26 | Stop reason: HOSPADM

## 2017-09-26 RX ORDER — ACETAMINOPHEN 325 MG/1
650 TABLET ORAL EVERY 4 HOURS PRN
Status: DISCONTINUED | OUTPATIENT
Start: 2017-09-26 | End: 2017-09-26 | Stop reason: HOSPADM

## 2017-09-26 RX ORDER — LIDOCAINE HYDROCHLORIDE 10 MG/ML
INJECTION, SOLUTION INFILTRATION; PERINEURAL AS NEEDED
Status: DISCONTINUED | OUTPATIENT
Start: 2017-09-26 | End: 2017-09-26 | Stop reason: HOSPADM

## 2017-09-26 RX ORDER — NITROGLYCERIN 0.4 MG/1
0.4 TABLET SUBLINGUAL
Status: DISCONTINUED | OUTPATIENT
Start: 2017-09-26 | End: 2017-09-26 | Stop reason: HOSPADM

## 2017-09-26 RX ORDER — LEVOTHYROXINE SODIUM 0.1 MG/1
100 TABLET ORAL
Status: DISCONTINUED | OUTPATIENT
Start: 2017-09-27 | End: 2017-09-27 | Stop reason: HOSPADM

## 2017-09-26 RX ORDER — HYDROCODONE BITARTRATE AND ACETAMINOPHEN 5; 325 MG/1; MG/1
1 TABLET ORAL EVERY 4 HOURS PRN
Status: DISCONTINUED | OUTPATIENT
Start: 2017-09-26 | End: 2017-09-27 | Stop reason: HOSPADM

## 2017-09-26 RX ORDER — SODIUM CHLORIDE 0.9 % (FLUSH) 0.9 %
1-10 SYRINGE (ML) INJECTION AS NEEDED
Status: DISCONTINUED | OUTPATIENT
Start: 2017-09-26 | End: 2017-09-26 | Stop reason: HOSPADM

## 2017-09-26 RX ORDER — ASPIRIN 81 MG/1
81 TABLET, CHEWABLE ORAL DAILY
Status: DISCONTINUED | OUTPATIENT
Start: 2017-09-26 | End: 2017-09-27 | Stop reason: HOSPADM

## 2017-09-26 RX ORDER — CLOPIDOGREL BISULFATE 75 MG/1
75 TABLET ORAL DAILY
Qty: 30 TABLET | Refills: 6 | Status: CANCELLED | OUTPATIENT
Start: 2017-09-26

## 2017-09-26 RX ORDER — PROTAMINE SULFATE 10 MG/ML
INJECTION, SOLUTION INTRAVENOUS AS NEEDED
Status: DISCONTINUED | OUTPATIENT
Start: 2017-09-26 | End: 2017-09-26 | Stop reason: HOSPADM

## 2017-09-26 RX ORDER — MONTELUKAST SODIUM 10 MG/1
10 TABLET ORAL DAILY
Status: DISCONTINUED | OUTPATIENT
Start: 2017-09-27 | End: 2017-09-27 | Stop reason: HOSPADM

## 2017-09-26 RX ORDER — FENTANYL CITRATE 50 UG/ML
INJECTION, SOLUTION INTRAMUSCULAR; INTRAVENOUS AS NEEDED
Status: DISCONTINUED | OUTPATIENT
Start: 2017-09-26 | End: 2017-09-26 | Stop reason: HOSPADM

## 2017-09-26 RX ADMIN — ASPIRIN 81 MG 81 MG: 81 TABLET ORAL at 17:18

## 2017-09-26 RX ADMIN — CLOPIDOGREL BISULFATE 75 MG: 75 TABLET ORAL at 17:18

## 2017-09-26 RX ADMIN — ATORVASTATIN CALCIUM 40 MG: 40 TABLET, FILM COATED ORAL at 20:44

## 2017-09-26 RX ADMIN — METOPROLOL TARTRATE 25 MG: 25 TABLET ORAL at 20:44

## 2017-09-26 RX ADMIN — CEFAZOLIN SODIUM 2 G: 2 INJECTION, SOLUTION INTRAVENOUS at 09:35

## 2017-09-26 RX ADMIN — DOCUSATE SODIUM 100 MG: 100 CAPSULE, LIQUID FILLED ORAL at 17:18

## 2017-09-27 VITALS
RESPIRATION RATE: 18 BRPM | DIASTOLIC BLOOD PRESSURE: 32 MMHG | WEIGHT: 157.19 LBS | SYSTOLIC BLOOD PRESSURE: 101 MMHG | BODY MASS INDEX: 23.82 KG/M2 | OXYGEN SATURATION: 97 % | HEIGHT: 68 IN | HEART RATE: 80 BPM | TEMPERATURE: 98 F

## 2017-09-27 LAB
ACT BLD: 136 SECONDS (ref 82–152)
ACT BLD: 252 SECONDS (ref 82–152)
ACT BLD: 274 SECONDS (ref 82–152)
ANION GAP SERPL CALCULATED.3IONS-SCNC: 5 MMOL/L (ref 3–11)
BH CV ECHO MEAS - RVSP: 42 MMHG
BUN BLD-MCNC: 36 MG/DL (ref 9–23)
BUN/CREAT SERPL: 20 (ref 7–25)
CALCIUM SPEC-SCNC: 9.7 MG/DL (ref 8.7–10.4)
CHLORIDE SERPL-SCNC: 106 MMOL/L (ref 99–109)
CO2 SERPL-SCNC: 27 MMOL/L (ref 20–31)
CREAT BLD-MCNC: 1.8 MG/DL (ref 0.6–1.3)
GFR SERPL CREATININE-BSD FRML MDRD: 27 ML/MIN/1.73
GLUCOSE BLD-MCNC: 140 MG/DL (ref 70–100)
GLUCOSE BLDC GLUCOMTR-MCNC: 155 MG/DL (ref 70–130)
LV EF 2D ECHO EST: 60 %
POTASSIUM BLD-SCNC: 4.7 MMOL/L (ref 3.5–5.5)
SODIUM BLD-SCNC: 138 MMOL/L (ref 132–146)

## 2017-09-27 PROCEDURE — 82962 GLUCOSE BLOOD TEST: CPT

## 2017-09-27 PROCEDURE — 99232 SBSQ HOSP IP/OBS MODERATE 35: CPT | Performed by: INTERNAL MEDICINE

## 2017-09-27 PROCEDURE — 80048 BASIC METABOLIC PNL TOTAL CA: CPT | Performed by: INTERNAL MEDICINE

## 2017-09-27 RX ORDER — ASPIRIN 81 MG/1
81 TABLET, CHEWABLE ORAL DAILY
Qty: 30 TABLET | Refills: 6 | Status: SHIPPED | OUTPATIENT
Start: 2017-09-27

## 2017-09-27 RX ORDER — CLOPIDOGREL BISULFATE 75 MG/1
75 TABLET ORAL DAILY
Qty: 30 TABLET | Refills: 6 | Status: SHIPPED | OUTPATIENT
Start: 2017-09-27 | End: 2018-01-04

## 2017-09-27 RX ADMIN — MONTELUKAST SODIUM 10 MG: 10 TABLET, FILM COATED ORAL at 08:50

## 2017-09-27 RX ADMIN — CLOPIDOGREL BISULFATE 75 MG: 75 TABLET ORAL at 08:50

## 2017-09-27 RX ADMIN — ASPIRIN 81 MG 81 MG: 81 TABLET ORAL at 08:50

## 2017-09-27 RX ADMIN — DOCUSATE SODIUM 100 MG: 100 CAPSULE, LIQUID FILLED ORAL at 08:50

## 2017-09-27 RX ADMIN — LEVOTHYROXINE SODIUM 100 MCG: 100 TABLET ORAL at 06:29

## 2017-09-27 RX ADMIN — DULOXETINE HYDROCHLORIDE 60 MG: 60 CAPSULE, DELAYED RELEASE ORAL at 08:50

## 2017-09-27 RX ADMIN — HYDROCODONE BITARTRATE AND ACETAMINOPHEN 1 TABLET: 5; 325 TABLET ORAL at 07:59

## 2017-10-10 ENCOUNTER — LAB (OUTPATIENT)
Dept: LAB | Facility: HOSPITAL | Age: 77
End: 2017-10-10

## 2017-10-10 ENCOUNTER — TRANSCRIBE ORDERS (OUTPATIENT)
Dept: LAB | Facility: HOSPITAL | Age: 77
End: 2017-10-10

## 2017-10-10 DIAGNOSIS — I13.10 BENIGN HYPERTENSIVE HEART AND RENAL DISEASE: ICD-10-CM

## 2017-10-10 DIAGNOSIS — E55.9 AVITAMINOSIS D: Primary | ICD-10-CM

## 2017-10-10 DIAGNOSIS — E55.9 AVITAMINOSIS D: ICD-10-CM

## 2017-10-10 LAB
25(OH)D3 SERPL-MCNC: 29.7 NG/ML
ALBUMIN SERPL-MCNC: 4.2 G/DL (ref 3.2–4.8)
ANION GAP SERPL CALCULATED.3IONS-SCNC: 5 MMOL/L (ref 3–11)
BASOPHILS # BLD AUTO: 0.08 10*3/MM3 (ref 0–0.2)
BASOPHILS NFR BLD AUTO: 1.1 % (ref 0–1)
BILIRUB UR QL STRIP: NEGATIVE
BUN BLD-MCNC: 45 MG/DL (ref 9–23)
BUN/CREAT SERPL: 28.1 (ref 7–25)
CALCIUM SPEC-SCNC: 9.8 MG/DL (ref 8.7–10.4)
CHLORIDE SERPL-SCNC: 106 MMOL/L (ref 99–109)
CLARITY UR: CLEAR
CO2 SERPL-SCNC: 28 MMOL/L (ref 20–31)
COLOR UR: YELLOW
CREAT BLD-MCNC: 1.6 MG/DL (ref 0.6–1.3)
DEPRECATED RDW RBC AUTO: 46.9 FL (ref 37–54)
EOSINOPHIL # BLD AUTO: 0.39 10*3/MM3 (ref 0–0.3)
EOSINOPHIL NFR BLD AUTO: 5.4 % (ref 0–3)
ERYTHROCYTE [DISTWIDTH] IN BLOOD BY AUTOMATED COUNT: 14.1 % (ref 11.3–14.5)
FERRITIN SERPL-MCNC: 95 NG/ML (ref 10–291)
GFR SERPL CREATININE-BSD FRML MDRD: 31 ML/MIN/1.73
GLUCOSE BLD-MCNC: 161 MG/DL (ref 70–100)
GLUCOSE UR STRIP-MCNC: NEGATIVE MG/DL
HCT VFR BLD AUTO: 33.6 % (ref 34.5–44)
HGB BLD-MCNC: 11 G/DL (ref 11.5–15.5)
HGB UR QL STRIP.AUTO: NEGATIVE
IMM GRANULOCYTES # BLD: 0.04 10*3/MM3 (ref 0–0.03)
IMM GRANULOCYTES NFR BLD: 0.6 % (ref 0–0.6)
IRON 24H UR-MRATE: 78 MCG/DL (ref 50–175)
IRON SATN MFR SERPL: 27 % (ref 15–50)
KETONES UR QL STRIP: NEGATIVE
LEUKOCYTE ESTERASE UR QL STRIP.AUTO: NEGATIVE
LYMPHOCYTES # BLD AUTO: 2.84 10*3/MM3 (ref 0.6–4.8)
LYMPHOCYTES NFR BLD AUTO: 39.5 % (ref 24–44)
MCH RBC QN AUTO: 30.1 PG (ref 27–31)
MCHC RBC AUTO-ENTMCNC: 32.7 G/DL (ref 32–36)
MCV RBC AUTO: 91.8 FL (ref 80–99)
MONOCYTES # BLD AUTO: 0.55 10*3/MM3 (ref 0–1)
MONOCYTES NFR BLD AUTO: 7.6 % (ref 0–12)
NEUTROPHILS # BLD AUTO: 3.29 10*3/MM3 (ref 1.5–8.3)
NEUTROPHILS NFR BLD AUTO: 45.8 % (ref 41–71)
NITRITE UR QL STRIP: NEGATIVE
PH UR STRIP.AUTO: 6.5 [PH] (ref 5–8)
PHOSPHATE SERPL-MCNC: 3.5 MG/DL (ref 2.4–5.1)
PLATELET # BLD AUTO: 204 10*3/MM3 (ref 150–450)
PMV BLD AUTO: 9.2 FL (ref 6–12)
POTASSIUM BLD-SCNC: 5.3 MMOL/L (ref 3.5–5.5)
PROT UR QL STRIP: NEGATIVE
RBC # BLD AUTO: 3.66 10*6/MM3 (ref 3.89–5.14)
SODIUM BLD-SCNC: 139 MMOL/L (ref 132–146)
SP GR UR STRIP: 1.01 (ref 1–1.03)
TIBC SERPL-MCNC: 284 MCG/DL (ref 250–450)
UROBILINOGEN UR QL STRIP: NORMAL
WBC NRBC COR # BLD: 7.19 10*3/MM3 (ref 3.5–10.8)

## 2017-10-10 PROCEDURE — 83970 ASSAY OF PARATHORMONE: CPT | Performed by: INTERNAL MEDICINE

## 2017-10-10 PROCEDURE — 85025 COMPLETE CBC W/AUTO DIFF WBC: CPT | Performed by: INTERNAL MEDICINE

## 2017-10-10 PROCEDURE — 36415 COLL VENOUS BLD VENIPUNCTURE: CPT

## 2017-10-10 PROCEDURE — 82728 ASSAY OF FERRITIN: CPT | Performed by: INTERNAL MEDICINE

## 2017-10-10 PROCEDURE — 83540 ASSAY OF IRON: CPT | Performed by: INTERNAL MEDICINE

## 2017-10-10 PROCEDURE — 82306 VITAMIN D 25 HYDROXY: CPT | Performed by: INTERNAL MEDICINE

## 2017-10-10 PROCEDURE — 80069 RENAL FUNCTION PANEL: CPT | Performed by: INTERNAL MEDICINE

## 2017-10-10 PROCEDURE — 81003 URINALYSIS AUTO W/O SCOPE: CPT | Performed by: INTERNAL MEDICINE

## 2017-10-10 PROCEDURE — 83550 IRON BINDING TEST: CPT | Performed by: INTERNAL MEDICINE

## 2017-10-12 LAB — PTH-INTACT SERPL-MCNC: 71 PG/ML (ref 15–65)

## 2017-10-12 NOTE — DISCHARGE SUMMARY
9/26/2017 9/27/2017    Percy Moreland MD  [unfilled]    [facility] MD [place]  [address]  [place] [address]        Discharge Problem List:  Problem list:    1. Atrial fibrillation:  a. Newly diagnosed atrial fibrillation with controlled rate of unknown duration, [facility] ER presentation, 06/21/2015.   b. Recent history of echocardiogram, 05/18/2015, Dr. Dorantes, revealing normal LVEF. Mild AR. Mild MR. Moderate to severe TR.  c. EVITA/ECV, 06/24/2015. EF 55% to 60%. No thrombus. Mild to moderate mitral regurgitation. Moderate tricuspid regurgitation. RVSP 66 mmHg. External cardioversion with 2 joules to normal sinus rhythm.  d. Cardiac event monitor, 8/31/2016: placed for falls. 6.5 second pause.   e. PM implantation, 9/1/2016: The PM is a [employer] model L101 serial number [id number] generator, the atrial lead is a Guidant model 4135 serial number [id number] lead, the right ventricular lead is a Guidant model 4136 serial number [id number] lead. The bradycardia pacing mode is DDIR with a lower rate of 70 upper rate of 130 bpm.    2. Diastolic congestive failure.  3. Moderate pulmonary hypertension.   a. Cardiac catheterization, 6/5/16: Normal cardiac output and index. Severe pulmonary hypertension. No evidence of improvement with nitric oxide. No evidence of significant intracardiac shunting. Elevated RA and wedge pressures.   4. Chest pain:  a. History of normal echocardiogram as well as Cardiolite GXT, March 2011, Sridhar Bustamante MD.  b. Abnormal EKG revealing normal sinus rhythm with first degree AV block and poor precordial R-wave progression (no evidence of anterior infarct).  5. Hypertension.  6. Hyperlipidemia.  7. Type 2 diabetes mellitus.  8. History of tobacco abuse with cessation 30 years ago.  9. Obesity.  10. Obstructive sleep apnea with no CPAP usage.  11. Asthma.  12. Depression.  13. Gastroesophageal reflux disease.  14. Hypothyroidism on chronic replacement  "therapy.  15. Bilateral hearing deficit with hearing aids.  16. Osteoarthritis.  17. History of left ankle fracture followed by Dr. Kennedy.  18. Surgical history:  a. Bilateral cataract extraction, 1996 and 1997.  b. Right knee repair, 1982.  c. Left elbow repair, 2006.  d. Hysterectomy.  e. Parathyroid gland removal.    HPI:   Patient is a pleasant 77-year-old female who presents today for consideration of watchman device as part of the ASAP-TOO trial.  She is a known history of paroxysmal atrial fibrillation and diastolic heart failure.  She has been unable to take full anticoagulation due to severe anemia as she notes that it is \"toxic to her body\".  She has undergone colonoscopy and endoscopy without any known source of bleeding and has a history of getting iron infusions.  She does admit to gradual weight gain over the past 3-4 weeks of approximately 10-11 pounds.  However she attributes this to having a better appetite in that she is eating on a more regular basis.  She does not notice that she is feeling full she typically doesn't her abdomen with fluid in relation to her diastolic heart failure.  She states that she takes her Bumex usually once daily and does continue to weigh herself daily. Patient denies chest pain, palpitations, shortness of breath, PND, orthopnea, dizziness, and syncope.  She is not aware of any atrial fibrillation episodes and states that she has never been aware of when she goes in and out of rhythm.  She states that she went for walk a couple weeks ago and did notice some mild shortness of breath that this was a single occurrence and attributed it to the heat and humidity    Hospital Course:   The patient was admitted for Watchman Device implant. She underwent the procedure without complication with successful insertion of the Watchman Device as part of the ASAP-TOO study. Intraoperative EVITA showed EF 60% with successful watchman device implant without residual flow around the device. " She had a physiologic pericardial effusion see at baseline prior to the procedure which was shown by EVITA after implant to be unchanged. She was monitored on telemetry overnight and did well. The next morning, her pursestring suture was removed from her right groin without complication. She was felt stable for discharge to home.     Vitals:    09/27/17 0852   BP: (!) 101/32   Pulse:    Resp:    Temp:    SpO2:        Physical Exam:  General: Patient is alert, oriented, in no acute distress  Heart: RRR, S1S2 no murmur, rub or gallop  Lungs:Clear to auscultation bilaterally, normal respiratory effort  Ext: No clubbing, cyanosis, or LE edema  Neurologic: No focal deficits      Lab Results   Component Value Date    WBC 7.19 10/10/2017    HGB 11.0 (L) 10/10/2017    HCT 33.6 (L) 10/10/2017    MCV 91.8 10/10/2017     10/10/2017     Lab Results   Component Value Date    GLUCOSE 161 (H) 10/10/2017    CALCIUM 9.8 10/10/2017     10/10/2017    K 5.3 10/10/2017    CO2 28.0 10/10/2017     10/10/2017    BUN 45 (H) 10/10/2017    CREATININE 1.60 (H) 10/10/2017    EGFRIFNONA 31 (L) 10/10/2017    BCR 28.1 (H) 10/10/2017    ANIONGAP 5.0 10/10/2017     Lab Results   Component Value Date    INR 1.01 09/25/2017    INR 1.08 05/01/2017    INR 1.54 04/18/2017    PROTIME 11.0 09/25/2017    PROTIME 11.8 (H) 05/01/2017    PROTIME 17.0 (H) 04/18/2017       Data:      Sarita Alegre   Home Medication Instructions GA:[id number]    Printed on:10/12/17 2599   Medication Information                      albuterol (PROVENTIL HFA;VENTOLIN HFA) 108 (90 Base) MCG/ACT inhaler  Inhale 2 puffs Every 4 (Four) Hours As Needed for Wheezing.             aspirin 81 MG chewable tablet  Chew 1 tablet Daily.             atorvastatin (LIPITOR) 40 MG tablet  Take 40 mg by mouth Every Night.             Biotin 5000 MCG tablet  Take 1 tablet by mouth Daily.             bumetanide (BUMEX) 1 MG tablet  Take 1 mg by mouth Daily.              cholecalciferol (VITAMIN D3) 1000 units tablet  Take 1,000 Units by mouth Daily.             clopidogrel (PLAVIX) 75 MG tablet  Take 1 tablet by mouth Daily.             docusate sodium (COLACE) 100 MG capsule  Take 100 mg by mouth Daily.             DULoxetine (CYMBALTA) 60 MG capsule  Take 60 mg by mouth Daily.             fluocinonide (LIDEX) 0.05 % external solution  Apply 1 application topically 2 (Two) Times a Day.             HYDROcodone-acetaminophen (NORCO) 5-325 MG per tablet  Take 1 tablet by mouth Every 6 (Six) Hours As Needed.             ketotifen (ZADITOR) 0.025 % ophthalmic solution  1 drop 2 (Two) Times a Day As Needed (itchy eyes).             levothyroxine (SYNTHROID, LEVOTHROID) 100 MCG tablet  Take 100 mcg by mouth Daily.             metoprolol tartrate (LOPRESSOR) 25 MG tablet  Take 25 mg by mouth 2 (Two) Times a Day.             montelukast (SINGULAIR) 10 MG tablet  Take 10 mg by mouth Daily.             Probiotic Product (ALIGN PO)  Take 1 tablet by mouth Every Other Day. Last dose 9-23-17             spironolactone (ALDACTONE) 25 MG tablet  Take 25 mg by mouth Daily.             vitamin C (ASCORBIC ACID) 500 MG tablet  Take 500 mg by mouth Daily.                 Follow up with Dr. Lopes in 3 months. EVITA in 3 months    [facility]  10/12/2017   1:51 PM

## 2017-10-26 ENCOUNTER — TRANSCRIBE ORDERS (OUTPATIENT)
Dept: INTERVENTIONAL RADIOLOGY/VASCULAR | Facility: HOSPITAL | Age: 77
End: 2017-10-26

## 2017-10-26 DIAGNOSIS — R18.8 OTHER ASCITES: Primary | ICD-10-CM

## 2017-11-02 ENCOUNTER — CLINICAL SUPPORT NO REQUIREMENTS (OUTPATIENT)
Dept: CARDIOLOGY | Facility: CLINIC | Age: 77
End: 2017-11-02

## 2017-11-02 DIAGNOSIS — I48.19 ATRIAL FIBRILLATION, PERSISTENT (HCC): ICD-10-CM

## 2017-11-02 PROCEDURE — 93296 REM INTERROG EVL PM/IDS: CPT | Performed by: INTERNAL MEDICINE

## 2017-11-02 PROCEDURE — 93294 REM INTERROG EVL PM/LDLS PM: CPT | Performed by: INTERNAL MEDICINE

## 2017-11-06 ENCOUNTER — HOSPITAL ENCOUNTER (OUTPATIENT)
Dept: CT IMAGING | Facility: HOSPITAL | Age: 77
End: 2017-11-06
Attending: INTERNAL MEDICINE

## 2017-12-01 NOTE — NURSING NOTE
Contacted patient to inquire re: Plavix.  Pt reports that she has not taken her Plavix today but that nobody told her to stop the Plavix for the procedure.  Contacted Dr. Green re: holding Plavix; instructed to contact Dr. Cooper.  Dr Cooper notified; okay for patient to stop Plavix for procedure.  Contacted scheduling to rescheduled procedure for Tuesday 12/5 after 5 days off of Plavix.  Contacted Ms. Alegre and informed her of this change in date and to instruct her to stop Plavix until after procedure.  Pt verbalizes understanding.

## 2017-12-04 ENCOUNTER — HOSPITAL ENCOUNTER (OUTPATIENT)
Dept: CT IMAGING | Facility: HOSPITAL | Age: 77
Discharge: HOME | End: 2017-12-04
Attending: INTERNAL MEDICINE

## 2017-12-05 ENCOUNTER — HOSPITAL ENCOUNTER (OUTPATIENT)
Dept: CT IMAGING | Facility: HOSPITAL | Age: 77
Discharge: HOME | End: 2017-12-05
Attending: INTERNAL MEDICINE | Admitting: INTERNAL MEDICINE

## 2017-12-05 VITALS
HEART RATE: 72 BPM | OXYGEN SATURATION: 96 % | DIASTOLIC BLOOD PRESSURE: 67 MMHG | TEMPERATURE: 98 F | RESPIRATION RATE: 70 BRPM | SYSTOLIC BLOOD PRESSURE: 124 MMHG

## 2017-12-05 DIAGNOSIS — R18.8 OTHER ASCITES: ICD-10-CM

## 2017-12-05 LAB
APTT PPP: <24 SECONDS (ref 24–31)
GLUCOSE BLDC GLUCOMTR-MCNC: 184 MG/DL (ref 70–130)
INR PPP: 1
PLATELET # BLD AUTO: 157 10*3/MM3 (ref 150–450)
PROTHROMBIN TIME: 10.9 SECONDS (ref 9.6–11.5)

## 2017-12-05 PROCEDURE — 85730 THROMBOPLASTIN TIME PARTIAL: CPT | Performed by: RADIOLOGY

## 2017-12-05 PROCEDURE — 63710000001 ALPRAZOLAM 0.5 MG TABLET: Performed by: RADIOLOGY

## 2017-12-05 PROCEDURE — A9270 NON-COVERED ITEM OR SERVICE: HCPCS | Performed by: RADIOLOGY

## 2017-12-05 PROCEDURE — 85049 AUTOMATED PLATELET COUNT: CPT | Performed by: RADIOLOGY

## 2017-12-05 PROCEDURE — 85610 PROTHROMBIN TIME: CPT | Performed by: RADIOLOGY

## 2017-12-05 PROCEDURE — 75989 ABSCESS DRAINAGE UNDER X-RAY: CPT

## 2017-12-05 PROCEDURE — 82962 GLUCOSE BLOOD TEST: CPT

## 2017-12-05 RX ORDER — SODIUM CHLORIDE 0.9 % (FLUSH) 0.9 %
1-10 SYRINGE (ML) INJECTION AS NEEDED
Status: DISCONTINUED | OUTPATIENT
Start: 2017-12-05 | End: 2017-12-06 | Stop reason: HOSPADM

## 2017-12-05 RX ORDER — ALPRAZOLAM 0.5 MG/1
0.5 TABLET ORAL ONCE
Status: COMPLETED | OUTPATIENT
Start: 2017-12-05 | End: 2017-12-05

## 2017-12-05 RX ADMIN — ALPRAZOLAM 0.5 MG: 0.5 TABLET ORAL at 08:55

## 2017-12-05 NOTE — PLAN OF CARE
Problem: Patient Care Overview (Adult)  Goal: Discharge Needs Assessment  Outcome: Ongoing (interventions implemented as appropriate)    12/05/17 0911   Discharge Needs Assessment   Concerns To Be Addressed no discharge needs identified   Readmission Within The Last 30 Days no previous admission in last 30 days   Equipment Needed After Discharge none   Discharge Disposition home or self-care   Current Health   Anticipated Changes Related to Illness none   Self-Care   Equipment Currently Used at Home none   Living Environment   Transportation Available family or friend will provide

## 2017-12-05 NOTE — PLAN OF CARE
Problem: Paracentesis, Abdominal (Adult)  Goal: Signs and Symptoms of Listed Potential Problems Will be Absent or Manageable (Paracentesis, Abdominal)  Outcome: Ongoing (interventions implemented as appropriate)    12/05/17 0910   Paracentesis, Abdominal   Problems Assessed (Paracentesis) all   Problems Present (Paracentesis) none

## 2017-12-05 NOTE — NURSING NOTE
CT scan done and reviewed by Dr Cota.  Minimal fluid noted.  Procedure not done.  Pt informed.  Report called to NEHAL

## 2017-12-05 NOTE — PLAN OF CARE
Problem: Patient Care Overview (Adult)  Goal: Plan of Care Review  Outcome: Ongoing (interventions implemented as appropriate)    12/05/17 0911   Coping/Psychosocial Response Interventions   Plan Of Care Reviewed With patient;spouse   Patient Care Overview   Progress no change

## 2017-12-18 ENCOUNTER — HOSPITAL ENCOUNTER (OUTPATIENT)
Dept: CARDIOLOGY | Facility: HOSPITAL | Age: 77
Discharge: HOME | End: 2017-12-18
Attending: INTERNAL MEDICINE | Admitting: INTERNAL MEDICINE

## 2017-12-18 VITALS
DIASTOLIC BLOOD PRESSURE: 65 MMHG | HEART RATE: 72 BPM | RESPIRATION RATE: 14 BRPM | SYSTOLIC BLOOD PRESSURE: 125 MMHG | OXYGEN SATURATION: 92 %

## 2017-12-18 DIAGNOSIS — I48.19 ATRIAL FIBRILLATION, PERSISTENT (HCC): ICD-10-CM

## 2017-12-18 LAB — LV EF 2D ECHO EST: 60 %

## 2017-12-18 PROCEDURE — 93320 DOPPLER ECHO COMPLETE: CPT | Performed by: INTERNAL MEDICINE

## 2017-12-18 PROCEDURE — 93325 DOPPLER ECHO COLOR FLOW MAPG: CPT | Performed by: INTERNAL MEDICINE

## 2017-12-18 PROCEDURE — 93321 DOPPLER ECHO F-UP/LMTD STD: CPT

## 2017-12-18 PROCEDURE — 93312 ECHO TRANSESOPHAGEAL: CPT

## 2017-12-18 PROCEDURE — 25010000002 MIDAZOLAM PER 1 MG: Performed by: INTERNAL MEDICINE

## 2017-12-18 PROCEDURE — 93312 ECHO TRANSESOPHAGEAL: CPT | Performed by: INTERNAL MEDICINE

## 2017-12-18 PROCEDURE — 93325 DOPPLER ECHO COLOR FLOW MAPG: CPT

## 2017-12-18 PROCEDURE — 25010000002 FENTANYL CITRATE (PF) 100 MCG/2ML SOLUTION: Performed by: INTERNAL MEDICINE

## 2017-12-18 RX ORDER — MIDAZOLAM HYDROCHLORIDE 1 MG/ML
INJECTION INTRAMUSCULAR; INTRAVENOUS
Status: COMPLETED | OUTPATIENT
Start: 2017-12-18 | End: 2017-12-18

## 2017-12-18 RX ORDER — FENTANYL CITRATE 50 UG/ML
INJECTION, SOLUTION INTRAMUSCULAR; INTRAVENOUS
Status: COMPLETED | OUTPATIENT
Start: 2017-12-18 | End: 2017-12-18

## 2017-12-18 RX ADMIN — METHOHEXITAL SODIUM 20 MG: 500 INJECTION, POWDER, LYOPHILIZED, FOR SOLUTION INTRAMUSCULAR; INTRAVENOUS; RECTAL at 11:29

## 2017-12-18 RX ADMIN — METHOHEXITAL SODIUM 20 MG: 500 INJECTION, POWDER, LYOPHILIZED, FOR SOLUTION INTRAMUSCULAR; INTRAVENOUS; RECTAL at 11:21

## 2017-12-18 RX ADMIN — FENTANYL CITRATE 50 MCG: 50 INJECTION, SOLUTION INTRAMUSCULAR; INTRAVENOUS at 11:08

## 2017-12-18 RX ADMIN — MIDAZOLAM HYDROCHLORIDE 2 MG: 1 INJECTION, SOLUTION INTRAMUSCULAR; INTRAVENOUS at 11:08

## 2017-12-18 RX ADMIN — FENTANYL CITRATE 50 MCG: 50 INJECTION, SOLUTION INTRAMUSCULAR; INTRAVENOUS at 11:11

## 2017-12-18 RX ADMIN — MIDAZOLAM HYDROCHLORIDE 2 MG: 1 INJECTION, SOLUTION INTRAMUSCULAR; INTRAVENOUS at 11:10

## 2017-12-18 NOTE — H&P
[facility] Consult Note      Referring Provider: Sandeep Lopes MD  Primary Provider:  [unfilled]  Reason for Consultation: EVITA s/p Watchman    Patient Care Team:  Percy Allison MD as PCP - General (Internal Medicine)  Percy Allison MD as PCP - Claims Attributed    Chief complaint:  EVITA s/p  Watchman device implant    Identification:  77-year-old female    Problem list:   1.  Atrial fibrillation:   A.  New onset with CVR of unknown duration, [facility] ER, 06/21/2015.    B.  Echocardiogram, 05/18/15, Jose E:  EF NL. Mild, AR and MR. Moderate to severe TR.   C.  EVITA/ECV, 06/24/15. EF 55% to 60%. No thrombus. Mild to moderate MR. Moderate TR with RVSP 66 mmHg. ECV with 2 joules to NSR.   D.  Cardiac event monitor, 8/31/16: placed for falls. 6.5 second pause.    E.  PM implantation, 9/1/16: [facility] model L101 serial number [id number] generator; atrial lead is a Guidant model 4135 serial number [id number] lead, the RV lead is a Guidant model 4136 serial number [id number] lead. The bradycardia pacing mode is DDIR with a lower rate of 70 upper rate of 130 bpm.     F.  Watchman Device implant 9/26/17:  21mm   G.  EVITA (intra-op)   2.  Diastolic congestive failure.  3.  Moderate pulmonary hypertension.    A.  RHC, 6/5/16: Normal cardiac output and index. Severe pulmonary hypertension. No evidence of improvement with nitric oxide. No evidence of significant intracardiac shunting. Elevated RA and wedge pressures.   4.  Chest pain:   A.  History of normal echo and Cardiolite GXT, March 2011, Sridhar Bustamante MD.   B.  Abnormal EKG:  NSR with 1st degree AV block and poor precordial R-wave progression (no evidence of anterior infarct).  5.  Hypertension.  6.  Hyperlipidemia.  7.  Type 2 diabetes mellitus.  8.  History of tobacco abuse with cessation 30 years ago.  9.  Obesity.  10.  Obstructive sleep apnea with no CPAP usage.  11.  Asthma.  12.  Depression.  13.  Gastroesophageal reflux disease.  14.  Hypothyroidism on  chronic replacement therapy.  15.  Bilateral hearing deficit with hearing aids.  16.  Osteoarthritis.  17.  History of left ankle fracture followed by Dr. Kennedy.  18.  Surgical history:   A.  Bilateral cataract extraction, 1996 and 1997.   B.  Right knee repair, 1982.   C.  Left elbow repair, 2006.   D.  Hysterectomy.   E.  Parathyroid gland removal.    Allergies:  Propafenone; Grass; and Molds & smuts    Home/Current Medications:    1.  Albuterol inhaler 2 puffs every 4 hours as needed  2.  Aspirin 81mg daily  3.  Lipitor 40 mg daily at bedtime  3.  Biotin 5000 µg 1 tablet daily  4.  Vitamin D3 1000 units daily  5.  Cymbalta 60 mg daily  6.  Lidex 0.05% external solution apply topically twice a day  7.  Norco 5-3 25 mg one tablet by mouth every 6 hours as needed for pain  8.  Levothyroxine 100 µg daily  9.  Metoprolol tartrate 25 mg twice a day  10.  Singulair 10 mg daily  11.  Probiotic product 1 tablet by mouth every other day  12.  Aldactone 25 mg daily  13.  Vitamin C 500 mg daily  14.  Bumex 1 mg daily  15.  Colace 100 mg daily  16.  Zaditor 0.025% ophthalmic solution one drop 2 times a day as needed  17.  Plavix 75mg daily      History of present illness:  Patient is a pleasant 77-year-old female with the above-noted medical history who presents today for her follow-up EVITA status post watchman device implant in September.  She has a known history of paroxysmal atrial fibrillation and diastolic heart failure has been unable to take for anticoagulation due to severe anemia.  She has undergone testing without any evidence of clear source of bleeding and has a history of having iron infusions in the past.  She has had no complications since implant.  She she was scheduled for a CT guided paracentesis earlier this month and was instructed to hold her Plavix for 5 days prior to, in which she did.  There is no fluid to be drawn off based on report noted and she started her Plavix immediately the day of the procedure,  12/5/17.      Cardiac Risk Factors:  Hypertension, hyperlipidemia, diabetes, former smoker, advanced age female, sedentary lifestyle    Social History:  Social History     Social History   • Marital status:      Spouse name: N/A   • Number of children: N/A   • Years of education: N/A     Occupational History   • Not on file.     Social History Main Topics   • Smoking status: Former Smoker     Packs/day: 3.00     Years: 25.00     Types: Cigarettes     Quit date: 4/25/1982   • Smokeless tobacco: Never Used   • Alcohol use No   • Drug use: No   • Sexual activity: Defer      Comment:      Other Topics Concern   • Not on file     Social History Narrative     Family History:  Family History   Problem Relation Age of Onset   • Asthma Mother    • Allergies Mother    • Colonic polyp Mother    • Mitral valve prolapse Mother    • Other Mother      a fib- rain knee replacement   • Heart disease Father    • Lung cancer Father      he was smoker   • Diabetes Other    • Colon cancer Son    • Breast cancer Neg Hx    • Ovarian cancer Neg Hx      Review of Systems  Pertinent positives are listed in the HPI.  All other systems reviewed are negative.         Objective     Vital Sign Min/Max for last 24 hours  No Data Recorded   BP  Min: 134/59  Max: 134/59   Pulse  Min: 70  Max: 70   Resp  Min: 16  Max: 16   SpO2  Min: 94 %  Max: 94 %   No Data Recorded   No Data Recorded         Physical Exam:    GENERAL: well-developed, well-nourished; in no acute distress.   NECK:  There is no jugular venous distention at 30°.  Carotid upstrokes are 2+ and  symmetrical without bruits.   LUNGS: Clear to auscultation bilaterally without wheezing, rhonchi, or rales noted.   CARDIOVASCULAR: The heart has a paced regular rate with a normal S1 and S2. There is no murmur, gallop, rub, or click appreciated. The PMI is nondisplaced.   ABDOMEN: Soft and nontender  NEUROLOGICAL: Nonfocal; Alert and oriented  PERIPHERAL VASCULAR:  Posterior tibial  and dorsalis pedis pulses are 2+ and symmetrical. There is no peripheral edema.   MUSCULOSKELETAL:  Normal ROM  SKIN:  Warm and dry  PSYCHIATRIC: normal mood and affect; behavior appropriate       EKG:  paced    Echocardiogram 8/31/17:    · Mild mitral valve regurgitation is present  · Left ventricular systolic function is normal. Estimated EF = 56%.  · Left ventricular wall thickness is consistent with mild concentric hypertrophy.  · The aortic valve exhibits sclerosis.  · No evidence of pulmonary hypertension is present.  · There is no evidence of pericardial effusion.  · Normal right ventricular cavity size, wall thickness and systolic function noted.  Labs:          Invalid input(s): LABALBU, PROT    Lab Results (last 24 hours)     ** No results found for the last 24 hours. **        Lab Results   Component Value Date    TROPONINI 0.02 11/05/2015    TROPONINI 0.01 10/11/2015    TROPONINI 0.05 06/21/2015      No results found for: CHOL  Lab Results   Component Value Date    HDL 30 (L) 11/09/2015    HDL 41 05/18/2015    HDL 43 10/31/2014     Lab Results   Component Value Date    LDLDIRECT 94 11/09/2015    LDLDIRECT 94 05/18/2015    LDLDIRECT 104 10/31/2014     Lab Results   Component Value Date    TRIG 83 11/09/2015    TRIG 64 09/28/2015    TRIG 108 09/21/2015     No components found for: CHOLHDL  Lab Results   Component Value Date    INR 1.00 12/05/2017    INR 1.01 09/25/2017    INR 1.08 05/01/2017    PROTIME 10.9 12/05/2017    PROTIME 11.0 09/25/2017    PROTIME 11.8 (H) 05/01/2017       Ejection Fraction:  56% per echo 8/2017      Results Review:  I reviewed the patients new clinical results.      Assessment:   1.  Atrial fibrillation:   -BSC PPM implantation (for 6.5 sec pause), 9/1/16   -Watchman Device implant 9/26/17:  21mm   -Plavix and Asa 81mg; ASAP-TOO study  2.  Diastolic congestive failure.  3.  Moderate pulmonary hypertension.   4.  Hypertension.  6.  Hyperlipidemia.  7.  Type 2 diabetes mellitus.  8.   History of tobacco abuse with cessation 30 years ago.    Plan:  EVITA today to further assess 21 mm Watchman JUAN.  The risks, benefits, potential complications of all been discussed with the patient and she is agreeable to proceed.    I discussed the patients findings and my recommendations with patient.    Scribed for Radha Cooper MD by KAILASH Mejia on December 17, 2017 at 9:24 AM      KAILASH Sutton  12/18/17  10:04 AM        I Radha Cooper MD personally performed the services described in this documentation as scribed by the above individual in my presence, and it is both accurate and complete.    Radha Cooper MD, FACC

## 2018-01-03 ENCOUNTER — OFFICE VISIT (OUTPATIENT)
Dept: CARDIOLOGY | Facility: CLINIC | Age: 78
End: 2018-01-03

## 2018-01-03 VITALS
DIASTOLIC BLOOD PRESSURE: 76 MMHG | WEIGHT: 166 LBS | SYSTOLIC BLOOD PRESSURE: 138 MMHG | BODY MASS INDEX: 25.16 KG/M2 | HEIGHT: 68 IN | HEART RATE: 70 BPM

## 2018-01-03 DIAGNOSIS — I10 ESSENTIAL HYPERTENSION: ICD-10-CM

## 2018-01-03 DIAGNOSIS — I49.5 TACHY-BRADY SYNDROME (HCC): Primary | ICD-10-CM

## 2018-01-03 DIAGNOSIS — I48.19 ATRIAL FIBRILLATION, PERSISTENT (HCC): ICD-10-CM

## 2018-01-03 PROCEDURE — 93280 PM DEVICE PROGR EVAL DUAL: CPT | Performed by: INTERNAL MEDICINE

## 2018-01-03 PROCEDURE — 99213 OFFICE O/P EST LOW 20 MIN: CPT | Performed by: INTERNAL MEDICINE

## 2018-01-03 NOTE — PROGRESS NOTES
Sarita Alegre  1940  [phone]      01/03/2018    [facility] CARDIOLOGY     Percy Moreland MD  [address] [facility] [address] [place] [address]    Chief Complaint   Patient presents with   • Atrial Fibrillation       Problem List:   Problem List     1. Atrial fibrillation:  a. Newly diagnosed atrial fibrillation with controlled rate of unknown duration, [facility] ER presentation, 06/21/2015.   b. Recent history of echocardiogram, 05/18/2015, Dr. Dorantes, revealing normal LVEF. Mild AR. Mild MR. Moderate to severe TR.  c. EVITA/ECV, 06/24/2015. EF 55% to 60%. No thrombus. Mild to moderate mitral regurgitation. Moderate tricuspid regurgitation. RVSP 66 mmHg. External cardioversion with 2 joules to normal sinus rhythm.  d. Cardiac event monitor, 8/31/2016: placed for falls. 6.5 second pause.   e. PM implantation, 9/1/2016: The PM is a [employer] model L101 serial number [id number] generator, the atrial lead is a [employer]ant model 4135 serial number [id number] lead, the right ventricular lead is a [employer]ant model 4136 serial number [id number] lead. The bradycardia pacing mode is DDIR with a lower rate of 70 upper rate of 130 bpm.  f. JUAN insertion 21 mm Watchman device 9/26/17 enrolled in ASAP TOO     g. EVITA 12/18/17; L LVEF , Mild TR/MR. No flow around JUAN device  2. Diastolic congestive failure.  3. Moderate pulmonary hypertension.   a. Cardiac catheterization, 6/5/16: Normal cardiac output and index. Severe pulmonary hypertension. No evidence of improvement with nitric oxide. No evidence of significant intracardiac shunting. Elevated RA and wedge pressures.   4. Chest pain:  a. History of normal echocardiogram as well as Cardiolite GXT, March 2011, Sridhar Bustamante MD.  b. Abnormal EKG revealing normal sinus rhythm with first degree AV block and poor precordial R-wave progression (no evidence of anterior infarct).  5. Hypertension.  6. Hyperlipidemia.  7. Type 2 diabetes  mellitus.  8. History of tobacco abuse with cessation 30 years ago.  9. Obesity.  10. Obstructive sleep apnea with no CPAP usage.  11. Asthma.  12. Depression.  13. Gastroesophageal reflux disease.  14. Hypothyroidism on chronic replacement therapy.  15. Bilateral hearing deficit with hearing aids.  16. Osteoarthritis.  17. History of left ankle fracture followed by Dr. Kennedy.  18. Surgical history:  a. Bilateral cataract extraction, 1996 and 1997.  b. Right knee repair, 1982.  c. Left elbow repair, 2006.  d. Hysterectomy.  e. Parathyroid gland removal.  Allergies  Allergies   Allergen Reactions   • Propafenone Other (See Comments)     Hepatic failure   • Grass Cough   • Molds & Smuts Cough       Current Medications    Current Outpatient Prescriptions:   •  albuterol (PROVENTIL HFA;VENTOLIN HFA) 108 (90 Base) MCG/ACT inhaler, Inhale 2 puffs Every 4 (Four) Hours As Needed for Wheezing., Disp: , Rfl:   •  aspirin 81 MG chewable tablet, Chew 1 tablet Daily., Disp: 30 tablet, Rfl: 6  •  atorvastatin (LIPITOR) 40 MG tablet, Take 40 mg by mouth Every Night., Disp: , Rfl:   •  Biotin 5000 MCG tablet, Take 1 tablet by mouth Daily., Disp: , Rfl:   •  bumetanide (BUMEX) 1 MG tablet, Take 1 mg by mouth Daily., Disp: , Rfl:   •  cholecalciferol (VITAMIN D3) 1000 units tablet, Take 1,000 Units by mouth Daily., Disp: , Rfl:   •  clopidogrel (PLAVIX) 75 MG tablet, Take 1 tablet by mouth Daily., Disp: 30 tablet, Rfl: 6  •  docusate sodium (COLACE) 100 MG capsule, Take 100 mg by mouth Daily., Disp: , Rfl:   •  DULoxetine (CYMBALTA) 60 MG capsule, Take 60 mg by mouth Daily., Disp: , Rfl:   •  Ergocalciferol (VITAMIN D2 PO), Take 50,000 Units/day by mouth 1 (One) Time Per Week., Disp: , Rfl:   •  HYDROcodone-acetaminophen (NORCO) 5-325 MG per tablet, Take 1 tablet by mouth Every 6 (Six) Hours As Needed., Disp: , Rfl:   •  ketotifen (ZADITOR) 0.025 % ophthalmic solution, 1 drop 2 (Two) Times a Day As Needed (itchy eyes)., Disp: , Rfl:  "  •  levothyroxine (SYNTHROID, LEVOTHROID) 100 MCG tablet, Take 100 mcg by mouth Daily., Disp: , Rfl:   •  metoprolol tartrate (LOPRESSOR) 25 MG tablet, Take 25 mg by mouth 2 (Two) Times a Day., Disp: , Rfl:   •  montelukast (SINGULAIR) 10 MG tablet, Take 10 mg by mouth Daily., Disp: , Rfl:   •  Probiotic Product (ALIGN PO), Take 1 tablet by mouth Every Other Day. Last dose 9-23-17, Disp: , Rfl:   •  spironolactone (ALDACTONE) 25 MG tablet, Take 25 mg by mouth Daily., Disp: , Rfl:   •  vitamin C (ASCORBIC ACID) 500 MG tablet, Take 500 mg by mouth Daily., Disp: , Rfl:     History of Present Illness   HPI    Pt presents for follow up of AF. Since we last saw the pt, pt denies any AF episodes, CP, LH, and dizziness. Denies any hospitalizations, ER visits, bleeding, or TIA/CVA symptoms. Overall feels well. Mild GLASER  No bleeding issues.    ROS:  General:  + fatigue, No weight gain or loss  Cardiovascular:  Denies CP, PND, syncope, near syncope, edema or palpitations.  Pulmonary:  + Mild GLASER, No cough, or wheezing      Vitals:    01/03/18 1624   BP: 138/76   BP Location: Right arm   Patient Position: Sitting   Pulse: 70   Weight: 75.3 kg (166 lb)   Height: 172.7 cm (68\")     PE:  General: NAD  Neck: no JVD, no carotid bruits, no TM  Heart RRR, NL S1, S2,  no rubs, murmurs  Lungs: CTA, no wheezes, rhonchi, or rales  Abd: soft, non-tender, NL BS  Ext: No musculoskeletal deformities, no edema, cyanosis, or clubbing  Psych: normal mood and affect    Diagnostic Data:      Procedures    1. Tachy-marcela syndrome    2. Atrial fibrillation, persistent    3. Essential hypertension        PM interrogation: No AF, NL PM fxn     Plan:  1) AF s/p JUAN device implant: Repeat EVITA NL. Switch to ASA 81 mg po daily  Continue present medications.   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

## 2018-02-12 ENCOUNTER — TELEPHONE (OUTPATIENT)
Dept: ONCOLOGY | Facility: CLINIC | Age: 78
End: 2018-02-12

## 2018-02-12 ENCOUNTER — LAB (OUTPATIENT)
Dept: LAB | Facility: HOSPITAL | Age: 78
End: 2018-02-12

## 2018-02-12 ENCOUNTER — OFFICE VISIT (OUTPATIENT)
Dept: ONCOLOGY | Facility: CLINIC | Age: 78
End: 2018-02-12

## 2018-02-12 VITALS
BODY MASS INDEX: 26.3 KG/M2 | HEART RATE: 70 BPM | SYSTOLIC BLOOD PRESSURE: 138 MMHG | TEMPERATURE: 97.4 F | WEIGHT: 173 LBS | DIASTOLIC BLOOD PRESSURE: 64 MMHG | RESPIRATION RATE: 18 BRPM

## 2018-02-12 DIAGNOSIS — D50.0 IRON DEFICIENCY ANEMIA DUE TO CHRONIC BLOOD LOSS: Primary | ICD-10-CM

## 2018-02-12 DIAGNOSIS — D62 ANEMIA DUE TO ACUTE BLOOD LOSS: ICD-10-CM

## 2018-02-12 DIAGNOSIS — K90.9 IRON MALABSORPTION: ICD-10-CM

## 2018-02-12 LAB
ERYTHROCYTE [DISTWIDTH] IN BLOOD BY AUTOMATED COUNT: 13.6 % (ref 11.3–14.5)
FERRITIN SERPL-MCNC: 62 NG/ML (ref 10–291)
FOLATE SERPL-MCNC: >24 NG/ML (ref 3.2–20)
HCT VFR BLD AUTO: 39.3 % (ref 34.5–44)
HGB BLD-MCNC: 12.6 G/DL (ref 11.5–15.5)
IRON 24H UR-MRATE: 81 MCG/DL (ref 50–175)
IRON SATN MFR SERPL: 26 % (ref 15–50)
LYMPHOCYTES # BLD AUTO: 2.7 10*3/MM3 (ref 0.6–4.8)
LYMPHOCYTES NFR BLD AUTO: 35.6 % (ref 24–44)
MCH RBC QN AUTO: 29.2 PG (ref 27–31)
MCHC RBC AUTO-ENTMCNC: 32 G/DL (ref 32–36)
MCV RBC AUTO: 91 FL (ref 80–99)
MONOCYTES # BLD AUTO: 0.5 10*3/MM3 (ref 0–1)
MONOCYTES NFR BLD AUTO: 6.6 % (ref 0–12)
NEUTROPHILS # BLD AUTO: 4.3 10*3/MM3 (ref 1.5–8.3)
NEUTROPHILS NFR BLD AUTO: 57.8 % (ref 41–71)
PLATELET # BLD AUTO: 227 10*3/MM3 (ref 150–450)
PMV BLD AUTO: 7.2 FL (ref 6–12)
RBC # BLD AUTO: 4.32 10*6/MM3 (ref 3.89–5.14)
TIBC SERPL-MCNC: 310 MCG/DL (ref 250–450)
VIT B12 BLD-MCNC: 1404 PG/ML (ref 211–911)
WBC NRBC COR # BLD: 7.5 10*3/MM3 (ref 3.5–10.8)

## 2018-02-12 PROCEDURE — 82728 ASSAY OF FERRITIN: CPT

## 2018-02-12 PROCEDURE — 99214 OFFICE O/P EST MOD 30 MIN: CPT | Performed by: NURSE PRACTITIONER

## 2018-02-12 PROCEDURE — 82607 VITAMIN B-12: CPT

## 2018-02-12 PROCEDURE — 83540 ASSAY OF IRON: CPT

## 2018-02-12 PROCEDURE — 82746 ASSAY OF FOLIC ACID SERUM: CPT

## 2018-02-12 PROCEDURE — 85025 COMPLETE CBC W/AUTO DIFF WBC: CPT

## 2018-02-12 PROCEDURE — 83550 IRON BINDING TEST: CPT

## 2018-02-12 PROCEDURE — 36415 COLL VENOUS BLD VENIPUNCTURE: CPT

## 2018-02-12 NOTE — PROGRESS NOTES
DATE OF VISIT: 2/12/2018    REASON FOR VISIT: Followup for iron deficiency anemia      HISTORY OF PRESENT ILLNESS: The patient is a very pleasant 78 y.o. female  with past medical history significant for iron deficiency anemia diagnosed March 2017 fsecondary to GI bleed.  The patient is here today for scheduled follow-up visit.    SUBJECTIVE: The patient has been doing fairly well. Since her last visit here she had a Watchman device placed for chronic atrial fibrillation. She did well with the procedure with no complications. She has had no issues with fatigue, weakness, or shortness of breath. She denies episodes of bleeding. She is eating and drinking well and denies fevers, chills, or night sweats.     PAST MEDICAL HISTORY/SOCIAL HISTORY/FAMILY HISTORY: Unchanged from my prior documentation done on May 9, 2017    Review of Systems   Constitutional: Negative for activity change, appetite change, chills, fatigue, fever and unexpected weight change.   HENT: Negative for hearing loss, mouth sores, nosebleeds, sore throat and trouble swallowing.    Eyes: Negative for visual disturbance.   Respiratory: Negative for cough, chest tightness, shortness of breath and wheezing.    Cardiovascular: Negative for chest pain, palpitations and leg swelling.   Gastrointestinal: Negative for abdominal distention, abdominal pain, blood in stool, constipation, diarrhea, nausea, rectal pain and vomiting.   Endocrine: Negative for cold intolerance and heat intolerance.   Genitourinary: Negative for difficulty urinating, dysuria, frequency and urgency.   Musculoskeletal: Negative for arthralgias, back pain, gait problem, joint swelling and myalgias.   Skin: Negative for rash.   Neurological: Negative for dizziness, tremors, syncope, weakness, light-headedness, numbness and headaches.   Hematological: Negative for adenopathy. Does not bruise/bleed easily.   Psychiatric/Behavioral: Negative for confusion, sleep disturbance and suicidal  ideas. The patient is not nervous/anxious.          Current Outpatient Prescriptions:   •  albuterol (PROVENTIL HFA;VENTOLIN HFA) 108 (90 Base) MCG/ACT inhaler, Inhale 2 puffs Every 4 (Four) Hours As Needed for Wheezing., Disp: , Rfl:   •  aspirin 81 MG chewable tablet, Chew 1 tablet Daily., Disp: 30 tablet, Rfl: 6  •  atorvastatin (LIPITOR) 40 MG tablet, Take 40 mg by mouth Every Night., Disp: , Rfl:   •  Biotin 5000 MCG tablet, Take 1 tablet by mouth Daily., Disp: , Rfl:   •  bumetanide (BUMEX) 1 MG tablet, Take 1 mg by mouth Daily., Disp: , Rfl:   •  cholecalciferol (VITAMIN D3) 1000 units tablet, Take 1,000 Units by mouth Daily., Disp: , Rfl:   •  docusate sodium (COLACE) 100 MG capsule, Take 100 mg by mouth Daily., Disp: , Rfl:   •  DULoxetine (CYMBALTA) 60 MG capsule, Take 60 mg by mouth Daily., Disp: , Rfl:   •  Ergocalciferol (VITAMIN D2 PO), Take 50,000 Units/day by mouth 1 (One) Time Per Week., Disp: , Rfl:   •  HYDROcodone-acetaminophen (NORCO) 5-325 MG per tablet, Take 1 tablet by mouth Every 6 (Six) Hours As Needed., Disp: , Rfl:   •  ketotifen (ZADITOR) 0.025 % ophthalmic solution, 1 drop 2 (Two) Times a Day As Needed (itchy eyes)., Disp: , Rfl:   •  levothyroxine (SYNTHROID, LEVOTHROID) 100 MCG tablet, Take 100 mcg by mouth Daily., Disp: , Rfl:   •  metoprolol tartrate (LOPRESSOR) 25 MG tablet, Take 25 mg by mouth 2 (Two) Times a Day., Disp: , Rfl:   •  montelukast (SINGULAIR) 10 MG tablet, Take 10 mg by mouth Daily., Disp: , Rfl:   •  Probiotic Product (ALIGN PO), Take 1 tablet by mouth Every Other Day. Last dose 9-23-17, Disp: , Rfl:   •  spironolactone (ALDACTONE) 25 MG tablet, Take 25 mg by mouth Daily., Disp: , Rfl:   •  vitamin C (ASCORBIC ACID) 500 MG tablet, Take 500 mg by mouth Daily., Disp: , Rfl:     PHYSICAL EXAMINATION:   /64  Pulse 70  Temp 97.4 °F (36.3 °C) (Temporal Artery )   Resp 18  Wt 78.5 kg (173 lb)  LMP  (LMP Unknown) Comment: mammogram 2017   BMI 26.3 kg/m2   ECOG  Performance Status: 0 - Asymptomatic  General Appearance:  alert, cooperative, no apparent distress and appears stated age   Neurologic/Psychiatric: A&O x 3, gait steady, appropriate affect, strength 5/5 in all muscle groups   HEENT:  Normocephalic, without obvious abnormality, mucous membranes moist   Neck: Supple, symmetrical, trachea midline, no adenopathy;  No thyromegaly, masses, or tenderness   Lungs:   Clear to auscultation bilaterally; respirations regular, even, and unlabored bilaterally   Heart:  Regular rate and rhythm, no murmurs appreciated   Abdomen:   Soft, non-tender, non-distended and no organomegaly   Lymph nodes: No cervical, supraclavicular, inguinal or axillary adenopathy noted   Extremities: Normal, atraumatic; no clubbing, cyanosis, or edema    Skin: No rashes, ulcers, or suspicious lesions noted     Lab on 02/12/2018   Component Date Value Ref Range Status   • WBC 02/12/2018 7.50  3.50 - 10.80 10*3/mm3 Final   • RBC 02/12/2018 4.32  3.89 - 5.14 10*6/mm3 Final   • Hemoglobin 02/12/2018 12.6  11.5 - 15.5 g/dL Final   • Hematocrit 02/12/2018 39.3  34.5 - 44.0 % Final   • RDW 02/12/2018 13.6  11.3 - 14.5 % Final   • MCV 02/12/2018 91.0  80.0 - 99.0 fL Final   • MCH 02/12/2018 29.2  27.0 - 31.0 pg Final   • MCHC 02/12/2018 32.0  32.0 - 36.0 g/dL Final   • MPV 02/12/2018 7.2  6.0 - 12.0 fL Final   • Platelets 02/12/2018 227  150 - 450 10*3/mm3 Final   • Neutrophil % 02/12/2018 57.8  41.0 - 71.0 % Final   • Lymphocyte % 02/12/2018 35.6  24.0 - 44.0 % Final   • Monocyte % 02/12/2018 6.6  0.0 - 12.0 % Final   • Neutrophils, Absolute 02/12/2018 4.30  1.50 - 8.30 10*3/mm3 Final   • Lymphocytes, Absolute 02/12/2018 2.70  0.60 - 4.80 10*3/mm3 Final   • Monocytes, Absolute 02/12/2018 0.50  0.00 - 1.00 10*3/mm3 Final        No results found.    ASSESSMENT: The patient is a very pleasant 78 y.o. female  With iron deficiency anemia.     PROBLEM LIST:   1. Iron deficiency anemia  2. 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  3. Status post acute GI bleeding  4. Repeat endoscopy with cauterization  5. Atrial fibrillation with previous Coumadin use- stopped 4/15/2017  6. Status post Watchman procedure done 9/26/2017.   7. Congestive heart failure with cardiac ascites  8. Hypothyroid  9.  Hypercholesterolemia  10.  Hypertension    PLAN:  1.  I reviewed the blood work results with the patient. I told her she has no evidence of anemia with hemoglobin of 12.6. Her MCV is normal as well.   2. We will follow up on the iron studies from today and notify her of the results. We will arrange for IV iron replacement is needed for evidence of recurrent iron deficiency.   3 We will see the patient back in 6 months with repeat labs including CBC, ferritin, and iron profile.    4.  She will continue Synthroid 100 mcg daily for hypothyroidisms.   5. She will continue follow up with Dr. Lopes following Watchman procedure for atrial fibrillation.   6.  She will continue Lipitor 40 mg daily.  7.  She will continue Lopressor 25 mg daily    Mckenna Eisenberg, APRN  2/12/2018

## 2018-02-16 RX ORDER — ALBUTEROL SULFATE 90 UG/1
2 AEROSOL, METERED RESPIRATORY (INHALATION) EVERY 4 HOURS PRN
Qty: 18 G | Refills: 3 | Status: SHIPPED | OUTPATIENT
Start: 2018-02-16 | End: 2018-08-29

## 2018-02-16 RX ORDER — ALBUTEROL SULFATE 90 UG/1
2 AEROSOL, METERED RESPIRATORY (INHALATION) EVERY 4 HOURS PRN
Qty: 18 G | Refills: 0 | Status: SHIPPED | OUTPATIENT
Start: 2018-02-16 | End: 2018-10-24 | Stop reason: SDUPTHER

## 2018-03-05 RX ORDER — ATORVASTATIN CALCIUM 40 MG/1
TABLET, FILM COATED ORAL
Qty: 90 TABLET | Refills: 0 | Status: SHIPPED | OUTPATIENT
Start: 2018-03-05 | End: 2018-04-12 | Stop reason: SDUPTHER

## 2018-04-09 RX ORDER — ATORVASTATIN CALCIUM 40 MG/1
TABLET, FILM COATED ORAL
Qty: 90 TABLET | Refills: 0 | OUTPATIENT
Start: 2018-04-09

## 2018-04-12 ENCOUNTER — OFFICE VISIT (OUTPATIENT)
Dept: CARDIOLOGY | Facility: CLINIC | Age: 78
End: 2018-04-12

## 2018-04-12 VITALS
BODY MASS INDEX: 27.21 KG/M2 | SYSTOLIC BLOOD PRESSURE: 118 MMHG | WEIGHT: 173.4 LBS | DIASTOLIC BLOOD PRESSURE: 62 MMHG | HEIGHT: 67 IN | HEART RATE: 69 BPM

## 2018-04-12 DIAGNOSIS — E78.2 MIXED HYPERLIPIDEMIA: ICD-10-CM

## 2018-04-12 DIAGNOSIS — I48.19 ATRIAL FIBRILLATION, PERSISTENT (HCC): Primary | ICD-10-CM

## 2018-04-12 DIAGNOSIS — I10 ESSENTIAL HYPERTENSION: ICD-10-CM

## 2018-04-12 PROCEDURE — 99213 OFFICE O/P EST LOW 20 MIN: CPT | Performed by: INTERNAL MEDICINE

## 2018-04-12 RX ORDER — TROLAMINE SALICYLATE 10 G/100G
CREAM TOPICAL AS NEEDED
Status: ON HOLD | COMMUNITY
End: 2020-01-01

## 2018-04-12 RX ORDER — ATORVASTATIN CALCIUM 40 MG/1
40 TABLET, FILM COATED ORAL DAILY
Qty: 90 TABLET | Refills: 2 | Status: SHIPPED | OUTPATIENT
Start: 2018-04-12 | End: 2019-03-12 | Stop reason: SDUPTHER

## 2018-04-12 NOTE — PROGRESS NOTES
Sarita Alegre  1940  [phone]      04/12/2018    Percy Moreland MD    Chief Complaint   Patient presents with   • chest discomfortment       Problem List:  1. Atrial fibrillation:  a. Newly diagnosed atrial fibrillation with controlled rate of unknown duration, [facility] ER presentation, 06/21/2015.   b. Recent history of echocardiogram, 05/18/2015, Dr. Dorantes, revealing normal LVEF. Mild AR. Mild MR. Moderate to severe TR.  c. EVITA/ECV, 06/24/2015. EF 55% to 60%. No thrombus. Mild to moderate mitral regurgitation. Moderate tricuspid regurgitation. RVSP 66 mmHg. External cardioversion with 2 joules to normal sinus rhythm.  d. Cardiac event monitor, 8/31/2016: placed for falls. 6.5 second pause.   e. PM implantation, 9/1/2016: The PM is a zLense model L101 serial number [id number] generator, the atrial lead is a Guidant model 4135 serial number [id number] lead, the right ventricular lead is a Guidant model 4136 serial number [id number] lead. The bradycardia pacing mode is DDIR with a lower rate of 70 upper rate of 130 bpm.    f. Watchman 9/26/2017: 21 mm watchman left atrial appendage occlusive device. ASAP-TOO studyDr. Marianne  2. Diastolic congestive heart failure.  3. Moderate pulmonary hypertension.   a. Cardiac catheterization, 6/5/16: Normal cardiac output and index. Severe pulmonary hypertension. No evidence of improvement with nitric oxide. No evidence of significant intracardiac shunting. Elevated RA and wedge pressures.   4. Chest pain:  a. History of normal echocardiogram as well as Cardiolite GXT, March 2011, Sridhar Bustamante MD.  b. Abnormal EKG revealing normal sinus rhythm with first degree AV block and poor precordial R-wave progression (no evidence of anterior infarct).  5. Hypertension.  6. Hyperlipidemia.  7. Type 2 diabetes mellitus.  8. History of tobacco abuse with cessation 30 years ago.  9. Obesity.  10. Obstructive sleep apnea with no CPAP  usage.  11. Asthma.  12. Depression.  13. Gastroesophageal reflux disease.  14. Hypothyroidism on chronic replacement therapy.  15. Bilateral hearing deficit with hearing aids.  16. Osteoarthritis.  17. History of left ankle fracture followed by Dr. Kennedy.  18. Surgical history:  a. Bilateral cataract extraction, 1996 and 1997.  b. Right knee repair, 1982.  c. Left elbow repair, 2006.  d. Hysterectomy.  e. Parathyroid gland removal.    Allergies   Allergen Reactions   • Propafenone Other (See Comments)     Hepatic failure   • Grass Cough   • Molds & Smuts Cough       Current Medications:      Current Outpatient Prescriptions:   •  albuterol (PROVENTIL HFA;VENTOLIN HFA) 108 (90 Base) MCG/ACT inhaler, Inhale 2 puffs Every 4 (Four) Hours As Needed for Wheezing., Disp: 18 g, Rfl: 0  •  albuterol (PROVENTIL HFA;VENTOLIN HFA) 108 (90 Base) MCG/ACT inhaler, Inhale 2 puffs Every 4 (Four) Hours As Needed for Wheezing., Disp: 18 g, Rfl: 3  •  aspirin 81 MG chewable tablet, Chew 1 tablet Daily., Disp: 30 tablet, Rfl: 6  •  bumetanide (BUMEX) 1 MG tablet, Take 1 mg by mouth Daily., Disp: , Rfl:   •  Cyanocobalamin (B-12 COMPLIANCE INJECTION) 1000 MCG/ML kit, Inject  as directed As Needed., Disp: , Rfl:   •  DULoxetine (CYMBALTA) 60 MG capsule, Take 60 mg by mouth Daily., Disp: , Rfl:   •  HYDROcodone-acetaminophen (NORCO) 5-325 MG per tablet, Take 1 tablet by mouth Every 6 (Six) Hours As Needed., Disp: , Rfl:   •  ketotifen (ZADITOR) 0.025 % ophthalmic solution, 1 drop 2 (Two) Times a Day As Needed (itchy eyes)., Disp: , Rfl:   •  levothyroxine (SYNTHROID, LEVOTHROID) 100 MCG tablet, Take 100 mcg by mouth Daily., Disp: , Rfl:   •  linaclotide (LINZESS) 145 MCG capsule capsule, Take 145 mcg by mouth Every Morning Before Breakfast., Disp: , Rfl:   •  metoprolol tartrate (LOPRESSOR) 25 MG tablet, Take 25 mg by mouth 2 (Two) Times a Day., Disp: , Rfl:   •  montelukast (SINGULAIR) 10 MG tablet, Take 10 mg by mouth Daily., Disp: ,  "Rfl:   •  Probiotic Product (ALIGN PO), Take 1 tablet by mouth Every Other Day. Last dose 9-23-17, Disp: , Rfl:   •  spironolactone (ALDACTONE) 25 MG tablet, Take 25 mg by mouth Daily., Disp: , Rfl:   •  trolamine salicylate (ASPERCREME) 10 % cream, Apply  topically As Needed for Muscle / Joint Pain. [place] Dream Relief cream, Disp: , Rfl:   •  vitamin C (ASCORBIC ACID) 500 MG tablet, Take 500 mg by mouth Daily., Disp: , Rfl:   •  atorvastatin (LIPITOR) 40 MG tablet, Take 1 tablet by mouth Daily., Disp: 90 tablet, Rfl: 2    HPI    Sarita Alegre presents today for 3 month follow up of atrial fibrillation, diastolic congestive heart failure, hypertension, and hyperlipidemia. Since last visit, patient has been doing well from a cardiac standpoint. Experiences some chest discomfort that begins when she is siting or laying down, and resolves spontaneously. Monitors her blood pressure. Has mild swelling in her right ankle, mainly located between the joints. Due to her arthritis, her finger will stiffen at times.  Will see arthritis MD soon. Continues to have pain in her inguinal region, but has not had any GI bleeds. Patient denies chest pain, palpitations, shortness of breath, edema, PND, orthopnea, dizziness, and syncope. Remains busy and active with no limitations.    The following portions of the patient's history were reviewed and updated as appropriate: allergies, current medications and problem list.    Pertinent positives as listed in the HPI.  All other systems reviewed are negative.    Vitals:    04/12/18 1439   BP: 118/62   BP Location: Right arm   Patient Position: Sitting   Pulse: 69   Weight: 78.7 kg (173 lb 6.4 oz)   Height: 170.2 cm (67\")       Physical Exam:  General: Alert and oriented to person, place, and time.  Neck: Jugular venous pressure is within normal limits. Carotids have normal upstrokes without bruits.   Cardiovascular: Regular rate and rhythm without murmur gallop or rub.  Lungs: " Clear without rales or wheezes. Equal expansion is noted.   Extremities: Show no edema.  Skin: warm and dry.  Neurologic: nonfocal    Diagnostic Data:    Procedures    Assessment:      ICD-10-CM ICD-9-CM   1. Atrial fibrillation, persistent I48.1 427.31   2. Essential hypertension I10 401.9   3. Mixed hyperlipidemia E78.2 272.2       Plan:    1. Continue current medications.  2. F/up in September 2018 or sooner if needed.    Scribed for Radha Cooper MD by Dony Jamison. 4/12/2018  3:45 PM     I Radha Cooper MD personally performed the services described in this documentation as scribed by the above individual in my presence, and it is both accurate and complete.    Radha Cooper MD, FACC

## 2018-04-25 ENCOUNTER — LAB (OUTPATIENT)
Dept: LAB | Facility: HOSPITAL | Age: 78
End: 2018-04-25

## 2018-04-25 ENCOUNTER — TRANSCRIBE ORDERS (OUTPATIENT)
Dept: LAB | Facility: HOSPITAL | Age: 78
End: 2018-04-25

## 2018-04-25 DIAGNOSIS — I13.10 BENIGN HYPERTENSIVE HEART AND RENAL DISEASE: ICD-10-CM

## 2018-04-25 DIAGNOSIS — N18.30 CHRONIC KIDNEY DISEASE, STAGE III (MODERATE) (HCC): ICD-10-CM

## 2018-04-25 DIAGNOSIS — I13.10 BENIGN HYPERTENSIVE HEART AND RENAL DISEASE: Primary | ICD-10-CM

## 2018-04-25 LAB
ALBUMIN SERPL-MCNC: 4.3 G/DL (ref 3.2–4.8)
ANION GAP SERPL CALCULATED.3IONS-SCNC: 5 MMOL/L (ref 3–11)
BASOPHILS # BLD AUTO: 0.04 10*3/MM3 (ref 0–0.2)
BASOPHILS NFR BLD AUTO: 0.5 % (ref 0–1)
BUN BLD-MCNC: 29 MG/DL (ref 9–23)
BUN/CREAT SERPL: 20.7 (ref 7–25)
CALCIUM SPEC-SCNC: 10 MG/DL (ref 8.7–10.4)
CHLORIDE SERPL-SCNC: 108 MMOL/L (ref 99–109)
CO2 SERPL-SCNC: 30 MMOL/L (ref 20–31)
CREAT BLD-MCNC: 1.4 MG/DL (ref 0.6–1.3)
DEPRECATED RDW RBC AUTO: 45.7 FL (ref 37–54)
EOSINOPHIL # BLD AUTO: 0.29 10*3/MM3 (ref 0–0.3)
EOSINOPHIL NFR BLD AUTO: 3.8 % (ref 0–3)
ERYTHROCYTE [DISTWIDTH] IN BLOOD BY AUTOMATED COUNT: 13.5 % (ref 11.3–14.5)
FERRITIN SERPL-MCNC: 35 NG/ML (ref 10–291)
GFR SERPL CREATININE-BSD FRML MDRD: 36 ML/MIN/1.73
GLUCOSE BLD-MCNC: 112 MG/DL (ref 70–100)
HCT VFR BLD AUTO: 40.2 % (ref 34.5–44)
HGB BLD-MCNC: 12.8 G/DL (ref 11.5–15.5)
IMM GRANULOCYTES # BLD: 0.04 10*3/MM3 (ref 0–0.03)
IMM GRANULOCYTES NFR BLD: 0.5 % (ref 0–0.6)
IRON 24H UR-MRATE: 81 MCG/DL (ref 50–175)
IRON SATN MFR SERPL: 26 % (ref 15–50)
LYMPHOCYTES # BLD AUTO: 2.26 10*3/MM3 (ref 0.6–4.8)
LYMPHOCYTES NFR BLD AUTO: 29.6 % (ref 24–44)
MCH RBC QN AUTO: 29.5 PG (ref 27–31)
MCHC RBC AUTO-ENTMCNC: 31.8 G/DL (ref 32–36)
MCV RBC AUTO: 92.6 FL (ref 80–99)
MONOCYTES # BLD AUTO: 0.56 10*3/MM3 (ref 0–1)
MONOCYTES NFR BLD AUTO: 7.3 % (ref 0–12)
NEUTROPHILS # BLD AUTO: 4.49 10*3/MM3 (ref 1.5–8.3)
NEUTROPHILS NFR BLD AUTO: 58.8 % (ref 41–71)
PHOSPHATE SERPL-MCNC: 3.8 MG/DL (ref 2.4–5.1)
PLATELET # BLD AUTO: 184 10*3/MM3 (ref 150–450)
PMV BLD AUTO: 10.1 FL (ref 6–12)
POTASSIUM BLD-SCNC: 5.1 MMOL/L (ref 3.5–5.5)
RBC # BLD AUTO: 4.34 10*6/MM3 (ref 3.89–5.14)
SODIUM BLD-SCNC: 143 MMOL/L (ref 132–146)
TIBC SERPL-MCNC: 317 MCG/DL (ref 250–450)
WBC NRBC COR # BLD: 7.64 10*3/MM3 (ref 3.5–10.8)

## 2018-04-25 PROCEDURE — 36415 COLL VENOUS BLD VENIPUNCTURE: CPT

## 2018-04-25 PROCEDURE — 83540 ASSAY OF IRON: CPT

## 2018-04-25 PROCEDURE — 80069 RENAL FUNCTION PANEL: CPT | Performed by: INTERNAL MEDICINE

## 2018-04-25 PROCEDURE — 83550 IRON BINDING TEST: CPT

## 2018-04-25 PROCEDURE — 85025 COMPLETE CBC W/AUTO DIFF WBC: CPT | Performed by: INTERNAL MEDICINE

## 2018-04-25 PROCEDURE — 82728 ASSAY OF FERRITIN: CPT

## 2018-04-27 ENCOUNTER — APPOINTMENT (OUTPATIENT)
Dept: LAB | Facility: HOSPITAL | Age: 78
End: 2018-04-27

## 2018-04-27 ENCOUNTER — TRANSCRIBE ORDERS (OUTPATIENT)
Dept: LAB | Facility: HOSPITAL | Age: 78
End: 2018-04-27

## 2018-04-27 DIAGNOSIS — N18.30 CHRONIC KIDNEY DISEASE, STAGE III (MODERATE) (HCC): Primary | ICD-10-CM

## 2018-04-27 LAB
BILIRUB UR QL STRIP: NEGATIVE
CLARITY UR: CLEAR
COLOR UR: YELLOW
GLUCOSE UR STRIP-MCNC: NEGATIVE MG/DL
HGB UR QL STRIP.AUTO: NEGATIVE
KETONES UR QL STRIP: NEGATIVE
LEUKOCYTE ESTERASE UR QL STRIP.AUTO: NEGATIVE
NITRITE UR QL STRIP: NEGATIVE
PH UR STRIP.AUTO: 6 [PH] (ref 5–8)
PROT UR QL STRIP: NEGATIVE
SP GR UR STRIP: 1.01 (ref 1–1.03)
UROBILINOGEN UR QL STRIP: NORMAL

## 2018-04-27 PROCEDURE — 81003 URINALYSIS AUTO W/O SCOPE: CPT | Performed by: INTERNAL MEDICINE

## 2018-05-31 ENCOUNTER — CLINICAL SUPPORT NO REQUIREMENTS (OUTPATIENT)
Dept: CARDIOLOGY | Facility: CLINIC | Age: 78
End: 2018-05-31

## 2018-05-31 DIAGNOSIS — I48.19 ATRIAL FIBRILLATION, PERSISTENT (HCC): ICD-10-CM

## 2018-05-31 PROCEDURE — 93296 REM INTERROG EVL PM/IDS: CPT | Performed by: INTERNAL MEDICINE

## 2018-05-31 PROCEDURE — 93294 REM INTERROG EVL PM/LDLS PM: CPT | Performed by: INTERNAL MEDICINE

## 2018-06-12 ENCOUNTER — OFFICE VISIT (OUTPATIENT)
Dept: ENDOCRINOLOGY | Facility: CLINIC | Age: 78
End: 2018-06-12

## 2018-06-12 VITALS
OXYGEN SATURATION: 99 % | WEIGHT: 175 LBS | HEART RATE: 71 BPM | BODY MASS INDEX: 27.47 KG/M2 | HEIGHT: 67 IN | DIASTOLIC BLOOD PRESSURE: 82 MMHG | SYSTOLIC BLOOD PRESSURE: 128 MMHG

## 2018-06-12 DIAGNOSIS — Z79.4 TYPE 2 DIABETES MELLITUS WITHOUT COMPLICATION, WITH LONG-TERM CURRENT USE OF INSULIN (HCC): ICD-10-CM

## 2018-06-12 DIAGNOSIS — I10 ESSENTIAL HYPERTENSION: Primary | ICD-10-CM

## 2018-06-12 DIAGNOSIS — E11.9 TYPE 2 DIABETES MELLITUS WITHOUT COMPLICATION, WITH LONG-TERM CURRENT USE OF INSULIN (HCC): ICD-10-CM

## 2018-06-12 DIAGNOSIS — E03.9 ACQUIRED HYPOTHYROIDISM: ICD-10-CM

## 2018-06-12 DIAGNOSIS — N18.30 CHRONIC KIDNEY DISEASE (CKD), STAGE III (MODERATE) (HCC): ICD-10-CM

## 2018-06-12 PROCEDURE — 99204 OFFICE O/P NEW MOD 45 MIN: CPT | Performed by: INTERNAL MEDICINE

## 2018-06-12 NOTE — PROGRESS NOTES
Sarita Alegre 78 y.o.  CC: Type 2 diabetes mellitus (re-establish care, A1C 7.2 last month, will request results)      [place]: Type 2 diabetes mellitus (re-establish care, A1C 7.2 last month, will request results)  new patient by dates  LOV 6/5/15- after that fell and broke leg, to hospital   Had toxic reaction to coumadin (inr went to 16 and went in to kidney, heart and liver failure)- [facility]   Got home with home health, got PT and nursing   Also had leg fracture on father's day (went to bathroom and had orthostatic syncope   Is currently on levemir 15 u daily   Still some ckd- creat 1.4  Lost 80 lbs with illness   Has gained 40 lbs back   Would like to lose 10 lbs  H/o chronic afib - intol to coumadin   Has pacemaker and watchman (Dr Lopes)  Is in study for watchman   Discussed a1c on levemir - accommodating planned weight loss discussed   Has had good eye exam- last 2 weeks ago (Dr Snell, retinal specialist, thinks cva with decreased vision in left eye )   PCP did lab work - Dr Moreland  No neuropathy - foot care via podiatry - has had gt toenails removed along with corns   Ur alb due- will check Dr Moreland labs    Allergies   Allergen Reactions   • Propafenone Other (See Comments)     Hepatic failure   • Grass Cough   • Molds & Smuts Cough       Current Outpatient Prescriptions:   •  albuterol (PROVENTIL HFA;VENTOLIN HFA) 108 (90 Base) MCG/ACT inhaler, Inhale 2 puffs Every 4 (Four) Hours As Needed for Wheezing., Disp: 18 g, Rfl: 0  •  albuterol (PROVENTIL HFA;VENTOLIN HFA) 108 (90 Base) MCG/ACT inhaler, Inhale 2 puffs Every 4 (Four) Hours As Needed for Wheezing., Disp: 18 g, Rfl: 3  •  aspirin 81 MG chewable tablet, Chew 1 tablet Daily., Disp: 30 tablet, Rfl: 6  •  atorvastatin (LIPITOR) 40 MG tablet, Take 1 tablet by mouth Daily., Disp: 90 tablet, Rfl: 2  •  bumetanide (BUMEX) 1 MG tablet, Take 1 mg by mouth Daily., Disp: , Rfl:   •  Cyanocobalamin (B-12  COMPLIANCE INJECTION) 1000 MCG/ML kit, Inject  as directed As Needed., Disp: , Rfl:   •  DULoxetine (CYMBALTA) 60 MG capsule, Take 60 mg by mouth Daily., Disp: , Rfl:   •  HYDROcodone-acetaminophen (NORCO) 5-325 MG per tablet, Take 1 tablet by mouth Every 6 (Six) Hours As Needed., Disp: , Rfl:   •  insulin detemir (LEVEMIR FLEXPEN) 100 UNIT/ML injection, Inject 15 Units under the skin Daily., Disp: , Rfl:   •  ketotifen (ZADITOR) 0.025 % ophthalmic solution, 1 drop 2 (Two) Times a Day As Needed (itchy eyes)., Disp: , Rfl:   •  levothyroxine (SYNTHROID, LEVOTHROID) 100 MCG tablet, Take 100 mcg by mouth Daily., Disp: , Rfl:   •  linaclotide (LINZESS) 145 MCG capsule capsule, Take 145 mcg by mouth Every Morning Before Breakfast., Disp: , Rfl:   •  metoprolol tartrate (LOPRESSOR) 25 MG tablet, Take 25 mg by mouth 2 (Two) Times a Day., Disp: , Rfl:   •  montelukast (SINGULAIR) 10 MG tablet, Take 10 mg by mouth Daily., Disp: , Rfl:   •  Probiotic Product (ALIGN PO), Take 1 tablet by mouth Every Other Day. Last dose 9-23-17, Disp: , Rfl:   •  spironolactone (ALDACTONE) 25 MG tablet, Take 25 mg by mouth Daily., Disp: , Rfl:   •  trolamine salicylate (ASPERCREME) 10 % cream, Apply  topically As Needed for Muscle / Joint Pain. 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     • CIELO (obstructive sleep apnea) [G47.33]     Overview Note:     UNTREATED; appeared to attempt to wear it sporadically 2007,  2008, split-night study of 03/10/2016 reported an overall AHI of 32.7/hr which required BiPAP therapy after failed CPAP attempt with  final BiPAP 17 and EPAP 11 which still reported oxygen desaturations on 2 L O2, her lowest oxygen saturation was documented at 64% and several arrhythmias were observed during the study.     • Upper GI hemorrhage [K92.2]     Overview Note:     S/p push endoscopy 3/21/17 by Dr. Haley  S/p EGD, colonoscopy and capsule endoscopy by Dr. Haley       • Anemia due to acute blood loss [D62]     Overview Note:     H/O multiple blood transfusions     • Supratherapeutic INR [R79.1]   • Chronic kidney disease (CKD), stage III (moderate) [N18.3]     Overview Note:     Status post temporary hemodialysis at [facility], 8/24/2015 through 9/29/2015, worsening      11/15  with hosp chf       • Hypertension [I10]   • Type 2 diabetes mellitus [E11.9]   • Obesity [E66.9]   • GERD (gastroesophageal reflux disease) [K21.9]   • Hypothyroidism [E03.9]     Overview Note:     On chronic replacement therapy.     • Tachy-marcela syndrome [I49.5]     Overview Note:     PPM 9/2016 Tristan     • Atrial fibrillation, persistent [I48.1]     Overview Note:     a. Diagnosed 6/21/15 at [facility] ER, duration unknowm.    b. Echocardiogram, 05/18/2015, Dr. Dorantes, revealing normal LVEF.  Mild AR.  Mild MR.  Moderate to severe TR.  c. EVITA/ECV, 06/24/2015.  EF 55% to 60%.  No thrombus.  Mild to moderate mitral regurgitation.  Moderate tricuspid regurgitation.  RVSP 66 mmHg.  External cardioversion with 2 joules to normal sinus rhythm  d. Status post cardioversion 8/25/15 & 9/3/15  e. CHADS-VASC = 6, currently not on anticoagulation secondary to GI bleed on warfarin as well as elevated INRs. Transfused with 3 units of blood in January 2017, AVMs cauterized. Recurrent GI bleed April 2017, and warfarin was stopped.         • Pulmonary emphysema [J43.9]     Overview Note:     Impression: 04/21/2015 - with wheezing, PND and GLASER  ?exac by bystolic- felt like she did better with hctz- now using lasix  weight stable   states breathng and  breathlessness are still her major problems   echo ok   may be worse with higher dose bystolic  having problems affording medications including inhalers; Description: A. FEV1 65%;  12/2014. H/O asthma; also long h/o cigs   B. Prior cigs; quit in 1980's after smoking 2-3 ppd for 25 yrs.- hosp for exacerbation 5/15 - PULM / NON CARDIAC DESPITE ELEVATED BNP, NORMAL EF     • Obstructive sleep apnea syndrome [G47.33]     Overview Note:     Severe  Description: A. UNTREATED; appeared to attempt to wear it sporadically 2007,  2008, split-night study of 03/10/2016 reported an overall AHI of 32.7/hr which required BiPAP therapy after failed CPAP attempt with final BiPAP 17 and EPAP 11 which still reported oxygen desaturations on 2 L O2, her lowest oxygen saturation was documented at 64% and several arrhythmias were observed during the study.  B. SLEEP STUDY; 11/2007, w/ AHI 57.8; followed by Dr. rader until 2008, during her sleep study CPAP of 7 treated her well; giving her an AHI of < 2.  Follow-up visits in the sleep clinic indicate she was still sleepy, he increased the pressure to 9, referred her to ENT to consider surgery.  C. Her weight at the time of the sleep study was the same as now; at 206 lbs.     • Severe chronic ulcerative colitis [K51.90]     Overview Note:     With rectal bleeding       Review of Systems   Constitutional: Positive for unexpected weight change. Negative for activity change, appetite change, chills, diaphoresis, fatigue and fever.   HENT: Positive for hearing loss. Negative for congestion, dental problem, drooling, ear discharge, ear pain, facial swelling, mouth sores, nosebleeds, postnasal drip, rhinorrhea, sinus pressure, sneezing, sore throat, tinnitus, trouble swallowing and voice change.    Eyes: Positive for itching and visual disturbance. Negative for photophobia, pain, discharge and redness.   Respiratory: Positive for cough, shortness of breath and wheezing. Negative for apnea,  choking, chest tightness and stridor.    Cardiovascular: Negative for chest pain, palpitations and leg swelling.   Gastrointestinal: Negative for abdominal distention, abdominal pain, anal bleeding, blood in stool, constipation, diarrhea, nausea, rectal pain and vomiting.   Endocrine: Negative for cold intolerance, heat intolerance, polydipsia, polyphagia and polyuria.   Genitourinary: Positive for urgency. Negative for decreased urine volume, difficulty urinating, dysuria, enuresis, flank pain, frequency, genital sores and hematuria.        Incontinence   Musculoskeletal: Negative for arthralgias, back pain, gait problem, joint swelling, myalgias, neck pain and neck stiffness.   Skin: Negative for color change, pallor, rash and wound.   Allergic/Immunologic: Negative for environmental allergies, food allergies and immunocompromised state.   Neurological: Positive for weakness. Negative for dizziness, tremors, seizures, syncope, facial asymmetry, speech difficulty, light-headedness, numbness and headaches.   Hematological: Negative for adenopathy. Does not bruise/bleed easily.   Psychiatric/Behavioral: Negative for agitation, behavioral problems, confusion, decreased concentration, dysphoric mood, hallucinations, self-injury, sleep disturbance and suicidal ideas. The patient is not nervous/anxious and is not hyperactive.      Social History     Social History   • Marital status:      Spouse name: N/A   • Number of children: N/A   • Years of education: N/A     Occupational History   • Not on file.     Social History Main Topics   • Smoking status: Former Smoker     Packs/day: 3.00     Years: 25.00     Types: Cigarettes     Quit date: 4/25/1982   • Smokeless tobacco: Never Used   • Alcohol use No   • Drug use: No   • Sexual activity: Defer      Comment:      Other Topics Concern   • Not on file     Social History Narrative   • No narrative on file     Family History   Problem Relation Age of Onset   •  "Asthma Mother    • Allergies Mother    • Colonic polyp Mother    • Mitral valve prolapse Mother    • Other Mother         a fib- rain knee replacement   • Heart disease Father    • Lung cancer Father         he was smoker   • Diabetes Other    • Colon cancer Son    • Breast cancer Neg Hx    • Ovarian cancer Neg Hx      /82   Pulse 71   Ht 170.2 cm (67\")   Wt 79.4 kg (175 lb)   LMP  (LMP Unknown) Comment: mammogram 2017   SpO2 99%   BMI 27.41 kg/m²   Physical Exam   Constitutional: She is oriented to person, place, and time. She appears well-developed and well-nourished.   HENT:   Head: Normocephalic and atraumatic.   Nose: Nose normal.   Mouth/Throat: Oropharynx is clear and moist.   Eyes: Conjunctivae, EOM and lids are normal. Pupils are equal, round, and reactive to light.   Neck: Trachea normal and normal range of motion. Neck supple. Carotid bruit is not present. No tracheal deviation present. No thyroid mass and no thyromegaly present.   Cardiovascular: Normal rate, regular rhythm, normal heart sounds and intact distal pulses.  Exam reveals no gallop and no friction rub.    No murmur heard.  Pulmonary/Chest: Effort normal and breath sounds normal. No respiratory distress. She has no wheezes.   Musculoskeletal: Normal range of motion. She exhibits no edema or deformity.    Sarita had a diabetic foot exam performed today.   During the foot exam she had a monofilament test performed.    Neurological Sensory Findings - Unaltered hot/cold right ankle/foot discrimination and unaltered hot/cold left ankle/foot discrimination. Unaltered sharp/dull right ankle/foot discrimination and unaltered sharp/dull left ankle/foot discrimination. No right ankle/foot altered proprioception and no left ankle/foot altered proprioception  Vascular Status -  Her right foot exhibits normal foot vasculature  and no edema. Her left foot exhibits normal foot vasculature  and no edema.  Skin Integrity  -  Her right foot skin is " intact.Her left foot skin is intact..  Lymphadenopathy:     She has no cervical adenopathy.   Neurological: She is alert and oriented to person, place, and time. She has normal reflexes. She displays normal reflexes. No cranial nerve deficit.   Skin: Skin is warm and dry. No rash noted. No cyanosis or erythema. Nails show no clubbing.   Psychiatric: She has a normal mood and affect. Her speech is normal and behavior is normal. Judgment and thought content normal. Cognition and memory are normal.   Nursing note and vitals reviewed.    Results for orders placed or performed in visit on 04/27/18   Urinalysis With / Microscopic If Indicated - Urine, Clean Catch   Result Value Ref Range    Color, UA Yellow Yellow, Straw    Appearance, UA Clear Clear    pH, UA 6.0 5.0 - 8.0    Specific Gravity, UA 1.012 1.001 - 1.030    Glucose, UA Negative Negative    Ketones, UA Negative Negative    Bilirubin, UA Negative Negative    Blood, UA Negative Negative    Protein, UA Negative Negative    Leuk Esterase, UA Negative Negative    Nitrite, UA Negative Negative    Urobilinogen, UA 1.0 E.U./dL 0.2 - 1.0 E.U./dL     Problem List Items Addressed This Visit        Cardiovascular and Mediastinum    Hypertension - Primary     bp is good overall   Continue current medications             Endocrine    Type 2 diabetes mellitus     Blood sugar and 90 day average sugar reviewed  a1c 7.2%  She is utd with eye exam  No neuropathy or foot lesion   Ur alb neg 9/17   Discussed goal for average sugar - is planning to work on weight loss of about 10 lbs  Is on insulin only   Some ckd with h/o liver failure in past, dialysis assoc with propafenone   F/u 3-4 months with additional medication if a1c remains elevated          Relevant Medications    insulin detemir (LEVEMIR FLEXPEN) 100 UNIT/ML injection    Hypothyroidism     tsh was normal 5/17              Genitourinary    Chronic kidney disease (CKD), stage III (moderate)     Monitor kidney function  and keep in mind when considering additional medications              Return in about 3 months (around 9/12/2018) for Recheck 30 min .    Michelle Landis MA  Signed Wanda Matthews MD

## 2018-06-13 NOTE — ASSESSMENT & PLAN NOTE
Blood sugar and 90 day average sugar reviewed  a1c 7.2%  She is utd with eye exam  No neuropathy or foot lesion   Ur alb neg 9/17   Discussed goal for average sugar - is planning to work on weight loss of about 10 lbs  Is on insulin only   Some ckd with h/o liver failure in past, dialysis assoc with propafenone   F/u 3-4 months with additional medication if a1c remains elevated

## 2018-07-27 ENCOUNTER — TRANSCRIBE ORDERS (OUTPATIENT)
Dept: ADMINISTRATIVE | Facility: HOSPITAL | Age: 78
End: 2018-07-27

## 2018-07-27 DIAGNOSIS — Z12.31 VISIT FOR SCREENING MAMMOGRAM: Primary | ICD-10-CM

## 2018-08-09 ENCOUNTER — HOSPITAL ENCOUNTER (OUTPATIENT)
Dept: MAMMOGRAPHY | Facility: HOSPITAL | Age: 78
Discharge: HOME | End: 2018-08-09
Attending: OBSTETRICS & GYNECOLOGY | Admitting: OBSTETRICS & GYNECOLOGY

## 2018-08-09 DIAGNOSIS — Z12.31 VISIT FOR SCREENING MAMMOGRAM: ICD-10-CM

## 2018-08-09 DIAGNOSIS — I48.0 PAROXYSMAL ATRIAL FIBRILLATION (HCC): Primary | ICD-10-CM

## 2018-08-09 PROCEDURE — 77063 BREAST TOMOSYNTHESIS BI: CPT

## 2018-08-09 PROCEDURE — 77067 SCR MAMMO BI INCL CAD: CPT | Performed by: RADIOLOGY

## 2018-08-09 PROCEDURE — 77063 BREAST TOMOSYNTHESIS BI: CPT | Performed by: RADIOLOGY

## 2018-08-09 PROCEDURE — 77067 SCR MAMMO BI INCL CAD: CPT

## 2018-08-13 ENCOUNTER — OFFICE VISIT (OUTPATIENT)
Dept: ONCOLOGY | Facility: CLINIC | Age: 78
End: 2018-08-13

## 2018-08-13 ENCOUNTER — LAB (OUTPATIENT)
Dept: LAB | Facility: HOSPITAL | Age: 78
End: 2018-08-13

## 2018-08-13 VITALS
BODY MASS INDEX: 28.66 KG/M2 | SYSTOLIC BLOOD PRESSURE: 189 MMHG | WEIGHT: 182.6 LBS | DIASTOLIC BLOOD PRESSURE: 87 MMHG | OXYGEN SATURATION: 97 % | TEMPERATURE: 97.2 F | RESPIRATION RATE: 18 BRPM | HEART RATE: 70 BPM | HEIGHT: 67 IN

## 2018-08-13 DIAGNOSIS — K90.9 IRON MALABSORPTION: ICD-10-CM

## 2018-08-13 DIAGNOSIS — D50.0 IRON DEFICIENCY ANEMIA DUE TO CHRONIC BLOOD LOSS: Primary | ICD-10-CM

## 2018-08-13 DIAGNOSIS — D50.0 IRON DEFICIENCY ANEMIA DUE TO CHRONIC BLOOD LOSS: ICD-10-CM

## 2018-08-13 LAB
ERYTHROCYTE [DISTWIDTH] IN BLOOD BY AUTOMATED COUNT: 14.7 % (ref 11.3–14.5)
FERRITIN SERPL-MCNC: 38 NG/ML (ref 10–291)
HCT VFR BLD AUTO: 41.4 % (ref 34.5–44)
HGB BLD-MCNC: 13.9 G/DL (ref 11.5–15.5)
IRON 24H UR-MRATE: 67 MCG/DL (ref 50–175)
IRON SATN MFR SERPL: 21 % (ref 15–50)
LYMPHOCYTES # BLD AUTO: 2.9 10*3/MM3 (ref 0.6–4.8)
LYMPHOCYTES NFR BLD AUTO: 32.9 % (ref 24–44)
MCH RBC QN AUTO: 30.1 PG (ref 27–31)
MCHC RBC AUTO-ENTMCNC: 33.6 G/DL (ref 32–36)
MCV RBC AUTO: 89.5 FL (ref 80–99)
MONOCYTES # BLD AUTO: 0.6 10*3/MM3 (ref 0–1)
MONOCYTES NFR BLD AUTO: 6.9 % (ref 0–12)
NEUTROPHILS # BLD AUTO: 5.4 10*3/MM3 (ref 1.5–8.3)
NEUTROPHILS NFR BLD AUTO: 60.2 % (ref 41–71)
PLATELET # BLD AUTO: 234 10*3/MM3 (ref 150–450)
PMV BLD AUTO: 7.1 FL (ref 6–12)
RBC # BLD AUTO: 4.63 10*6/MM3 (ref 3.89–5.14)
TIBC SERPL-MCNC: 322 MCG/DL (ref 250–450)
WBC NRBC COR # BLD: 8.9 10*3/MM3 (ref 3.5–10.8)

## 2018-08-13 PROCEDURE — 85025 COMPLETE CBC W/AUTO DIFF WBC: CPT

## 2018-08-13 PROCEDURE — 83550 IRON BINDING TEST: CPT

## 2018-08-13 PROCEDURE — 82728 ASSAY OF FERRITIN: CPT

## 2018-08-13 PROCEDURE — 36415 COLL VENOUS BLD VENIPUNCTURE: CPT

## 2018-08-13 PROCEDURE — 99214 OFFICE O/P EST MOD 30 MIN: CPT | Performed by: INTERNAL MEDICINE

## 2018-08-13 PROCEDURE — 83540 ASSAY OF IRON: CPT

## 2018-08-13 RX ORDER — LEVOCETIRIZINE DIHYDROCHLORIDE 5 MG/1
5 TABLET, FILM COATED ORAL EVERY EVENING
COMMUNITY
End: 2020-01-01 | Stop reason: SDUPTHER

## 2018-08-13 NOTE — PROGRESS NOTES
DATE OF VISIT: 8/13/2018    REASON FOR VISIT: Followup for iron deficiency anemia.     HISTORY OF PRESENT ILLNESS: The patient is a very pleasant 78 y.o. female with past medical history significant for iron deficiency anemia diagnosed March 2017 fsecondary to GI bleed.  The patient is here today for scheduled follow-up visit.    SUBJECTIVE: The patient is here today with her . She complains of chronic fatigue and dizziness and headaches. She denies nausea, vomiting and diarrhea.     PAST MEDICAL HISTORY/SOCIAL HISTORY/FAMILY HISTORY: Reviewed by me and unchanged from my documentation done on 02/12/18.    Review of Systems   Constitutional: Positive for fatigue. Negative for activity change, appetite change, chills, fever and unexpected weight change.   HENT: Negative for hearing loss, mouth sores, nosebleeds, sore throat and trouble swallowing.    Eyes: Negative for visual disturbance.   Respiratory: Negative for cough, chest tightness, shortness of breath and wheezing.    Cardiovascular: Negative for chest pain, palpitations and leg swelling.   Gastrointestinal: Negative for abdominal distention, abdominal pain, blood in stool, constipation, diarrhea, nausea, rectal pain and vomiting.   Endocrine: Negative for cold intolerance and heat intolerance.   Genitourinary: Negative for difficulty urinating, dysuria, frequency and urgency.   Musculoskeletal: Negative for arthralgias, back pain, gait problem, joint swelling and myalgias.   Skin: Negative for rash.   Neurological: Positive for dizziness, light-headedness and headaches. Negative for tremors, syncope, weakness and numbness.   Hematological: Negative for adenopathy. Does not bruise/bleed easily.   Psychiatric/Behavioral: Negative for confusion, sleep disturbance and suicidal ideas. The patient is not nervous/anxious.          Current Outpatient Prescriptions:   •  albuterol (PROVENTIL HFA;VENTOLIN HFA) 108 (90 Base) MCG/ACT inhaler, Inhale 2 puffs Every  4 (Four) Hours As Needed for Wheezing., Disp: 18 g, Rfl: 0  •  albuterol (PROVENTIL HFA;VENTOLIN HFA) 108 (90 Base) MCG/ACT inhaler, Inhale 2 puffs Every 4 (Four) Hours As Needed for Wheezing., Disp: 18 g, Rfl: 3  •  aspirin 81 MG chewable tablet, Chew 1 tablet Daily., Disp: 30 tablet, Rfl: 6  •  atorvastatin (LIPITOR) 40 MG tablet, Take 1 tablet by mouth Daily., Disp: 90 tablet, Rfl: 2  •  Biotin w/ Vitamins C & E (HAIR/SKIN/NAILS PO), Take 1 tablet by mouth Daily., Disp: , Rfl:   •  bumetanide (BUMEX) 1 MG tablet, Take 1 mg by mouth Daily., Disp: , Rfl:   •  Cholecalciferol (VITAMIN D3) 5000 units capsule capsule, Take 5,000 Units by mouth Daily., Disp: , Rfl:   •  Cyanocobalamin (B-12 COMPLIANCE INJECTION) 1000 MCG/ML kit, Inject  as directed As Needed., Disp: , Rfl:   •  DULoxetine (CYMBALTA) 60 MG capsule, Take 60 mg by mouth Daily., Disp: , Rfl:   •  HYDROcodone-acetaminophen (NORCO) 5-325 MG per tablet, Take 1 tablet by mouth Every 6 (Six) Hours As Needed., Disp: , Rfl:   •  insulin detemir (LEVEMIR FLEXPEN) 100 UNIT/ML injection, Inject 15 Units under the skin Daily., Disp: , Rfl:   •  ketotifen (ZADITOR) 0.025 % ophthalmic solution, 1 drop 2 (Two) Times a Day As Needed (itchy eyes)., Disp: , Rfl:   •  levocetirizine (XYZAL ALLERGY 24HR) 5 MG tablet, Take 5 mg by mouth Every Evening., Disp: , Rfl:   •  levothyroxine (SYNTHROID, LEVOTHROID) 100 MCG tablet, Take 100 mcg by mouth Daily., Disp: , Rfl:   •  linaclotide (LINZESS) 145 MCG capsule capsule, Take 145 mcg by mouth Every Morning Before Breakfast., Disp: , Rfl:   •  metoprolol tartrate (LOPRESSOR) 25 MG tablet, Take 25 mg by mouth 2 (Two) Times a Day., Disp: , Rfl:   •  montelukast (SINGULAIR) 10 MG tablet, Take 10 mg by mouth Daily., Disp: , Rfl:   •  Probiotic Product (ALIGN PO), Take 1 tablet by mouth Every Other Day. Last dose 9-23-17, Disp: , Rfl:   •  spironolactone (ALDACTONE) 25 MG tablet, Take 25 mg by mouth Daily., Disp: , Rfl:   •  trolamine  "salicylate (ASPERCREME) 10 % cream, Apply  topically As Needed for Muscle / Joint Pain. Australian Dream Relief cream, Disp: , Rfl:   •  vitamin C (ASCORBIC ACID) 500 MG tablet, Take 500 mg by mouth Daily., Disp: , Rfl:     PHYSICAL EXAMINATION:   BP (!) 189/87   Pulse 70   Temp 97.2 °F (36.2 °C) (Temporal Artery )   Resp 18   Ht 170.2 cm (67.01\")   Wt 82.8 kg (182 lb 9.6 oz)   LMP  (LMP Unknown) Comment: mammogram 2017   SpO2 97%   BMI 28.59 kg/m²    ECOG Performance Status: 1 - Symptomatic but completely ambulatory  General Appearance:  alert, cooperative, no apparent distress and appears stated age   Neurologic/Psychiatric: A&O x 3, gait steady, appropriate affect, strength 5/5 in all muscle groups   HEENT:  Normocephalic, without obvious abnormality, mucous membranes moist   Neck: Supple, symmetrical, trachea midline, no adenopathy;  No thyromegaly, masses, or tenderness   Lungs:   Clear to auscultation bilaterally; respirations regular, even, and unlabored bilaterally   Heart:  Regular rate and rhythm, no murmurs appreciated   Abdomen:   Soft, non-tender, non-distended and no organomegaly   Lymph nodes: No cervical, supraclavicular, inguinal or axillary adenopathy noted   Extremities: Normal, atraumatic; no clubbing, cyanosis, or edema    Skin: No rashes, ulcers, or suspicious lesions noted     Lab on 08/13/2018   Component Date Value Ref Range Status   • WBC 08/13/2018 8.90  3.50 - 10.80 10*3/mm3 Final   • RBC 08/13/2018 4.63  3.89 - 5.14 10*6/mm3 Final   • Hemoglobin 08/13/2018 13.9  11.5 - 15.5 g/dL Final   • Hematocrit 08/13/2018 41.4  34.5 - 44.0 % Final   • RDW 08/13/2018 14.7* 11.3 - 14.5 % Final   • MCV 08/13/2018 89.5  80.0 - 99.0 fL Final   • MCH 08/13/2018 30.1  27.0 - 31.0 pg Final   • MCHC 08/13/2018 33.6  32.0 - 36.0 g/dL Final   • MPV 08/13/2018 7.1  6.0 - 12.0 fL Final   • Platelets 08/13/2018 234  150 - 450 10*3/mm3 Final   • Neutrophil % 08/13/2018 60.2  41.0 - 71.0 % Final   • " Lymphocyte % 08/13/2018 32.9  24.0 - 44.0 % Final   • Monocyte % 08/13/2018 6.9  0.0 - 12.0 % Final   • Neutrophils, Absolute 08/13/2018 5.40  1.50 - 8.30 10*3/mm3 Final   • Lymphocytes, Absolute 08/13/2018 2.90  0.60 - 4.80 10*3/mm3 Final   • Monocytes, Absolute 08/13/2018 0.60  0.00 - 1.00 10*3/mm3 Final        Mammo Screening Digital Tomosynthesis Bilateral With Cad    Result Date: 8/10/2018  Narrative: ROUTINE SCREENING MAMMOGRAM  HISTORY: 78-year-old female for routine screening  IMAGE COMPARISON:  Prior exams, most recently dated 6/8/2017  TECHNIQUE: Low dose full field digital breast tomosynthesis imaging was performed with 2D and 3D acquisitions consisting of bilateral CC and MLO views. Bilateral two-dimensional nipple in profile CC views were performed as well.  FINDINGS: There are scattered areas of fibroglandular density. There is no worrisome mass, group of calcifications, or architectural distortion to suggest malignancy.      Impression: No findings suspicious for malignancy.  BI-RADS CATEGORY:  1, NEGATIVE  RECOMMENDATION: Yearly mammogram, yearly clinical breast exam, and encourage self breast awareness.  CAD was used.  The standard false negative rate of mammography is between 10% and 25%. Complex patterns or increased breast density will markedly elevate the false negative rate of mammography.  A letter, in lay terminology, with the results of this exam will be mailed to the patient.  This report was finalized on 8/10/2018 10:13 AM by Dr. Beatrice Foster MD.        ASSESSMENT: The patient is a very pleasant 78 y.o. female  with iron deficiency anemia.     PROBLEM LIST:  1. Iron deficiency anemia  2. 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  3. Status post acute GI bleeding  4. Repeat endoscopy with cauterization  5. Atrial fibrillation with previous Coumadin use- stopped 4/15/2017  6. Status post Watchman procedure done 9/26/2017.   7. Congestive heart failure with cardiac ascites  8.  Hypothyroid  9.  Hypercholesterolemia  10.  Hypertension       PLAN:  1.  I reviewed the blood work results with the patient. I told her she has no evidence of anemia with hemoglobin of 13.9. Her MCV is normal as well.   2. We will follow up on the iron studies from today and notify her of the results. We will arrange for IV iron replacement if needed for evidence of recurrent iron deficiency.   3 We will see the patient back in 12 months with repeat labs including CBC, ferritin, and iron profile.    4.  She will continue Synthroid 100 mcg daily for hypothyroidisms.   5. She will continue follow up with Dr. Lopes following Watchman procedure for atrial fibrillation.   6.  She will continue Lipitor 40 mg daily.  7.  She will continue Lopressor 25 mg daily       Mable Neal MD  8/13/2018

## 2018-08-14 ENCOUNTER — TELEPHONE (OUTPATIENT)
Dept: GASTROENTEROLOGY | Facility: CLINIC | Age: 78
End: 2018-08-14

## 2018-08-14 NOTE — TELEPHONE ENCOUNTER
Patient called and request that Radha order a Paracenetsis or ultrasound. I ask Radha. Radha stated that the fluid build up is from her heart failure not her liver. Please contast Cardiologist for Paracentesis and ultrasound order. Patient hasn't been seen at our office since 4/2017. Patient voiced understanding.

## 2018-08-27 ENCOUNTER — HOSPITAL ENCOUNTER (OUTPATIENT)
Dept: CARDIOLOGY | Facility: HOSPITAL | Age: 78
Discharge: HOME | End: 2018-08-27
Attending: INTERNAL MEDICINE | Admitting: INTERNAL MEDICINE

## 2018-08-27 VITALS
SYSTOLIC BLOOD PRESSURE: 119 MMHG | DIASTOLIC BLOOD PRESSURE: 96 MMHG | OXYGEN SATURATION: 90 % | RESPIRATION RATE: 12 BRPM | HEART RATE: 70 BPM

## 2018-08-27 DIAGNOSIS — I48.0 PAROXYSMAL ATRIAL FIBRILLATION (HCC): ICD-10-CM

## 2018-08-27 LAB
BH CV ECHO MEAS - LA DIMENSION: 3.5 CM
BH CV ECHO MEAS - RAP SYSTOLE: 3 MMHG
BH CV ECHO MEAS - RVSP: 55 MMHG
BH CV ECHO MEAS - TR MAX PG: 52
BH CV ECHO MEAS - TR MAX VEL: 353 CM/SEC
BH CV XLRA - RV MID: 3.6 CM
LV EF 2D ECHO EST: 55 %

## 2018-08-27 PROCEDURE — 93320 DOPPLER ECHO COMPLETE: CPT | Performed by: INTERNAL MEDICINE

## 2018-08-27 PROCEDURE — 93312 ECHO TRANSESOPHAGEAL: CPT | Performed by: INTERNAL MEDICINE

## 2018-08-27 PROCEDURE — 93325 DOPPLER ECHO COLOR FLOW MAPG: CPT

## 2018-08-27 PROCEDURE — 25010000002 FENTANYL CITRATE (PF) 100 MCG/2ML SOLUTION: Performed by: INTERNAL MEDICINE

## 2018-08-27 PROCEDURE — 93320 DOPPLER ECHO COMPLETE: CPT

## 2018-08-27 PROCEDURE — 25010000002 MIDAZOLAM PER 1 MG: Performed by: INTERNAL MEDICINE

## 2018-08-27 PROCEDURE — 93312 ECHO TRANSESOPHAGEAL: CPT

## 2018-08-27 PROCEDURE — 93325 DOPPLER ECHO COLOR FLOW MAPG: CPT | Performed by: INTERNAL MEDICINE

## 2018-08-27 RX ORDER — MIDAZOLAM HYDROCHLORIDE 1 MG/ML
INJECTION INTRAMUSCULAR; INTRAVENOUS
Status: COMPLETED | OUTPATIENT
Start: 2018-08-27 | End: 2018-08-27

## 2018-08-27 RX ORDER — FENTANYL CITRATE 50 UG/ML
INJECTION, SOLUTION INTRAMUSCULAR; INTRAVENOUS
Status: COMPLETED | OUTPATIENT
Start: 2018-08-27 | End: 2018-08-27

## 2018-08-27 RX ADMIN — MIDAZOLAM HYDROCHLORIDE 2 MG: 1 INJECTION, SOLUTION INTRAMUSCULAR; INTRAVENOUS at 10:11

## 2018-08-27 RX ADMIN — MIDAZOLAM HYDROCHLORIDE 2 MG: 1 INJECTION, SOLUTION INTRAMUSCULAR; INTRAVENOUS at 10:07

## 2018-08-27 RX ADMIN — METHOHEXITAL SODIUM 20 MG: 500 INJECTION, POWDER, LYOPHILIZED, FOR SOLUTION INTRAMUSCULAR; INTRAVENOUS; RECTAL at 10:09

## 2018-08-27 RX ADMIN — FENTANYL CITRATE 50 MCG: 50 INJECTION, SOLUTION INTRAMUSCULAR; INTRAVENOUS at 10:07

## 2018-08-27 RX ADMIN — FENTANYL CITRATE 50 MCG: 50 INJECTION, SOLUTION INTRAMUSCULAR; INTRAVENOUS at 10:11

## 2018-08-27 NOTE — H&P
[facility] Consult Note      Referring Provider: Sandeep Lopes MD  Primary Provider:  Percy Allison MD  Reason for Consultation: A-fib s/p 1 year Watchman    Patient Care Team:  Percy Allison MD as PCP - General (Internal Medicine)  Percy Allison MD as PCP - Claims Attributed    Chief complaint A-fib s/p 1 year Watchman    Identification:  78 year old  female    Problem list:    1. Atrial fibrillation:  a. Newly diagnosed atrial fibrillation with controlled rate of unknown duration, [facility] ER presentation, 06/21/2015.   b. Recent history of echocardiogram, 05/18/2015, Dr. Dorantes, revealing normal LVEF. Mild AR. Mild MR. Moderate to severe TR.  c. EVITA/ECV, 06/24/2015. EF 55% to 60%. No thrombus. Mild to moderate mitral regurgitation. Moderate tricuspid regurgitation. RVSP 66 mmHg. External cardioversion with 2 joules to normal sinus rhythm.  d. Cardiac event monitor, 8/31/2016: placed for falls. 6.5 second pause.   e. PM implantation, 9/1/2016: The PM is a [employer] model L101 serial number [id number] generator, the atrial lead is a Guidant model 4135 serial number [id number] lead, the right ventricular lead is a Guidant model 4136 serial number [id number] lead. The bradycardia pacing mode is DDIR with a lower rate of 70 upper rate of 130 bpm.    f. Watchman 9/26/2017: 21 mm watchman left atrial appendage occlusive device. ASAP-TOO studyDr. Marianne  2. Diastolic congestive heart failure.  3. Moderate pulmonary hypertension.   a. Cardiac catheterization, 6/5/16: Normal cardiac output and index. Severe pulmonary hypertension. No evidence of improvement with nitric oxide. No evidence of significant intracardiac shunting. Elevated RA and wedge pressures.   4. Chest pain:  a. History of normal echocardiogram as well as Cardiolite GXT, March 2011, Sridhar Bustamante MD.  b. Abnormal EKG revealing normal sinus rhythm with first degree AV block and poor precordial  R-wave progression (no evidence of anterior infarct).  5. Hypertension.  6. Hyperlipidemia.  7. Type 2 diabetes mellitus.  8. History of tobacco abuse with cessation 30 years ago.  9. Obesity.  10. Obstructive sleep apnea with no CPAP usage.  11. Asthma.  12. Depression.  13. Gastroesophageal reflux disease.  14. Hypothyroidism on chronic replacement therapy.  15. Bilateral hearing deficit with hearing aids.  16. Osteoarthritis.  17. History of left ankle fracture followed by Dr. Kennedy.  18. Surgical history:  a. Bilateral cataract extraction, 1996 and 1997.  b. Right knee repair, 1982.  c. Left elbow repair, 2006.  d. Hysterectomy.  e. Parathyroid gland removal.    Allergies:  Propafenone; Grass; and Molds & smuts    Home/Current Medications:      Current Outpatient Prescriptions:   •  albuterol (PROVENTIL HFA;VENTOLIN HFA) 108 (90 Base) MCG/ACT inhaler, Inhale 2 puffs Every 4 (Four) Hours As Needed for Wheezing., Disp: 18 g, Rfl: 0  •  albuterol (PROVENTIL HFA;VENTOLIN HFA) 108 (90 Base) MCG/ACT inhaler, Inhale 2 puffs Every 4 (Four) Hours As Needed for Wheezing., Disp: 18 g, Rfl: 3  •  aspirin 81 MG chewable tablet, Chew 1 tablet Daily., Disp: 30 tablet, Rfl: 6  •  atorvastatin (LIPITOR) 40 MG tablet, Take 1 tablet by mouth Daily., Disp: 90 tablet, Rfl: 2  •  Biotin w/ Vitamins C & E (HAIR/SKIN/NAILS PO), Take 1 tablet by mouth Daily., Disp: , Rfl:   •  bumetanide (BUMEX) 1 MG tablet, Take 1 mg by mouth Daily., Disp: , Rfl:   •  Cholecalciferol (VITAMIN D3) 5000 units capsule capsule, Take 5,000 Units by mouth Daily., Disp: , Rfl:   •  Cyanocobalamin (B-12 COMPLIANCE INJECTION) 1000 MCG/ML kit, Inject  as directed As Needed., Disp: , Rfl:   •  DULoxetine (CYMBALTA) 60 MG capsule, Take 60 mg by mouth Daily., Disp: , Rfl:   •  HYDROcodone-acetaminophen (NORCO) 5-325 MG per tablet, Take 1 tablet by mouth Every 6 (Six) Hours As Needed., Disp: , Rfl:   •  insulin detemir (LEVEMIR FLEXPEN) 100 UNIT/ML injection, Inject  15 Units under the skin Daily., Disp: , Rfl:   •  ketotifen (ZADITOR) 0.025 % ophthalmic solution, 1 drop 2 (Two) Times a Day As Needed (itchy eyes)., Disp: , Rfl:   •  levocetirizine (XYZAL ALLERGY 24HR) 5 MG tablet, Take 5 mg by mouth Every Evening., Disp: , Rfl:   •  levothyroxine (SYNTHROID, LEVOTHROID) 100 MCG tablet, Take 100 mcg by mouth Daily., Disp: , Rfl:   •  linaclotide (LINZESS) 145 MCG capsule capsule, Take 145 mcg by mouth Every Morning Before Breakfast., Disp: , Rfl:   •  metoprolol tartrate (LOPRESSOR) 25 MG tablet, Take 25 mg by mouth 2 (Two) Times a Day., Disp: , Rfl:   •  montelukast (SINGULAIR) 10 MG tablet, Take 10 mg by mouth Daily., Disp: , Rfl:   •  Probiotic Product (ALIGN PO), Take 1 tablet by mouth Every Other Day. Last dose 9-23-17, Disp: , Rfl:   •  spironolactone (ALDACTONE) 25 MG tablet, Take 25 mg by mouth Daily., Disp: , Rfl:   •  trolamine salicylate (ASPERCREME) 10 % cream, Apply  topically As Needed for Muscle / Joint Pain. [place] Dream Relief cream, Disp: , Rfl:   •  vitamin C (ASCORBIC ACID) 500 MG tablet, Take 500 mg by mouth Daily., Disp: , Rfl:       History of present illness:    Patient is a very pleasant 78-year-old female with the above-noted medical history presents today for EVITA to further assess watchman device that was placed in August 2017.  She is enrolled in a research study and this is following within the appropriate time frame to have this evaluated.  She has had no complications from the procedure and has no cardiac complaints today.  She denies having any chest pain, shortness of breath, dyspnea on exertion, edema, fatigue, palpitations, dizziness and syncope.  Overall, she states that she has done very well since having it placed approximately one year ago.      Cardiac Risk Factors:  Hypertension, hyperlipidemia, advanced age female, former smoker, sedentary lifestyle      Social History:  Social History     Social History   • Marital status:       Spouse name: N/A   • Number of children: N/A   • Years of education: N/A     Occupational History   • Not on file.     Social History Main Topics   • Smoking status: Former Smoker     Packs/day: 3.00     Years: 25.00     Types: Cigarettes     Quit date: 4/25/1982   • Smokeless tobacco: Never Used   • Alcohol use No   • Drug use: No   • Sexual activity: Defer      Comment:      Other Topics Concern   • Not on file     Social History Narrative   • No narrative on file     Family History:  Family History   Problem Relation Age of Onset   • Asthma Mother    • Allergies Mother    • Colonic polyp Mother    • Mitral valve prolapse Mother    • Other Mother         a fib- rain knee replacement   • Heart disease Father    • Lung cancer Father         he was smoker   • Diabetes Other    • Colon cancer Son    • Breast cancer Neg Hx    • Ovarian cancer Neg Hx      Review of Systems  Pertinent positives are listed in the HPI.  All other systems reviewed are negative.         Objective     Vital Sign Min/Max for last 24 hours  No Data Recorded   BP  Min: 142/75  Max: 142/75   Pulse  Min: 70  Max: 70   No Data Recorded   SpO2  Min: 93 %  Max: 93 %   No Data Recorded   No Data Recorded         Physical Exam:    GENERAL: well-developed, well-nourished; in no acute distress.   NECK:  There is no jugular venous distention at 30°.  Carotid upstrokes are 2+ and  symmetrical without bruits.   LUNGS: Clear to auscultation bilaterally without wheezing, rhonchi, or rales noted.   CARDIOVASCULAR: The heart has a regular rate with a normal S1 and S2. There is no murmur, gallop, rub, or click appreciated. The PMI is nondisplaced.   ABDOMEN: Soft and nontender  NEUROLOGICAL: Nonfocal; Alert and oriented  PERIPHERAL VASCULAR:  Posterior tibial pulses are 2+ and symmetrical. There is trace peripheral edema.   SKIN:  Warm and dry  PSYCHIATRIC: normal mood and affect; behavior appropriate     EKG:  NSR    Echocardiogram 8/31/17:  · Mild  mitral valve regurgitation is present  · Left ventricular systolic function is normal. Estimated EF = 56%.  · Left ventricular wall thickness is consistent with mild concentric hypertrophy.  · The aortic valve exhibits sclerosis.  · No evidence of pulmonary hypertension is present.  · There is no evidence of pericardial effusion.  · Normal right ventricular cavity size, wall thickness and systolic function noted.    Labs:        Lab Results   Component Value Date    TROPONINI 0.02 11/05/2015    TROPONINI 0.01 10/11/2015    TROPONINI 0.05 06/21/2015      No results found for: CHOL  Lab Results   Component Value Date    HDL 30 (L) 11/09/2015    HDL 41 05/18/2015    HDL 43 10/31/2014     No results found for: LDLDIRECT  Lab Results   Component Value Date    TRIG 83 11/09/2015    TRIG 64 09/28/2015    TRIG 108 09/21/2015     No components found for: CHOLHDL  Lab Results   Component Value Date    INR 1.00 12/05/2017    INR 1.01 09/25/2017    INR 1.08 05/01/2017    PROTIME 10.9 12/05/2017    PROTIME 11.0 09/25/2017    PROTIME 11.8 (H) 05/01/2017       Ejection Fraction:  56% per echo 8/2017      Results Review:  I reviewed the patients new clinical results.      Assessment:  1. Atrial fibrillation:  a. Newly diagnosed atrial fibrillation with controlled rate of unknown duration, [facility] ER presentation, 06/21/2015.   b. Recent history of echocardiogram, 05/18/2015, Dr. Dorantes, revealing normal LVEF. Mild AR. Mild MR. Moderate to severe TR.  c. EVITA/ECV, 06/24/2015. EF 55% to 60%. No thrombus. Mild to moderate mitral regurgitation. Moderate tricuspid regurgitation. RVSP 66 mmHg. External cardioversion with 2 joules to normal sinus rhythm.  d. Cardiac event monitor, 8/31/2016: placed for falls. 6.5 second pause.   e. PM implantation, 9/1/2016: The PM is a [employer] model L101 serial number [id number] generator, the atrial lead is a [employer] model 4135 serial number [id number] lead, the right ventricular  lead is a Guidant model 4136 serial number [id number] lead. The bradycardia pacing mode is DDIR with a lower rate of 70 upper rate of 130 bpm.    f. Watchman 9/26/2017: 21 mm watchman left atrial appendage occlusive device. ASAP-TOO studyDr. Marianne  2. Diastolic congestive heart failure.  3. Moderate pulmonary hypertension.   a. Cardiac catheterization, 6/5/16: Normal cardiac output and index. Severe pulmonary hypertension. No evidence of improvement with nitric oxide. No evidence of significant intracardiac shunting. Elevated RA and wedge pressures.   4. Chest pain:  a. History of normal echocardiogram as well as Cardiolite GXT, March 2011, Sirdhar Bustamante MD.  b. Abnormal EKG revealing normal sinus rhythm with first degree AV block and poor precordial R-wave progression (no evidence of anterior infarct).  5. Hypertension.   6.  Hyperlipidemia      Plan:  EVITA today with Dr. Radha Cooper.  The risks, benefits, potential complications of all been discussed with the patient and she is agreeable to proceed.    I discussed the patients findings and my recommendations with patient.    Scribed for Radha Cooper MD by KAILASH Mejia on 08/27/2018 at 9:40 AM         KAILASH uStton  08/27/18  9:40 AM    I Radha Cooper MD personally performed the services described in this documentation as scribed by the above individual in my presence, and it is both accurate and complete.    Radha Cooper MD, [facility]

## 2018-08-28 ENCOUNTER — TELEPHONE (OUTPATIENT)
Dept: PULMONOLOGY | Facility: CLINIC | Age: 78
End: 2018-08-28

## 2018-08-28 DIAGNOSIS — J43.9 PULMONARY EMPHYSEMA, UNSPECIFIED EMPHYSEMA TYPE (HCC): Primary | ICD-10-CM

## 2018-08-28 RX ORDER — BUDESONIDE AND FORMOTEROL FUMARATE DIHYDRATE 160; 4.5 UG/1; UG/1
2 AEROSOL RESPIRATORY (INHALATION) 2 TIMES DAILY
Qty: 1 INHALER | Refills: 5 | Status: SHIPPED | OUTPATIENT
Start: 2018-08-28 | End: 2018-10-24

## 2018-08-28 NOTE — TELEPHONE ENCOUNTER
Mrs. Alegre called and wanted to have a maintenance inhaler prescribed due to having to use her rescue inhaler more frequently during the humid days.  She is also going to be out of town and is concerned she may need something while she travels.  She states she has tried Symbicort in the past.  I called this in for her and she states she will follow-up with Dr. Thao in October.  I explained how to use the inhaler and she needs to rinse her mouth out after each use.  She is agreeable.

## 2018-08-29 ENCOUNTER — OFFICE VISIT (OUTPATIENT)
Dept: CARDIOLOGY | Facility: CLINIC | Age: 78
End: 2018-08-29

## 2018-08-29 VITALS
SYSTOLIC BLOOD PRESSURE: 116 MMHG | DIASTOLIC BLOOD PRESSURE: 84 MMHG | OXYGEN SATURATION: 95 % | HEIGHT: 67 IN | HEART RATE: 72 BPM | BODY MASS INDEX: 28.72 KG/M2 | WEIGHT: 183 LBS

## 2018-08-29 DIAGNOSIS — I48.19 ATRIAL FIBRILLATION, PERSISTENT (HCC): Primary | ICD-10-CM

## 2018-08-29 DIAGNOSIS — I49.5 TACHY-BRADY SYNDROME (HCC): ICD-10-CM

## 2018-08-29 DIAGNOSIS — I10 ESSENTIAL HYPERTENSION: ICD-10-CM

## 2018-08-29 PROCEDURE — 99213 OFFICE O/P EST LOW 20 MIN: CPT | Performed by: PHYSICIAN ASSISTANT

## 2018-08-29 PROCEDURE — 93280 PM DEVICE PROGR EVAL DUAL: CPT | Performed by: PHYSICIAN ASSISTANT

## 2018-08-29 RX ORDER — LOSARTAN POTASSIUM 50 MG/1
50 TABLET ORAL DAILY
COMMUNITY
End: 2018-09-19 | Stop reason: DRUGHIGH

## 2018-08-29 NOTE — PROGRESS NOTES
[facility] at [facility]   OFFICE NOTE      Sarita Alegre  1940  PCP: Percy Allison MD    SUBJECTIVE:   Sarita Alegre is a 78 y.o. female seen for a follow up visit regarding the following: afib, CHF, HTN, BSC pacemaker, Watchman    CC:Afib   Problem List:  1. Atrial fibrillation:  a. Newly diagnosed atrial fibrillation with controlled rate of unknown duration, [facility] ER presentation, 06/21/2015.   b. Recent history of echocardiogram, 05/18/2015, Dr. Dorantes, revealing normal LVEF. Mild AR. Mild MR. Moderate to severe TR.  c. EVITA/ECV, 06/24/2015. EF 55% to 60%. No thrombus. Mild to moderate mitral regurgitation. Moderate tricuspid regurgitation. RVSP 66 mmHg. External cardioversion with 2 joules to normal sinus rhythm.  d. Cardiac event monitor, 8/31/2016: placed for falls. 6.5 second pause.   e. PM implantation, 9/1/2016: The PM is a KOEZY model L101 serial number [id number] generator, the atrial lead is a Guidant model 4135 serial number [id number] lead, the right ventricular lead is a Guidant model 4136 serial number [id number] lead. The bradycardia pacing mode is DDIR with a lower rate of 70 upper rate of 130 bpm.    f. Watchman 9/26/2017: 21 mm watchman left atrial appendage occlusive device. ASA-TOO studyDr. Marianne  2. Diastolic congestive heart failure.  3. Moderate pulmonary hypertension.   a. Cardiac catheterization, 6/5/16: Normal cardiac output and index. Severe pulmonary hypertension. No evidence of improvement with nitric oxide. No evidence of significant intracardiac shunting. Elevated RA and wedge pressures.   4. Chest pain:  a. History of normal echocardiogram as well as Cardiolite GXT, March 2011, Sridhar Bustamante MD.  b. Abnormal EKG revealing normal sinus rhythm with first degree AV block and poor precordial R-wave progression (no evidence of anterior infarct).  5. Hypertension.  6. Hyperlipidemia.  7. Type 2 diabetes  mellitus.  8. History of tobacco abuse with cessation 30 years ago.  9. Obesity.  10. Obstructive sleep apnea with no CPAP usage.  11. Asthma.  12. Depression.  13. Gastroesophageal reflux disease.  14. Hypothyroidism on chronic replacement therapy.  15. Bilateral hearing deficit with hearing aids.  16. Osteoarthritis.  17. History of left ankle fracture followed by Dr. Kennedy.  18. Surgical history:  a. Bilateral cataract extraction, 1996 and 1997.  b. Right knee repair, 1982.  c. Left elbow repair, 2006.  d. Hysterectomy.  e. Parathyroid gland removal.     HPI:   Today I saw Mrs. Alegre for follow up regarding Afib, HTN, s/p watchman, and BSC pacemaker.  She recently had an EVITA revealing Watchman placement acceptable.  She denies any palpitations, dizziness, near syncope or syncope.  She reports her BP is controlled.  She denies any chest pain, or CHF symptoms.  She denies any CVA symptoms.   She is continuing to rehabilitation after her prolonged hospitalization 2015 she's had some neuropathy in her upper and lower extremities which is recovering at a slow but steady pace.         ROS:  Review of Symptoms:  General: no recent weight loss/gain, weakness or fatigue  Skin: no rashes, lumps, or other skin changes  HEENT: no dizziness, lightheadedness, or vision changes  Respiratory: no cough or hemoptysis  Cardiovascular: no palpitations, and tachycardia  Gastrointestinal: no black/tarry stools or diarrhea  Urinary: no change in frequency or urgency  Peripheral Vascular: no claudication or leg cramps  Musculoskeletal: no muscle or joint pain/stiffness  Psychiatric: no depression or excessive stress  Neurological: Leg weakness, arm weakness, since 2015  Hematologic: no anemia, easy bruising or bleeding  Endocrine: no thyroid problems, nor heat or cold intolerance    Cardiac PMH: (Old records have been reviewed and summarized below)      Past Medical History, Past Surgical History, Family history, Social History, and  Medications were all reviewed with the patient today and updated as necessary.         Allergies   Allergen Reactions   • Propafenone Other (See Comments)     Hepatic failure   • Grass Cough   • Molds & Smuts Cough     Patient Active Problem List   Diagnosis   • Atrial fibrillation, persistent (CMS/HCC)   • Pulmonary emphysema (CMS/HCC)   • Obstructive sleep apnea syndrome   • Severe chronic ulcerative colitis (CMS/HCC)   • Tachy-marcela syndrome (CMS/HCC)   • Hypertension   • Type 2 diabetes mellitus (CMS/[facility])   • Obesity   • GERD (gastroesophageal reflux disease)   • Hypothyroidism   • Chronic kidney disease (CKD), stage III (moderate)   • Secondary pulmonary hypertension   • CIELO (obstructive sleep apnea)   • Upper GI hemorrhage   • Anemia due to acute blood loss   • Supratherapeutic INR   • Iron deficiency anemia secondary to blood loss (chronic)   • Iron malabsorption     Past Medical History:   Diagnosis Date   • Adenomatous polyp of stomach    • Allergic rhinitis    • Anemia    • Asthma    • Atrial fibrillation (CMS/HCC)     Hospitalized at [facility] 6/2015, presented after dizziness and atrial fibrillation, treated with Rythmol and a beta blocker converted to sinus prior to discharge.   • BiPAP (biphasic positive airway pressure) dependence    • Chronic anticoagulation 6/14/2016   • Chronic diastolic congestive heart failure (CMS/HCC) 6/21/2016   • CKD (chronic kidney disease), stage III      Status post temporary hemodialysis at [facility], 8/24/2015 through 9/29/2015, worsening 11/15  with hosp chf   • Closed fracture of fibula     Followed by Dr. Kennedy   • COPD (chronic obstructive pulmonary disease) (CMS/[facility])    • Depression 9/7/2016   • Diabetes mellitus (CMS/[facility])    • Diabetic peripheral neuropathy (CMS/[facility]) 12/29/2016   • Dyslipidemia    • Gastrointestinal bleeding, lower 6/15/2016   • GERD (gastroesophageal reflux disease)    • Hearing aid worn    • Hearing deficit 9/7/2016   • Hiatal hernia    • History of abnormal  electrocardiogram     a bib, inferior infarct, st-t changes 10/12-referred to ER   • History of acute gastritis    • History of bilateral renal artery occlusion    • History of cataract    • History of deafness    • History of fracture of ankle 07/2015   • History of herpes zoster    • History of pneumonia    • History of transfusion    • Hyperparathyroidism (CMS/HCC)    • Hypertension    • Hypothyroidism 9/7/2016   • Insomnia    • Joint pain, knee    • Low back pain    • Obesity    • CIELO (obstructive sleep apnea)     UNTREATED; appeared to attempt to wear it sporadically 2007,  2008, split-night study of 03/10/2016 reported an overall AHI of 32.7/hr which required BiPAP therapy after failed CPAP attempt with final BiPAP 17 and EPAP 11 which still reported oxygen desaturations on 2 L O2, her lowest oxygen saturation was documented at 64% and several arrhythmias were observed during the study.   • Osteoarthritis 9/7/2016   • Pleural effusion      CT scan of the chest 8/28/2015, reports bilateral pleural effusions, right greater than left, simple in appearance with atelectatic changes identified of the right lung base.  Chest x-ray 10/12/2015, left pleural effusion still seen.   • Recurrent oral ulcers    • Retinal artery occlusion 12/29/2016    h/o   • Secondary pulmonary hypertension     EVITA: 6/24/15, RVSP 66mmhg; moderate MR, LVEF 55â‚¬â€œ60%, no pericardial effusion or atrial thrombus, no ASD.   • Tachy-marcela syndrome (CMS/HCC) 09/01/2016    PPM   • Tricuspid valve insufficiency 5/17/2016    Description: A.  Severe.   • Type 2 diabetes mellitus (CMS/HCC) 9/7/2016   • Vision loss 12/29/2016    Of the left eye   • Wears glasses      Past Surgical History:   Procedure Laterality Date   • APPENDECTOMY     • ATRIAL APPENDAGE EXCLUSION LEFT WITH TRANSESOPHAGEAL ECHOCARDIOGRAM N/A 9/26/2017    Procedure: Atrial Appendage Occlusion - PER ASAP-TOO PROTOCOL;  Surgeon: Sandeep Lopes MD;  Location: [facility]  "LOCATION;  Service:    • BLADDER SURGERY      x3   • CARDIAC ELECTROPHYSIOLOGY PROCEDURE N/A 9/1/2016    Procedure: Pacemaker DC new;  Surgeon: Zafar Hudson MD;  Location:  [facility];  Service:    • CARDIAC PACEMAKER PLACEMENT     • CARDIOVERSION      .  Status post cardioversion, 8/25/2015 and 9/3/2015, secondary to atrial fibrillation.   • CATARACT EXTRACTION Bilateral     1996  and 1997   • COLONOSCOPY N/A 6/17/2016    Procedure: COLONOSCOPY;  Surgeon: Edy Muñoz MD;  Location:  [facility];  Service:    • ELBOW PROCEDURE Left 2006    Left elbow repair   • ENDOSCOPY N/A 4/17/2017    Procedure: ESOPHAGOGASTRODUODENOSCOPY AT BEDSIDE;  Surgeon: Ollie White MD;  Location:  [facility];  Service:    • EYE SURGERY     • HYSTERECTOMY  1972   • KNEE SURGERY Right 1982   • OTHER SURGICAL HISTORY      History of hearing aid check   • PACEMAKER IMPLANTATION     • PARATHYROIDECTOMY     • UPPER GASTROINTESTINAL ENDOSCOPY       Family History   Problem Relation Age of Onset   • Asthma Mother    • Allergies Mother    • Colonic polyp Mother    • Mitral valve prolapse Mother    • Other Mother         a fib- rain knee replacement   • Heart disease Father    • Lung cancer Father         he was smoker   • Diabetes Other    • Colon cancer Son    • Breast cancer Neg Hx    • Ovarian cancer Neg Hx      Social History   Substance Use Topics   • Smoking status: Former Smoker     Packs/day: 3.00     Years: 25.00     Types: Cigarettes     Quit date: 4/25/1982   • Smokeless tobacco: Never Used   • Alcohol use No         PHYSICAL EXAM:    /84 (BP Location: Left arm, Patient Position: Sitting)   Pulse 72   Ht 170.2 cm (67\")   Wt 83 kg (183 lb)   LMP  (LMP Unknown) Comment: mammogram 2017   SpO2 95%   BMI 28.66 kg/m²        Wt Readings from Last 5 Encounters:   08/29/18 83 kg (183 lb)   08/13/18 82.8 kg (182 lb 9.6 oz)   06/12/18 79.4 kg (175 lb)   04/12/18 78.7 kg (173 lb 6.4 oz) "   02/12/18 78.5 kg (173 lb)       BP Readings from Last 5 Encounters:   08/29/18 116/84   08/27/18 119/96   08/13/18 (!) 189/87   06/12/18 128/82   04/12/18 118/62       General appearance - Alert, well appearing, and in no distress   Mental status - Affect appropriate to mood.  Eyes - Sclerae anicteric,  ENMT - Hearing grossly normal bilaterally, Dental hygiene good.  Neck - Carotids upstroke normal bilaterally, no bruits, no JVD.  Resp - Clear to auscultation, no wheezes, rales or rhonchi, symmetric air entry.  Heart - Normal rate, regular rhythm, normal S1, S2, no murmurs, rubs, clicks or gallops.  GI - Soft, nontender, nondistended, no masses or organomegaly.  Neurological - Grossly intact - normal speech, no focal findings  Musculoskeletal - No joint tenderness, deformity or swelling, no muscular tenderness noted.  Extremities - Peripheral pulses normal, no pedal edema, no clubbing or cyanosis.  Skin - Normal coloration and turgor.  Psych -  oriented to person, place, and time.    Medical problems and test results were reviewed with the patient today.     Recent Results (from the past 672 hour(s))   Ferritin    Collection Time: 08/13/18  2:27 PM   Result Value Ref Range    Ferritin 38.00 10.00 - 291.00 ng/mL   Iron Profile    Collection Time: 08/13/18  2:27 PM   Result Value Ref Range    Iron 67 50 - 175 mcg/dL    TIBC 322 250 - 450 mcg/dL    Iron Saturation 21 15 - 50 %   CBC Auto Differential    Collection Time: 08/13/18  2:27 PM   Result Value Ref Range    WBC 8.90 3.50 - 10.80 10*3/mm3    RBC 4.63 3.89 - 5.14 10*6/mm3    Hemoglobin 13.9 11.5 - 15.5 g/dL    Hematocrit 41.4 34.5 - 44.0 %    RDW 14.7 (H) 11.3 - 14.5 %    MCV 89.5 80.0 - 99.0 fL    MCH 30.1 27.0 - 31.0 pg    MCHC 33.6 32.0 - 36.0 g/dL    MPV 7.1 6.0 - 12.0 fL    Platelets 234 150 - 450 10*3/mm3    Neutrophil % 60.2 41.0 - 71.0 %    Lymphocyte % 32.9 24.0 - 44.0 %    Monocyte % 6.9 0.0 - 12.0 %    Neutrophils, Absolute 5.40 1.50 - 8.30 10*3/mm3     Lymphocytes, Absolute 2.90 0.60 - 4.80 10*3/mm3    Monocytes, Absolute 0.60 0.00 - 1.00 10*3/mm3   Adult Transesophageal Echo (EVITA) W/ Cont if Necessary Per Protocol    Collection Time: 08/27/18  9:21 AM   Result Value Ref Range    RV Mid 3.60 cm    RAP systole 3 mmHg    RVSP(TR) 55 mmHg    BH CV ECHO PEPE - TR MAX PG 52     LA dimension 3.5 cm    TR max andre 353 cm/sec    Echo EF Estimated 55 %           EVITA 8/18    · Left ventricular systolic function is normal. Estimated EF = 55%.  · Mild aortic valve regurgitation is present.  · Mild mitral valve regurgitation is present  · Moderate tricuspid valve regurgitation is present.  · Calculated right ventricular systolic pressure from tricuspid regurgitation is 55 mmHg.  · A 21 mm Watchman JUAN occlusion device is present within the JUAN. There is no flow around the device.       Pacemaker Interrogation:   KVK TEAM dual-chamber device.  A paced 86%.  RV paced 92%.  DDD IR at 70.  She is pacemaker dependent.  Atrial threshold 1.0 V at 0.4 ms.  RV threshold was 0.9 V at 0.4ms.  Atrial impedance 460 ohms.  RV impedance 573 ohms.  Battery voltage 5.5 years.       ASSESSMENT   1. Afib: No recurrent events. , Continue asa  2. HTN: controlled, on Losartan   3. S/p watchman, recent acceptable EVITA  4. [facility] pac[place]: Normal function.     PLAN  · Continue current medical therapy including asa.   · Return for follow up in one year.     8/29/2018  3:41 PM    Will Seema NYE

## 2018-08-30 ENCOUNTER — CLINICAL SUPPORT NO REQUIREMENTS (OUTPATIENT)
Dept: CARDIOLOGY | Facility: CLINIC | Age: 78
End: 2018-08-30

## 2018-08-30 DIAGNOSIS — I48.19 ATRIAL FIBRILLATION, PERSISTENT (HCC): ICD-10-CM

## 2018-09-19 ENCOUNTER — OFFICE VISIT (OUTPATIENT)
Dept: CARDIOLOGY | Facility: CLINIC | Age: 78
End: 2018-09-19

## 2018-09-19 ENCOUNTER — OFFICE VISIT (OUTPATIENT)
Dept: NEUROLOGY | Facility: CLINIC | Age: 78
End: 2018-09-19

## 2018-09-19 VITALS
HEIGHT: 67 IN | HEART RATE: 71 BPM | WEIGHT: 187 LBS | BODY MASS INDEX: 29.35 KG/M2 | DIASTOLIC BLOOD PRESSURE: 68 MMHG | SYSTOLIC BLOOD PRESSURE: 132 MMHG

## 2018-09-19 VITALS
DIASTOLIC BLOOD PRESSURE: 74 MMHG | SYSTOLIC BLOOD PRESSURE: 130 MMHG | WEIGHT: 186 LBS | HEIGHT: 67 IN | RESPIRATION RATE: 16 BRPM | HEART RATE: 69 BPM | BODY MASS INDEX: 29.19 KG/M2 | OXYGEN SATURATION: 98 %

## 2018-09-19 DIAGNOSIS — Z79.899 HIGH RISK MEDICATION USE: ICD-10-CM

## 2018-09-19 DIAGNOSIS — I10 ESSENTIAL HYPERTENSION: ICD-10-CM

## 2018-09-19 DIAGNOSIS — I48.19 ATRIAL FIBRILLATION, PERSISTENT (HCC): Primary | ICD-10-CM

## 2018-09-19 DIAGNOSIS — R06.09 DOE (DYSPNEA ON EXERTION): ICD-10-CM

## 2018-09-19 PROCEDURE — 99213 OFFICE O/P EST LOW 20 MIN: CPT | Performed by: PSYCHIATRY & NEUROLOGY

## 2018-09-19 PROCEDURE — 99214 OFFICE O/P EST MOD 30 MIN: CPT | Performed by: INTERNAL MEDICINE

## 2018-09-19 RX ORDER — METOLAZONE 2.5 MG/1
2.5 TABLET ORAL DAILY
Qty: 30 TABLET | Refills: 3 | Status: ON HOLD | OUTPATIENT
Start: 2018-09-19 | End: 2018-11-19

## 2018-09-19 RX ORDER — LOSARTAN POTASSIUM 25 MG/1
25 TABLET ORAL DAILY
Qty: 90 TABLET | Refills: 3 | Status: SHIPPED | OUTPATIENT
Start: 2018-09-19 | End: 2019-01-01

## 2018-09-19 NOTE — PROGRESS NOTES
Subjective:     Patient ID: Sarita Alegre is a 78 y.o. female.  Chief Complaint   Patient presents with   • Atrial Fibrillation       History of Present Illness     76 yo woman returns in followup  for ASAP TOO.      S/P implantation of Watchman device 9/26/17 without complication.   Stopped anti coagulation 6 weeks after placement.  No new neurological sx.      Problem history:    Dx with AF on 6/21/15.  Pm implantation on 9/1/16.  Severe pulm HTN.  Cannot take anti coagulation due to anemia from recurrent GI bleeding on Warfarin.  Multiple procedures to find source of GI bleeding; EGG, colonoscopy, capsule endoscoopy.  Hgb 9.6/Hct 33  Receiving IV iron from Dr Neal.  Multiple blood transfusions in past.      The following portions of the patient's history were reviewed and updated as appropriate: allergies, current medications, past family history, past medical history, past social history, past surgical history and problem list.    Review of Systems   Constitutional: Positive for fatigue. Negative for activity change and unexpected weight change.   HENT: Negative for tinnitus and trouble swallowing.    Eyes: Negative for photophobia and visual disturbance.   Respiratory: Positive for chest tightness. Negative for apnea, cough and choking.    Cardiovascular: Negative for leg swelling.   Gastrointestinal: Negative for nausea and vomiting.   Endocrine: Negative for cold intolerance and heat intolerance.   Genitourinary: Negative for difficulty urinating, frequency, menstrual problem and urgency.   Musculoskeletal: Negative for back pain, gait problem, myalgias and neck pain.   Skin: Negative for color change and rash.   Allergic/Immunologic: Negative for immunocompromised state.   Neurological: Negative for tremors, seizures, syncope, facial asymmetry, speech difficulty, light-headedness, numbness and headaches.   Hematological: Negative for adenopathy. Does not bruise/bleed easily.   Psychiatric/Behavioral:  "Negative for behavioral problems, confusion, decreased concentration, hallucinations and sleep disturbance.        Objective:  Vitals:    09/19/18 1138   BP: 130/74   BP Location: Right arm   Patient Position: Sitting   Cuff Size: Adult   Pulse: 69   Resp: 16   SpO2: 98%   Weight: 84.4 kg (186 lb)   Height: 170.2 cm (67\")     Neurologic Exam     Mental Status   Registration: recalls 3 of 3 objects. Recall at 5 minutes: recalls 3 of 3 objects. Follows 3 step commands.   Attention: normal. Concentration: normal.   Level of consciousness: alert  Knowledge: good and consistent with education.   Able to name object. Able to read. Able to repeat. Able to write. Normal comprehension.     Cranial Nerves     CN II   Visual fields full to confrontation.   Visual acuity: normal  Right visual field deficit: none  Left visual field deficit: none     CN III, IV, VI   Right pupil: Shape: regular. Reactivity: brisk. Consensual response: intact.   Left pupil: Shape: regular. Reactivity: brisk. Consensual response: intact.   Nystagmus: none   Diplopia: none  Ophthalmoparesis: none  Upgaze: normal  Downgaze: normal  Conjugate gaze: present  Vestibulo-ocular reflex: present    CN V   Facial sensation intact.   Right corneal reflex: normal  Left corneal reflex: normal    CN VII   Right facial weakness: none  Left facial weakness: none    CN VIII   Hearing: intact    CN IX, X   Palate: symmetric  Right gag reflex: normal  Left gag reflex: normal    CN XI   Right sternocleidomastoid strength: normal  Left sternocleidomastoid strength: normal    CN XII   Tongue: not atrophic  Fasciculations: absent  Tongue deviation: none    Motor Exam   Muscle bulk: normal  Overall muscle tone: normal  Right arm tone: normal  Left arm tone: normal  Right leg tone: normal  Left leg tone: normal    Sensory Exam   Light touch normal.   Vibration normal.   Proprioception normal.   Pinprick normal.     Gait, Coordination, and Reflexes     Tremor   Resting " tremor: absent  Intention tremor: absent  Action tremor: absent    Reflexes   Reflexes 2+ except as noted.       Physical Exam   Constitutional: She appears well-developed and well-nourished.   Nursing note and vitals reviewed.      Assessment/Plan:       Problems Addressed this Visit        Cardiovascular and Mediastinum    Atrial fibrillation, persistent (CMS/HCC) - Primary     No neurological sx after Watchman device

## 2018-09-19 NOTE — PROGRESS NOTES
Sarita GUTIÉRREZ Codey  1940  [phone]      09/19/2018    Percy Allison MD    Chief Complaint   Patient presents with   • Atrial Fibrillation       Problem List:  1. Atrial fibrillation:  a. Newly diagnosed atrial fibrillation with controlled rate of unknown duration, [facility] ER presentation, 06/21/2015.   b. Recent history of echocardiogram, 05/18/2015, Dr. Dorantes, revealing normal LVEF. Mild AR. Mild MR. Moderate to severe TR.  c. EVITA/ECV, 06/24/2015. EF 55% to 60%. No thrombus. Mild to moderate mitral regurgitation. Moderate tricuspid regurgitation. RVSP 66 mmHg. External cardioversion with 2 joules to normal sinus rhythm.  d. Cardiac event monitor, 8/31/2016: placed for falls. 6.5 second pause.   e. PM implantation, 9/1/2016: The PM is a [employer] model L101 serial number [id number] generator, the atrial lead is a Guidant model 4135 serial number [id number] lead, the right ventricular lead is a Guidant model 4136 serial number [id number] lead. The bradycardia pacing mode is DDIR with a lower rate of 70 upper rate of 130 bpm.  f. Echocardiogram, 8/31/2017: EF 56%. Mild MR. Aortic valve exhibits sclerosis  g. Watchman 9/26/2017: 21 mm watchman left atrial appendage occlusive device. ASAP-TOO study Dr. Marianne gutiérrez. EVITA, 12/18/2017: EF 60%. Mild aortic valve regurgitation. Mild MR. Moderate TR.  i. EVITA, 8/27/2018: EF 55%. Mild aortic valve regurgitation. Mild MR. Moderate TR. Calculated right ventricular systolic pressure from tricuspid regurgitation is 55 mmHg. A 21 mm Watchman JUAN occlusion device is present within the JUAN. There is no flow around the device.  2. Diastolic congestive heart failure.  3. Moderate pulmonary hypertension.   a. Cardiac catheterization, 6/5/16: Normal cardiac output and index. Severe pulmonary hypertension. No evidence of improvement with nitric oxide. No evidence of significant intracardiac shunting. Elevated RA and wedge pressures.   4. Chest  pain:  a. History of normal echocardiogram as well as Cardiolite GXT, March 2011, Sridhar Bustamante MD.  b. Abnormal EKG revealing normal sinus rhythm with first degree AV block and poor precordial R-wave progression (no evidence of anterior infarct).  5. Hypertension.  6. Hyperlipidemia.  7. Type 2 diabetes mellitus.  8. History of tobacco abuse with cessation 30 years ago.  9. Obesity.  10. Obstructive sleep apnea with no CPAP usage.  11. Asthma.  12. Depression.  13. Gastroesophageal reflux disease.  14. Hypothyroidism on chronic replacement therapy.  15. Bilateral hearing deficit with hearing aids.  16. Osteoarthritis.  17. History of left ankle fracture followed by Dr. Kennedy.  18. Surgical history:  a. Bilateral cataract extraction, 1996 and 1997.  b. Right knee repair, 1982.  c. Left elbow repair, 2006.  d. Hysterectomy.  e. Parathyroid gland removal.    Allergies   Allergen Reactions   • Propafenone Other (See Comments)     Hepatic failure   • Grass Cough   • Molds & Smuts Cough       Current Medications:      Current Outpatient Prescriptions:   •  albuterol (PROVENTIL HFA;VENTOLIN HFA) 108 (90 Base) MCG/ACT inhaler, Inhale 2 puffs Every 4 (Four) Hours As Needed for Wheezing., Disp: 18 g, Rfl: 0  •  Albuterol Sulfate (PROAIR HFA IN), Inhale As Needed., Disp: , Rfl:   •  aspirin 81 MG chewable tablet, Chew 1 tablet Daily., Disp: 30 tablet, Rfl: 6  •  atorvastatin (LIPITOR) 40 MG tablet, Take 1 tablet by mouth Daily., Disp: 90 tablet, Rfl: 2  •  Biotin w/ Vitamins C & E (HAIR/SKIN/NAILS PO), Take 1 tablet by mouth Daily., Disp: , Rfl:   •  budesonide-formoterol (SYMBICORT) 160-4.5 MCG/ACT inhaler, Inhale 2 puffs 2 (Two) Times a Day., Disp: 1 inhaler, Rfl: 5  •  bumetanide (BUMEX) 1 MG tablet, Take 1 mg by mouth Daily., Disp: , Rfl:   •  Cholecalciferol (VITAMIN D3) 5000 units capsule capsule, Take 5,000 Units by mouth Daily., Disp: , Rfl:   •  Cyanocobalamin (B-12 COMPLIANCE INJECTION) 1000 MCG/ML kit, Inject  as  directed As Needed., Disp: , Rfl:   •  DULoxetine (CYMBALTA) 60 MG capsule, Take 60 mg by mouth Daily., Disp: , Rfl:   •  HYDROcodone-acetaminophen (NORCO) 5-325 MG per tablet, Take 1 tablet by mouth Every 6 (Six) Hours As Needed., Disp: , Rfl:   •  insulin detemir (LEVEMIR FLEXPEN) 100 UNIT/ML injection, Inject 15 Units under the skin Daily., Disp: , Rfl:   •  ketotifen (ZADITOR) 0.025 % ophthalmic solution, 1 drop 2 (Two) Times a Day As Needed (itchy eyes)., Disp: , Rfl:   •  levocetirizine (XYZAL ALLERGY 24HR) 5 MG tablet, Take 5 mg by mouth Every Evening., Disp: , Rfl:   •  levothyroxine (SYNTHROID, LEVOTHROID) 100 MCG tablet, Take 100 mcg by mouth Daily., Disp: , Rfl:   •  linaclotide (LINZESS) 145 MCG capsule capsule, Take 145 mcg by mouth Every Morning Before Breakfast., Disp: , Rfl:   •  metoprolol tartrate (LOPRESSOR) 25 MG tablet, Take 25 mg by mouth 2 (Two) Times a Day., Disp: , Rfl:   •  montelukast (SINGULAIR) 10 MG tablet, Take 10 mg by mouth Daily., Disp: , Rfl:   •  Probiotic Product (ALIGN PO), Take 1 tablet by mouth Every Other Day. Last dose 9-23-17, Disp: , Rfl:   •  spironolactone (ALDACTONE) 25 MG tablet, Take 25 mg by mouth Daily., Disp: , Rfl:   •  trolamine salicylate (ASPERCREME) 10 % cream, Apply  topically As Needed for Muscle / Joint Pain. [place] Dream Relief cream, Disp: , Rfl:   •  vitamin C (ASCORBIC ACID) 500 MG tablet, Take 500 mg by mouth Daily., Disp: , Rfl:   •  losartan (COZAAR) 25 MG tablet, Take 1 tablet by mouth Daily., Disp: 90 tablet, Rfl: 3    HPI    Sarita Alegre presents today for 5 month follow up for afib, diastolic congestive heart failure, moderate pulmonary hypertension, hypertension and hyperlipidemia. Since last visit, she was in the [facility] with severe shortness of breath. She has was diagnosed with pneumonia of the left upper lung.  Her labs showed evidence of a slight troponin bump at 0.1 and 0.3.  She has not had a recent cardiac  "evaluation.  She has noted an increase in her weight and went into the hospital at 192 pounds and is now down to 188 by her home scale.  She sleeps with BiPAP for her sleep apnea. Patient denies chest pain, palpitations, edema, and syncope.     The following portions of the patient's history were reviewed and updated as appropriate: allergies, current medications and problem list.    Pertinent positives as listed in the HPI.  All other systems reviewed are negative.    Vitals:    09/19/18 1330   BP: 132/68   BP Location: Right arm   Patient Position: Sitting   Pulse: 71   Weight: 84.8 kg (187 lb)   Height: 170.2 cm (67\")       Physical Exam:    General: Alert and oriented  Neck: Jugular venous pressure is within normal limits. Carotids have normal upstrokes without bruits.   Cardiovascular: Heart has a nondisplaced focal PMI. Regular rate and rhythm without murmur, gallop or rub.  Lungs: Clear without rales or wheezes. Equal expansion is noted.   Extremities: Show no edema.  Skin: warm and dry.  Neurologic: nonfocal    Diagnostic Data:  Lab Results   Component Value Date    GLUCOSE 112 (H) 04/25/2018    BUN 29 (H) 04/25/2018    CREATININE 1.40 (H) 04/25/2018    EGFRIFNONA 36 (L) 04/25/2018    BCR 20.7 04/25/2018    K 5.1 04/25/2018    CO2 30.0 04/25/2018    CALCIUM 10.0 04/25/2018    PROTENTOTREF 7.1 12/14/2016    ALBUMIN 4.30 04/25/2018    LABIL2 0.9 12/14/2016    AST 16 04/16/2017    ALT 9 04/16/2017     Lab Results   Component Value Date    GLUCOSE 112 (H) 04/25/2018    CALCIUM 10.0 04/25/2018     04/25/2018    K 5.1 04/25/2018    CO2 30.0 04/25/2018     04/25/2018    BUN 29 (H) 04/25/2018    CREATININE 1.40 (H) 04/25/2018    EGFRIFNONA 36 (L) 04/25/2018    BCR 20.7 04/25/2018    ANIONGAP 5.0 04/25/2018     Lab Results   Component Value Date    CHLPL 142 11/09/2015    TRIG 83 11/09/2015    HDL 30 (L) 11/09/2015    LDL 94 11/09/2015     Lab Results   Component Value Date    WBC 8.90 08/13/2018    HGB " 13.9 08/13/2018    HCT 41.4 08/13/2018    MCV 89.5 08/13/2018     08/13/2018     Lab Results   Component Value Date    TSH 2.234 05/09/2017       Procedures    Assessment:      ICD-10-CM ICD-9-CM   1. Atrial fibrillation, persistent (CMS/HCC) I48.1 427.31   2. Essential hypertension I10 401.9   3.  Elevated troponin level at 0.1 and 0.3.  4.  Recent left upper lobe pneumonia    Plan:    1. Order BMP panel on Monday  2. Order a Cardiolite Lexiscan.  3. Start Metolazone 2.5 mg daily.  Consider discontinuing the Bumex if the metolazone works equally well depending on her last Monday.  4. Continue all other current medications.  5. F/up in 3 months or sooner if needed.    Scribed for Radha Cooper MD by Rashmi Biggs. 9/19/2018  1:51 PM     I Radha Cooper MD personally performed the services described in this documentation as scribed by the above individual in my presence, and it is both accurate and complete.    Radha Cooper MD, FACC

## 2018-09-25 ENCOUNTER — LAB (OUTPATIENT)
Dept: LAB | Facility: HOSPITAL | Age: 78
End: 2018-09-25

## 2018-09-25 DIAGNOSIS — Z79.899 HIGH RISK MEDICATION USE: ICD-10-CM

## 2018-09-25 LAB
ANION GAP SERPL CALCULATED.3IONS-SCNC: 15 MMOL/L (ref 3–11)
BUN BLD-MCNC: 51 MG/DL (ref 9–23)
BUN/CREAT SERPL: 24.1 (ref 7–25)
CALCIUM SPEC-SCNC: 9.4 MG/DL (ref 8.7–10.4)
CHLORIDE SERPL-SCNC: 97 MMOL/L (ref 99–109)
CO2 SERPL-SCNC: 29 MMOL/L (ref 20–31)
CREAT BLD-MCNC: 2.12 MG/DL (ref 0.6–1.3)
GFR SERPL CREATININE-BSD FRML MDRD: 22 ML/MIN/1.73
GLUCOSE BLD-MCNC: 195 MG/DL (ref 70–100)
POTASSIUM BLD-SCNC: 4.7 MMOL/L (ref 3.5–5.5)
SODIUM BLD-SCNC: 141 MMOL/L (ref 132–146)

## 2018-09-25 PROCEDURE — 82728 ASSAY OF FERRITIN: CPT | Performed by: INTERNAL MEDICINE

## 2018-09-25 PROCEDURE — 84443 ASSAY THYROID STIM HORMONE: CPT | Performed by: INTERNAL MEDICINE

## 2018-09-25 PROCEDURE — 80061 LIPID PANEL: CPT | Performed by: INTERNAL MEDICINE

## 2018-09-25 PROCEDURE — 80053 COMPREHEN METABOLIC PANEL: CPT

## 2018-09-25 PROCEDURE — 80048 BASIC METABOLIC PNL TOTAL CA: CPT

## 2018-09-27 ENCOUNTER — OFFICE VISIT (OUTPATIENT)
Dept: ENDOCRINOLOGY | Facility: CLINIC | Age: 78
End: 2018-09-27

## 2018-09-27 ENCOUNTER — TELEPHONE (OUTPATIENT)
Dept: CARDIOLOGY | Facility: CLINIC | Age: 78
End: 2018-09-27

## 2018-09-27 VITALS
HEIGHT: 68 IN | SYSTOLIC BLOOD PRESSURE: 112 MMHG | OXYGEN SATURATION: 98 % | HEART RATE: 71 BPM | DIASTOLIC BLOOD PRESSURE: 60 MMHG | BODY MASS INDEX: 27.28 KG/M2 | WEIGHT: 180 LBS

## 2018-09-27 DIAGNOSIS — D50.9 IRON DEFICIENCY ANEMIA, UNSPECIFIED IRON DEFICIENCY ANEMIA TYPE: ICD-10-CM

## 2018-09-27 DIAGNOSIS — D50.0 IRON DEFICIENCY ANEMIA DUE TO CHRONIC BLOOD LOSS: ICD-10-CM

## 2018-09-27 DIAGNOSIS — E11.9 TYPE 2 DIABETES MELLITUS WITHOUT COMPLICATION, WITH LONG-TERM CURRENT USE OF INSULIN (HCC): Primary | ICD-10-CM

## 2018-09-27 DIAGNOSIS — Z79.4 TYPE 2 DIABETES MELLITUS WITHOUT COMPLICATION, WITH LONG-TERM CURRENT USE OF INSULIN (HCC): Primary | ICD-10-CM

## 2018-09-27 DIAGNOSIS — E03.9 ACQUIRED HYPOTHYROIDISM: ICD-10-CM

## 2018-09-27 DIAGNOSIS — I10 ESSENTIAL HYPERTENSION: ICD-10-CM

## 2018-09-27 LAB
ALBUMIN SERPL-MCNC: 4.54 G/DL (ref 3.2–4.8)
ALBUMIN/GLOB SERPL: 2 G/DL (ref 1.5–2.5)
ALP SERPL-CCNC: 96 U/L (ref 25–100)
ALT SERPL W P-5'-P-CCNC: 24 U/L (ref 7–40)
ANION GAP SERPL CALCULATED.3IONS-SCNC: 19 MMOL/L (ref 3–11)
ARTICHOKE IGE QN: 90 MG/DL (ref 0–130)
AST SERPL-CCNC: 26 U/L (ref 0–33)
BASOPHILS # BLD AUTO: 0.09 10*3/MM3 (ref 0–0.2)
BASOPHILS NFR BLD AUTO: 0.9 % (ref 0–1)
BILIRUB SERPL-MCNC: 0.7 MG/DL (ref 0.3–1.2)
BUN BLD-MCNC: 53 MG/DL (ref 9–23)
BUN/CREAT SERPL: 23.8 (ref 7–25)
CALCIUM SPEC-SCNC: 9.8 MG/DL (ref 8.7–10.4)
CHLORIDE SERPL-SCNC: 101 MMOL/L (ref 99–109)
CHOLEST SERPL-MCNC: 140 MG/DL (ref 0–200)
CO2 SERPL-SCNC: 25 MMOL/L (ref 20–31)
CREAT BLD-MCNC: 2.23 MG/DL (ref 0.6–1.3)
DEPRECATED RDW RBC AUTO: 45 FL (ref 37–54)
EOSINOPHIL # BLD AUTO: 0.26 10*3/MM3 (ref 0–0.3)
EOSINOPHIL NFR BLD AUTO: 2.5 % (ref 0–3)
ERYTHROCYTE [DISTWIDTH] IN BLOOD BY AUTOMATED COUNT: 13.7 % (ref 11.3–14.5)
FERRITIN SERPL-MCNC: 71 NG/ML (ref 10–291)
GFR SERPL CREATININE-BSD FRML MDRD: 21 ML/MIN/1.73
GLOBULIN UR ELPH-MCNC: 2.3 GM/DL
GLUCOSE BLD-MCNC: 197 MG/DL (ref 70–100)
GLUCOSE BLDC GLUCOMTR-MCNC: 235 MG/DL (ref 70–130)
HBA1C MFR BLD: 7.3 %
HCT VFR BLD AUTO: 41.7 % (ref 34.5–44)
HDLC SERPL-MCNC: 34 MG/DL (ref 40–60)
HGB BLD-MCNC: 13.4 G/DL (ref 11.5–15.5)
IMM GRANULOCYTES # BLD: 0.08 10*3/MM3 (ref 0–0.03)
IMM GRANULOCYTES NFR BLD: 0.8 % (ref 0–0.6)
IRON 24H UR-MRATE: 99 MCG/DL (ref 50–175)
LYMPHOCYTES # BLD AUTO: 2.87 10*3/MM3 (ref 0.6–4.8)
LYMPHOCYTES NFR BLD AUTO: 27.5 % (ref 24–44)
MCH RBC QN AUTO: 29.1 PG (ref 27–31)
MCHC RBC AUTO-ENTMCNC: 32.1 G/DL (ref 32–36)
MCV RBC AUTO: 90.7 FL (ref 80–99)
MONOCYTES # BLD AUTO: 1.11 10*3/MM3 (ref 0–1)
MONOCYTES NFR BLD AUTO: 10.6 % (ref 0–12)
NEUTROPHILS # BLD AUTO: 6.11 10*3/MM3 (ref 1.5–8.3)
NEUTROPHILS NFR BLD AUTO: 58.5 % (ref 41–71)
PLATELET # BLD AUTO: 255 10*3/MM3 (ref 150–450)
PMV BLD AUTO: 10.4 FL (ref 6–12)
POTASSIUM BLD-SCNC: 4.7 MMOL/L (ref 3.5–5.5)
PROT SERPL-MCNC: 6.8 G/DL (ref 5.7–8.2)
RBC # BLD AUTO: 4.6 10*6/MM3 (ref 3.89–5.14)
SODIUM BLD-SCNC: 145 MMOL/L (ref 132–146)
TRIGL SERPL-MCNC: 165 MG/DL (ref 0–150)
TSH SERPL DL<=0.05 MIU/L-ACNC: 5.63 MIU/ML (ref 0.35–5.35)
WBC NRBC COR # BLD: 10.44 10*3/MM3 (ref 3.5–10.8)

## 2018-09-27 PROCEDURE — 85025 COMPLETE CBC W/AUTO DIFF WBC: CPT | Performed by: INTERNAL MEDICINE

## 2018-09-27 PROCEDURE — 82947 ASSAY GLUCOSE BLOOD QUANT: CPT | Performed by: INTERNAL MEDICINE

## 2018-09-27 PROCEDURE — 90662 IIV NO PRSV INCREASED AG IM: CPT | Performed by: INTERNAL MEDICINE

## 2018-09-27 PROCEDURE — G0008 ADMIN INFLUENZA VIRUS VAC: HCPCS | Performed by: INTERNAL MEDICINE

## 2018-09-27 PROCEDURE — 83540 ASSAY OF IRON: CPT | Performed by: INTERNAL MEDICINE

## 2018-09-27 PROCEDURE — 83036 HEMOGLOBIN GLYCOSYLATED A1C: CPT | Performed by: INTERNAL MEDICINE

## 2018-09-27 PROCEDURE — 99214 OFFICE O/P EST MOD 30 MIN: CPT | Performed by: INTERNAL MEDICINE

## 2018-09-27 NOTE — PROGRESS NOTES
Sarita Alegre 78 y.o.  CC:Follow-up; Diabetes (Type II, eye exam was 6/2018); Hypertension; and Hypothyroidism      [place]: Follow-up; Diabetes (Type II, eye exam was 6/2018); Hypertension; and Hypothyroidism    Blood sugar and 90 day average sugar reviewed  Results for orders placed or performed in visit on 09/27/18   POC Glycosylated Hemoglobin (Hb A1C)   Result Value Ref Range    Hemoglobin A1C 7.3 %   POC Glucose Fingerstick   Result Value Ref Range    Glucose 235 (A) 70 - 130 mg/dL   in interim was hospitalized for pneumonia after exposure to red tide  Also has stitch in left arm due to bleeding IV site  She is still on omnicef for pneumonia  bp is good  Has f/u with Dr Cooper with stress test   Is just taking levemir 20 mg daily   No longer taking humalog prior to meals   occ fasting sugar of 58 any when she sleeps in - discussed reducing levemir to 18 and monitor   Can add snack for low blood sugar at bedtime (sugar less than 120)  Energy is good overall    Allergies   Allergen Reactions   • Propafenone Other (See Comments)     Hepatic failure   • Grass Cough   • Molds & Smuts Cough       Current Outpatient Prescriptions:   •  albuterol (PROVENTIL HFA;VENTOLIN HFA) 108 (90 Base) MCG/ACT inhaler, Inhale 2 puffs Every 4 (Four) Hours As Needed for Wheezing., Disp: 18 g, Rfl: 0  •  Albuterol Sulfate (PROAIR HFA IN), Inhale As Needed., Disp: , Rfl:   •  aspirin 81 MG chewable tablet, Chew 1 tablet Daily., Disp: 30 tablet, Rfl: 6  •  atorvastatin (LIPITOR) 40 MG tablet, Take 1 tablet by mouth Daily., Disp: 90 tablet, Rfl: 2  •  Biotin w/ Vitamins C & E (HAIR/SKIN/NAILS PO), Take 1 tablet by mouth Daily., Disp: , Rfl:   •  budesonide-formoterol (SYMBICORT) 160-4.5 MCG/ACT inhaler, Inhale 2 puffs 2 (Two) Times a Day., Disp: 1 inhaler, Rfl: 5  •  bumetanide (BUMEX) 1 MG tablet, Take 1 mg by mouth Daily., Disp: , Rfl:   •  Cholecalciferol (VITAMIN D3) 5000 units capsule capsule, Take 5,000 Units by mouth  Daily., Disp: , Rfl:   •  Cyanocobalamin (B-12 COMPLIANCE INJECTION) 1000 MCG/ML kit, Inject  as directed As Needed., Disp: , Rfl:   •  DULoxetine (CYMBALTA) 60 MG capsule, Take 60 mg by mouth Daily., Disp: , Rfl:   •  HYDROcodone-acetaminophen (NORCO) 5-325 MG per tablet, Take 1 tablet by mouth Every 6 (Six) Hours As Needed., Disp: , Rfl:   •  insulin detemir (LEVEMIR FLEXPEN) 100 UNIT/ML injection, Inject 15-20 Units under the skin into the appropriate area as directed Daily., Disp: , Rfl:   •  ketotifen (ZADITOR) 0.025 % ophthalmic solution, 1 drop 2 (Two) Times a Day As Needed (itchy eyes)., Disp: , Rfl:   •  levocetirizine (XYZAL ALLERGY 24HR) 5 MG tablet, Take 5 mg by mouth Every Evening., Disp: , Rfl:   •  levothyroxine (SYNTHROID, LEVOTHROID) 100 MCG tablet, Take 100 mcg by mouth Daily., Disp: , Rfl:   •  linaclotide (LINZESS) 145 MCG capsule capsule, Take 145 mcg by mouth Every Morning Before Breakfast., Disp: , Rfl:   •  losartan (COZAAR) 25 MG tablet, Take 1 tablet by mouth Daily., Disp: 90 tablet, Rfl: 3  •  metOLazone (ZAROXOLYN) 2.5 MG tablet, Take 1 tablet by mouth Daily., Disp: 30 tablet, Rfl: 3  •  metoprolol tartrate (LOPRESSOR) 25 MG tablet, Take 25 mg by mouth 2 (Two) Times a Day., Disp: , Rfl:   •  montelukast (SINGULAIR) 10 MG tablet, Take 10 mg by mouth Daily., Disp: , Rfl:   •  Probiotic Product (ALIGN PO), Take 1 tablet by mouth Every Other Day. Last dose 9-23-17, Disp: , Rfl:   •  spironolactone (ALDACTONE) 25 MG tablet, Take 25 mg by mouth Daily., Disp: , Rfl:   •  trolamine salicylate (ASPERCREME) 10 % cream, Apply  topically As Needed for Muscle / Joint Pain. 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     • CIELO (obstructive sleep apnea) [G47.33]     Overview Note:     UNTREATED; appeared to attempt to wear it sporadically 2007,  2008, split-night study of 03/10/2016 reported an overall AHI of 32.7/hr which required BiPAP therapy after failed CPAP attempt with final BiPAP 17 and EPAP 11 which still reported oxygen desaturations on 2 L O2, her lowest oxygen saturation was documented at 64% and several arrhythmias were observed during the study.     • Upper GI hemorrhage [K92.2]     Overview Note:     S/p push endoscopy 3/21/17 by Dr. Haley  S/p EGD, colonoscopy and capsule endoscopy by Dr. Haley       • Anemia due to acute blood loss [D62]     Overview Note:     H/O multiple blood transfusions     • Supratherapeutic INR [R79.1]   • Chronic kidney disease (CKD), stage III (moderate) [N18.3]     Overview Note:     Status post temporary hemodialysis at [facility], 8/24/2015 through 9/29/2015, worsening      11/15  with hosp chf   creat 1.56 - baseline gfr 31     • Hypertension [I10]   • Type 2 diabetes mellitus (CMS/HCC) [E11.9]     Overview Note:     Alb : creat neg 5/18     • Obesity [E66.9]   • GERD (gastroesophageal reflux disease) [K21.9]   • Hypothyroidism [E03.9]     Overview Note:     On chronic replacement therapy.     • Tachy-marcela syndrome (CMS/HCC) [I49.5]     Overview Note:     PPM 9/2016 Tristan     • Atrial fibrillation, persistent (CMS/HCC) [I48.1]     Overview Note:     a. Diagnosed 6/21/15 at [facility] ER, duration unknowm.    b. Echocardiogram, 05/18/2015, Dr. Dorantes, revealing normal LVEF.  Mild AR.  Mild MR.  Moderate to severe TR.  c. EVITA/ECV, 06/24/2015.  EF 55% to 60%.  No thrombus.  Mild to moderate mitral regurgitation.  Moderate tricuspid regurgitation.  RVSP 66 mmHg.  External cardioversion with 2 joules to normal sinus rhythm  d. Status post cardioversion  8/25/15 & 9/3/15  e. CHADS-VASC = 6, currently not on anticoagulation secondary to GI bleed on warfarin as well as elevated INRs. Transfused with 3 units of blood in January 2017, AVMs cauterized. Recurrent GI bleed April 2017, and warfarin was stopped.         • Pulmonary emphysema (CMS/HCC) [J43.9]     Overview Note:     Impression: 04/21/2015 - with wheezing, PND and GLASER  ?exac by bystolic- felt like she did better with hctz- now using lasix  weight stable   states breathng and breathlessness are still her major problems   echo ok   may be worse with higher dose bystolic  having problems affording medications including inhalers; Description: A. FEV1 65%;  12/2014. H/O asthma; also long h/o cigs   B. Prior cigs; quit in 1980's after smoking 2-3 ppd for 25 yrs.- hosp for exacerbation 5/15 - PULM / NON CARDIAC DESPITE ELEVATED BNP, NORMAL EF     • Obstructive sleep apnea syndrome [G47.33]     Overview Note:     Severe  Description: A. UNTREATED; appeared to attempt to wear it sporadically 2007,  2008, split-night study of 03/10/2016 reported an overall AHI of 32.7/hr which required BiPAP therapy after failed CPAP attempt with final BiPAP 17 and EPAP 11 which still reported oxygen desaturations on 2 L O2, her lowest oxygen saturation was documented at 64% and several arrhythmias were observed during the study.  B. SLEEP STUDY; 11/2007, w/ AHI 57.8; followed by Dr. rader until 2008, during her sleep study CPAP of 7 treated her well; giving her an AHI of < 2.  Follow-up visits in the sleep clinic indicate she was still sleepy, he increased the pressure to 9, referred her to ENT to consider surgery.  C. Her weight at the time of the sleep study was the same as now; at 206 lbs.     • Severe chronic ulcerative colitis (CMS/HCC) [K51.90]     Overview Note:     With rectal bleeding       Review of Systems   Constitutional: Negative for activity change, appetite change, chills, diaphoresis, fatigue, fever and  unexpected weight change.   HENT: Negative for congestion, dental problem, drooling, ear discharge, ear pain, facial swelling, hearing loss, mouth sores, nosebleeds, postnasal drip, rhinorrhea, sinus pressure, sneezing, sore throat, tinnitus, trouble swallowing and voice change.    Eyes: Negative for photophobia, pain, discharge, redness, itching and visual disturbance.   Respiratory: Negative for apnea, cough, choking, chest tightness, shortness of breath, wheezing and stridor.    Cardiovascular: Negative for chest pain, palpitations and leg swelling.   Gastrointestinal: Negative for abdominal distention, abdominal pain, anal bleeding, blood in stool, constipation, diarrhea, nausea, rectal pain and vomiting.   Endocrine: Negative for cold intolerance, heat intolerance, polydipsia, polyphagia and polyuria.   Genitourinary: Negative for decreased urine volume, difficulty urinating, dysuria, enuresis, flank pain, frequency, genital sores, hematuria and urgency.   Musculoskeletal: Negative for arthralgias, back pain, gait problem, joint swelling, myalgias, neck pain and neck stiffness.   Skin: Negative for color change, pallor, rash and wound.   Allergic/Immunologic: Negative for environmental allergies, food allergies and immunocompromised state.   Neurological: Negative for dizziness, tremors, seizures, syncope, facial asymmetry, speech difficulty, weakness, light-headedness, numbness and headaches.   Hematological: Negative for adenopathy. Does not bruise/bleed easily.   Psychiatric/Behavioral: Negative for agitation, behavioral problems, confusion, decreased concentration, dysphoric mood, hallucinations, self-injury, sleep disturbance and suicidal ideas. The patient is not nervous/anxious and is not hyperactive.      Social History     Social History   • Marital status:      Spouse name: N/A   • Number of children: N/A   • Years of education: N/A     Occupational History   • Not on file.     Social History  "Main Topics   • Smoking status: Former Smoker     Packs/day: 3.00     Years: 25.00     Types: Cigarettes     Quit date: 4/25/1982   • Smokeless tobacco: Never Used   • Alcohol use No   • Drug use: No   • Sexual activity: Defer      Comment:      Other Topics Concern   • Not on file     Social History Narrative   • No narrative on file     Family History   Problem Relation Age of Onset   • Asthma Mother    • Allergies Mother    • Colonic polyp Mother    • Mitral valve prolapse Mother    • Other Mother         a fib- rain knee replacement   • Heart disease Father    • Lung cancer Father         he was smoker   • Diabetes Other    • Colon cancer Son    • Breast cancer Neg Hx    • Ovarian cancer Neg Hx      /60   Pulse 71   Ht 171.5 cm (67.5\")   Wt 81.6 kg (180 lb)   LMP  (LMP Unknown) Comment: mammogram 2017   SpO2 98%   Breastfeeding? No   BMI 27.78 kg/m²   Physical Exam   Constitutional: She is oriented to person, place, and time. She appears well-developed and well-nourished.   HENT:   Head: Normocephalic and atraumatic.   Nose: Nose normal.   Mouth/Throat: Oropharynx is clear and moist.   Eyes: Pupils are equal, round, and reactive to light. Conjunctivae, EOM and lids are normal.   Neck: Trachea normal and normal range of motion. Neck supple. Carotid bruit is not present. No tracheal deviation present. No thyroid mass and no thyromegaly present.   Cardiovascular: Normal rate, regular rhythm, normal heart sounds and intact distal pulses.  Exam reveals no gallop and no friction rub.    No murmur heard.  Pulmonary/Chest: Effort normal and breath sounds normal. No respiratory distress. She has no wheezes.   Musculoskeletal: Normal range of motion. She exhibits no edema or deformity.    Sarita had a diabetic foot exam performed today.   During the foot exam she had a monofilament test performed.    Neurological Sensory Findings - Unaltered hot/cold right ankle/foot discrimination and unaltered " hot/cold left ankle/foot discrimination. Unaltered sharp/dull right ankle/foot discrimination and unaltered sharp/dull left ankle/foot discrimination. No right ankle/foot altered proprioception and no left ankle/foot altered proprioception  Vascular Status -  Her right foot exhibits normal foot vasculature  and no edema. Her left foot exhibits normal foot vasculature  and no edema.  Skin Integrity  -  Her right foot skin is intact.Her left foot skin is intact..  Lymphadenopathy:     She has no cervical adenopathy.   Neurological: She is alert and oriented to person, place, and time. She has normal reflexes. She displays normal reflexes. No cranial nerve deficit.   Skin: Skin is warm and dry. No rash noted. No cyanosis or erythema. Nails show no clubbing.   Psychiatric: She has a normal mood and affect. Her speech is normal and behavior is normal. Judgment and thought content normal. Cognition and memory are normal.   Nursing note and vitals reviewed.    Results for orders placed or performed in visit on 09/25/18   Basic Metabolic Panel   Result Value Ref Range    Glucose 195 (H) 70 - 100 mg/dL    BUN 51 (H) 9 - 23 mg/dL    Creatinine 2.12 (H) 0.60 - 1.30 mg/dL    Sodium 141 132 - 146 mmol/L    Potassium 4.7 3.5 - 5.5 mmol/L    Chloride 97 (L) 99 - 109 mmol/L    CO2 29.0 20.0 - 31.0 mmol/L    Calcium 9.4 8.7 - 10.4 mg/dL    eGFR Non African Amer 22 (L) >60 mL/min/1.73    BUN/Creatinine Ratio 24.1 7.0 - 25.0    Anion Gap 15.0 (H) 3.0 - 11.0 mmol/L     Sarita was seen today for follow-up, diabetes, hypertension and hypothyroidism.    Diagnoses and all orders for this visit:    Type 2 diabetes mellitus without complication, with long-term current use of insulin (CMS/[facility])  -     POC Glycosylated Hemoglobin (Hb A1C)  -     POC Glucose Fingerstick      Problem List Items Addressed This Visit        Cardiovascular and Mediastinum    Hypertension     bp low after hospitalization             Endocrine    Type 2 diabetes  mellitus (CMS/HCC) - Primary     Blood sugar and 90 day average sugar reviewed  Results for orders placed or performed in visit on 09/27/18   Comprehensive Metabolic Panel   Result Value Ref Range    Glucose 197 (H) 70 - 100 mg/dL    BUN 53 (H) 9 - 23 mg/dL    Creatinine 2.23 (H) 0.60 - 1.30 mg/dL    Sodium 145 132 - 146 mmol/L    Potassium 4.7 3.5 - 5.5 mmol/L    Chloride 101 99 - 109 mmol/L    CO2 25.0 20.0 - 31.0 mmol/L    Calcium 9.8 8.7 - 10.4 mg/dL    Total Protein 6.8 5.7 - 8.2 g/dL    Albumin 4.54 3.20 - 4.80 g/dL    ALT (SGPT) 24 7 - 40 U/L    AST (SGOT) 26 0 - 33 U/L    Alkaline Phosphatase 96 25 - 100 U/L    Total Bilirubin 0.7 0.3 - 1.2 mg/dL    eGFR Non African Amer 21 (L) >60 mL/min/1.73    Globulin 2.3 gm/dL    A/G Ratio 2.0 1.5 - 2.5 g/dL    BUN/Creatinine Ratio 23.8 7.0 - 25.0    Anion Gap 19.0 (H) 3.0 - 11.0 mmol/L   CBC Auto Differential   Result Value Ref Range    WBC 10.44 3.50 - 10.80 10*3/mm3    RBC 4.60 3.89 - 5.14 10*6/mm3    Hemoglobin 13.4 11.5 - 15.5 g/dL    Hematocrit 41.7 34.5 - 44.0 %    MCV 90.7 80.0 - 99.0 fL    MCH 29.1 27.0 - 31.0 pg    MCHC 32.1 32.0 - 36.0 g/dL    RDW 13.7 11.3 - 14.5 %    RDW-SD 45.0 37.0 - 54.0 fl    MPV 10.4 6.0 - 12.0 fL    Platelets 255 150 - 450 10*3/mm3    Neutrophil % 58.5 41.0 - 71.0 %    Lymphocyte % 27.5 24.0 - 44.0 %    Monocyte % 10.6 0.0 - 12.0 %    Eosinophil % 2.5 0.0 - 3.0 %    Basophil % 0.9 0.0 - 1.0 %    Immature Grans % 0.8 (H) 0.0 - 0.6 %    Neutrophils, Absolute 6.11 1.50 - 8.30 10*3/mm3    Lymphocytes, Absolute 2.87 0.60 - 4.80 10*3/mm3    Monocytes, Absolute 1.11 (H) 0.00 - 1.00 10*3/mm3    Eosinophils, Absolute 0.26 0.00 - 0.30 10*3/mm3    Basophils, Absolute 0.09 0.00 - 0.20 10*3/mm3    Immature Grans, Absolute 0.08 (H) 0.00 - 0.03 10*3/mm3   Lipid Panel   Result Value Ref Range    Total Cholesterol 140 0 - 200 mg/dL    Triglycerides 165 (H) 0 - 150 mg/dL    HDL Cholesterol 34 (L) 40 - 60 mg/dL    LDL Cholesterol  90 0 - 130 mg/dL   TSH    Result Value Ref Range    TSH 5.629 (H) 0.350 - 5.350 mIU/mL   Ferritin   Result Value Ref Range    Ferritin 71.00 10.00 - 291.00 ng/mL   Iron   Result Value Ref Range    Iron 99 50 - 175 mcg/dL   POC Glycosylated Hemoglobin (Hb A1C)   Result Value Ref Range    Hemoglobin A1C 7.3 %   POC Glucose Fingerstick   Result Value Ref Range    Glucose 235 (A) 70 - 130 mg/dL     Average sugar is slightly high at 160   Continue to monitor blood sugars and make adjustment in diet, reduce insulin to 18 units due to am low sugars fasting and we discussed snack at hs if sugar less than 120   She is utd with eye exam  No foot callus or ulcer   Ur alb neg 5/18   F/u 3-4 months          Relevant Orders    POC Glycosylated Hemoglobin (Hb A1C) (Completed)    POC Glucose Fingerstick (Completed)    Lipid Panel (Completed)    TSH (Completed)    Hypothyroidism     Update tsh             Hematopoietic and Hemostatic    Iron deficiency anemia secondary to blood loss (chronic)     Appears pale today with bruising after treatment for pneumonia  Stitch left ant cubital fossa removed (complication from IV)- site clean and dry  Check cbc and iron levels           Other Visit Diagnoses     Iron deficiency anemia, unspecified iron deficiency anemia type        Relevant Orders    Comprehensive Metabolic Panel (Completed)    CBC Auto Differential (Completed)    Ferritin (Completed)    Iron (Completed)      Return in about 4 months (around 1/27/2019) for Recheck 30 min .      Mary Ellen Magallanes MA  Signed Wanda Matthews MD

## 2018-09-27 NOTE — TELEPHONE ENCOUNTER
Cr up from last labs after starting Metolazone 2.5 mg daily- she will hold Bumex through the weekend and call me Tuesday with an update- if she does well without N+Bumex, we will stop it and repeat a BMP next week- she verbalized understanding-

## 2018-09-28 NOTE — ASSESSMENT & PLAN NOTE
Blood sugar and 90 day average sugar reviewed  Results for orders placed or performed in visit on 09/27/18   Comprehensive Metabolic Panel   Result Value Ref Range    Glucose 197 (H) 70 - 100 mg/dL    BUN 53 (H) 9 - 23 mg/dL    Creatinine 2.23 (H) 0.60 - 1.30 mg/dL    Sodium 145 132 - 146 mmol/L    Potassium 4.7 3.5 - 5.5 mmol/L    Chloride 101 99 - 109 mmol/L    CO2 25.0 20.0 - 31.0 mmol/L    Calcium 9.8 8.7 - 10.4 mg/dL    Total Protein 6.8 5.7 - 8.2 g/dL    Albumin 4.54 3.20 - 4.80 g/dL    ALT (SGPT) 24 7 - 40 U/L    AST (SGOT) 26 0 - 33 U/L    Alkaline Phosphatase 96 25 - 100 U/L    Total Bilirubin 0.7 0.3 - 1.2 mg/dL    eGFR Non African Amer 21 (L) >60 mL/min/1.73    Globulin 2.3 gm/dL    A/G Ratio 2.0 1.5 - 2.5 g/dL    BUN/Creatinine Ratio 23.8 7.0 - 25.0    Anion Gap 19.0 (H) 3.0 - 11.0 mmol/L   CBC Auto Differential   Result Value Ref Range    WBC 10.44 3.50 - 10.80 10*3/mm3    RBC 4.60 3.89 - 5.14 10*6/mm3    Hemoglobin 13.4 11.5 - 15.5 g/dL    Hematocrit 41.7 34.5 - 44.0 %    MCV 90.7 80.0 - 99.0 fL    MCH 29.1 27.0 - 31.0 pg    MCHC 32.1 32.0 - 36.0 g/dL    RDW 13.7 11.3 - 14.5 %    RDW-SD 45.0 37.0 - 54.0 fl    MPV 10.4 6.0 - 12.0 fL    Platelets 255 150 - 450 10*3/mm3    Neutrophil % 58.5 41.0 - 71.0 %    Lymphocyte % 27.5 24.0 - 44.0 %    Monocyte % 10.6 0.0 - 12.0 %    Eosinophil % 2.5 0.0 - 3.0 %    Basophil % 0.9 0.0 - 1.0 %    Immature Grans % 0.8 (H) 0.0 - 0.6 %    Neutrophils, Absolute 6.11 1.50 - 8.30 10*3/mm3    Lymphocytes, Absolute 2.87 0.60 - 4.80 10*3/mm3    Monocytes, Absolute 1.11 (H) 0.00 - 1.00 10*3/mm3    Eosinophils, Absolute 0.26 0.00 - 0.30 10*3/mm3    Basophils, Absolute 0.09 0.00 - 0.20 10*3/mm3    Immature Grans, Absolute 0.08 (H) 0.00 - 0.03 10*3/mm3   Lipid Panel   Result Value Ref Range    Total Cholesterol 140 0 - 200 mg/dL    Triglycerides 165 (H) 0 - 150 mg/dL    HDL Cholesterol 34 (L) 40 - 60 mg/dL    LDL Cholesterol  90 0 - 130 mg/dL   TSH   Result Value Ref Range    TSH  5.629 (H) 0.350 - 5.350 mIU/mL   Ferritin   Result Value Ref Range    Ferritin 71.00 10.00 - 291.00 ng/mL   Iron   Result Value Ref Range    Iron 99 50 - 175 mcg/dL   POC Glycosylated Hemoglobin (Hb A1C)   Result Value Ref Range    Hemoglobin A1C 7.3 %   POC Glucose Fingerstick   Result Value Ref Range    Glucose 235 (A) 70 - 130 mg/dL     Average sugar is slightly high at 160   Continue to monitor blood sugars and make adjustment in diet, reduce insulin to 18 units due to am low sugars fasting and we discussed snack at hs if sugar less than 120   She is utd with eye exam  No foot callus or ulcer   Ur alb neg 5/18   F/u 3-4 months

## 2018-09-28 NOTE — ASSESSMENT & PLAN NOTE
Appears pale today with bruising after treatment for pneumonia  Stitch left ant cubital fossa removed (complication from IV)- site clean and dry  Check cbc and iron levels

## 2018-10-01 ENCOUNTER — HOSPITAL ENCOUNTER (OUTPATIENT)
Dept: CARDIOLOGY | Facility: HOSPITAL | Age: 78
Discharge: HOME | End: 2018-10-01
Attending: INTERNAL MEDICINE

## 2018-10-01 DIAGNOSIS — R06.09 DOE (DYSPNEA ON EXERTION): ICD-10-CM

## 2018-10-01 PROCEDURE — 25010000002 REGADENOSON 0.4 MG/5ML SOLUTION: Performed by: INTERNAL MEDICINE

## 2018-10-01 PROCEDURE — 78452 HT MUSCLE IMAGE SPECT MULT: CPT | Performed by: INTERNAL MEDICINE

## 2018-10-01 PROCEDURE — 78452 HT MUSCLE IMAGE SPECT MULT: CPT

## 2018-10-01 PROCEDURE — 93017 CV STRESS TEST TRACING ONLY: CPT

## 2018-10-01 PROCEDURE — 93018 CV STRESS TEST I&R ONLY: CPT | Performed by: INTERNAL MEDICINE

## 2018-10-01 PROCEDURE — 0 TECHNETIUM SESTAMIBI: Performed by: INTERNAL MEDICINE

## 2018-10-01 PROCEDURE — A9500 TC99M SESTAMIBI: HCPCS | Performed by: INTERNAL MEDICINE

## 2018-10-01 RX ADMIN — TECHNETIUM TC 99M SESTAMIBI 1 DOSE: 1 INJECTION INTRAVENOUS at 10:20

## 2018-10-01 RX ADMIN — TECHNETIUM TC 99M SESTAMIBI 1 DOSE: 1 INJECTION INTRAVENOUS at 08:25

## 2018-10-01 RX ADMIN — REGADENOSON 0.4 MG: 0.08 INJECTION, SOLUTION INTRAVENOUS at 10:19

## 2018-10-02 LAB
BH CV STRESS BP STAGE 2: NORMAL
BH CV STRESS BP STAGE 4: NORMAL
BH CV STRESS COMMENTS STAGE 1: NORMAL
BH CV STRESS DOSE REGADENOSON STAGE 1: 0.4
BH CV STRESS DURATION MIN STAGE 1: 0
BH CV STRESS DURATION MIN STAGE 2: 1
BH CV STRESS DURATION MIN STAGE 3: 1
BH CV STRESS DURATION MIN STAGE 4: 1
BH CV STRESS DURATION SEC STAGE 1: 10
BH CV STRESS DURATION SEC STAGE 2: 0
BH CV STRESS HR STAGE 1: 81
BH CV STRESS HR STAGE 2: 78
BH CV STRESS HR STAGE 3: 74
BH CV STRESS HR STAGE 4: 72
BH CV STRESS PROTOCOL 1: NORMAL
BH CV STRESS RECOVERY BP: NORMAL MMHG
BH CV STRESS RECOVERY HR: 78 BPM
BH CV STRESS STAGE 1: 1
BH CV STRESS STAGE 2: 2
BH CV STRESS STAGE 3: 3
BH CV STRESS STAGE 4: 4
LV EF NUC BP: 48 %
MAXIMAL PREDICTED HEART RATE: 140 BPM
PERCENT MAX PREDICTED HR: 57.86 %
STRESS BASELINE BP: NORMAL MMHG
STRESS BASELINE HR: 71 BPM
STRESS PERCENT HR: 68 %
STRESS POST PEAK BP: NORMAL MMHG
STRESS POST PEAK HR: 81 BPM
STRESS TARGET HR: 119 BPM

## 2018-10-24 ENCOUNTER — OFFICE VISIT (OUTPATIENT)
Dept: PULMONOLOGY | Facility: CLINIC | Age: 78
End: 2018-10-24

## 2018-10-24 VITALS
WEIGHT: 181 LBS | DIASTOLIC BLOOD PRESSURE: 72 MMHG | SYSTOLIC BLOOD PRESSURE: 120 MMHG | OXYGEN SATURATION: 94 % | HEIGHT: 67 IN | HEART RATE: 72 BPM | TEMPERATURE: 98.4 F | BODY MASS INDEX: 28.41 KG/M2

## 2018-10-24 DIAGNOSIS — J43.9 PULMONARY EMPHYSEMA, UNSPECIFIED EMPHYSEMA TYPE (HCC): ICD-10-CM

## 2018-10-24 DIAGNOSIS — I27.20 PULMONARY HYPERTENSION (HCC): Primary | ICD-10-CM

## 2018-10-24 DIAGNOSIS — G47.33 OBSTRUCTIVE SLEEP APNEA SYNDROME: ICD-10-CM

## 2018-10-24 DIAGNOSIS — R93.89 ABNORMAL CT OF THE CHEST: ICD-10-CM

## 2018-10-24 PROCEDURE — 99214 OFFICE O/P EST MOD 30 MIN: CPT | Performed by: INTERNAL MEDICINE

## 2018-10-24 RX ORDER — ALBUTEROL SULFATE 90 UG/1
2 AEROSOL, METERED RESPIRATORY (INHALATION) EVERY 4 HOURS PRN
Qty: 18 G | Refills: 0 | Status: SHIPPED | OUTPATIENT
Start: 2018-10-24 | End: 2018-10-24 | Stop reason: SDUPTHER

## 2018-10-24 RX ORDER — ALBUTEROL SULFATE 90 UG/1
2 AEROSOL, METERED RESPIRATORY (INHALATION) EVERY 4 HOURS PRN
Qty: 18 G | Refills: 0 | Status: ON HOLD | OUTPATIENT
Start: 2018-10-24 | End: 2018-11-19

## 2018-10-24 RX ORDER — ALBUTEROL SULFATE 90 UG/1
2 AEROSOL, METERED RESPIRATORY (INHALATION) 4 TIMES DAILY PRN
Qty: 1 INHALER | Refills: 11 | Status: SHIPPED | OUTPATIENT
Start: 2018-10-24 | End: 2019-01-17 | Stop reason: SDUPTHER

## 2018-10-24 NOTE — PROGRESS NOTES
CHIEF COMPLAINT: Followup for COPD.    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 Cooper, hypertension, hyperlipidemia, type 2 diabetes, prior tobacco abuse, obesity, CIELO, asthma, depression, GERD, hypothyroidism, osteoarthritis, who has also had recurrent ascites requiring recurrent large volume paracentesis. She also has chronic kidney disease followed by [facility]. Patient has also had ongoing iron deficiency anemia, serial transfusions. She is being followed by Dr. Muñoz and recently had a capsule study. Her autoimmune workup to date appears to be negative from the records in Epic. Her hepatitis panel is negative. Patient’s most recent paracentesis was performed in 11/2016 with almost 7.5 L removed.     INTERVAL HISTORY:    Since last visit had watchman device placed.   Has not required any further paracentesis.  Also started using her Bipap again.  Has had some fluctuating weight, diuretics increased by Dr. Cooper.  Overall better compared to last year.  Was admitted briefly to [facility] for CHF / NSTEMI then had stress test that was negative by Dr. Cooper.    PMH:    GOLD Stage 2 COPD  Questionable Asthma  Allergic Rhinitis  Prior Tobacco Abuse  Severe CIELO  Group 2+3 Moderate Pulmonary HTN  Diastolic HF, LVH, LAE, Mild to Mod MR EF 55-60%  Afib s/p PPM for Tachy-Leonidas Syndrome, s/p Watchman  CKD Stage IV  Recurrent Ascites (no cirrhosis on GI eval) s/p recurrent LVP  Iron Def Anemia requiring intermittent transfusion  Duodenal AVM's  T2DM  HTN  HLD  GERD  Hypothyroidism  Hearing  Loss  Osteoarthritis    PSH:  Recurrent Large Volume Paracentesis - none needed > 1 year  Cataracts  Right Knee  Left Elbow  INNA  Parathyroid Gland Removal    FH    SH  50-75 py smoking, quit 1980's    ROS  All other systems were reviewed and negative except per HPI    Physical Exam   Constitutional: Oriented to person, place, and time. Appears well-developed and well-nourished.   Head: Normocephalic and atraumatic.   Nose: Nose normal.   Mouth/Throat: Oropharynx is clear and moist.   Eyes: Conjunctivae are normal.   Neck: No tracheal deviation present.   Cardiovascular: Normal rate, regular rhythm, normal heart sounds and intact distal pulses.  Exam reveals no gallop and no friction rub.    No murmur heard.  Pulmonary/Chest: Effort normal and breath sounds normal. No stridor. No respiratory distress. No wheezes. No rales. No tenderness.   Abdominal: Soft. Bowel sounds are normal. No distended w/ ascites.   Musculoskeletal: Normal range of motion. No edema.   Lymphadenopathy:  No cervical adenopathy.   Neurological: Alert and oriented to person, place, and time.  No Focal Neurological Deficits Observed   Skin: Skin is warm and dry. No rash noted.   Psychiatric: Normal mood and affect.  Behavior is normal. Judgment normal.     DIAGNOSTIC DATA (Reviewed and interpreted by me):    6MW 1/25/17 resting room air sat 98%, minimum sat 91%, no O2 needed    Prior Full PFT form 12/2014 (No pleural effusions on CXR at time of PFT):  Moderate obstruction, air trapping mild reduction in DLCO over-corrects for alveolar volume  FEV1/FVC = 65%  FEV1 = 65%  FVC = 76%  TLC = 85%  RV/TLC = 47%  DLCO = 66-->124% for alveolar volume    Severe obstructive sleep apnea (327.23) (G47.33)      A. UNTREATED; appeared to attempt to wear it sporadically 2007, 2008, split-night study      of 03/10/2016 reported an overall AHI of 32.7/hr which required BiPAP therapy after failed      CPAP attempt with final BiPAP 17 and EPAP 11 which still  reported oxygen desaturations      on 2 L O2, her lowest oxygen saturation was documented at 64% and several      arrhythmias were observed during the study.      B. SLEEP STUDY; 11/2007, w/ AHI 57.8; followed by Dr. rader until 2008, during her      sleep study CPAP of 7 treated her well; giving her an AHI of < 2. Follow-up      visits in the sleep clinic indicate she was still sleepy, he increased the pressure to 9,      referred her to ENT to consider surgery.     CT Chest 8/28/15 - mosaic attenuation c/w obstructive lung disease, bilateral pleural effusions, scarring from prior granulomatous disease    V/Q Scan 8/25/15 - no evidence of CTEPH or acute clot, air trapping c/w obstructive lung disease    Echo 5/24/16 - EF 55-60%, LVH, mild LAE, mild to mod MR, RV moderately dilated, RV Systolic Function Severely reduced, RVSP 80, severe TR    RHC 6/2016  HEMODYNAMIC DATA (Pressures in mmHg):  PULMONARY CAPILLARY WEDGE PRESSURE: 18.  PA: 70/22  Mean PA:  36  RV: 70/10.  RA: Mean of 16.  OXYGEN SATURATIONS: IVC 51%, SVC 59%, RA 56%, RV 54%, PA 60%, and arterial 92%.       NITRIC OXIDE CHALLENGE:  CARDIAC OUTPUT: Baseline 4.23 with cardiac index of 2.28. Systemic blood  pressure 140/89 with a PA pressure of 78/25 and a mean of 37.      POST- NITRIC OXIDE CHALLENGE:  CARDIAC OUTPUT: 3.9 with a cardiac index of 2.1. Systemic pressure 151/75. PA  pressure is 70/20 with a mean of 36.     Impression & Plan    1) Moderate Group 2+3 Pulmonary Hypertension - complex situation.  No evidence of cirrhosis - therefore ascites is cardiac/renal in nature and she cannot have portopulmonary htn by definition. The severity is moderate - mean PA 36 (moderate 35-45) and is clearly proportional to the degree of Diastolic HF (PCWP 18), COPD FEV1 65%, CKD IV, and Severe untreated CIELO w/ Nocturnal Hypoxemia AHI of 32.7.  For that reason selective pulmonary vasodilators are not indicated, and in fact, could potentially make things worse with  "worsening V/Q matching or volume overloading a non-compliant left ventricle.  Seems to be doing much better with more aggressive diuresis.  No paracentesis needed for > 1 year.  Also needs good BP control.      2) Severe CIELO and Nocturnal Hypoxemia - now using Bipap regularly.  I have asked her to bring card in to evaluate adequacy next visit.    3) GOLD 2A COPD - I think there is less likely an asthma component.  She was recently prescribed symbicort and is concerned about the risk of pneumonia.  I agree ICS is probably not needed in this situation.  Will change to Anoro, if too expensive try Stiolto.    4) Abnormal CT - was recently at [facility] for CHF.  CTA done there 9/17/18 showed no PE, \"small bilateral effusions\", \"focal GGO in OLIVA suspect focal pneumonic infiltrates vs pulmonary edema\", \"recommend follow up CT in 1-2 months\".  Will get repeat CT to follow these findings up as recommended.      RTC 3 months, call if CT abnormal, get CD from CT done at [place] for comparison    Johan Thao MD  Pulmonology and Critical Care Medicine  10/24/18 12:34 PM  Electronically Signed    "

## 2018-10-25 DIAGNOSIS — R93.89 ABNORMAL CT OF THE CHEST: Primary | ICD-10-CM

## 2018-10-31 ENCOUNTER — TRANSCRIBE ORDERS (OUTPATIENT)
Dept: LAB | Facility: HOSPITAL | Age: 78
End: 2018-10-31

## 2018-10-31 ENCOUNTER — LAB (OUTPATIENT)
Dept: LAB | Facility: HOSPITAL | Age: 78
End: 2018-10-31

## 2018-10-31 DIAGNOSIS — N18.9 CHRONIC KIDNEY DISEASE, UNSPECIFIED CKD STAGE: ICD-10-CM

## 2018-10-31 DIAGNOSIS — N18.9 CHRONIC KIDNEY DISEASE, UNSPECIFIED CKD STAGE: Primary | ICD-10-CM

## 2018-10-31 DIAGNOSIS — E55.9 VITAMIN D INSUFFICIENCY: ICD-10-CM

## 2018-10-31 LAB
25(OH)D3 SERPL-MCNC: 35.8 NG/ML
ALBUMIN SERPL-MCNC: 4.03 G/DL (ref 3.2–4.8)
ANION GAP SERPL CALCULATED.3IONS-SCNC: 10 MMOL/L (ref 3–11)
BACTERIA UR QL AUTO: NORMAL /HPF
BASOPHILS # BLD AUTO: 0.04 10*3/MM3 (ref 0–0.2)
BASOPHILS NFR BLD AUTO: 0.4 % (ref 0–1)
BILIRUB UR QL STRIP: NEGATIVE
BUN BLD-MCNC: 70 MG/DL (ref 9–23)
BUN/CREAT SERPL: 31.8 (ref 7–25)
CALCIUM SPEC-SCNC: 9.2 MG/DL (ref 8.7–10.4)
CHLORIDE SERPL-SCNC: 95 MMOL/L (ref 99–109)
CLARITY UR: CLEAR
CO2 SERPL-SCNC: 28 MMOL/L (ref 20–31)
COLOR UR: YELLOW
CREAT BLD-MCNC: 2.2 MG/DL (ref 0.6–1.3)
DEPRECATED RDW RBC AUTO: 44.7 FL (ref 37–54)
EOSINOPHIL # BLD AUTO: 0.33 10*3/MM3 (ref 0–0.3)
EOSINOPHIL NFR BLD AUTO: 3.7 % (ref 0–3)
ERYTHROCYTE [DISTWIDTH] IN BLOOD BY AUTOMATED COUNT: 13.6 % (ref 11.3–14.5)
GFR SERPL CREATININE-BSD FRML MDRD: 21 ML/MIN/1.73
GLUCOSE BLD-MCNC: 360 MG/DL (ref 70–100)
GLUCOSE UR STRIP-MCNC: NEGATIVE MG/DL
HCT VFR BLD AUTO: 39.3 % (ref 34.5–44)
HGB BLD-MCNC: 12.6 G/DL (ref 11.5–15.5)
HGB UR QL STRIP.AUTO: NEGATIVE
HYALINE CASTS UR QL AUTO: NORMAL /LPF
IMM GRANULOCYTES # BLD: 0.02 10*3/MM3 (ref 0–0.03)
IMM GRANULOCYTES NFR BLD: 0.2 % (ref 0–0.6)
KETONES UR QL STRIP: NEGATIVE
LEUKOCYTE ESTERASE UR QL STRIP.AUTO: NEGATIVE
LYMPHOCYTES # BLD AUTO: 2.94 10*3/MM3 (ref 0.6–4.8)
LYMPHOCYTES NFR BLD AUTO: 32.8 % (ref 24–44)
MCH RBC QN AUTO: 29 PG (ref 27–31)
MCHC RBC AUTO-ENTMCNC: 32.1 G/DL (ref 32–36)
MCV RBC AUTO: 90.3 FL (ref 80–99)
MONOCYTES # BLD AUTO: 0.4 10*3/MM3 (ref 0–1)
MONOCYTES NFR BLD AUTO: 4.5 % (ref 0–12)
NEUTROPHILS # BLD AUTO: 5.24 10*3/MM3 (ref 1.5–8.3)
NEUTROPHILS NFR BLD AUTO: 58.6 % (ref 41–71)
NITRITE UR QL STRIP: NEGATIVE
PH UR STRIP.AUTO: 6 [PH] (ref 5–8)
PHOSPHATE SERPL-MCNC: 4.5 MG/DL (ref 2.4–5.1)
PLATELET # BLD AUTO: 184 10*3/MM3 (ref 150–450)
PMV BLD AUTO: 10.6 FL (ref 6–12)
POTASSIUM BLD-SCNC: 5 MMOL/L (ref 3.5–5.5)
PROT UR QL STRIP: NEGATIVE
RBC # BLD AUTO: 4.35 10*6/MM3 (ref 3.89–5.14)
RBC # UR: NORMAL /HPF
REF LAB TEST METHOD: NORMAL
SODIUM BLD-SCNC: 133 MMOL/L (ref 132–146)
SP GR UR STRIP: 1.01 (ref 1–1.03)
SQUAMOUS #/AREA URNS HPF: NORMAL /HPF
UROBILINOGEN UR QL STRIP: NORMAL
WBC NRBC COR # BLD: 8.95 10*3/MM3 (ref 3.5–10.8)
WBC UR QL AUTO: NORMAL /HPF

## 2018-10-31 PROCEDURE — 82306 VITAMIN D 25 HYDROXY: CPT

## 2018-10-31 PROCEDURE — 36415 COLL VENOUS BLD VENIPUNCTURE: CPT | Performed by: INTERNAL MEDICINE

## 2018-10-31 PROCEDURE — 85007 BL SMEAR W/DIFF WBC COUNT: CPT

## 2018-10-31 PROCEDURE — 85025 COMPLETE CBC W/AUTO DIFF WBC: CPT

## 2018-10-31 PROCEDURE — 80069 RENAL FUNCTION PANEL: CPT | Performed by: INTERNAL MEDICINE

## 2018-10-31 PROCEDURE — 81001 URINALYSIS AUTO W/SCOPE: CPT | Performed by: INTERNAL MEDICINE

## 2018-11-14 ENCOUNTER — CLINICAL SUPPORT NO REQUIREMENTS (OUTPATIENT)
Dept: CARDIOLOGY | Facility: CLINIC | Age: 78
End: 2018-11-14

## 2018-11-14 DIAGNOSIS — I48.19 PERSISTENT ATRIAL FIBRILLATION (HCC): Primary | ICD-10-CM

## 2018-11-14 DIAGNOSIS — I50.9 CONGESTIVE HEART FAILURE, UNSPECIFIED HF CHRONICITY, UNSPECIFIED HEART FAILURE TYPE (HCC): ICD-10-CM

## 2018-11-14 PROCEDURE — 93294 REM INTERROG EVL PM/LDLS PM: CPT | Performed by: INTERNAL MEDICINE

## 2018-11-14 PROCEDURE — 93296 REM INTERROG EVL PM/IDS: CPT | Performed by: INTERNAL MEDICINE

## 2018-11-15 ENCOUNTER — TELEPHONE (OUTPATIENT)
Dept: CARDIOLOGY | Facility: CLINIC | Age: 78
End: 2018-11-15

## 2018-11-15 ENCOUNTER — CLINICAL SUPPORT NO REQUIREMENTS (OUTPATIENT)
Dept: CARDIOLOGY | Facility: CLINIC | Age: 78
End: 2018-11-15

## 2018-11-15 DIAGNOSIS — I48.19 PERSISTENT ATRIAL FIBRILLATION (HCC): ICD-10-CM

## 2018-11-15 DIAGNOSIS — I49.5 TACHY-BRADY SYNDROME (HCC): Primary | ICD-10-CM

## 2018-11-15 NOTE — TELEPHONE ENCOUNTER
Pt returned my call and said she isn't really having any symptoms.  Doesn't feel palpitations or symptoms.

## 2018-11-19 ENCOUNTER — HOSPITAL ENCOUNTER (OUTPATIENT)
Facility: HOSPITAL | Age: 78
Discharge: HOME | End: 2018-11-20
Attending: INTERNAL MEDICINE | Admitting: NURSE PRACTITIONER

## 2018-11-19 ENCOUNTER — HOSPITAL ENCOUNTER (OUTPATIENT)
Facility: HOSPITAL | Age: 78
Setting detail: HOSPITAL OUTPATIENT SURGERY
Discharge: HOME | End: 2018-11-19
Attending: INTERNAL MEDICINE | Admitting: INTERNAL MEDICINE

## 2018-11-19 VITALS
SYSTOLIC BLOOD PRESSURE: 143 MMHG | BODY MASS INDEX: 28.93 KG/M2 | TEMPERATURE: 98.6 F | DIASTOLIC BLOOD PRESSURE: 69 MMHG | WEIGHT: 184.3 LBS | HEIGHT: 67 IN | HEART RATE: 80 BPM | OXYGEN SATURATION: 96 %

## 2018-11-19 DIAGNOSIS — I20.8 ANGINA AT REST (HCC): Primary | ICD-10-CM

## 2018-11-19 DIAGNOSIS — I27.20 PULMONARY HYPERTENSION (HCC): ICD-10-CM

## 2018-11-19 PROBLEM — I20.89 ANGINA AT REST: Status: ACTIVE | Noted: 2018-11-19

## 2018-11-19 LAB
GLUCOSE BLDC GLUCOMTR-MCNC: 152 MG/DL (ref 70–130)
GLUCOSE BLDC GLUCOMTR-MCNC: 186 MG/DL (ref 70–130)

## 2018-11-19 PROCEDURE — 63710000001 INSULIN DETEMIR PER 5 UNITS: Performed by: NURSE PRACTITIONER

## 2018-11-19 PROCEDURE — 93010 ELECTROCARDIOGRAM REPORT: CPT | Performed by: INTERNAL MEDICINE

## 2018-11-19 PROCEDURE — G0378 HOSPITAL OBSERVATION PER HR: HCPCS

## 2018-11-19 PROCEDURE — 82962 GLUCOSE BLOOD TEST: CPT

## 2018-11-19 PROCEDURE — 93005 ELECTROCARDIOGRAM TRACING: CPT | Performed by: NURSE PRACTITIONER

## 2018-11-19 PROCEDURE — 99214 OFFICE O/P EST MOD 30 MIN: CPT | Performed by: INTERNAL MEDICINE

## 2018-11-19 RX ORDER — LEVOTHYROXINE SODIUM 0.1 MG/1
100 TABLET ORAL
Status: DISCONTINUED | OUTPATIENT
Start: 2018-11-20 | End: 2018-11-20 | Stop reason: HOSPADM

## 2018-11-19 RX ORDER — ASCORBIC ACID 500 MG
500 TABLET ORAL DAILY
Status: DISCONTINUED | OUTPATIENT
Start: 2018-11-19 | End: 2018-11-20 | Stop reason: HOSPADM

## 2018-11-19 RX ORDER — KETOTIFEN FUMARATE 0.35 MG/ML
1 SOLUTION/ DROPS OPHTHALMIC DAILY PRN
COMMUNITY
End: 2018-12-03

## 2018-11-19 RX ORDER — ALBUTEROL SULFATE 90 UG/1
2 AEROSOL, METERED RESPIRATORY (INHALATION) EVERY 4 HOURS PRN
COMMUNITY
End: 2018-12-03

## 2018-11-19 RX ORDER — KETOTIFEN FUMARATE 0.35 MG/ML
1 SOLUTION/ DROPS OPHTHALMIC 2 TIMES DAILY PRN
Status: DISCONTINUED | OUTPATIENT
Start: 2018-11-19 | End: 2018-11-20 | Stop reason: HOSPADM

## 2018-11-19 RX ORDER — MONTELUKAST SODIUM 10 MG/1
10 TABLET ORAL DAILY
Status: DISCONTINUED | OUTPATIENT
Start: 2018-11-19 | End: 2018-11-20

## 2018-11-19 RX ORDER — SODIUM CHLORIDE 0.9 % (FLUSH) 0.9 %
3-10 SYRINGE (ML) INJECTION AS NEEDED
Status: DISCONTINUED | OUTPATIENT
Start: 2018-11-19 | End: 2018-11-20 | Stop reason: HOSPADM

## 2018-11-19 RX ORDER — BUMETANIDE 1 MG/1
1 TABLET ORAL DAILY
COMMUNITY
End: 2018-12-03

## 2018-11-19 RX ORDER — ATORVASTATIN CALCIUM 40 MG/1
40 TABLET, FILM COATED ORAL DAILY
Status: DISCONTINUED | OUTPATIENT
Start: 2018-11-19 | End: 2018-11-20

## 2018-11-19 RX ORDER — CETIRIZINE HYDROCHLORIDE 10 MG/1
10 TABLET ORAL DAILY
Status: DISCONTINUED | OUTPATIENT
Start: 2018-11-19 | End: 2018-11-20

## 2018-11-19 RX ORDER — DULOXETIN HYDROCHLORIDE 60 MG/1
60 CAPSULE, DELAYED RELEASE ORAL DAILY
COMMUNITY
End: 2018-12-03

## 2018-11-19 RX ORDER — ASPIRIN 81 MG/1
81 TABLET ORAL DAILY
COMMUNITY
End: 2018-12-03

## 2018-11-19 RX ORDER — IPRATROPIUM BROMIDE AND ALBUTEROL SULFATE 2.5; .5 MG/3ML; MG/3ML
3 SOLUTION RESPIRATORY (INHALATION)
Status: DISCONTINUED | OUTPATIENT
Start: 2018-11-19 | End: 2018-11-20 | Stop reason: HOSPADM

## 2018-11-19 RX ORDER — ACETAMINOPHEN 325 MG/1
650 TABLET ORAL EVERY 4 HOURS PRN
Status: DISCONTINUED | OUTPATIENT
Start: 2018-11-19 | End: 2018-11-20 | Stop reason: HOSPADM

## 2018-11-19 RX ORDER — SODIUM CHLORIDE 0.9 % (FLUSH) 0.9 %
3 SYRINGE (ML) INJECTION EVERY 12 HOURS SCHEDULED
Status: DISCONTINUED | OUTPATIENT
Start: 2018-11-19 | End: 2018-11-20 | Stop reason: HOSPADM

## 2018-11-19 RX ORDER — LOSARTAN POTASSIUM 25 MG/1
25 TABLET ORAL DAILY
Status: DISCONTINUED | OUTPATIENT
Start: 2018-11-19 | End: 2018-11-19

## 2018-11-19 RX ORDER — ALBUTEROL SULFATE 2.5 MG/3ML
2.5 SOLUTION RESPIRATORY (INHALATION) EVERY 6 HOURS PRN
Status: DISCONTINUED | OUTPATIENT
Start: 2018-11-19 | End: 2018-11-20 | Stop reason: HOSPADM

## 2018-11-19 RX ORDER — BIOTIN 1 MG
500 TABLET ORAL DAILY
COMMUNITY
End: 2018-12-03

## 2018-11-19 RX ORDER — MELATONIN
1000 DAILY
COMMUNITY
End: 2018-12-03

## 2018-11-19 RX ORDER — DULOXETIN HYDROCHLORIDE 60 MG/1
60 CAPSULE, DELAYED RELEASE ORAL DAILY
Status: DISCONTINUED | OUTPATIENT
Start: 2018-11-19 | End: 2018-11-20

## 2018-11-19 RX ADMIN — ATORVASTATIN CALCIUM 40 MG: 40 TABLET, FILM COATED ORAL at 17:58

## 2018-11-19 RX ADMIN — OXYCODONE HYDROCHLORIDE AND ACETAMINOPHEN 500 MG: 500 TABLET ORAL at 17:58

## 2018-11-19 RX ADMIN — METOPROLOL TARTRATE 25 MG: 25 TABLET ORAL at 21:30

## 2018-11-19 RX ADMIN — MONTELUKAST SODIUM 10 MG: 10 TABLET, FILM COATED ORAL at 17:58

## 2018-11-19 RX ADMIN — INSULIN DETEMIR 18 UNITS: 100 INJECTION, SOLUTION SUBCUTANEOUS at 22:37

## 2018-11-19 RX ADMIN — SODIUM CHLORIDE, PRESERVATIVE FREE 3 ML: 5 INJECTION INTRAVENOUS at 21:29

## 2018-11-19 RX ADMIN — DULOXETINE 60 MG: 60 CAPSULE, DELAYED RELEASE ORAL at 17:58

## 2018-11-19 RX ADMIN — CETIRIZINE HYDROCHLORIDE 10 MG: 10 TABLET, FILM COATED ORAL at 17:58

## 2018-11-19 NOTE — NURSING NOTE
ACC REVIEW REPORT: [facility]        PATIENT NAME: Sraita Alegre    PATIENT ID: [id number]    BED: 9801    BED TYPE: [facility]    BED GIVEN TO: Cierra    TIME BED GIVEN: 1030    YOB: 1940    AGE: 78    GENDER: F    PREVIOUS ADMIT TO [facility]:     PREVIOUS ADMISSION DATE:     PATIENT CLASS:     TODAY'S DATE: 11/19/2018    TRANSFER DATE: 11-19-18    ETA: will call (depends on transport availabilitiy)    TRANSFERRING FACILITY: [facility]    TRANSFERRING FACILITY PHONE # : [phone]    TRANSFERRING MD: Sage    DATE/TIME REQUEST RECEIVED: 11-19-18@10:00    [facility] RN: Fernanda Looney    REPORT FROM: Cierra    TIME REPORT TAKEN: 10:30    DIAGNOSIS: angina    REASON FOR TRANSFER TO [facility]: higher level of care    TRANSPORTATION: EMS    CLINICAL REASON FOR TRANSFER TO [facility]: 78 y.o. Admitted to [facility] 11/18 with c/o CP - is being transferred to [facility] for cath      CLINICAL INFORMATION    HEIGHT:     WEIGHT: 185# (84.09kg)    ALLERGIES: propafenone, grass, molds & smuts    LEE: no    INFECTIOUS DISEASE:     ISOLATION:     LAST VITAL SIGNS:  TIME: 0740  TEMP: 97.5  PULSE: 65  B/P: 162/68  RESP: 16    LAB INFORMATION: this morning: gun 71, Cr 2.1, K+ 5.1  Yesterday: K+ 6.0, H&H: 13/41, WBC 8.6  fsbs this morning before breakfast - 152    CULTURE INFORMATION:     MEDS/IV FLUIDS: #20 left ac - NS@100  Kayexalate given on 11/18  SSI prn    Pt refused heparin SQ injections      CARDIAC SYSTEM:    CHEST PAIN: none today    RHYTHM: paced    Is patient taking or has patient been given any drugs that could increase bleeding? no  (Plavix, Brilinta, Effient, Eliquis, Xarelto, Warfarin, Integrilin, Angiomax)    CARDIAC ENZYMES:    DATE: 11/18/18  TIME: 1509  TROP: 0.013    2 hour troponin 0.012, 12 hour troponin was 0.014    CARDIAC NOTES: pt had a GXT in September      RESPIRATORY SYSTEM:    LUNG SOUNDS:  clear    OXYGEN: no    O2 SAT: 96% on RA    RESPIRATORY STATUS: denies soa; pt uses CPAP at night  for CIELO      CNS/MUSCULOSKELETAL    ALERT AND ORIENTED:  yes    CNS/MUSCULOSKELETAL NOTES: ambulatory    PAST MEDICAL HISTORY: DM, COPD, CIELO, ulcerative colitis, HTN, GERD, hypothyroid, GERD, CKD, pulmonary htn, GI bleed, ascites, afib, pacemaker, knee surgery, hysterectomy, watchman device, parathyroid gland removal    Kimberley Looney RN  11/19/2018  10:32 AM

## 2018-11-19 NOTE — H&P
[facility] at [facility]   H&P     Sarita GIBBS Codey  1940    There is no work phone number on file.      11/19/18    DATE OF ADMISSION: 11/19/2018  [facility] Percy Ayala MD  [address] [facility] [address] [place], [place] [id number]    Chief Complaint: Angina    Problem List:  1. Atrial fibrillation:  a. Newly diagnosed atrial fibrillation with controlled rate of unknown duration, [facility] ER presentation, 06/21/2015.   b. Recent history of echocardiogram, 05/18/2015, Dr. Dorantes, revealing normal LVEF. Mild AR. Mild MR. Moderate to severe TR.  c. EVITA/ECV, 06/24/2015. EF 55% to 60%. No thrombus. Mild to moderate mitral regurgitation. Moderate tricuspid regurgitation. RVSP 66 mmHg. External cardioversion with 2 joules to normal sinus rhythm.  d. Cardiac event monitor, 8/31/2016: placed for falls. 6.5 second pause.   e. PM implantation, 9/1/2016: The PM is a Intellijoule model L101 serial number [id number] generator, the atrial lead is a Guidant model 4135 serial number [id number] lead, the right ventricular lead is a Guidant model 4136 serial number [id number] lead. The bradycardia pacing mode is DDIR with a lower rate of 70 upper rate of 130 bpm.  f. Echocardiogram, 8/31/2017: EF 56%. Mild MR. Aortic valve exhibits sclerosis  g. Watchman 9/26/2017: 21 mm watchman left atrial appendage occlusive device. ASAP-TOO study Dr. Marianne gibbs. EVITA, 12/18/2017: EF 60%. Mild aortic valve regurgitation. Mild MR. Moderate TR.  i. EVITA, 8/27/2018: EF 55%. Mild aortic valve regurgitation. Mild MR. Moderate TR. Calculated right ventricular systolic pressure from tricuspid regurgitation is 55 mmHg. A 21 mm Watchman JUAN occlusion device is present within the JUAN. There is no flow around the device.  2. Diastolic congestive heart failure.  3. Moderate pulmonary hypertension.   a. Cardiac catheterization, 6/5/16: Normal cardiac output and index. Severe pulmonary  "hypertension. No evidence of improvement with nitric oxide. No evidence of significant intracardiac shunting. Elevated RA and wedge pressures.   4. Chest pain:  a. History of normal echocardiogram as well as Cardiolite GXT, March 2011, Sridhar Bustamante MD.  b. Abnormal EKG revealing normal sinus rhythm with first degree AV block and poor precordial R-wave progression (no evidence of anterior infarct).  c. Echocardiogram 9/17/18: EF 55-60%, mild AI, trace MR, moderate TR, moderate to severe pulmonary hypertension, RVSP 63 mmHg  d. Cardiolite stress test 10/1/18: EF 48%, no evidence of inducible ischemia  5. Hypertension.  6. Hyperlipidemia.  7. Type 2 diabetes mellitus.  8. History of tobacco abuse with cessation 30 years ago.  9. Obesity.  10. Obstructive sleep apnea with no CPAP usage.  11. Asthma.  12. Depression.  13. Gastroesophageal reflux disease.  14. Hypothyroidism on chronic replacement therapy.  15. Bilateral hearing deficit with hearing aids.  16. Osteoarthritis.  17. History of left ankle fracture followed by Dr. Kennedy.  18. Surgical history:  a. Bilateral cataract extraction, 1996 and 1997.  b. Right knee repair, 1982.  c. Left elbow repair, 2006.  d. Hysterectomy.  e. Parathyroid gland removal.       History of Present Illness:   Patient is an 78 year old  female with the above noted past medical history who presents to [facility] as a transfer from [facility] for angina and possible LHC with Dr. Cooper.  The patient states she was driving with her  yesterday after leaving an open house when she developed sudden onset of mid-sternal chest pain that initially radiated to her jaw and then radiated down her left arm and up to her shoulder.  This was associated with nausea and shortness of breath.  She felt \"hot\" all over.  She denied any vomiting, dizziness, LH, or syncope.  She states this lasted approximately 30 minutes in duration before they decided " to go to the ED.  She denies any recurrent chest pain since this episode.  Prior to yesterday, she noted stable dyspnea on exertion and no anginal complaints.  She also had a normal stress test on 10/1/18 showing no evidence of ischemia.      Hospital records from [facility] reveal negative troponin x 4, initial Cr 2.4 (appears baseline ~1.9, she follows with Dr. Camacho with nephrology), K 6.0.  She was also noted to be hypotensive with BP of 85/42.  She was given NS fluid bolus and kayexalate.  Her home aldactone and Bumex were held.  Her creatinine did improve to 2.1 from labs today and repeat potassium was 5.1.       Allergies   Allergen Reactions   • Propafenone Other (See Comments)     Hepatic failure   • Grass Cough   • Molds & Smuts Cough       Prior to Admission Medications     Prescriptions Last Dose Informant Patient Reported? Taking?    albuterol (PROAIR HFA) 108 (90 Base) MCG/ACT inhaler   No No    Inhale 2 puffs 4 (Four) Times a Day As Needed for Wheezing or Shortness of Air.    albuterol (PROVENTIL HFA;VENTOLIN HFA) 108 (90 Base) MCG/ACT inhaler   No No    Inhale 2 puffs Every 4 (Four) Hours As Needed for Wheezing.    aspirin 81 MG chewable tablet   No No    Chew 1 tablet Daily.    atorvastatin (LIPITOR) 40 MG tablet   No No    Take 1 tablet by mouth Daily.    Biotin w/ Vitamins C & E (HAIR/SKIN/NAILS PO)   Yes No    Take 1 tablet by mouth Daily.    bumetanide (BUMEX) 1 MG tablet  Medication Bottle Yes No    Take 1 mg by mouth Daily.    Cholecalciferol (VITAMIN D3) 5000 units capsule capsule   Yes No    Take 5,000 Units by mouth Daily.    Cyanocobalamin (B-12 COMPLIANCE INJECTION) 1000 MCG/ML kit   Yes No    Inject  as directed As Needed.    DULoxetine (CYMBALTA) 60 MG capsule  Medication Bottle Yes No    Take 60 mg by mouth Daily.    HYDROcodone-acetaminophen (NORCO) 5-325 MG per tablet  Medication Bottle Yes No    Take 1 tablet by mouth Every 6 (Six) Hours As Needed.    insulin detemir (LEVEMIR FLEXPEN)  100 UNIT/ML injection   Yes No    Inject 15-20 Units under the skin into the appropriate area as directed Daily.    ketotifen (ZADITOR) 0.025 % ophthalmic solution  Medication Bottle Yes No    1 drop 2 (Two) Times a Day As Needed (itchy eyes).    levocetirizine (XYZAL ALLERGY 24HR) 5 MG tablet   Yes No    Take 5 mg by mouth Every Evening.    levothyroxine (SYNTHROID, LEVOTHROID) 100 MCG tablet  Medication Bottle Yes No    Take 100 mcg by mouth Daily.    linaclotide (LINZESS) 145 MCG capsule capsule   Yes No    Take 145 mcg by mouth Every Morning Before Breakfast.    losartan (COZAAR) 25 MG tablet   No No    Take 1 tablet by mouth Daily.    metOLazone (ZAROXOLYN) 2.5 MG tablet   No No    Take 1 tablet by mouth Daily.    metoprolol tartrate (LOPRESSOR) 25 MG tablet   No No    Take 1 tablet by mouth Every 12 (Twelve) Hours.    montelukast (SINGULAIR) 10 MG tablet  Medication Bottle Yes No    Take 10 mg by mouth Daily.    Probiotic Product (ALIGN PO)  Medication Bottle Yes No    Take 1 tablet by mouth Every Other Day. Last dose 9-23-17    spironolactone (ALDACTONE) 25 MG tablet  Medication Bottle Yes No    Take 25 mg by mouth Daily.    trolamine salicylate (ASPERCREME) 10 % cream   Yes No    Apply  topically As Needed for Muscle / Joint Pain. Australian Dream Relief cream    umeclidinium-vilanterol (ANORO ELLIPTA) 62.5-25 MCG/INH aerosol powder  inhaler   No No    Inhale 1 puff Daily. 1 inh once a day    umeclidinium-vilanterol (ANORO ELLIPTA) 62.5-25 MCG/INH aerosol powder  inhaler   No No    Inhale 1 puff Daily.    vitamin C (ASCORBIC ACID) 500 MG tablet  Medication Bottle Yes No    Take 500 mg by mouth Daily.          No current facility-administered medications for this encounter.     Social History     Socioeconomic History   • Marital status:      Spouse name: Not on file   • Number of children: Not on file   • Years of education: Not on file   • Highest education level: Not on file   Tobacco Use   •  Smoking status: Former Smoker     Packs/day: 3.00     Years: 25.00     Pack years: 75.00     Types: Cigarettes     Last attempt to quit: 1982     Years since quittin.5   • Smokeless tobacco: Never Used   Substance and Sexual Activity   • Alcohol use: No   • Drug use: No   • Sexual activity: Defer     Partners: Female     Birth control/protection: None     Comment:        Family History   Problem Relation Age of Onset   • Asthma Mother    • Allergies Mother    • Colonic polyp Mother    • Mitral valve prolapse Mother    • Other Mother         a fib- rain knee replacement   • Heart disease Father    • Lung cancer Father         he was smoker   • Diabetes Other    • Colon cancer Son    • Breast cancer Neg Hx    • Ovarian cancer Neg Hx        REVIEW OF SYSTEMS:   CONST:  No weight loss, fever, chills, weakness + fatigue.   HEENT:  No visual loss, blurred vision, double vision, yellow sclerae.                   No hearing loss, congestion, sore throat.   SKIN:      No rashes, urticaria, ulcers, sores.     RESP:     + GLASER, no hemoptysis, cough, sputum.   GI:           No anorexia, + nausea, no vomiting, diarrhea. No abdominal pain, melena.   :         No burning on urination, hematuria or increased frequency.  ENDO:    + diaphoresis, no cold or heat intolerance. No polyuria or polydipsia.   NEURO:  No headache, dizziness, syncope, paralysis, ataxia, or parasthesias.                  No change in bowel or bladder control. No history of CVA/TIA  MUSC:    No muscle, back pain, joint pain or stiffness.   HEME:    No anemia, bleeding, bruising. No history of DVT/PE.  PSYCH:  No history of depression, anxiety    OBJECTIVE:    There were no vitals filed for this visit.      Vital Sign Min/Max for last 24 hours  No Data Recorded   No Data Recorded   No Data Recorded   No Data Recorded   No Data Recorded   No Data Recorded    No intake or output data in the 24 hours ending 18 1509          Physical  Exam:  GEN: Well nourished, well-developed, no acute distress  HEENT: Normocephalic, atraumatic, PERRLA, moist mucous membranes  NECK: Supple, No JVD, no thyromegaly, no lymphadenopathy  CARD: S1S2, RRR, 2/6 systolic ejection murmur, no gallop, or rub  LUNGS: Clear to auscultation, normal respiratory effort  ABDOMEN: Soft, nontender, normal bowel sounds  EXTREMITIES: No gross deformities, no clubbing, cyanosis, or edema  SKIN: Warm, dry  NEURO: No focal deficits, alert and oriented x 3  PSYCHIATRIC: Normal affect and mood      Data:                                        No intake or output data in the 24 hours ending 11/19/18 1509    Chest X-Ray:  Imaging Results (last 24 hours)     ** No results found for the last 24 hours. **          Telemetry: AV paced, HR 72 bpm  EKG: pending    Assessment and Plan:   1. Unstable angina:  - Patient with episode of CP yesterday while driving.  No recurrence since.   - Troponin negative x 4 at OSH.  Last stress test in October negative for ischemia.  - She did eat today so will defer LHC for today.  Keep NPO after midnight for possible LHC tomorrow with Dr. Cooper.    2. Persistent atrial fibrillation:  - Has been doing well with no recurrence per last device interrogation  - S/p JUAN occlusive device, on ASA only    3. Tachy-marcela syndrome:  - S/p PPM implant    4. CKD:  - Baseline Cr ~1.9, initially 2.4 upon presentation to OSH, likely from over-diuresis, improved to 2.1 today after holding Bumex and spironolactone  - Follows with Dr. Camacho  - Repeat BMP tomorrow am    5. HTN:  - Continue to monitor and adjust meds as needed      Electronically signed by KAILASH Reeves, 11/19/18, 2:34 PM.     I Radha Cooper MD personally performed the services described in this documentation as scribed by the above individual in my presence, and it is both accurate and complete.    Radha Cooper MD, [facility]

## 2018-11-20 VITALS
HEART RATE: 70 BPM | RESPIRATION RATE: 16 BRPM | WEIGHT: 184.3 LBS | DIASTOLIC BLOOD PRESSURE: 57 MMHG | TEMPERATURE: 97.2 F | BODY MASS INDEX: 28.93 KG/M2 | OXYGEN SATURATION: 94 % | SYSTOLIC BLOOD PRESSURE: 115 MMHG | HEIGHT: 67 IN

## 2018-11-20 DIAGNOSIS — Z79.899 HIGH RISK MEDICATION USE: Primary | ICD-10-CM

## 2018-11-20 LAB
ANION GAP SERPL CALCULATED.3IONS-SCNC: 7 MMOL/L (ref 3–11)
BUN BLD-MCNC: 47 MG/DL (ref 9–23)
BUN/CREAT SERPL: 27.5 (ref 7–25)
CALCIUM SPEC-SCNC: 9.5 MG/DL (ref 8.7–10.4)
CHLORIDE SERPL-SCNC: 108 MMOL/L (ref 99–109)
CO2 SERPL-SCNC: 24 MMOL/L (ref 20–31)
CREAT BLD-MCNC: 1.71 MG/DL (ref 0.6–1.3)
GFR SERPL CREATININE-BSD FRML MDRD: 29 ML/MIN/1.73
GLUCOSE BLD-MCNC: 103 MG/DL (ref 70–100)
GLUCOSE BLDC GLUCOMTR-MCNC: 103 MG/DL (ref 70–130)
GLUCOSE BLDC GLUCOMTR-MCNC: 141 MG/DL (ref 70–130)
GLUCOSE BLDC GLUCOMTR-MCNC: 182 MG/DL (ref 70–130)
POTASSIUM BLD-SCNC: 4 MMOL/L (ref 3.5–5.5)
SODIUM BLD-SCNC: 139 MMOL/L (ref 132–146)

## 2018-11-20 PROCEDURE — G0378 HOSPITAL OBSERVATION PER HR: HCPCS

## 2018-11-20 PROCEDURE — 0 IOPAMIDOL PER 1 ML: Performed by: INTERNAL MEDICINE

## 2018-11-20 PROCEDURE — 80048 BASIC METABOLIC PNL TOTAL CA: CPT | Performed by: NURSE PRACTITIONER

## 2018-11-20 PROCEDURE — 94799 UNLISTED PULMONARY SVC/PX: CPT

## 2018-11-20 PROCEDURE — 94640 AIRWAY INHALATION TREATMENT: CPT

## 2018-11-20 PROCEDURE — C1894 INTRO/SHEATH, NON-LASER: HCPCS | Performed by: INTERNAL MEDICINE

## 2018-11-20 PROCEDURE — 82962 GLUCOSE BLOOD TEST: CPT

## 2018-11-20 PROCEDURE — 93458 L HRT ARTERY/VENTRICLE ANGIO: CPT | Performed by: INTERNAL MEDICINE

## 2018-11-20 PROCEDURE — 25010000002 FENTANYL CITRATE (PF) 100 MCG/2ML SOLUTION: Performed by: INTERNAL MEDICINE

## 2018-11-20 PROCEDURE — 99214 OFFICE O/P EST MOD 30 MIN: CPT | Performed by: INTERNAL MEDICINE

## 2018-11-20 PROCEDURE — 99152 MOD SED SAME PHYS/QHP 5/>YRS: CPT | Performed by: INTERNAL MEDICINE

## 2018-11-20 PROCEDURE — 25010000002 HEPARIN (PORCINE) PER 1000 UNITS: Performed by: INTERNAL MEDICINE

## 2018-11-20 PROCEDURE — 25010000002 MIDAZOLAM PER 1 MG: Performed by: INTERNAL MEDICINE

## 2018-11-20 RX ORDER — ACETAMINOPHEN 325 MG/1
650 TABLET ORAL EVERY 4 HOURS PRN
Status: DISCONTINUED | OUTPATIENT
Start: 2018-11-20 | End: 2018-11-20 | Stop reason: HOSPADM

## 2018-11-20 RX ORDER — MONTELUKAST SODIUM 10 MG/1
10 TABLET ORAL NIGHTLY
Status: DISCONTINUED | OUTPATIENT
Start: 2018-11-20 | End: 2018-11-20 | Stop reason: HOSPADM

## 2018-11-20 RX ORDER — MORPHINE SULFATE 2 MG/ML
1 INJECTION, SOLUTION INTRAMUSCULAR; INTRAVENOUS EVERY 4 HOURS PRN
Status: DISCONTINUED | OUTPATIENT
Start: 2018-11-20 | End: 2018-11-20 | Stop reason: HOSPADM

## 2018-11-20 RX ORDER — SODIUM CHLORIDE 9 MG/ML
100 INJECTION, SOLUTION INTRAVENOUS CONTINUOUS
Status: ACTIVE | OUTPATIENT
Start: 2018-11-20 | End: 2018-11-20

## 2018-11-20 RX ORDER — CETIRIZINE HYDROCHLORIDE 10 MG/1
10 TABLET ORAL NIGHTLY
Status: DISCONTINUED | OUTPATIENT
Start: 2018-11-20 | End: 2018-11-20 | Stop reason: HOSPADM

## 2018-11-20 RX ORDER — NALOXONE HCL 0.4 MG/ML
0.4 VIAL (ML) INJECTION
Status: DISCONTINUED | OUTPATIENT
Start: 2018-11-20 | End: 2018-11-20 | Stop reason: HOSPADM

## 2018-11-20 RX ORDER — ALPRAZOLAM 0.25 MG/1
0.25 TABLET ORAL 3 TIMES DAILY PRN
Status: DISCONTINUED | OUTPATIENT
Start: 2018-11-20 | End: 2018-11-20 | Stop reason: HOSPADM

## 2018-11-20 RX ORDER — FENTANYL CITRATE 50 UG/ML
INJECTION, SOLUTION INTRAMUSCULAR; INTRAVENOUS AS NEEDED
Status: DISCONTINUED | OUTPATIENT
Start: 2018-11-20 | End: 2018-11-20 | Stop reason: HOSPADM

## 2018-11-20 RX ORDER — ATORVASTATIN CALCIUM 40 MG/1
40 TABLET, FILM COATED ORAL NIGHTLY
Status: DISCONTINUED | OUTPATIENT
Start: 2018-11-20 | End: 2018-11-20 | Stop reason: HOSPADM

## 2018-11-20 RX ORDER — LIDOCAINE HYDROCHLORIDE 10 MG/ML
INJECTION, SOLUTION EPIDURAL; INFILTRATION; INTRACAUDAL; PERINEURAL AS NEEDED
Status: DISCONTINUED | OUTPATIENT
Start: 2018-11-20 | End: 2018-11-20 | Stop reason: HOSPADM

## 2018-11-20 RX ORDER — MIDAZOLAM HYDROCHLORIDE 1 MG/ML
INJECTION INTRAMUSCULAR; INTRAVENOUS AS NEEDED
Status: DISCONTINUED | OUTPATIENT
Start: 2018-11-20 | End: 2018-11-20 | Stop reason: HOSPADM

## 2018-11-20 RX ORDER — DULOXETIN HYDROCHLORIDE 60 MG/1
60 CAPSULE, DELAYED RELEASE ORAL NIGHTLY
Status: DISCONTINUED | OUTPATIENT
Start: 2018-11-20 | End: 2018-11-20 | Stop reason: HOSPADM

## 2018-11-20 RX ORDER — HYDROCODONE BITARTRATE AND ACETAMINOPHEN 7.5; 325 MG/1; MG/1
1 TABLET ORAL EVERY 4 HOURS PRN
Status: DISCONTINUED | OUTPATIENT
Start: 2018-11-20 | End: 2018-11-20 | Stop reason: HOSPADM

## 2018-11-20 RX ADMIN — IPRATROPIUM BROMIDE AND ALBUTEROL SULFATE 3 ML: 2.5; .5 SOLUTION RESPIRATORY (INHALATION) at 15:40

## 2018-11-20 RX ADMIN — OXYCODONE HYDROCHLORIDE AND ACETAMINOPHEN 500 MG: 500 TABLET ORAL at 08:44

## 2018-11-20 RX ADMIN — SODIUM CHLORIDE, PRESERVATIVE FREE 3 ML: 5 INJECTION INTRAVENOUS at 08:45

## 2018-11-20 RX ADMIN — METOPROLOL TARTRATE 25 MG: 25 TABLET ORAL at 08:43

## 2018-11-20 RX ADMIN — LEVOTHYROXINE SODIUM 100 MCG: 100 TABLET ORAL at 06:10

## 2018-11-20 RX ADMIN — IPRATROPIUM BROMIDE AND ALBUTEROL SULFATE 3 ML: 2.5; .5 SOLUTION RESPIRATORY (INHALATION) at 07:21

## 2018-11-20 NOTE — PROGRESS NOTES
Result Notes for PACEART REMOTE DEVICE CHECK     Notes recorded by Sandeep Lopes MD on 11/16/2018 at 5:42 PM EST  I would have her come in for PM interrogation, change AVD to shorter settings if still RV pacing at 400 ms, and evaluate RV lead for insullation or fracture

## 2018-11-20 NOTE — PLAN OF CARE
Problem: Patient Care Overview  Goal: Plan of Care Review  Outcome: Ongoing (interventions implemented as appropriate)   11/20/18 0213   Coping/Psychosocial   Plan of Care Reviewed With patient   Plan of Care Review   Progress improving   OTHER   Outcome Summary V/S stable. No c/o chest pain/pressure. Pt NPO after midnight. PIV in right forearm intact no signs of infiltration noted. Pt is in V-paced rhythm.

## 2018-11-20 NOTE — CONSULTS
[facility] Consult Note    Sarita Aelgre  1940  [id number]    Date of Admit:  11/19/2018    Date of Consult: 11/20/2018        Requesting Provider: Radha Cooper, *  Evaluating Physician: Phillip Green MD        Reason for Consultation:  STAGE 4 CKD  History of present illness:    Patient is a 78 y.o.  Yr old female KNOWN TO [facility] WITH STAGE 3-4 CKD SECONDARY TO NEPHRROSCLEROSIS AND CARDIO-RENAL SYNDROME ADMITTED WITH UNSTABLE ANGINA AND WE ARE ASKED TO FOLLOW RENAL FXN. IS GETTING A CARDIAC CATH TODAY. LAST CREAT PRIOR TO ADMISSION 2.2 10/31/18 ( RANGE 1.4-2.2 FOR SEVERAL YRS ). NO PROTEINURIA. RECENT HYPERKALEMIA REQUIRING KAYEXALATE.    Past Medical History:   Diagnosis Date   • Adenomatous polyp of stomach    • Allergic rhinitis    • Anemia    • Asthma    • Atrial fibrillation (CMS/HCC)     Hospitalized at [facility] 6/2015, presented after dizziness and atrial fibrillation, treated with Rythmol and a beta blocker converted to sinus prior to discharge.   • BiPAP (biphasic positive airway pressure) dependence    • Chronic anticoagulation 6/14/2016   • Chronic diastolic congestive heart failure (CMS/HCC) 6/21/2016   • CKD (chronic kidney disease), stage III (CMS/HCC)      Status post temporary hemodialysis at [facility], 8/24/2015 through 9/29/2015, worsening 11/15  with hosp chf   • Closed fracture of fibula     Followed by Dr. Kennedy   • COPD (chronic obstructive pulmonary disease) (CMS/[facility])    • Depression 9/7/2016   • Diabetes mellitus (CMS/HCC)    • Diabetic peripheral neuropathy (CMS/HCC) 12/29/2016   • Dyslipidemia    • Gastrointestinal bleeding, lower 6/15/2016   • GERD (gastroesophageal reflux disease)    • Hearing aid worn    • Hearing deficit 9/7/2016   • Hiatal hernia    • History of abnormal electrocardiogram     a bib, inferior infarct, st-t changes 10/12-referred to ER   • History of acute gastritis    • History of bilateral renal artery occlusion    • History of cataract    • History of deafness     • History of fracture of ankle 07/2015   • History of herpes zoster    • History of pneumonia    • History of transfusion    • Hyperparathyroidism (CMS/[facility])    • Hypertension    • Hypothyroidism 9/7/2016   • Insomnia    • Joint pain, knee    • Low back pain    • Obesity    • CIELO (obstructive sleep apnea)     UNTREATED; appeared to attempt to wear it sporadically 2007,  2008, split-night study of 03/10/2016 reported an overall AHI of 32.7/hr which required BiPAP therapy after failed CPAP attempt with final BiPAP 17 and EPAP 11 which still reported oxygen desaturations on 2 L O2, her lowest oxygen saturation was documented at 64% and several arrhythmias were observed during the study.   • Osteoarthritis 9/7/2016   • Pleural effusion      CT scan of the chest 8/28/2015, reports bilateral pleural effusions, right greater than left, simple in appearance with atelectatic changes identified of the right lung base.  Chest x-ray 10/12/2015, left pleural effusion still seen.   • Pneumonia    • Recurrent oral ulcers    • Retinal artery occlusion 12/29/2016    h/o   • Secondary pulmonary hypertension     EVITA: 6/24/15, RVSP 66mmhg; moderate MR, LVEF 55â‚¬â€œ60%, no pericardial effusion or atrial thrombus, no ASD.   • Tachy-marcela syndrome (CMS/[facility]) 09/01/2016    PPM   • Tricuspid valve insufficiency 5/17/2016    Description: A.  Severe.   • Type 2 diabetes mellitus (CMS/[facility]) 9/7/2016   • Vision loss 12/29/2016    Of the left eye   • Wears glasses        Past Surgical History:   Procedure Laterality Date   • APPENDECTOMY     • BLADDER SURGERY      x3   • CARDIAC PACEMAKER PLACEMENT     • CARDIOVERSION      .  Status post cardioversion, 8/25/2015 and 9/3/2015, secondary to atrial fibrillation.   • CATARACT EXTRACTION Bilateral     1996  and 1997   • ELBOW PROCEDURE Left 2006    Left elbow repair   • EYE SURGERY     • HYSTERECTOMY  1972   • KNEE SURGERY Right 1982   • OTHER SURGICAL HISTORY      History of hearing aid check   •  PACEMAKER IMPLANTATION     • PARATHYROIDECTOMY     • UPPER GASTROINTESTINAL ENDOSCOPY         Social History     Socioeconomic History   • Marital status:      Spouse name: Not on file   • Number of children: Not on file   • Years of education: Not on file   • Highest education level: Not on file   Tobacco Use   • Smoking status: Former Smoker     Packs/day: 3.00     Years: 25.00     Pack years: 75.00     Types: Cigarettes     Last attempt to quit: 1982     Years since quittin.5   • Smokeless tobacco: Never Used   Substance and Sexual Activity   • Alcohol use: No   • Drug use: No   • Sexual activity: Defer     Partners: Female     Birth control/protection: None     Comment:        family history includes Allergies in her mother; Asthma in her mother; Colon cancer in her son; Colonic polyp in her mother; Diabetes in her other; Heart disease in her father; Lung cancer in her father; Mitral valve prolapse in her mother; Other in her mother. NO FH OF RENAL DZ.    Allergies   Allergen Reactions   • Propafenone Other (See Comments)     Hepatic failure   • Grass Cough   • Molds & Smuts Cough       Medication:    Current Facility-Administered Medications:   •  acetaminophen (TYLENOL) tablet 650 mg, 650 mg, Oral, Q4H PRN, Sameera Marks APRN  •  albuterol (PROVENTIL) nebulizer solution 0.083% 2.5 mg/3mL, 2.5 mg, Nebulization, Q6H PRN, Sameera Marks APRN  •  atorvastatin (LIPITOR) tablet 40 mg, 40 mg, Oral, Nightly, Radha Cooper MD  •  cetirizine (zyrTEC) tablet 10 mg, 10 mg, Oral, Nightly, Radha Cooper MD  •  DULoxetine (CYMBALTA) DR capsule 60 mg, 60 mg, Oral, Nightly, Radha Cooper MD  •  insulin detemir (LEVEMIR) injection 18 Units, 18 Units, Subcutaneous, Nightly, Sameera Marks APRN, 18 Units at 18  •  ipratropium-albuterol (DUO-NEB) nebulizer solution 3 mL, 3 mL, Nebulization, Q8H - RT, Sameera Marks  APRN, 3 mL at 11/20/18 0721  •  ketotifen (ZADITOR) 0.025 % ophthalmic solution 1 drop, 1 drop, Both Eyes, BID PRN, Sameera Marks APRN  •  levothyroxine (SYNTHROID, LEVOTHROID) tablet 100 mcg, 100 mcg, Oral, Q AM, Sameera Marks APRN, 100 mcg at 11/20/18 0610  •  metoprolol tartrate (LOPRESSOR) tablet 25 mg, 25 mg, Oral, Q12H, Sameera Marks APRN, 25 mg at 11/20/18 0843  •  montelukast (SINGULAIR) tablet 10 mg, 10 mg, Oral, Nightly, Radha Cooper MD  •  sodium chloride 0.9 % flush 3 mL, 3 mL, Intravenous, Q12H, Sameera Marks APRN, 3 mL at 11/20/18 0845  •  sodium chloride 0.9 % flush 3-10 mL, 3-10 mL, Intravenous, PRN, Sameera Marks APRN  •  vitamin C (ASCORBIC ACID) tablet 500 mg, 500 mg, Oral, Daily, Sameera Marks APRN, 500 mg at 11/20/18 0844    Medications Prior to Admission   Medication Sig Dispense Refill Last Dose   • albuterol (PROAIR HFA) 108 (90 Base) MCG/ACT inhaler Inhale 2 puffs 4 (Four) Times a Day As Needed for Wheezing or Shortness of Air. 1 inhaler 11    • aspirin 81 MG chewable tablet Chew 1 tablet Daily. 30 tablet 6 Taking   • atorvastatin (LIPITOR) 40 MG tablet Take 1 tablet by mouth Daily. 90 tablet 2 Taking   • Biotin w/ Vitamins C & E (HAIR/SKIN/NAILS PO) Take 1 tablet by mouth Daily.   Taking   • bumetanide (BUMEX) 1 MG tablet Take 1 mg by mouth Daily.   Taking   • Cholecalciferol (VITAMIN D3) 5000 units capsule capsule Take 5,000 Units by mouth Daily.   Taking   • Cyanocobalamin (B-12 COMPLIANCE INJECTION) 1000 MCG/ML kit Inject  as directed As Needed.   Taking   • DULoxetine (CYMBALTA) 60 MG capsule Take 60 mg by mouth Daily.   Taking   • HYDROcodone-acetaminophen (NORCO) 5-325 MG per tablet Take 1 tablet by mouth Every 6 (Six) Hours As Needed.   Taking   • insulin detemir (LEVEMIR FLEXPEN) 100 UNIT/ML injection Inject 18 Units under the skin into the appropriate area as directed Daily.   Taking   •  ketotifen (ZADITOR) 0.025 % ophthalmic solution 1 drop 2 (Two) Times a Day As Needed (itchy eyes).   Taking   • levocetirizine (XYZAL ALLERGY 24HR) 5 MG tablet Take 5 mg by mouth Every Evening.   Taking   • levothyroxine (SYNTHROID, LEVOTHROID) 100 MCG tablet Take 100 mcg by mouth Daily.   11/19/2018 at Unknown time   • linaclotide (LINZESS) 145 MCG capsule capsule Take 145 mcg by mouth Every Morning Before Breakfast.   Taking   • losartan (COZAAR) 25 MG tablet Take 1 tablet by mouth Daily. 90 tablet 3 Taking   • metoprolol tartrate (LOPRESSOR) 25 MG tablet Take 1 tablet by mouth Every 12 (Twelve) Hours. 180 tablet 3 Taking   • montelukast (SINGULAIR) 10 MG tablet Take 10 mg by mouth Daily.   Taking   • spironolactone (ALDACTONE) 25 MG tablet Take 25 mg by mouth Daily.   Taking   • trolamine salicylate (ASPERCREME) 10 % cream Apply  topically As Needed for Muscle / Joint Pain. Australian Dream Relief cream   Taking   • umeclidinium-vilanterol (ANORO ELLIPTA) 62.5-25 MCG/INH aerosol powder  inhaler Inhale 1 puff Daily. 1 inh once a day (Patient taking differently: Inhale 1 puff 2 (Two) Times a Day. 1 inh once a day) 1 each 2    • vitamin C (ASCORBIC ACID) 500 MG tablet Take 500 mg by mouth Daily.   Taking       Review of Systems:    Constitutional-- No Fever, chills or sweats. No significant change in weight. + FATIGUE/DIAPHORESIS.  Eye-- no diplopia, no conjunctivitis  ENT-- No new hearing or throat complaints.  No epistaxis or oral sores. No odynophagia or dysphagia. No headache, photophobia or neck stiffness.  CV-- + chest pain. NO palpitations. + soa. NO edema  Resp-- No cough/Hemoptysis  GI- + nausea. NO vomiting or diarrhea.  No hematochezia, melena, or hematemesis.  -- No dysuria, hematuria, or flank pain. No lower tract obstructive symptoms  Skin-- No rash, warm and dry  Lymph- no painful or swollen lymph nodes in neck/axilla or groin.   Heme- No active bruising or bleeding; no Hx of DVT or PE.  MS-- no  "swelling or pain in the joints  Neuro-- No acute focal weakness or numbness in the arms or legs.  No seizures.  Psych--No anxiety or depression  Endo--No cold or heat intolerance.  No polyuria, polydipsia, or polyphagia    Full review of systems reviewed and negative otherwise for acute complaints    Physical Exam:   Vital Signs   Blood pressure 137/72, pulse 70, temperature 97.2 °F (36.2 °C), resp. rate 16, height 170.2 cm (67\"), weight 83.6 kg (184 lb 4.9 oz), SpO2 93 %, not currently breastfeeding.     GENERAL: Awake and alert, in no acute distress.   HEENT: Normocephalic, atraumatic.  PER.  No conjunctivitis. No icterus. Oropharynx clear without evidence of thrush or exudate. No evidence of peridontal disease.    NECK: Supple without nuchal rigidity. No mass.  LYMPH: No painful cervical, axillary or inguinal lymphadenopathy.  HEART: RRR; No murmur, rubs, gallops. No bruits in neck, abdomen, or groins, no edema  LUNGS: Normal respiratory effort. Nonlabored. No dullness.  No crepitus.  Clear to auscultation bilaterally without wheezing, rales, rhonchi.  ABDOMEN: Soft, nontender, nondistended. Positive bowel sounds. No rebound or guarding. NO mass or HSM.  JOINTS:  Full range of motion, no redness or tenderness.  EXT:  No cyanosis, clubbing or edema.  :  BLADDER NOT DISTENDED.  SKIN: Warm and dry without rash  NEURO: Oriented to PPT. No focal neurological deficits. Strength equal bilateral  PSYCHIATRIC: Normal insight and judgement. Cooperative with PE    Laboratory Data        Invalid input(s): WBC;3  Results from last 7 days   Lab Units  11/20/18   0610   SODIUM mmol/L  139   POTASSIUM mmol/L  4.0   CHLORIDE mmol/L  108   CO2 mmol/L  24.0   BUN mg/dL  47*   CREATININE mg/dL  1.71*   CALCIUM mg/dL  9.5     Results from last 7 days   Lab Units  11/20/18   0610   GLUCOSE mg/dL  103*             Estimated Creatinine Clearance: 29.2 mL/min (A) (by C-G formula based on SCr of 1.71 mg/dL " (H)).    Radiology:      Impression: STAGE 4 CKD. GFR NEAR BASELINE. CARDIORENAL SYNDROME AND NEPHROSCLEROSIS. RECENT HYPERKALEMIA HAS RESOLVED.       PLAN: Thank you for asking us to see Sarita Alegre, I recommend the following: WATCH GFR AFTER CONTRAST ( SMALL RISK OF WORSENING RENAL FXN WITH THIS ).       Phillip Green MD  11/20/2018  11:29 AM

## 2018-11-20 NOTE — PROGRESS NOTES
"[facility] Daily Note       LOS: 0 days   Patient Care Team:  Percy Allison MD as PCP - General (Internal Medicine)  Percy Allison MD as PCP - Claims Attributed    Chief Complaint:  Unstable angina    Subjective     Subjective: No chest pain overnight.  Overall feels good.    Review of Systems:   As above.    Medications:    atorvastatin 40 mg Oral Daily   cetirizine 10 mg Oral Daily   DULoxetine 60 mg Oral Daily   insulin detemir 18 Units Subcutaneous Nightly   ipratropium-albuterol 3 mL Nebulization Q8H - RT   levothyroxine 100 mcg Oral Q AM   metoprolol tartrate 25 mg Oral Q12H   montelukast 10 mg Oral Daily   sodium chloride 3 mL Intravenous Q12H   vitamin C 500 mg Oral Daily       Objective     Vital Sign Min/Max for last 24 hours  Temp  Min: 97.2 °F (36.2 °C)  Max: 98.6 °F (37 °C)   BP  Min: 140/73  Max: 166/87   Pulse  Min: 70  Max: 100   Resp  Min: 16  Max: 16   SpO2  Min: 94 %  Max: 99 %   No Data Recorded   Weight  Min: 83.6 kg (184 lb 4.9 oz)  Max: 83.6 kg (184 lb 4.9 oz)    No intake or output data in the 24 hours ending 11/20/18 0729     Flowsheet Rows      First Filed Value   Admission Height  170.2 cm (67\") Documented at 11/19/2018 1433   Admission Weight  83.6 kg (184 lb 4.9 oz) Documented at 11/19/2018 1433          Physical Exam:    General: Alert and oriented.   Cardiovascular: Heart has a nondisplaced focal PMI. Regular rate and rhythm without murmur, gallop or rub.  Lungs: Clear without rales or wheezes. Equal expansion is noted.   Abdomen: Soft, nontender.  Extremities: Show no edema.   Skin: warm and dry.     Results Review:    I reviewed the patient's new clinical results.  EKG:  Tele: AV paced    Labs:    Results from last 7 days   Lab Units  11/20/18   0610   SODIUM mmol/L  139   POTASSIUM mmol/L  4.0   CHLORIDE mmol/L  108   CO2 mmol/L  24.0   BUN mg/dL  47*   CREATININE mg/dL  1.71*   CALCIUM mg/dL  9.5   GLUCOSE mg/dL  103*           Invalid input(s): " NEUTOPHILPCT,  EOSPCT  Lab Results   Component Value Date    TROPONINI 0.02 11/05/2015    TROPONINI 0.01 10/11/2015    TROPONINI 0.05 06/21/2015     Lab Results   Component Value Date    CHOL 140 09/25/2018     Lab Results   Component Value Date    TRIG 165 (H) 09/25/2018    TRIG 83 11/09/2015    TRIG 64 09/28/2015     Lab Results   Component Value Date    HDL 34 (L) 09/25/2018    HDL 30 (L) 11/09/2015    HDL 41 05/18/2015     No components found for: LDLCALC  Lab Results   Component Value Date    INR 1.00 12/05/2017    INR 1.01 09/25/2017    INR 1.08 05/01/2017    PROTIME 10.9 12/05/2017    PROTIME 11.0 09/25/2017    PROTIME 11.8 (H) 05/01/2017         Ejection Fraction: 48% by MPS    Assessment   Assessment:    1. Unstable angina:  - Patient with episode of CP yesterday while driving.  No recurrence since.   - Troponin negative x 4 at OSH.  Last stress test in October negative for ischemia.  - She did eat today so will defer LHC for today.  Keep NPO after midnight for possible LHC tomorrow with Dr. Cooper.     2. Persistent atrial fibrillation:  - Has been doing well with no recurrence per last device interrogation  - S/p JUAN occlusive device, on ASA only     3. Tachy-marcela syndrome:  - S/p PPM implant     4. CKD:  - Baseline Cr ~1.9, initially 2.4 upon presentation to OSH, likely from over-diuresis, improved to 2.1 today after holding Bumex and spironolactone  - Follows with Dr. Camacho  - Repeat BMP tomorrow am     5. HTN:  - Continue to monitor and adjust meds as needed      Plan:    Left heart catheterization with potential intervention today.  Her creatinine is down from 2.2-1.7 following hydration overnight  Right radial access will be attempted.  The right groin should also be prepped.  Minimal contrast will be used.  There will be no left ventriculogram.   As noted her ventricular lead impedance was elevated at the last pacemaker check.  This will be further evaluated today.  Her AV delay will be  reduced as well as is currently set at 400 ms and she's 92% paced.    Radha Cooper MD  11/20/18  7:29 AM

## 2018-12-20 ENCOUNTER — APPOINTMENT (OUTPATIENT)
Dept: CT IMAGING | Facility: HOSPITAL | Age: 78
End: 2018-12-20
Attending: INTERNAL MEDICINE

## 2018-12-31 ENCOUNTER — HOSPITAL ENCOUNTER (OUTPATIENT)
Dept: CT IMAGING | Facility: HOSPITAL | Age: 78
Discharge: HOME | End: 2018-12-31
Attending: INTERNAL MEDICINE | Admitting: INTERNAL MEDICINE

## 2018-12-31 DIAGNOSIS — R93.89 ABNORMAL CT OF THE CHEST: ICD-10-CM

## 2018-12-31 PROCEDURE — 71250 CT THORAX DX C-: CPT

## 2019-01-01 ENCOUNTER — TELEPHONE (OUTPATIENT)
Dept: PULMONOLOGY | Facility: CLINIC | Age: 79
End: 2019-01-01

## 2019-01-01 ENCOUNTER — HOSPITAL ENCOUNTER (OUTPATIENT)
Dept: MAMMOGRAPHY | Facility: HOSPITAL | Age: 79
End: 2019-01-01

## 2019-01-01 ENCOUNTER — OFFICE VISIT (OUTPATIENT)
Dept: PULMONOLOGY | Facility: CLINIC | Age: 79
End: 2019-01-01

## 2019-01-01 ENCOUNTER — OFFICE VISIT (OUTPATIENT)
Dept: NEUROLOGY | Facility: CLINIC | Age: 79
End: 2019-01-01

## 2019-01-01 ENCOUNTER — TRANSCRIBE ORDERS (OUTPATIENT)
Dept: LAB | Facility: HOSPITAL | Age: 79
End: 2019-01-01

## 2019-01-01 ENCOUNTER — OFFICE VISIT (OUTPATIENT)
Dept: CARDIOLOGY | Facility: CLINIC | Age: 79
End: 2019-01-01

## 2019-01-01 ENCOUNTER — LAB (OUTPATIENT)
Dept: LAB | Facility: HOSPITAL | Age: 79
End: 2019-01-01

## 2019-01-01 ENCOUNTER — OFFICE VISIT (OUTPATIENT)
Dept: ONCOLOGY | Facility: CLINIC | Age: 79
End: 2019-01-01

## 2019-01-01 ENCOUNTER — TRANSCRIBE ORDERS (OUTPATIENT)
Dept: PULMONOLOGY | Facility: CLINIC | Age: 79
End: 2019-01-01

## 2019-01-01 ENCOUNTER — APPOINTMENT (OUTPATIENT)
Dept: CT IMAGING | Facility: HOSPITAL | Age: 79
End: 2019-01-01

## 2019-01-01 ENCOUNTER — TRANSCRIBE ORDERS (OUTPATIENT)
Dept: ADMINISTRATIVE | Facility: HOSPITAL | Age: 79
End: 2019-01-01

## 2019-01-01 ENCOUNTER — CLINICAL SUPPORT NO REQUIREMENTS (OUTPATIENT)
Dept: CARDIOLOGY | Facility: CLINIC | Age: 79
End: 2019-01-01

## 2019-01-01 VITALS
WEIGHT: 205.13 LBS | TEMPERATURE: 98.2 F | HEART RATE: 71 BPM | BODY MASS INDEX: 31.09 KG/M2 | DIASTOLIC BLOOD PRESSURE: 78 MMHG | OXYGEN SATURATION: 98 % | HEIGHT: 68 IN | SYSTOLIC BLOOD PRESSURE: 130 MMHG

## 2019-01-01 VITALS
HEART RATE: 70 BPM | OXYGEN SATURATION: 97 % | BODY MASS INDEX: 30.19 KG/M2 | WEIGHT: 199.2 LBS | SYSTOLIC BLOOD PRESSURE: 118 MMHG | DIASTOLIC BLOOD PRESSURE: 70 MMHG | HEIGHT: 68 IN

## 2019-01-01 VITALS
WEIGHT: 203 LBS | HEIGHT: 68 IN | DIASTOLIC BLOOD PRESSURE: 68 MMHG | OXYGEN SATURATION: 99 % | BODY MASS INDEX: 30.77 KG/M2 | HEART RATE: 74 BPM | SYSTOLIC BLOOD PRESSURE: 122 MMHG

## 2019-01-01 VITALS
WEIGHT: 197 LBS | DIASTOLIC BLOOD PRESSURE: 60 MMHG | SYSTOLIC BLOOD PRESSURE: 120 MMHG | HEIGHT: 68 IN | TEMPERATURE: 98.1 F | OXYGEN SATURATION: 97 % | BODY MASS INDEX: 29.86 KG/M2 | HEART RATE: 77 BPM

## 2019-01-01 VITALS
DIASTOLIC BLOOD PRESSURE: 60 MMHG | BODY MASS INDEX: 29.7 KG/M2 | HEART RATE: 80 BPM | WEIGHT: 196 LBS | SYSTOLIC BLOOD PRESSURE: 150 MMHG | TEMPERATURE: 98.1 F | RESPIRATION RATE: 18 BRPM | HEIGHT: 68 IN | OXYGEN SATURATION: 96 %

## 2019-01-01 DIAGNOSIS — I48.19 ATRIAL FIBRILLATION, PERSISTENT (HCC): Primary | ICD-10-CM

## 2019-01-01 DIAGNOSIS — I50.33 DIASTOLIC CHF, ACUTE ON CHRONIC (HCC): ICD-10-CM

## 2019-01-01 DIAGNOSIS — J44.9 CHRONIC OBSTRUCTIVE PULMONARY DISEASE, UNSPECIFIED COPD TYPE (HCC): Primary | ICD-10-CM

## 2019-01-01 DIAGNOSIS — I10 ESSENTIAL HYPERTENSION: ICD-10-CM

## 2019-01-01 DIAGNOSIS — I49.5 TACHY-BRADY SYNDROME (HCC): ICD-10-CM

## 2019-01-01 DIAGNOSIS — G47.33 OBSTRUCTIVE SLEEP APNEA SYNDROME: ICD-10-CM

## 2019-01-01 DIAGNOSIS — I38 CHF DUE TO VALVULAR DISEASE (HCC): ICD-10-CM

## 2019-01-01 DIAGNOSIS — N18.30 CHRONIC KIDNEY DISEASE (CKD), STAGE III (MODERATE) (HCC): ICD-10-CM

## 2019-01-01 DIAGNOSIS — I50.9 CHF DUE TO VALVULAR DISEASE (HCC): ICD-10-CM

## 2019-01-01 DIAGNOSIS — J45.909 ASTHMA, UNSPECIFIED ASTHMA SEVERITY, UNSPECIFIED WHETHER COMPLICATED, UNSPECIFIED WHETHER PERSISTENT: ICD-10-CM

## 2019-01-01 DIAGNOSIS — D50.0 IRON DEFICIENCY ANEMIA DUE TO CHRONIC BLOOD LOSS: ICD-10-CM

## 2019-01-01 DIAGNOSIS — D50.0 IRON DEFICIENCY ANEMIA DUE TO CHRONIC BLOOD LOSS: Primary | ICD-10-CM

## 2019-01-01 DIAGNOSIS — J43.9 PULMONARY EMPHYSEMA, UNSPECIFIED EMPHYSEMA TYPE (HCC): Primary | ICD-10-CM

## 2019-01-01 DIAGNOSIS — N18.4 CHRONIC KIDNEY DISEASE, STAGE IV (SEVERE) (HCC): Primary | ICD-10-CM

## 2019-01-01 DIAGNOSIS — J45.909 ASTHMA, UNSPECIFIED ASTHMA SEVERITY, UNSPECIFIED WHETHER COMPLICATED, UNSPECIFIED WHETHER PERSISTENT: Primary | ICD-10-CM

## 2019-01-01 DIAGNOSIS — J18.9 PNEUMONIA, UNSPECIFIED ORGANISM: Primary | ICD-10-CM

## 2019-01-01 DIAGNOSIS — I48.19 ATRIAL FIBRILLATION, PERSISTENT (HCC): ICD-10-CM

## 2019-01-01 DIAGNOSIS — E78.5 HYPERLIPIDEMIA LDL GOAL <100: ICD-10-CM

## 2019-01-01 DIAGNOSIS — I27.20 PULMONARY HYPERTENSION (HCC): ICD-10-CM

## 2019-01-01 DIAGNOSIS — J43.9 PULMONARY EMPHYSEMA, UNSPECIFIED EMPHYSEMA TYPE (HCC): ICD-10-CM

## 2019-01-01 DIAGNOSIS — J90 PLEURAL EFFUSION: ICD-10-CM

## 2019-01-01 DIAGNOSIS — Z12.31 VISIT FOR SCREENING MAMMOGRAM: Primary | ICD-10-CM

## 2019-01-01 DIAGNOSIS — N18.4 CHRONIC KIDNEY DISEASE, STAGE IV (SEVERE) (HCC): ICD-10-CM

## 2019-01-01 DIAGNOSIS — K21.9 GASTROESOPHAGEAL REFLUX DISEASE, ESOPHAGITIS PRESENCE NOT SPECIFIED: ICD-10-CM

## 2019-01-01 DIAGNOSIS — F32.A DEPRESSION, UNSPECIFIED DEPRESSION TYPE: ICD-10-CM

## 2019-01-01 LAB
A ALTERNATA IGE QN: <0.1 KU/L
A FUMIGATUS IGE QN: <0.1 KU/L
ALBUMIN SERPL-MCNC: 4 G/DL (ref 3.5–5.2)
AMER ROACH IGE QN: <0.1 KU/L
ANION GAP SERPL CALCULATED.3IONS-SCNC: 10.6 MMOL/L (ref 5–15)
ANION GAP SERPL CALCULATED.3IONS-SCNC: 12.7 MMOL/L (ref 5–15)
BACTERIA UR QL AUTO: ABNORMAL /HPF
BAHIA GRASS IGE QN: <0.1 KU/L
BASOPHILS # BLD AUTO: 0.06 10*3/MM3 (ref 0–0.2)
BASOPHILS NFR BLD AUTO: 0.9 % (ref 0–1.5)
BERMUDA GRASS IGE QN: <0.1 KU/L
BILIRUB UR QL STRIP: NEGATIVE
BOXELDER IGE QN: <0.1 KU/L
BUN BLD-MCNC: 29 MG/DL (ref 8–23)
BUN BLD-MCNC: 45 MG/DL (ref 8–23)
BUN/CREAT SERPL: 17.2 (ref 7–25)
BUN/CREAT SERPL: 25 (ref 7–25)
C HERBARUM IGE QN: <0.1 KU/L
CALCIUM SPEC-SCNC: 10.1 MG/DL (ref 8.6–10.5)
CALCIUM SPEC-SCNC: 9.1 MG/DL (ref 8.6–10.5)
CAT DANDER IGG QN: <0.1 KU/L
CHLORIDE SERPL-SCNC: 97 MMOL/L (ref 98–107)
CHLORIDE SERPL-SCNC: 99 MMOL/L (ref 98–107)
CLARITY UR: ABNORMAL
CMN PIGWEED IGE QN: <0.1 KU/L
CO2 SERPL-SCNC: 26.3 MMOL/L (ref 22–29)
CO2 SERPL-SCNC: 30.4 MMOL/L (ref 22–29)
COLOR UR: ABNORMAL
COMMON RAGWEED IGE QN: <0.1 KU/L
CONV CLASS DESCRIPTION: ABNORMAL
CREAT BLD-MCNC: 1.69 MG/DL (ref 0.57–1)
CREAT BLD-MCNC: 1.8 MG/DL (ref 0.57–1)
D FARINAE IGE QN: <0.1 KU/L
D PTERONYSS IGE QN: <0.1 KU/L
DEPRECATED RDW RBC AUTO: 41.7 FL (ref 37–54)
DOG DANDER IGE QN: <0.1 KU/L
ENGL PLANTAIN IGE QN: <0.1 KU/L
EOSINOPHIL # BLD AUTO: 0.23 10*3/MM3 (ref 0–0.4)
EOSINOPHIL NFR BLD AUTO: 3.4 % (ref 0.3–6.2)
ERYTHROCYTE [DISTWIDTH] IN BLOOD BY AUTOMATED COUNT: 13.4 % (ref 12.3–15.4)
ERYTHROCYTE [DISTWIDTH] IN BLOOD BY AUTOMATED COUNT: 14.4 % (ref 12.3–15.4)
GFR SERPL CREATININE-BSD FRML MDRD: 27 ML/MIN/1.73
GFR SERPL CREATININE-BSD FRML MDRD: 29 ML/MIN/1.73
GLUCOSE BLD-MCNC: 204 MG/DL (ref 65–99)
GLUCOSE BLD-MCNC: 71 MG/DL (ref 65–99)
GLUCOSE UR STRIP-MCNC: ABNORMAL MG/DL
HAZELNUT POLN IGE QN: <0.1 KU/L
HCT VFR BLD AUTO: 39.1 % (ref 34–46.6)
HCT VFR BLD AUTO: 40.5 % (ref 34–46.6)
HGB BLD-MCNC: 12.4 G/DL (ref 12–15.9)
HGB BLD-MCNC: 13 G/DL (ref 12–15.9)
HGB UR QL STRIP.AUTO: NEGATIVE
HYALINE CASTS UR QL AUTO: ABNORMAL /LPF
IMM GRANULOCYTES # BLD AUTO: 0.03 10*3/MM3 (ref 0–0.05)
IMM GRANULOCYTES NFR BLD AUTO: 0.4 % (ref 0–0.5)
IRON 24H UR-MRATE: 76 MCG/DL (ref 37–145)
IRON SATN MFR SERPL: 22 % (ref 20–50)
JOHNSON GRASS IGE QN: <0.1 KU/L
KENT BLUE GRASS IGE QN: <0.1 KU/L
KETONES UR QL STRIP: ABNORMAL
LEUKOCYTE ESTERASE UR QL STRIP.AUTO: ABNORMAL
LYMPHOCYTES # BLD AUTO: 1.6 10*3/MM3 (ref 0.7–3.1)
LYMPHOCYTES # BLD AUTO: 2.2 10*3/MM3 (ref 0.7–3.1)
LYMPHOCYTES NFR BLD AUTO: 23.6 % (ref 19.6–45.3)
LYMPHOCYTES NFR BLD AUTO: 26.3 % (ref 19.6–45.3)
M RACEMOSUS IGE QN: <0.1 KU/L
MCH RBC QN AUTO: 27.2 PG (ref 26.6–33)
MCH RBC QN AUTO: 30 PG (ref 26.6–33)
MCHC RBC AUTO-ENTMCNC: 31.7 G/DL (ref 31.5–35.7)
MCHC RBC AUTO-ENTMCNC: 32 G/DL (ref 31.5–35.7)
MCV RBC AUTO: 85.7 FL (ref 79–97)
MCV RBC AUTO: 93.5 FL (ref 79–97)
MONOCYTES # BLD AUTO: 0.58 10*3/MM3 (ref 0.1–0.9)
MONOCYTES # BLD AUTO: 0.6 10*3/MM3 (ref 0.1–0.9)
MONOCYTES NFR BLD AUTO: 6.9 % (ref 5–12)
MONOCYTES NFR BLD AUTO: 8.5 % (ref 5–12)
MT JUNIPER IGE QN: <0.1 KU/L
MUGWORT IGE QN: <0.1 KU/L
NETTLE IGE QN: <0.1 KU/L
NEUTROPHILS # BLD AUTO: 4.29 10*3/MM3 (ref 1.7–7)
NEUTROPHILS # BLD AUTO: 5.5 10*3/MM3 (ref 1.7–7)
NEUTROPHILS NFR BLD AUTO: 63.2 % (ref 42.7–76)
NEUTROPHILS NFR BLD AUTO: 66.8 % (ref 42.7–76)
NITRITE UR QL STRIP: NEGATIVE
NRBC BLD AUTO-RTO: 0 /100 WBC (ref 0–0.2)
NT-PROBNP SERPL-MCNC: ABNORMAL PG/ML (ref 5–1800)
P NOTATUM IGE QN: <0.1 KU/L
PH UR STRIP.AUTO: 6 [PH] (ref 5–8)
PHOSPHATE SERPL-MCNC: 3.4 MG/DL (ref 2.5–4.5)
PLATELET # BLD AUTO: 196 10*3/MM3 (ref 140–450)
PLATELET # BLD AUTO: 197 10*3/MM3 (ref 140–450)
PMV BLD AUTO: 10 FL (ref 6–12)
PMV BLD AUTO: 6.6 FL (ref 6–12)
POTASSIUM BLD-SCNC: 4.4 MMOL/L (ref 3.5–5.2)
POTASSIUM BLD-SCNC: 4.8 MMOL/L (ref 3.5–5.2)
PROT UR QL STRIP: ABNORMAL
RBC # BLD AUTO: 4.33 10*6/MM3 (ref 3.77–5.28)
RBC # BLD AUTO: 4.56 10*6/MM3 (ref 3.77–5.28)
RBC # UR: ABNORMAL /HPF
REF LAB TEST METHOD: ABNORMAL
S BOTRYOSUM IGE QN: <0.1 KU/L
SHEEP SORREL IGE QN: <0.1 KU/L
SODIUM BLD-SCNC: 136 MMOL/L (ref 136–145)
SODIUM BLD-SCNC: 140 MMOL/L (ref 136–145)
SP GR UR STRIP: 1.02 (ref 1–1.03)
SQUAMOUS #/AREA URNS HPF: ABNORMAL /HPF
SWEET GUM IGE QN: <0.1 KU/L
T011-IGE MAPLE LEAF SYCAMORE: <0.1 KU/L
TIBC SERPL-MCNC: 353 MCG/DL (ref 298–536)
TOTAL IGE SMQN RAST: 3 IU/ML (ref 6–495)
TRANSFERRIN SERPL-MCNC: 237 MG/DL (ref 200–360)
UROBILINOGEN UR QL STRIP: ABNORMAL
WBC NRBC COR # BLD: 6.79 10*3/MM3 (ref 3.4–10.8)
WBC NRBC COR # BLD: 8.3 10*3/MM3 (ref 3.4–10.8)
WBC UR QL AUTO: ABNORMAL /HPF
WHITE ELM IGE QN: <0.1 KU/L
WHITE HICKORY IGE QN: <0.1 KU/L
WHITE MULBERRY IGE QN: <0.1 KU/L
WHITE OAK IGE QN: <0.1 KU/L

## 2019-01-01 PROCEDURE — 99214 OFFICE O/P EST MOD 30 MIN: CPT | Performed by: NURSE PRACTITIONER

## 2019-01-01 PROCEDURE — 86003 ALLG SPEC IGE CRUDE XTRC EA: CPT | Performed by: INTERNAL MEDICINE

## 2019-01-01 PROCEDURE — 93296 REM INTERROG EVL PM/IDS: CPT | Performed by: INTERNAL MEDICINE

## 2019-01-01 PROCEDURE — 80048 BASIC METABOLIC PNL TOTAL CA: CPT | Performed by: INTERNAL MEDICINE

## 2019-01-01 PROCEDURE — 36415 COLL VENOUS BLD VENIPUNCTURE: CPT

## 2019-01-01 PROCEDURE — 99214 OFFICE O/P EST MOD 30 MIN: CPT | Performed by: INTERNAL MEDICINE

## 2019-01-01 PROCEDURE — 93280 PM DEVICE PROGR EVAL DUAL: CPT | Performed by: NURSE PRACTITIONER

## 2019-01-01 PROCEDURE — 83540 ASSAY OF IRON: CPT

## 2019-01-01 PROCEDURE — 80069 RENAL FUNCTION PANEL: CPT | Performed by: INTERNAL MEDICINE

## 2019-01-01 PROCEDURE — 81001 URINALYSIS AUTO W/SCOPE: CPT

## 2019-01-01 PROCEDURE — 36415 COLL VENOUS BLD VENIPUNCTURE: CPT | Performed by: INTERNAL MEDICINE

## 2019-01-01 PROCEDURE — 82785 ASSAY OF IGE: CPT | Performed by: INTERNAL MEDICINE

## 2019-01-01 PROCEDURE — 84466 ASSAY OF TRANSFERRIN: CPT

## 2019-01-01 PROCEDURE — 99213 OFFICE O/P EST LOW 20 MIN: CPT | Performed by: NURSE PRACTITIONER

## 2019-01-01 PROCEDURE — 83880 ASSAY OF NATRIURETIC PEPTIDE: CPT | Performed by: INTERNAL MEDICINE

## 2019-01-01 PROCEDURE — 94010 BREATHING CAPACITY TEST: CPT | Performed by: NURSE PRACTITIONER

## 2019-01-01 PROCEDURE — 85025 COMPLETE CBC W/AUTO DIFF WBC: CPT

## 2019-01-01 PROCEDURE — 99213 OFFICE O/P EST LOW 20 MIN: CPT | Performed by: PSYCHIATRY & NEUROLOGY

## 2019-01-01 PROCEDURE — 93294 REM INTERROG EVL PM/LDLS PM: CPT | Performed by: INTERNAL MEDICINE

## 2019-01-01 PROCEDURE — 85025 COMPLETE CBC W/AUTO DIFF WBC: CPT | Performed by: INTERNAL MEDICINE

## 2019-01-01 RX ORDER — BENZONATATE 100 MG/1
100 CAPSULE ORAL 3 TIMES DAILY PRN
Qty: 42 CAPSULE | Refills: 0 | Status: SHIPPED | OUTPATIENT
Start: 2019-01-01 | End: 2019-01-01

## 2019-01-01 RX ORDER — ALBUTEROL SULFATE 90 UG/1
2 AEROSOL, METERED RESPIRATORY (INHALATION) 4 TIMES DAILY PRN
Qty: 1 INHALER | Refills: 11 | Status: SHIPPED | OUTPATIENT
Start: 2019-01-01 | End: 2020-01-01 | Stop reason: SDUPTHER

## 2019-01-01 RX ORDER — LEVOTHYROXINE SODIUM 0.07 MG/1
75 TABLET ORAL DAILY
Refills: 2 | COMMUNITY
Start: 2019-01-01 | End: 2020-01-01

## 2019-01-01 RX ORDER — SODIUM BICARBONATE 325 MG/1
325 TABLET ORAL 2 TIMES DAILY
Refills: 0 | COMMUNITY
Start: 2019-01-01 | End: 2019-01-01

## 2019-01-17 ENCOUNTER — OFFICE VISIT (OUTPATIENT)
Dept: PULMONOLOGY | Facility: CLINIC | Age: 79
End: 2019-01-17

## 2019-01-17 VITALS
TEMPERATURE: 97.9 F | OXYGEN SATURATION: 93 % | BODY MASS INDEX: 30.01 KG/M2 | HEART RATE: 71 BPM | WEIGHT: 191.2 LBS | HEIGHT: 67 IN | SYSTOLIC BLOOD PRESSURE: 112 MMHG | DIASTOLIC BLOOD PRESSURE: 80 MMHG

## 2019-01-17 DIAGNOSIS — G47.33 OSA ON CPAP: ICD-10-CM

## 2019-01-17 DIAGNOSIS — J44.9 CHRONIC OBSTRUCTIVE PULMONARY DISEASE, UNSPECIFIED COPD TYPE (HCC): Primary | ICD-10-CM

## 2019-01-17 DIAGNOSIS — J38.3 VOCAL CORD DYSFUNCTION: ICD-10-CM

## 2019-01-17 DIAGNOSIS — I27.20 PULMONARY HYPERTENSION (HCC): ICD-10-CM

## 2019-01-17 DIAGNOSIS — Z99.89 OSA ON CPAP: ICD-10-CM

## 2019-01-17 PROCEDURE — 99214 OFFICE O/P EST MOD 30 MIN: CPT | Performed by: INTERNAL MEDICINE

## 2019-01-17 RX ORDER — ALBUTEROL SULFATE 90 UG/1
2 AEROSOL, METERED RESPIRATORY (INHALATION) 4 TIMES DAILY PRN
Qty: 1 INHALER | Refills: 11 | Status: SHIPPED | OUTPATIENT
Start: 2019-01-17 | End: 2019-01-01 | Stop reason: SDUPTHER

## 2019-01-17 NOTE — PROGRESS NOTES
CHIEF COMPLAINT: Followup for COPD.    HISTORY OF PRESENT ILLNESS: A 76-year-old white female with multiple medical problems including atrial fibrillation, status post EVITA cardioversion x1, pacemaker implantation for tachybrady syndrome 2016, diastolic heart failure, moderate group 2 plus group 3 pulmonary hypertension, status post right heart cath with mean PA pressure of 38, wedge of 18, cardiac index of 2.1, performed by Dr. Cooper, hypertension, hyperlipidemia, type 2 diabetes, prior tobacco abuse, obesity, CIELO, asthma, depression, GERD, hypothyroidism, osteoarthritis, who has also had recurrent ascites requiring recurrent large volume paracentesis. She also has chronic kidney disease followed by [facility]. Patient has also had ongoing iron deficiency anemia, serial transfusions. She is being followed by Dr. Muñoz and recently had a capsule study. Her autoimmune workup to date appears to be negative from the records in Epic. Her hepatitis panel is negative. Patient’s most recent paracentesis was performed in 2016 with almost 7.5 L removed.     Had watchman device placed.  No further paracentesis needed.  Using bipap with good compliance.  Recent LHC w/ Non-obstructive CAD.    INTERVAL HISTORY:    Since last visit her son  unexpectedly in December.  She has had a lot of anxiety and depression.  Had chest pain leading to LHC which was ok.  When she gets crying spells she gets very short of breath and feels like the wind is getting cut off in her upper airway.  Weight has been stable with prn diuretics.  Recent CT showed resolution of left sided pneumonia.    PMH:    GOLD Stage 2 COPD  Questionable Asthma  Allergic Rhinitis  Prior Tobacco Abuse  Severe CIELO  Group 2+3 Moderate Pulmonary HTN  Diastolic HF, LVH, LAE, Mild to Mod MR EF 55-60%  Afib s/p PPM for Tachy-Leonidas Syndrome, s/p Watchman  CKD Stage IV  Recurrent Ascites (no cirrhosis on GI eval) s/p recurrent  LVP  Iron Def Anemia requiring intermittent transfusion  Duodenal AVM's  T2DM  HTN  HLD  GERD  Hypothyroidism  Hearing Loss  Osteoarthritis    PSH:  Recurrent Large Volume Paracentesis - none needed > 1 year  Cataracts  Right Knee  Left Elbow  INNA  Parathyroid Gland Removal    FH    SH  50-75 py smoking, quit 1980's    ROS  All other systems were reviewed and negative except per HPI    Physical Exam   Constitutional: Oriented to person, place, and time. Appears well-developed and well-nourished.   Head: Normocephalic and atraumatic.   Nose: Nose normal.   Mouth/Throat: Oropharynx is clear and moist.   Eyes: Conjunctivae are normal.   Neck: No tracheal deviation present.   Cardiovascular: Normal rate, regular rhythm, normal heart sounds and intact distal pulses.  Exam reveals no gallop and no friction rub.    No murmur heard.  Pulmonary/Chest: Effort normal and breath sounds normal. No stridor. No respiratory distress. No wheezes. No rales. No tenderness.   Abdominal: Soft. Bowel sounds are normal. No distended w/ ascites.   Musculoskeletal: Normal range of motion. No edema.   Lymphadenopathy:  No cervical adenopathy.   Neurological: Alert and oriented to person, place, and time.  No Focal Neurological Deficits Observed   Skin: Skin is warm and dry. No rash noted.   Psychiatric: Normal mood and affect.  Behavior is normal. Judgment normal.     DIAGNOSTIC DATA (Reviewed and interpreted by me):    6MW 1/25/17 resting room air sat 98%, minimum sat 91%, no O2 needed    Prior Full PFT form 12/2014 (No pleural effusions on CXR at time of PFT):  Moderate obstruction, air trapping mild reduction in DLCO over-corrects for alveolar volume  FEV1/FVC = 65%  FEV1 = 65%  FVC = 76%  TLC = 85%  RV/TLC = 47%  DLCO = 66-->124% for alveolar volume    CPAP download - 12/19/18-1/17/19 - compliance w/ > 4 hrs + 83%, AHI 1.1    Severe obstructive sleep apnea (327.23) (G47.33)      A. UNTREATED; appeared to attempt to wear it sporadically  2007, 2008, split-night study      of 03/10/2016 reported an overall AHI of 32.7/hr which required BiPAP therapy after failed      CPAP attempt with final BiPAP 17 and EPAP 11 which still reported oxygen desaturations      on 2 L O2, her lowest oxygen saturation was documented at 64% and several      arrhythmias were observed during the study.      B. SLEEP STUDY; 11/2007, w/ AHI 57.8; followed by Dr. rader until 2008, during her      sleep study CPAP of 7 treated her well; giving her an AHI of < 2. Follow-up      visits in the sleep clinic indicate she was still sleepy, he increased the pressure to 9,      referred her to ENT to consider surgery.    CT Chest 8/28/15 - mosaic attenuation c/w obstructive lung disease, bilateral pleural effusions, scarring from prior granulomatous disease    CT Chest 12/31/18 - resolution of left sided infiltrates, small left sided effusion    V/Q Scan 8/25/15 - no evidence of CTEPH or acute clot, air trapping c/w obstructive lung disease    Echo 5/24/16 - EF 55-60%, LVH, mild LAE, mild to mod MR, RV moderately dilated, RV Systolic Function Severely reduced, RVSP 80, severe TR    RHC 6/2016  HEMODYNAMIC DATA (Pressures in mmHg):  PULMONARY CAPILLARY WEDGE PRESSURE: 18.  PA: 70/22  Mean PA:  36  RV: 70/10.  RA: Mean of 16.  OXYGEN SATURATIONS: IVC 51%, SVC 59%, RA 56%, RV 54%, PA 60%, and arterial 92%.       NITRIC OXIDE CHALLENGE:  CARDIAC OUTPUT: Baseline 4.23 with cardiac index of 2.28. Systemic blood  pressure 140/89 with a PA pressure of 78/25 and a mean of 37.      POST- NITRIC OXIDE CHALLENGE:  CARDIAC OUTPUT: 3.9 with a cardiac index of 2.1. Systemic pressure 151/75. PA  pressure is 70/20 with a mean of 36.     Impression & Plan    1) Moderate Group 2+3 Pulmonary Hypertension - complex situation.  No evidence of cirrhosis - therefore ascites is cardiac/renal in nature and she cannot have portopulmonary htn by definition. The severity is moderate - mean PA 36 (moderate 35-45)  "and is clearly proportional to the degree of Diastolic HF (PCWP 18), COPD FEV1 65%, CKD IV, and Severe untreated CIELO w/ Nocturnal Hypoxemia AHI of 32.7.  For that reason selective pulmonary vasodilators are not indicated, and in fact, could potentially make things worse with worsening V/Q matching or volume overloading a non-compliant left ventricle.  Seems to be doing much better with more aggressive diuresis.  No paracentesis needed for > 1 year.  Also needs good BP control.      2) Severe CIELO and Nocturnal Hypoxemia - now using Bipap regularly.  CPAP download looks good.  Interested in oral appliance.  I told her if she tries this we need to repeat sleep study with device.    3) GOLD 2A COPD - I think there is less likely an asthma component.  She was recently prescribed symbicort and is concerned about the risk of pneumonia.  I agree ICS is probably not needed in this situation.  Continue LAMA / LABA.    4) Abnormal CT - was recently at [facility] for CHF.  CTA done there 9/17/18 showed no PE, \"small bilateral effusions\", \"focal GGO in OLIVA suspect focal pneumonic infiltrates vs pulmonary edema\", \"recommend follow up CT in 1-2 months\".  Repeat CT w/ resolution of these findings.    5) Ongoing Dyspnea - clinically suspect vocal cord dysfunction with all the stress from recently losing her son.  Prescribe prn xanax, refer to speech therapy.    RTC 6 months with PFT    Johan Thao MD  Pulmonology and Critical Care Medicine  01/17/19 2:47 PM  Electronically Signed    "

## 2019-01-18 DIAGNOSIS — G47.33 OSA (OBSTRUCTIVE SLEEP APNEA): Primary | ICD-10-CM

## 2019-01-28 ENCOUNTER — OFFICE VISIT (OUTPATIENT)
Dept: ENDOCRINOLOGY | Facility: CLINIC | Age: 79
End: 2019-01-28

## 2019-01-28 VITALS
WEIGHT: 191.4 LBS | OXYGEN SATURATION: 96 % | HEART RATE: 70 BPM | DIASTOLIC BLOOD PRESSURE: 60 MMHG | BODY MASS INDEX: 29.01 KG/M2 | HEIGHT: 68 IN | SYSTOLIC BLOOD PRESSURE: 118 MMHG

## 2019-01-28 DIAGNOSIS — E11.9 TYPE 2 DIABETES MELLITUS WITHOUT COMPLICATION, WITH LONG-TERM CURRENT USE OF INSULIN (HCC): Primary | ICD-10-CM

## 2019-01-28 DIAGNOSIS — I10 ESSENTIAL HYPERTENSION: ICD-10-CM

## 2019-01-28 DIAGNOSIS — Z79.4 TYPE 2 DIABETES MELLITUS WITHOUT COMPLICATION, WITH LONG-TERM CURRENT USE OF INSULIN (HCC): Primary | ICD-10-CM

## 2019-01-28 DIAGNOSIS — E03.9 ACQUIRED HYPOTHYROIDISM: ICD-10-CM

## 2019-01-28 LAB
GLUCOSE BLDC GLUCOMTR-MCNC: 209 MG/DL (ref 70–130)
HBA1C MFR BLD: 7.8 %

## 2019-01-28 PROCEDURE — 82947 ASSAY GLUCOSE BLOOD QUANT: CPT | Performed by: INTERNAL MEDICINE

## 2019-01-28 PROCEDURE — 83036 HEMOGLOBIN GLYCOSYLATED A1C: CPT | Performed by: INTERNAL MEDICINE

## 2019-01-28 PROCEDURE — 99214 OFFICE O/P EST MOD 30 MIN: CPT | Performed by: INTERNAL MEDICINE

## 2019-01-28 RX ORDER — ALPRAZOLAM 0.25 MG/1
0.5 TABLET ORAL 2 TIMES DAILY PRN
COMMUNITY
End: 2019-06-13

## 2019-01-28 RX ORDER — REPAGLINIDE 1 MG/1
TABLET ORAL
Qty: 90 TABLET | Refills: 11 | Status: SHIPPED | OUTPATIENT
Start: 2019-01-28 | End: 2020-01-01

## 2019-02-03 NOTE — ASSESSMENT & PLAN NOTE
Blood sugar and 90 day average sugar reviewed  Has been less active, less strict with diet- son recently passed away from heart attack  Results for orders placed or performed in visit on 01/28/19   POC Glucose Fingerstick   Result Value Ref Range    Glucose 209 (A) 70 - 130 mg/dL   POC Glycosylated Hemoglobin (Hb A1C)   Result Value Ref Range    Hemoglobin A1C 7.8 %     Average sugar is 170   Is utd with eye exam  No neuropathy   Ur alb neg 5/18  Continue to monitor and adjust medication prn   She is already making changes in diet and working on getting sugars back in better range  F/u 3-4 months

## 2019-02-14 ENCOUNTER — OFFICE VISIT (OUTPATIENT)
Dept: CARDIOLOGY | Facility: CLINIC | Age: 79
End: 2019-02-14

## 2019-02-14 VITALS
HEART RATE: 70 BPM | SYSTOLIC BLOOD PRESSURE: 126 MMHG | DIASTOLIC BLOOD PRESSURE: 66 MMHG | HEIGHT: 68 IN | WEIGHT: 192 LBS | BODY MASS INDEX: 29.1 KG/M2

## 2019-02-14 DIAGNOSIS — I49.5 TACHY-BRADY SYNDROME (HCC): ICD-10-CM

## 2019-02-14 DIAGNOSIS — I48.19 ATRIAL FIBRILLATION, PERSISTENT (HCC): ICD-10-CM

## 2019-02-14 DIAGNOSIS — R06.02 SHORTNESS OF BREATH: Primary | ICD-10-CM

## 2019-02-14 DIAGNOSIS — I10 ESSENTIAL HYPERTENSION: ICD-10-CM

## 2019-02-14 PROCEDURE — 99213 OFFICE O/P EST LOW 20 MIN: CPT | Performed by: NURSE PRACTITIONER

## 2019-02-14 NOTE — PROGRESS NOTES
Sarita Alegre  1940  79 y.o.  [phone]  [phone]      02/14/2019    Percy Allison MD    Chief Complaint   Patient presents with   • Atrial fibrillation, persistent (CMS/HCC)       Problem List:  1. Atrial fibrillation:  a. Diagnosed 06/21/2015 with controlled rate, [facility].   b. EVITA/ECV, 06/24/2015: EF 55-60%. No thrombus. Mild-to-moderate MR. Moderate TR with RVSP 66 mmHg. ECV to NSR with 2 joules.  c. CHADS-VASc = 7 (CHF, HTN, Age > 75, DM, Vascular disease, Female sex)  2. Tachy-marcela syndrome:  a. Cardiac event monitor, 08/31/2016, placed for falls: 6.5 second pause.   b. PM implantation, 09/01/2016: [employer] PM, model L101 serial number [id number] generator, the atrial lead is a Guidant model 4135 serial number [id number] lead, the RV lead is a Guidant model 4136 serial number [id number] lead. DDIR  bpm.  rene Henderson, 09/26/2017: 21 mm watchman left atrial appendage occlusive device. ASAP-TOO study Dr. Lopes.  3. Diastolic congestive heart failure:  a. Echocardiogram, 03/24/2015: EF 55-60%. Mild MR/TR/AI.  b. Echocardiogram, 05/18/2015: EF 65%. Mild MR/AI. Moderate-to-severe TR. Dr. Dorantes.  c. Echocardiogram, 06/22/2015: EF 55-60%. RV mildly dilated. Mildly reduced RV global systolic function.Severe TR with RVSP 81 mmHg.  d. Echocardiogram, 08/31/2017: EF 56%. Mild MR. Aortic valve exhibits sclerosis.  e. EVITA, 12/18/2017: EF 60%. Mild MR/AI. Moderate TR.  f. EVITA, 08/27/2018: EF 55%. Mild MR/AI. Moderate TR with RVSP 55 mmHg. A 21 mm Watchman JUAN occlusion device is present within the JUAN. There is no flow around the device.  g. Echocardiogram, 09/17/2018: EF 55-60%, mild AI, trace MR, moderate TR, moderate-to-severe pulmonary HTN, RVSP 63 mmHg  4. Moderate pulmonary hypertension:  a. RHC, 06/05/2016: Normal cardiac output and index. Severe pulmonary HTN. No evidence of improvement with nitric oxide. No evidence of significant intracardiac shunting.  Elevated RA and wedge pressures.   5. Chest pain:  a. History of normal echocardiogram as well as Cardiolite GXT, March 2011, Sridhar Bustamante MD.  b. Abnormal EKG revealing NSR with first degree AV block and poor precordial R-wave progression (no evidence of anterior infarct).  c. Cardiolite GXT, 10/01/2018: EF 48%, no evidence of inducible ischemia.  d. LHC, 11/20/2018: Near-normal coronary arteries. Mildly elevated LVEDP, however the patient has been off diuretics for 2 days and has been hydrated overnight for renal protection.  6. Hypertension.  7. Hyperlipidemia.  8. Type 2 diabetes mellitus.  9. History of tobacco abuse with cessation 30 years ago.  10. Obesity.  11. CIELO with no CPAP use.  12. Asthma.  13. Depression.  14. GERD.  15. Hypothyroidism on chronic replacement therapy.  16. Bilateral hearing deficit with hearing aids.  17. Osteoarthritis.  18. History of left ankle fracture followed by Dr. Kennedy.  19. Surgical history:  a. Bilateral cataract extraction, 1996 and 1997.  b. Right knee repair, 1982.  c. Left elbow repair, 2006.  d. Hysterectomy.  e. Parathyroid gland removal.      Allergies   Allergen Reactions   • Propafenone Other (See Comments)     Hepatic failure   • Grass Cough   • Molds & Smuts Cough       Current Medications:      Current Outpatient Medications:   •  albuterol sulfate HFA (PROAIR HFA) 108 (90 Base) MCG/ACT inhaler, Inhale 2 puffs 4 (Four) Times a Day As Needed for Wheezing or Shortness of Air., Disp: 1 inhaler, Rfl: 11  •  ALPRAZolam (XANAX) 0.25 MG tablet, Take 0.5 mg by mouth 2 (Two) Times a Day As Needed for Anxiety., Disp: , Rfl:   •  aspirin 81 MG chewable tablet, Chew 1 tablet Daily., Disp: 30 tablet, Rfl: 6  •  atorvastatin (LIPITOR) 40 MG tablet, Take 1 tablet by mouth Daily., Disp: 90 tablet, Rfl: 2  •  Biotin w/ Vitamins C & E (HAIR/SKIN/NAILS PO), Take 1 tablet by mouth Daily., Disp: , Rfl:   •  bumetanide (BUMEX) 1 MG tablet, Take 1 mg by mouth Daily., Disp: , Rfl:   •   Cholecalciferol (VITAMIN D3) 5000 units capsule capsule, Take 5,000 Units by mouth Daily., Disp: , Rfl:   •  Cyanocobalamin (B-12 COMPLIANCE INJECTION) 1000 MCG/ML kit, Inject  as directed As Needed., Disp: , Rfl:   •  DULoxetine (CYMBALTA) 60 MG capsule, Take 60 mg by mouth Daily., Disp: , Rfl:   •  HYDROcodone-acetaminophen (NORCO) 5-325 MG per tablet, Take 1 tablet by mouth Every 6 (Six) Hours As Needed., Disp: , Rfl:   •  insulin detemir (LEVEMIR FLEXPEN) 100 UNIT/ML injection, Inject 20 Units under the skin into the appropriate area as directed Daily., Disp: , Rfl:   •  ketotifen (ZADITOR) 0.025 % ophthalmic solution, 1 drop 2 (Two) Times a Day As Needed (itchy eyes)., Disp: , Rfl:   •  levocetirizine (XYZAL ALLERGY 24HR) 5 MG tablet, Take 5 mg by mouth Every Evening., Disp: , Rfl:   •  levothyroxine (SYNTHROID, LEVOTHROID) 100 MCG tablet, Take 100 mcg by mouth Daily., Disp: , Rfl:   •  linaclotide (LINZESS) 145 MCG capsule capsule, Take 290 mcg by mouth Every Morning Before Breakfast., Disp: , Rfl:   •  losartan (COZAAR) 25 MG tablet, Take 1 tablet by mouth Daily., Disp: 90 tablet, Rfl: 3  •  metoprolol tartrate (LOPRESSOR) 25 MG tablet, Take 1 tablet by mouth Every 12 (Twelve) Hours., Disp: 180 tablet, Rfl: 3  •  montelukast (SINGULAIR) 10 MG tablet, Take 10 mg by mouth Daily., Disp: , Rfl:   •  repaglinide (PRANDIN) 1 MG tablet, Take 1 pill up to three times daily for blood sugar over 200, Disp: 90 tablet, Rfl: 11  •  spironolactone (ALDACTONE) 25 MG tablet, Take 25 mg by mouth Daily., Disp: , Rfl:   •  trolamine salicylate (ASPERCREME) 10 % cream, Apply  topically As Needed for Muscle / Joint Pain. Australian Dream Relief cream, Disp: , Rfl:   •  umeclidinium-vilanterol (ANORO ELLIPTA) 62.5-25 MCG/INH aerosol powder  inhaler, Inhale 1 puff Daily. 1 inh once a day, Disp: 1 each, Rfl: 11  •  vitamin C (ASCORBIC ACID) 500 MG tablet, Take 500 mg by mouth Daily., Disp: , Rfl:     HPI    Sarita Alegre is a 79  "y.o. female who presents today for 3 month hospital follow up s/p catheterization for chest pain. Since then, she has experienced some ongoing shortness of breath.  She states that sometimes her inhalers will give her benefit if there is a lot of moisture in the air.  She weighs herself daily and it tends to fluctuate anywhere between 188-193 pounds consistently.  There was only one day where she had a greater than 2 pound weight gain in 24 hours.  She follows with Dr. aCmacho for nephrology who stopped the metolazone and recommended that she continue her current dose of Bumex and spironolactone.  She is able to take an additional Bumex as needed but was not given set parameters.  She is able to lay flat with one pillow and wears her CPAP faithfully at night.  She is not having any chest pain.  She admits that she's been under a great deal of stress since the sudden death of her son, who was only 57, on December 13, 2018.  I feel that her shortness of breath is related to stress and anxiety from the recent passing of her son.  She is connected continue to monitor this.  If for some reason it should maintain or progress, then we can always do an echocardiogram for further evaluation.    The following portions of the patient's history were reviewed and updated as appropriate: allergies, current medications and problem list.    Pertinent positives as listed in the HPI.  All other systems reviewed are negative.    Vitals:    02/14/19 1346   BP: 126/66   BP Location: Right arm   Patient Position: Sitting   Pulse: 70   Weight: 87.1 kg (192 lb)   Height: 172.7 cm (68\")       Physical Exam:    GENERAL: well-developed, well-nourished; in no acute distress.   NECK:  Carotid upstrokes are 2+ and  symmetrical without bruits.   LUNGS: Clear to auscultation bilaterally without wheezing, rhonchi, or rales noted.   CARDIOVASCULAR: The heart has a regular rate with a normal S1 and S2. There is no murmur, gallop, rub, or click " appreciated. The PMI is nondisplaced.   NEUROLOGICAL: Nonfocal; Alert and oriented  PERIPHERAL VASCULAR:  Posterior tibial pulses are 2+ and symmetrical. There is no peripheral edema.   SKIN:  Warm and dry  PSYCHIATRIC: normal mood and affect; behavior appropriate    Diagnostic Data:  Lab Results   Component Value Date    GLUCOSE 103 (H) 11/20/2018    BUN 47 (H) 11/20/2018    CREATININE 1.71 (H) 11/20/2018    EGFRIFNONA 29 (L) 11/20/2018    BCR 27.5 (H) 11/20/2018     11/20/2018    K 4.0 11/20/2018     11/20/2018    CO2 24.0 11/20/2018    CALCIUM 9.5 11/20/2018    PROTENTOTREF 7.1 12/14/2016    ALBUMIN 4.03 10/31/2018    LABIL2 0.9 12/14/2016    AST 26 09/25/2018    ALT 24 09/25/2018     Lab Results   Component Value Date    CHOL 140 09/25/2018    TRIG 165 (H) 09/25/2018    HDL 34 (L) 09/25/2018    LDL 90 09/25/2018      Lab Results   Component Value Date    WBC 8.95 10/31/2018    HGB 12.6 10/31/2018    HCT 39.3 10/31/2018    MCV 90.3 10/31/2018     10/31/2018     Lab Results   Component Value Date    TSH 5.629 (H) 09/25/2018       Procedures    Assessment:      ICD-10-CM ICD-9-CM   1. Shortness of breath R06.02 786.05   2. Atrial fibrillation, persistent (CMS/HCC) I48.1 427.31   3. Tachy-marcela syndrome (CMS/HCC) I49.5 427.81   4. Essential hypertension I10 401.9       Plan:    1. If no improvement in her shortness of breath 4 weeks, can do an echocardiogram  2. Encouraged routine exercise and dietary modifications  3. Continue current medications.  4. F/up in 3 months or sooner if needed.    Seen independently by KAILASH Mejia on . 2/14/2019  4:39 PM

## 2019-02-15 ENCOUNTER — TRANSCRIBE ORDERS (OUTPATIENT)
Dept: LAB | Facility: HOSPITAL | Age: 79
End: 2019-02-15

## 2019-02-15 ENCOUNTER — LAB (OUTPATIENT)
Dept: LAB | Facility: HOSPITAL | Age: 79
End: 2019-02-15

## 2019-02-15 DIAGNOSIS — N18.4 CHRONIC KIDNEY DISEASE, STAGE IV (SEVERE) (HCC): ICD-10-CM

## 2019-02-15 DIAGNOSIS — N18.4 CHRONIC KIDNEY DISEASE, STAGE IV (SEVERE) (HCC): Primary | ICD-10-CM

## 2019-02-15 LAB
ALBUMIN SERPL-MCNC: 4.62 G/DL (ref 3.2–4.8)
ANION GAP SERPL CALCULATED.3IONS-SCNC: 8 MMOL/L (ref 3–11)
BACTERIA UR QL AUTO: NORMAL /HPF
BASOPHILS # BLD AUTO: 0.06 10*3/MM3 (ref 0–0.2)
BASOPHILS NFR BLD AUTO: 0.7 % (ref 0–1)
BILIRUB UR QL STRIP: NEGATIVE
BUN BLD-MCNC: 58 MG/DL (ref 9–23)
BUN/CREAT SERPL: 27 (ref 7–25)
CALCIUM SPEC-SCNC: 10.1 MG/DL (ref 8.7–10.4)
CHLORIDE SERPL-SCNC: 104 MMOL/L (ref 99–109)
CLARITY UR: CLEAR
CO2 SERPL-SCNC: 26 MMOL/L (ref 20–31)
COLOR UR: YELLOW
CREAT BLD-MCNC: 2.15 MG/DL (ref 0.6–1.3)
DEPRECATED RDW RBC AUTO: 46.5 FL (ref 37–54)
EOSINOPHIL # BLD AUTO: 0.75 10*3/MM3 (ref 0–0.3)
EOSINOPHIL NFR BLD AUTO: 8.6 % (ref 0–3)
ERYTHROCYTE [DISTWIDTH] IN BLOOD BY AUTOMATED COUNT: 13.4 % (ref 11.3–14.5)
GFR SERPL CREATININE-BSD FRML MDRD: 22 ML/MIN/1.73
GLUCOSE BLD-MCNC: 136 MG/DL (ref 70–100)
GLUCOSE UR STRIP-MCNC: NEGATIVE MG/DL
HCT VFR BLD AUTO: 41.6 % (ref 34.5–44)
HGB BLD-MCNC: 13 G/DL (ref 11.5–15.5)
HGB UR QL STRIP.AUTO: NEGATIVE
HYALINE CASTS UR QL AUTO: NORMAL /LPF
IMM GRANULOCYTES # BLD AUTO: 0.03 10*3/MM3 (ref 0–0.05)
IMM GRANULOCYTES NFR BLD AUTO: 0.3 % (ref 0–0.6)
KETONES UR QL STRIP: NEGATIVE
LEUKOCYTE ESTERASE UR QL STRIP.AUTO: NEGATIVE
LYMPHOCYTES # BLD AUTO: 2.91 10*3/MM3 (ref 0.6–4.8)
LYMPHOCYTES NFR BLD AUTO: 33.5 % (ref 24–44)
MCH RBC QN AUTO: 29.6 PG (ref 27–31)
MCHC RBC AUTO-ENTMCNC: 31.3 G/DL (ref 32–36)
MCV RBC AUTO: 94.8 FL (ref 80–99)
MONOCYTES # BLD AUTO: 0.67 10*3/MM3 (ref 0–1)
MONOCYTES NFR BLD AUTO: 7.7 % (ref 0–12)
NEUTROPHILS # BLD AUTO: 4.29 10*3/MM3 (ref 1.5–8.3)
NEUTROPHILS NFR BLD AUTO: 49.5 % (ref 41–71)
NITRITE UR QL STRIP: NEGATIVE
PH UR STRIP.AUTO: 5.5 [PH] (ref 5–8)
PHOSPHATE SERPL-MCNC: 4.4 MG/DL (ref 2.4–5.1)
PLATELET # BLD AUTO: 197 10*3/MM3 (ref 150–450)
PMV BLD AUTO: 10 FL (ref 6–12)
POTASSIUM BLD-SCNC: 5.7 MMOL/L (ref 3.5–5.5)
PROT UR QL STRIP: NEGATIVE
RBC # BLD AUTO: 4.39 10*6/MM3 (ref 3.89–5.14)
RBC # UR: NORMAL /HPF
REF LAB TEST METHOD: NORMAL
SODIUM BLD-SCNC: 138 MMOL/L (ref 132–146)
SP GR UR STRIP: 1.01 (ref 1–1.03)
SQUAMOUS #/AREA URNS HPF: NORMAL /HPF
UROBILINOGEN UR QL STRIP: NORMAL
WBC NRBC COR # BLD: 8.68 10*3/MM3 (ref 3.5–10.8)
WBC UR QL AUTO: NORMAL /HPF

## 2019-02-15 PROCEDURE — 81001 URINALYSIS AUTO W/SCOPE: CPT

## 2019-02-15 PROCEDURE — 85025 COMPLETE CBC W/AUTO DIFF WBC: CPT | Performed by: INTERNAL MEDICINE

## 2019-02-15 PROCEDURE — 80069 RENAL FUNCTION PANEL: CPT | Performed by: INTERNAL MEDICINE

## 2019-02-15 PROCEDURE — 36415 COLL VENOUS BLD VENIPUNCTURE: CPT | Performed by: INTERNAL MEDICINE

## 2019-02-18 ENCOUNTER — TRANSCRIBE ORDERS (OUTPATIENT)
Dept: PULMONOLOGY | Facility: CLINIC | Age: 79
End: 2019-02-18

## 2019-02-27 ENCOUNTER — CLINICAL SUPPORT NO REQUIREMENTS (OUTPATIENT)
Dept: CARDIOLOGY | Facility: CLINIC | Age: 79
End: 2019-02-27

## 2019-02-27 DIAGNOSIS — I48.19 PERSISTENT ATRIAL FIBRILLATION (HCC): Primary | ICD-10-CM

## 2019-02-27 PROCEDURE — 93294 REM INTERROG EVL PM/LDLS PM: CPT | Performed by: INTERNAL MEDICINE

## 2019-02-27 PROCEDURE — 93296 REM INTERROG EVL PM/IDS: CPT | Performed by: INTERNAL MEDICINE

## 2019-03-12 RX ORDER — ATORVASTATIN CALCIUM 40 MG/1
TABLET, FILM COATED ORAL
Qty: 90 TABLET | Refills: 3 | Status: SHIPPED | OUTPATIENT
Start: 2019-03-12 | End: 2020-01-01 | Stop reason: SDUPTHER

## 2019-03-15 ENCOUNTER — TELEPHONE (OUTPATIENT)
Dept: CARDIOLOGY | Facility: CLINIC | Age: 79
End: 2019-03-15

## 2019-03-15 NOTE — TELEPHONE ENCOUNTER
I spoke with pt for 18 month follow up  ASAP-TOO study, s/p watchman device implant. She states she is stable with no stroke symptoms. No medication changes since last visit. She is continuing   follow up with Dr. Cooper and will see Dr. Lopes in August.

## 2019-03-27 ENCOUNTER — APPOINTMENT (OUTPATIENT)
Dept: LAB | Facility: HOSPITAL | Age: 79
End: 2019-03-27

## 2019-03-27 ENCOUNTER — TRANSCRIBE ORDERS (OUTPATIENT)
Dept: LAB | Facility: HOSPITAL | Age: 79
End: 2019-03-27

## 2019-03-27 DIAGNOSIS — E87.5 HYPERPOTASSEMIA: Primary | ICD-10-CM

## 2019-03-27 LAB
ANION GAP SERPL CALCULATED.3IONS-SCNC: 10 MMOL/L (ref 3–11)
BUN BLD-MCNC: 94 MG/DL (ref 9–23)
BUN/CREAT SERPL: 37.3 (ref 7–25)
CALCIUM SPEC-SCNC: 10 MG/DL (ref 8.7–10.4)
CHLORIDE SERPL-SCNC: 100 MMOL/L (ref 99–109)
CO2 SERPL-SCNC: 26 MMOL/L (ref 20–31)
CREAT BLD-MCNC: 2.52 MG/DL (ref 0.6–1.3)
GFR SERPL CREATININE-BSD FRML MDRD: 18 ML/MIN/1.73
GLUCOSE BLD-MCNC: 157 MG/DL (ref 70–100)
POTASSIUM BLD-SCNC: 5.8 MMOL/L (ref 3.5–5.5)
SODIUM BLD-SCNC: 136 MMOL/L (ref 132–146)

## 2019-03-27 PROCEDURE — 36415 COLL VENOUS BLD VENIPUNCTURE: CPT | Performed by: INTERNAL MEDICINE

## 2019-03-27 PROCEDURE — 80048 BASIC METABOLIC PNL TOTAL CA: CPT | Performed by: INTERNAL MEDICINE

## 2019-05-15 ENCOUNTER — OFFICE VISIT (OUTPATIENT)
Dept: CARDIOLOGY | Facility: CLINIC | Age: 79
End: 2019-05-15

## 2019-05-15 VITALS
WEIGHT: 195 LBS | BODY MASS INDEX: 29.55 KG/M2 | HEART RATE: 70 BPM | DIASTOLIC BLOOD PRESSURE: 62 MMHG | HEIGHT: 68 IN | SYSTOLIC BLOOD PRESSURE: 112 MMHG

## 2019-05-15 DIAGNOSIS — I10 ESSENTIAL HYPERTENSION: ICD-10-CM

## 2019-05-15 DIAGNOSIS — E78.5 HYPERLIPIDEMIA LDL GOAL <100: ICD-10-CM

## 2019-05-15 DIAGNOSIS — I48.19 ATRIAL FIBRILLATION, PERSISTENT (HCC): Primary | ICD-10-CM

## 2019-05-15 DIAGNOSIS — I49.5 TACHY-BRADY SYNDROME (HCC): ICD-10-CM

## 2019-05-15 PROCEDURE — 99214 OFFICE O/P EST MOD 30 MIN: CPT | Performed by: INTERNAL MEDICINE

## 2019-05-15 NOTE — PROGRESS NOTES
Sarita Alegre  1940  79 y.o.  [phone]  [phone]      05/15/2019    Percy Allison MD    Chief Complaint   Patient presents with   • Atrial fibrillation, persistent (CMS/HCC)   • Tachy-marcela syndrome   • Congestive Heart Failure   • Hypertension   • Hyperlipidemia       Problem List:  1. Atrial fibrillation:  a. Diagnosed 06/21/2015 with controlled rate, [facility] ER.   b. EVITA/ECV, 06/24/2015: EF 55-60%. No thrombus. Mild-to-moderate MR. Moderate TR with RVSP 66 mmHg. ECV to NSR with 2 joules.  c. CHADS-VASc = 7 (CHF, HTN, Age > 75, DM, Vascular disease, Female sex)  2. Tachy-marcela syndrome:  a. Cardiac event monitor, 08/31/2016, placed for falls: 6.5 second pause.   b. PM implantation, 09/01/2016: [employer] PM, model L101 serial number [id number] generator, the atrial lead is a Guidant model 4135 serial number [id number] lead, the RV lead is a Guidant model 4136 serial number [id number] lead. DDIR  bpm.  c. Watchman, 09/26/2017: 21 mm watchman left atrial appendage occlusive device. ASAP-[facility] study Dr. Lopes.  3. Diastolic congestive heart failure:  a. Echocardiogram, 03/24/2015: EF 55-60%. Mild MR/TR/AI.  b. Echocardiogram, 05/18/2015: EF 65%. Mild MR/AI. Moderate-to-severe TR. Dr. Dorantes.  c. Echocardiogram, 06/22/2015: EF 55-60%. RV mildly dilated. Mildly reduced RV global systolic function.Severe TR with RVSP 81 mmHg.  d. Echocardiogram, 08/31/2017: EF 56%. Mild MR. Aortic valve exhibits sclerosis.  e. EVITA, 12/18/2017: EF 60%. Mild MR/AI. Moderate TR.  f. EVITA, 08/27/2018: EF 55%. Mild MR/AI. Moderate TR with RVSP 55 mmHg. A 21 mm Watchman JUAN occlusion device is present within the JUAN. There is no flow around the device.  g. Echocardiogram, 09/17/2018: EF 55-60%, mild AI, trace MR, moderate TR, moderate-to-severe pulmonary HTN, RVSP 63 mmHg  4. Moderate pulmonary hypertension:  a. RHC, 06/05/2016: Normal cardiac output and index. Severe pulmonary HTN. No evidence  of improvement with nitric oxide. No evidence of significant intracardiac shunting. Elevated RA and wedge pressures.   5. Chest pain:  a. History of normal echocardiogram as well as Cardiolite GXT, March 2011, Sridhar Bustamante MD.  b. Abnormal EKG revealing NSR with first degree AV block and poor precordial R-wave progression (no evidence of anterior infarct).  c. Cardiolite GXT, 10/01/2018: EF 48%, no evidence of inducible ischemia.  d. LHC, 11/20/2018: Near-normal coronary arteries. Mildly elevated LVEDP, however the patient has been off diuretics for 2 days and has been hydrated overnight for renal protection.  6. Hypertension.  7. Hyperlipidemia.  8. Type 2 diabetes mellitus.  9. History of tobacco abuse with cessation 30 years ago.  10. Obesity.  11. CIELO with no CPAP use.  12. Asthma.  13. Depression.  14. GERD.  15. Hypothyroidism on chronic replacement therapy.  16. Bilateral hearing deficit with hearing aids.  17. Osteoarthritis.  18. History of left ankle fracture followed by Dr. Kennedy.  19. Surgical history:  a. Bilateral cataract extraction, 1996 and 1997.  b. Right knee repair, 1982.  c. Left elbow repair, 2006.  d. Hysterectomy.  e. Parathyroid gland removal.    Allergies   Allergen Reactions   • Propafenone Other (See Comments)     Hepatic failure   • Grass Cough   • Molds & Smuts Cough       Current Medications:      Current Outpatient Medications:   •  albuterol sulfate HFA (PROAIR HFA) 108 (90 Base) MCG/ACT inhaler, Inhale 2 puffs 4 (Four) Times a Day As Needed for Wheezing or Shortness of Air., Disp: 1 inhaler, Rfl: 11  •  ALPRAZolam (XANAX) 0.25 MG tablet, Take 0.5 mg by mouth 2 (Two) Times a Day As Needed for Anxiety., Disp: , Rfl:   •  aspirin 81 MG chewable tablet, Chew 1 tablet Daily., Disp: 30 tablet, Rfl: 6  •  atorvastatin (LIPITOR) 40 MG tablet, TAKE 1 TABLET BY MOUTH ONCE DAILY, Disp: 90 tablet, Rfl: 3  •  Biotin w/ Vitamins C & E (HAIR/SKIN/NAILS PO), Take 1 tablet by mouth Daily., Disp: ,  Rfl:   •  bumetanide (BUMEX) 1 MG tablet, Take 1 mg by mouth Daily., Disp: , Rfl:   •  Cholecalciferol (VITAMIN D3) 5000 units capsule capsule, Take 5,000 Units by mouth Daily., Disp: , Rfl:   •  Cyanocobalamin (B-12 COMPLIANCE INJECTION) 1000 MCG/ML kit, Inject  as directed As Needed., Disp: , Rfl:   •  DULoxetine (CYMBALTA) 60 MG capsule, Take 60 mg by mouth Daily., Disp: , Rfl:   •  HYDROcodone-acetaminophen (NORCO) 5-325 MG per tablet, Take 1 tablet by mouth Every 6 (Six) Hours As Needed., Disp: , Rfl:   •  insulin detemir (LEVEMIR FLEXPEN) 100 UNIT/ML injection, Inject 20 Units under the skin into the appropriate area as directed Daily., Disp: , Rfl:   •  ketotifen (ZADITOR) 0.025 % ophthalmic solution, 1 drop 2 (Two) Times a Day As Needed (itchy eyes)., Disp: , Rfl:   •  levocetirizine (XYZAL ALLERGY 24HR) 5 MG tablet, Take 5 mg by mouth Every Evening., Disp: , Rfl:   •  levothyroxine (SYNTHROID, LEVOTHROID) 100 MCG tablet, Take 100 mcg by mouth Daily., Disp: , Rfl:   •  linaclotide (LINZESS) 145 MCG capsule capsule, Take 290 mcg by mouth Every Morning Before Breakfast., Disp: , Rfl:   •  losartan (COZAAR) 25 MG tablet, Take 1 tablet by mouth Daily., Disp: 90 tablet, Rfl: 3  •  metoprolol tartrate (LOPRESSOR) 25 MG tablet, Take 1 tablet by mouth Every 12 (Twelve) Hours., Disp: 180 tablet, Rfl: 3  •  montelukast (SINGULAIR) 10 MG tablet, Take 10 mg by mouth Daily., Disp: , Rfl:   •  Patiromer Sorbitex Calcium (VELTASSA) 8.4 g pack, Take 8.4 g by mouth 1 (One) Time., Disp: , Rfl:   •  repaglinide (PRANDIN) 1 MG tablet, Take 1 pill up to three times daily for blood sugar over 200, Disp: 90 tablet, Rfl: 11  •  spironolactone (ALDACTONE) 25 MG tablet, Take 25 mg by mouth Daily., Disp: , Rfl:   •  trolamine salicylate (ASPERCREME) 10 % cream, Apply  topically As Needed for Muscle / Joint Pain. [employer] Dream Relief cream, Disp: , Rfl:   •  umeclidinium-vilanterol (ANORO ELLIPTA) 62.5-25 MCG/INH aerosol powder   "inhaler, Inhale 1 puff Daily. 1 inh once a day, Disp: 1 each, Rfl: 11  •  vitamin C (ASCORBIC ACID) 500 MG tablet, Take 500 mg by mouth Daily., Disp: , Rfl:     HPI    Sarita Alegre is a 79 y.o. female who presents today for a 3 month follow up of atrial fibrillation, tachy-marcela syndrome, diastolic CHF, moderate pulmonary hypertension, chest pain, hypertension, and hyperlipidemia. Since last visit, she has been doing well from a cardiovascular standpoint. Reports that her BP runs around 120 systolic when measured at home. When measured this morning at her PCP, BP was measured to be 126 systolic. Experiences exertional shortness of breath.  She knows that her oxygen level falls with exercise describes episodes of pain which start in her shoulders, reaches over her right shoulder and into her chest. Onset 2-3 months ago, and describes it feeling like indigestion. States that recent labs indicate high potassium levels. Patient denies palpitations, edema, dizziness, and syncope. To remain active, she walks or goes up the stairs. Her physical activity is not regular.  Her pulmonologist is doen Thao MD and her nephrologist is [facility].    The following portions of the patient's history were reviewed and updated as appropriate: allergies, current medications and problem list.    Pertinent positives as listed in the HPI.  All other systems reviewed are negative.    Vitals:    05/15/19 1459 05/15/19 1536   BP: 94/54 112/62   BP Location: Right arm    Patient Position: Sitting    Pulse: 70    Weight: 88.5 kg (195 lb)    Height: 172.7 cm (68\")        Physical Exam:    General: Alert and oriented  Neck: Jugular venous pressure is within normal limits. Carotids have normal upstrokes without bruits.   Cardiovascular: Heart has a nondisplaced focal PMI. Regular rate and rhythm without murmur, gallop or rub.  Lungs: Clear without rales or wheezes. Equal expansion is noted.   Extremities: Show no " edema.  Skin: warm and dry.  Neurologic: nonfocal    Diagnostic Data:  Lab Results   Component Value Date    GLUCOSE 157 (H) 03/27/2019    BUN 94 (H) 03/27/2019    CREATININE 2.52 (H) 03/27/2019    EGFRIFNONA 18 (L) 03/27/2019    BCR 37.3 (H) 03/27/2019    K 5.8 (H) 03/27/2019    CO2 26.0 03/27/2019    CALCIUM 10.0 03/27/2019    PROTENTOTREF 7.1 12/14/2016    ALBUMIN 4.62 02/15/2019    LABIL2 0.9 12/14/2016    AST 26 09/25/2018    ALT 24 09/25/2018     Lab Results   Component Value Date    GLUCOSE 157 (H) 03/27/2019    CALCIUM 10.0 03/27/2019     03/27/2019    K 5.8 (H) 03/27/2019    CO2 26.0 03/27/2019     03/27/2019    BUN 94 (H) 03/27/2019    CREATININE 2.52 (H) 03/27/2019    EGFRIFNONA 18 (L) 03/27/2019    BCR 37.3 (H) 03/27/2019    ANIONGAP 10.0 03/27/2019     Lab Results   Component Value Date    CHOL 140 09/25/2018    CHLPL 142 11/09/2015    TRIG 165 (H) 09/25/2018    HDL 34 (L) 09/25/2018    LDL 90 09/25/2018     Lab Results   Component Value Date    WBC 8.68 02/15/2019    HGB 13.0 02/15/2019    HCT 41.6 02/15/2019    MCV 94.8 02/15/2019     02/15/2019     Lab Results   Component Value Date    TSH 5.629 (H) 09/25/2018       Procedures    Assessment:    Dr. Cooper consulted pt on physical exercise and a regular exercise regimen for 4 minutes.    Dr. Cooper discussed oxygen saturation levels and management for 2 minutes.      ICD-10-CM ICD-9-CM   1. Atrial fibrillation, persistent (CMS/HCC) I48.1 427.31   2. Tachy-marcela syndrome (CMS/HCC) I49.5 427.81   3. Essential hypertension I10 401.9   4. Hyperlipidemia LDL goal <100 E78.5 272.4       Plan:    1. Continue aspirin 81 mg for anticoagulation.  2. Continue atorvastatin for hyperlipidemia.  3. Continue bumex, losartan, and spironolactone for hypertension.  4. Potentially discontinue spironolactone depending on upcoming lab work.  Her potassium level was reported to be high recently  5. Continue metoprolol for rate control in  atrial fibrillation.   6. Continue current medications.  7. F/up in 4 months or sooner if needed.    Scribed for Radha Cooper MD by Chun Jamison. 5/15/2019  3:43 PM     I Radha Cooper MD personally performed the services described in this documentation as scribed by the above individual in my presence, and it is both accurate and complete.    Radha Cooper MD, FACC

## 2019-05-31 ENCOUNTER — CLINICAL SUPPORT NO REQUIREMENTS (OUTPATIENT)
Dept: CARDIOLOGY | Facility: CLINIC | Age: 79
End: 2019-05-31

## 2019-05-31 DIAGNOSIS — I48.19 PERSISTENT ATRIAL FIBRILLATION (HCC): Primary | ICD-10-CM

## 2019-05-31 PROCEDURE — 93294 REM INTERROG EVL PM/LDLS PM: CPT | Performed by: INTERNAL MEDICINE

## 2019-05-31 PROCEDURE — 93296 REM INTERROG EVL PM/IDS: CPT | Performed by: INTERNAL MEDICINE

## 2019-06-13 ENCOUNTER — OFFICE VISIT (OUTPATIENT)
Dept: ENDOCRINOLOGY | Facility: CLINIC | Age: 79
End: 2019-06-13

## 2019-06-13 VITALS
HEIGHT: 68 IN | OXYGEN SATURATION: 97 % | DIASTOLIC BLOOD PRESSURE: 60 MMHG | HEART RATE: 69 BPM | WEIGHT: 195.2 LBS | SYSTOLIC BLOOD PRESSURE: 124 MMHG | BODY MASS INDEX: 29.58 KG/M2

## 2019-06-13 DIAGNOSIS — I10 ESSENTIAL HYPERTENSION: ICD-10-CM

## 2019-06-13 DIAGNOSIS — E78.5 HYPERLIPIDEMIA LDL GOAL <100: ICD-10-CM

## 2019-06-13 DIAGNOSIS — Z79.4 TYPE 2 DIABETES MELLITUS WITHOUT COMPLICATION, WITH LONG-TERM CURRENT USE OF INSULIN (HCC): Primary | ICD-10-CM

## 2019-06-13 DIAGNOSIS — E11.9 TYPE 2 DIABETES MELLITUS WITHOUT COMPLICATION, WITH LONG-TERM CURRENT USE OF INSULIN (HCC): Primary | ICD-10-CM

## 2019-06-13 DIAGNOSIS — E03.9 ACQUIRED HYPOTHYROIDISM: ICD-10-CM

## 2019-06-13 DIAGNOSIS — N18.30 CHRONIC KIDNEY DISEASE (CKD), STAGE III (MODERATE) (HCC): ICD-10-CM

## 2019-06-13 LAB
GLUCOSE BLDC GLUCOMTR-MCNC: 150 MG/DL (ref 70–130)
HBA1C MFR BLD: 7.1 %

## 2019-06-13 PROCEDURE — 82947 ASSAY GLUCOSE BLOOD QUANT: CPT | Performed by: INTERNAL MEDICINE

## 2019-06-13 PROCEDURE — 99214 OFFICE O/P EST MOD 30 MIN: CPT | Performed by: INTERNAL MEDICINE

## 2019-06-13 PROCEDURE — 83036 HEMOGLOBIN GLYCOSYLATED A1C: CPT | Performed by: INTERNAL MEDICINE

## 2019-06-13 NOTE — PROGRESS NOTES
Sarita Alegre 79 y.o.  CC:Follow-up and Diabetes (Type II, eye exam 6/18)      [place]: Follow-up and Diabetes (Type II, eye exam 6/18)    Blood sugar and 90 day average sugar reviewed  Results for orders placed or performed in visit on 06/13/19   POC Glycosylated Hemoglobin (Hb A1C)   Result Value Ref Range    Hemoglobin A1C 7.1 %   POC Glucose Fingerstick   Result Value Ref Range    Glucose 150 (A) 70 - 130 mg/dL     Average sugar is 150   She is utd with eye exam.  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 and cannot get water in face even in the shower   Discussed walking for exercise     Allergies   Allergen Reactions   • Propafenone Other (See Comments)     Hepatic failure   • Grass Cough   • Molds & Smuts Cough       Current Outpatient Medications:   •  albuterol sulfate HFA (PROAIR HFA) 108 (90 Base) MCG/ACT inhaler, Inhale 2 puffs 4 (Four) Times a Day As Needed for Wheezing or Shortness of Air., Disp: 1 inhaler, Rfl: 11  •  aspirin 81 MG chewable tablet, Chew 1 tablet Daily., Disp: 30 tablet, Rfl: 6  •  atorvastatin (LIPITOR) 40 MG tablet, TAKE 1 TABLET BY MOUTH ONCE DAILY, Disp: 90 tablet, Rfl: 3  •  Biotin w/ Vitamins C & E (HAIR/SKIN/NAILS PO), Take 1 tablet by mouth Daily., Disp: , Rfl:   •  bumetanide (BUMEX) 1 MG tablet, Take 1 mg by mouth Daily., Disp: , Rfl:   •  Cholecalciferol (VITAMIN D3) 5000 units capsule capsule, Take 5,000 Units by mouth Daily.,  Disp: , Rfl:   •  Cyanocobalamin (B-12 COMPLIANCE INJECTION) 1000 MCG/ML kit, Inject  as directed As Needed., Disp: , Rfl:   •  DULoxetine (CYMBALTA) 60 MG capsule, Take 60 mg by mouth Daily., Disp: , Rfl:   •  HYDROcodone-acetaminophen (NORCO) 5-325 MG per tablet, Take 1 tablet by mouth Every 6 (Six) Hours As Needed., Disp: , Rfl:   •  insulin detemir (LEVEMIR FLEXPEN) 100 UNIT/ML injection, Inject 20 Units under the skin into the appropriate area as directed Daily., Disp: , Rfl:   •  ketotifen (ZADITOR) 0.025 % ophthalmic solution, 1 drop 2 (Two) Times a Day As Needed (itchy eyes)., Disp: , Rfl:   •  levocetirizine (XYZAL ALLERGY 24HR) 5 MG tablet, Take 5 mg by mouth Every Evening., Disp: , Rfl:   •  levothyroxine (SYNTHROID, LEVOTHROID) 100 MCG tablet, Take 100 mcg by mouth Daily., Disp: , Rfl:   •  linaclotide (LINZESS) 145 MCG capsule capsule, Take 290 mcg by mouth Every Morning Before Breakfast., Disp: , Rfl:   •  losartan (COZAAR) 25 MG tablet, Take 1 tablet by mouth Daily., Disp: 90 tablet, Rfl: 3  •  metoprolol tartrate (LOPRESSOR) 25 MG tablet, Take 1 tablet by mouth Every 12 (Twelve) Hours., Disp: 180 tablet, Rfl: 3  •  montelukast (SINGULAIR) 10 MG tablet, Take 10 mg by mouth Daily., Disp: , Rfl:   •  Patiromer Sorbitex Calcium (VELTASSA) 8.4 g pack, Take 8.4 g by mouth 1 (One) Time., Disp: , Rfl:   •  repaglinide (PRANDIN) 1 MG tablet, Take 1 pill up to three times daily for blood sugar over 200, Disp: 90 tablet, Rfl: 11  •  spironolactone (ALDACTONE) 25 MG tablet, Take 25 mg by mouth Daily., Disp: , Rfl:   •  trolamine salicylate (ASPERCREME) 10 % cream, Apply  topically As Needed for Muscle / Joint Pain. [place] Dream Relief cream, Disp: , Rfl:   •  umeclidinium-vilanterol (ANORO ELLIPTA) 62.5-25 MCG/INH aerosol powder  inhaler, Inhale 1 puff Daily. 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]   • Pulmonary hypertension (CMS/HCC) [I27.20]   • CIELO (obstructive sleep apnea) [G47.33]   • Upper GI hemorrhage [K92.2]   • Anemia due to acute blood loss [D62]   • Supratherapeutic INR [R79.1]   • Chronic kidney disease (CKD), stage III (moderate) (CMS/HCC) [N18.3]   • Shortness of breath [R06.02]   • Hypertension [I10]   • Type 2 diabetes mellitus (CMS/HCC) [E11.9]   • Obesity [E66.9]   • GERD (gastroesophageal reflux disease) [K21.9]   • Hypothyroidism [E03.9]   • Tachy-marcela syndrome (CMS/HCC) [I49.5]   • Atrial fibrillation, persistent (CMS/HCC) [I48.1]   • Pulmonary emphysema (CMS/HCC) [J43.9]   • Obstructive sleep apnea syndrome [G47.33]   • Severe chronic ulcerative colitis (CMS/HCC) [K51.90]     Review of Systems   Constitutional: Positive for fatigue. Negative for activity change, appetite change, chills, diaphoresis, fever and unexpected weight change.   HENT: Negative for congestion, dental problem, drooling, ear discharge, ear pain, facial swelling, hearing loss, mouth sores, nosebleeds, postnasal drip, rhinorrhea, sinus pressure, sneezing, sore throat, tinnitus, trouble swallowing and voice change.    Eyes: Negative for photophobia, pain, discharge, redness, itching and visual disturbance.   Respiratory: Negative for apnea, cough, choking, chest tightness, shortness of breath, wheezing and stridor.    Cardiovascular: Negative for chest pain, palpitations and leg swelling.   Gastrointestinal: Negative for abdominal distention, abdominal pain, anal bleeding, blood in stool, constipation, diarrhea, nausea, rectal pain and vomiting.   Endocrine: Negative for cold intolerance, heat intolerance, polydipsia, polyphagia and polyuria.   Genitourinary: Negative for decreased urine  "volume, difficulty urinating, dysuria, enuresis, flank pain, frequency, genital sores, hematuria and urgency.   Musculoskeletal: Positive for arthralgias. Negative for back pain, gait problem, joint swelling, myalgias, neck pain and neck stiffness.   Skin: Negative for color change, pallor, rash and wound.   Allergic/Immunologic: Negative for environmental allergies, food allergies and immunocompromised state.   Neurological: Negative for dizziness, tremors, seizures, syncope, facial asymmetry, speech difficulty, weakness, light-headedness, numbness and headaches.   Hematological: Negative for adenopathy. Does not bruise/bleed easily.   Psychiatric/Behavioral: Negative for agitation, behavioral problems, confusion, decreased concentration, dysphoric mood, hallucinations, self-injury, sleep disturbance and suicidal ideas. The patient is not nervous/anxious and is not hyperactive.      Social History     Socioeconomic History   • Marital status:      Spouse name: Not on file   • Number of children: Not on file   • Years of education: Not on file   • Highest education level: Not on file   Tobacco Use   • Smoking status: Former Smoker     Packs/day: 3.00     Years: 25.00     Pack years: 75.00     Types: Cigarettes     Last attempt to quit: 1982     Years since quittin.1   • Smokeless tobacco: Never Used   Substance and Sexual Activity   • Alcohol use: No   • Drug use: No   • Sexual activity: Defer     Partners: Female     Birth control/protection: None     Comment:      Family History   Problem Relation Age of Onset   • Asthma Mother    • Allergies Mother    • Colonic polyp Mother    • Mitral valve prolapse Mother    • Other Mother         a fib- rain knee replacement   • Heart disease Father    • Lung cancer Father         he was smoker   • Diabetes Other    • Colon cancer Son    • Breast cancer Neg Hx    • Ovarian cancer Neg Hx      /60   Pulse 69   Ht 171.5 cm (67.5\")   Wt 88.5 kg (195 " lb 3.2 oz)   LMP  (LMP Unknown) Comment: mammogram 2017   SpO2 97%   Breastfeeding? No   BMI 30.12 kg/m²   Physical Exam   Constitutional: She is oriented to person, place, and time. She appears well-developed and well-nourished.   HENT:   Head: Normocephalic and atraumatic.   Nose: Nose normal.   Mouth/Throat: Oropharynx is clear and moist.   Eyes: Conjunctivae, EOM and lids are normal. Pupils are equal, round, and reactive to light.   Neck: Trachea normal and normal range of motion. Neck supple. Carotid bruit is not present. No tracheal deviation present. No thyroid mass and no thyromegaly present.   Cardiovascular: Normal rate, regular rhythm, normal heart sounds and intact distal pulses. Exam reveals no gallop and no friction rub.   No murmur heard.  Pulmonary/Chest: Effort normal and breath sounds normal. No respiratory distress. She has no wheezes.   Musculoskeletal: Normal range of motion. She exhibits no edema or deformity.    Sarita had a diabetic foot exam performed today.   During the foot exam she had a monofilament test performed.    Neurological Sensory Findings - Unaltered hot/cold right ankle/foot discrimination and unaltered hot/cold left ankle/foot discrimination. Unaltered sharp/dull right ankle/foot discrimination and unaltered sharp/dull left ankle/foot discrimination. No right ankle/foot altered proprioception and no left ankle/foot altered proprioception  Vascular Status -  Her right foot exhibits normal foot vasculature  and no edema. Her left foot exhibits normal foot vasculature  and no edema.  Skin Integrity  -  Her right foot skin is intact.Her left foot skin is intact..  Lymphadenopathy:     She has no cervical adenopathy.   Neurological: She is alert and oriented to person, place, and time. She has normal reflexes. She displays normal reflexes. No cranial nerve deficit.   Skin: Skin is warm and dry. No rash noted. No cyanosis or erythema. Nails show no clubbing.   Psychiatric: She  has a normal mood and affect. Her speech is normal and behavior is normal. Judgment and thought content normal. Cognition and memory are normal.   Nursing note and vitals reviewed.    Results for orders placed or performed in visit on 03/27/19   Basic Metabolic Panel   Result Value Ref Range    Glucose 157 (H) 70 - 100 mg/dL    BUN 94 (H) 9 - 23 mg/dL    Creatinine 2.52 (H) 0.60 - 1.30 mg/dL    Sodium 136 132 - 146 mmol/L    Potassium 5.8 (H) 3.5 - 5.5 mmol/L    Chloride 100 99 - 109 mmol/L    CO2 26.0 20.0 - 31.0 mmol/L    Calcium 10.0 8.7 - 10.4 mg/dL    eGFR Non African Amer 18 (L) >60 mL/min/1.73    BUN/Creatinine Ratio 37.3 (H) 7.0 - 25.0    Anion Gap 10.0 3.0 - 11.0 mmol/L     Sarita was seen today for follow-up and diabetes.    Diagnoses and all orders for this visit:    Type 2 diabetes mellitus without complication, with long-term current use of insulin (CMS/[facility])  -     POC Glycosylated Hemoglobin (Hb A1C)  -     POC Glucose Fingerstick      Problem List Items Addressed This Visit        Cardiovascular and Mediastinum    Hypertension     bp is good- continue to monitor          Hyperlipidemia LDL goal <100     Total Cholesterol 0 - 200 mg/dL 140    Triglycerides 0 - 150 mg/dL 165 Abnormally high     HDL Cholesterol 40 - 60 mg/dL 34 Abnormally low     LDL Cholesterol  0 - 130 mg/dL 90      Goal LDL 70   Is utd with eye exam  No neuropathy /callus or ulcer   Ur alb due next ov   F/u 3-4 months             Endocrine    Type 2 diabetes mellitus (CMS/[facility]) - Primary     Blood sugar and 90 day average sugar reviewed  Results for orders placed or performed in visit on 06/13/19   POC Glycosylated Hemoglobin (Hb A1C)   Result Value Ref Range    Hemoglobin A1C 7.1 %   POC Glucose Fingerstick   Result Value Ref Range    Glucose 150 (A) 70 - 130 mg/dL     Average sugar is 150   Is utd with eye exam  No neuropathy/callus   Doing better with blood sugar control  Asked her to add 30 min of exercise to her current regimen  and she will try   No low sugars  F/u 3-4 months          Relevant Orders    POC Glycosylated Hemoglobin (Hb A1C) (Completed)    POC Glucose Fingerstick (Completed)    Hypothyroidism     Continue supplement- recent normal levels             Genitourinary    Chronic kidney disease (CKD), stage III (moderate) (CMS/HCC)     High creatinine = has f/u Dr Camacho                Return in about 4 months (around 10/13/2019) for Recheck 30 min .    Mary Ellen Magallanes MA  Signed Wanda Matthews MD

## 2019-06-13 NOTE — ASSESSMENT & PLAN NOTE
Blood sugar and 90 day average sugar reviewed  Results for orders placed or performed in visit on 06/13/19   POC Glycosylated Hemoglobin (Hb A1C)   Result Value Ref Range    Hemoglobin A1C 7.1 %   POC Glucose Fingerstick   Result Value Ref Range    Glucose 150 (A) 70 - 130 mg/dL     Average sugar is 150   Is utd with eye exam  No neuropathy/callus   Doing better with blood sugar control  Asked her to add 30 min of exercise to her current regimen and she will try   No low sugars  F/u 3-4 months

## 2019-06-13 NOTE — ASSESSMENT & PLAN NOTE
Total Cholesterol 0 - 200 mg/dL 140    Triglycerides 0 - 150 mg/dL 165 Abnormally high     HDL Cholesterol 40 - 60 mg/dL 34 Abnormally low     LDL Cholesterol  0 - 130 mg/dL 90      Goal LDL 70   Is utd with eye exam  No neuropathy /callus or ulcer   Ur alb due next ov   F/u 3-4 months

## 2019-06-26 ENCOUNTER — TRANSCRIBE ORDERS (OUTPATIENT)
Dept: LAB | Facility: HOSPITAL | Age: 79
End: 2019-06-26

## 2019-06-26 ENCOUNTER — LAB (OUTPATIENT)
Dept: LAB | Facility: HOSPITAL | Age: 79
End: 2019-06-26

## 2019-06-26 DIAGNOSIS — N18.4 CHRONIC KIDNEY DISEASE, STAGE IV (SEVERE) (HCC): Primary | ICD-10-CM

## 2019-06-26 DIAGNOSIS — N18.4 CHRONIC KIDNEY DISEASE, STAGE IV (SEVERE) (HCC): ICD-10-CM

## 2019-06-26 LAB
ALBUMIN SERPL-MCNC: 4.4 G/DL (ref 3.5–5.2)
ANION GAP SERPL CALCULATED.3IONS-SCNC: 12.4 MMOL/L (ref 5–15)
BACTERIA UR QL AUTO: NORMAL /HPF
BILIRUB UR QL STRIP: NEGATIVE
BUN BLD-MCNC: 53 MG/DL (ref 8–23)
BUN/CREAT SERPL: 25.4 (ref 7–25)
CALCIUM SPEC-SCNC: 10.4 MG/DL (ref 8.6–10.5)
CHLORIDE SERPL-SCNC: 100 MMOL/L (ref 98–107)
CLARITY UR: CLEAR
CO2 SERPL-SCNC: 26.6 MMOL/L (ref 22–29)
COLOR UR: YELLOW
CREAT BLD-MCNC: 2.09 MG/DL (ref 0.57–1)
GFR SERPL CREATININE-BSD FRML MDRD: 23 ML/MIN/1.73
GLUCOSE BLD-MCNC: 118 MG/DL (ref 65–99)
GLUCOSE UR STRIP-MCNC: NEGATIVE MG/DL
HGB UR QL STRIP.AUTO: NEGATIVE
HYALINE CASTS UR QL AUTO: NORMAL /LPF
KETONES UR QL STRIP: NEGATIVE
LEUKOCYTE ESTERASE UR QL STRIP.AUTO: NEGATIVE
NITRITE UR QL STRIP: NEGATIVE
PH UR STRIP.AUTO: 6.5 [PH] (ref 5–8)
PHOSPHATE SERPL-MCNC: 4 MG/DL (ref 2.5–4.5)
POTASSIUM BLD-SCNC: 5.2 MMOL/L (ref 3.5–5.2)
PROT UR QL STRIP: NEGATIVE
RBC # UR: NORMAL /HPF
REF LAB TEST METHOD: NORMAL
SODIUM BLD-SCNC: 139 MMOL/L (ref 136–145)
SP GR UR STRIP: 1.01 (ref 1–1.03)
SQUAMOUS #/AREA URNS HPF: NORMAL /HPF
UROBILINOGEN UR QL STRIP: NORMAL
WBC UR QL AUTO: NORMAL /HPF

## 2019-06-26 PROCEDURE — 36415 COLL VENOUS BLD VENIPUNCTURE: CPT

## 2019-06-26 PROCEDURE — 81001 URINALYSIS AUTO W/SCOPE: CPT

## 2019-06-26 PROCEDURE — 80069 RENAL FUNCTION PANEL: CPT

## 2019-07-17 ENCOUNTER — TRANSCRIBE ORDERS (OUTPATIENT)
Dept: ADMINISTRATIVE | Facility: HOSPITAL | Age: 79
End: 2019-07-17

## 2019-08-15 ENCOUNTER — OFFICE VISIT (OUTPATIENT)
Dept: PULMONOLOGY | Facility: CLINIC | Age: 79
End: 2019-08-15

## 2019-08-15 VITALS
WEIGHT: 196.8 LBS | HEART RATE: 71 BPM | BODY MASS INDEX: 29.83 KG/M2 | HEIGHT: 68 IN | TEMPERATURE: 97.8 F | DIASTOLIC BLOOD PRESSURE: 62 MMHG | OXYGEN SATURATION: 96 % | SYSTOLIC BLOOD PRESSURE: 108 MMHG

## 2019-08-15 DIAGNOSIS — G47.33 OBSTRUCTIVE SLEEP APNEA SYNDROME: ICD-10-CM

## 2019-08-15 DIAGNOSIS — I27.20 PULMONARY HYPERTENSION (HCC): Primary | ICD-10-CM

## 2019-08-15 DIAGNOSIS — J43.9 PULMONARY EMPHYSEMA, UNSPECIFIED EMPHYSEMA TYPE (HCC): ICD-10-CM

## 2019-08-15 DIAGNOSIS — K21.9 GASTROESOPHAGEAL REFLUX DISEASE, ESOPHAGITIS PRESENCE NOT SPECIFIED: ICD-10-CM

## 2019-08-15 PROCEDURE — 99214 OFFICE O/P EST MOD 30 MIN: CPT | Performed by: NURSE PRACTITIONER

## 2019-08-15 NOTE — PROGRESS NOTES
[facility] Pulmonary Follow up    CHIEF COMPLAINT    CIELO/fatigue    HISTORY OF PRESENT ILLNESS    Sarita Alegre is a 79 y.o.female here today for follow-up of her obstructive sleep apnea.  She was last seen in the office in January by Dr. Johan Thao.  She states for the most part she is done fairly well since her last appointment.  She denies any respiratory illnesses or exacerbations.    She remains on Anoro daily for her gold to a COPD.  She denies any difficulties with this inhaler.  She uses her Ventolin as needed for shortness of breath.    She has a BiPAP for her obstructive sleep apnea however she states she has not been wearing it for the last month.  She states that one morning she woke up and had a very foul smell and does not use the machine since.  She has recent supplies but has not tried changing out her equipment.  She has not taken her machine to be checked to make sure that is working properly.  Her current DME company is patient [employer].  She has noticed increased fatigue during the day and daytime somnolence.  She does not feel well rested in the mornings.  She had been interested in oral appliance in the past but wants to hold off on this currently.  She has oxygen at home and will occasionally wear this during the day.  She would like to have a POC if she qualifies for one.    She denies a fever, chills, sputum production, hemoptysis, night sweats, weight loss, chest pain or palpitations.  She denies any lower extremity edema.  She does have chronic allergy symptoms and takes Xyzal, Singulair and Flonase daily for this.  She denies any reflux symptoms and will occasionally take times as needed when she eats poorly.    She is up-to-date on her current vaccinations.  She was a former smoker but quit in 1982.  She is accompanied today by her .  Patient Active Problem List   Diagnosis   • Atrial fibrillation, persistent (CMS/HCC)   • Pulmonary emphysema (CMS/HCC)   • Obstructive sleep  apnea syndrome   • Severe chronic ulcerative colitis (CMS/HCC)   • Tachy-marcela syndrome (CMS/HCC)   • Hypertension   • Type 2 diabetes mellitus (CMS/HCC)   • Obesity   • GERD (gastroesophageal reflux disease)   • Hypothyroidism   • Chronic kidney disease (CKD), stage III (moderate) (CMS/HCC)   • Shortness of breath   • Pulmonary hypertension (CMS/HCC)   • CIELO (obstructive sleep apnea)   • Upper GI hemorrhage   • Anemia due to acute blood loss   • Supratherapeutic INR   • Iron deficiency anemia secondary to blood loss (chronic)   • Iron malabsorption   • Abnormal CT of the chest   • Angina at rest (CMS/HCC)   • Hyperlipidemia LDL goal <100       Allergies   Allergen Reactions   • Propafenone Other (See Comments)     Hepatic failure   • Grass Cough   • Molds & Smuts Cough       Current Outpatient Medications:   •  albuterol sulfate HFA (PROAIR HFA) 108 (90 Base) MCG/ACT inhaler, Inhale 2 puffs 4 (Four) Times a Day As Needed for Wheezing or Shortness of Air., Disp: 1 inhaler, Rfl: 11  •  aspirin 81 MG chewable tablet, Chew 1 tablet Daily., Disp: 30 tablet, Rfl: 6  •  atorvastatin (LIPITOR) 40 MG tablet, TAKE 1 TABLET BY MOUTH ONCE DAILY, Disp: 90 tablet, Rfl: 3  •  Biotin w/ Vitamins C & E (HAIR/SKIN/NAILS PO), Take 1 tablet by mouth Daily., Disp: , Rfl:   •  bumetanide (BUMEX) 1 MG tablet, Take 1 mg by mouth Daily., Disp: , Rfl:   •  Cyanocobalamin (B-12 COMPLIANCE INJECTION) 1000 MCG/ML kit, Inject  as directed As Needed., Disp: , Rfl:   •  DULoxetine (CYMBALTA) 60 MG capsule, Take 60 mg by mouth Daily., Disp: , Rfl:   •  HYDROcodone-acetaminophen (NORCO) 5-325 MG per tablet, Take 1 tablet by mouth Every 6 (Six) Hours As Needed., Disp: , Rfl:   •  insulin detemir (LEVEMIR FLEXPEN) 100 UNIT/ML injection, Inject 20 Units under the skin into the appropriate area as directed Daily., Disp: , Rfl:   •  ketotifen (ZADITOR) 0.025 % ophthalmic solution, 1 drop 2 (Two) Times a Day As Needed (itchy eyes)., Disp: , Rfl:   •   levocetirizine (XYZAL ALLERGY 24HR) 5 MG tablet, Take 5 mg by mouth Every Evening., Disp: , Rfl:   •  levothyroxine (SYNTHROID, LEVOTHROID) 100 MCG tablet, Take 100 mcg by mouth Daily., Disp: , Rfl:   •  linaclotide (LINZESS) 145 MCG capsule capsule, Take 290 mcg by mouth Every Morning Before Breakfast., Disp: , Rfl:   •  losartan (COZAAR) 25 MG tablet, Take 1 tablet by mouth Daily., Disp: 90 tablet, Rfl: 3  •  metoprolol tartrate (LOPRESSOR) 25 MG tablet, Take 1 tablet by mouth Every 12 (Twelve) Hours., Disp: 180 tablet, Rfl: 3  •  montelukast (SINGULAIR) 10 MG tablet, Take 10 mg by mouth Daily., Disp: , Rfl:   •  Patiromer Sorbitex Calcium (VELTASSA) 8.4 g pack, Take 8.4 g by mouth 1 (One) Time., Disp: , Rfl:   •  repaglinide (PRANDIN) 1 MG tablet, Take 1 pill up to three times daily for blood sugar over 200, Disp: 90 tablet, Rfl: 11  •  spironolactone (ALDACTONE) 25 MG tablet, Take 25 mg by mouth Daily., Disp: , Rfl:   •  trolamine salicylate (ASPERCREME) 10 % cream, Apply  topically As Needed for Muscle / Joint Pain. Australian Dream Relief cream, Disp: , Rfl:   •  umeclidinium-vilanterol (ANORO ELLIPTA) 62.5-25 MCG/INH aerosol powder  inhaler, Inhale 1 puff Daily. 1 inh once a day, Disp: 1 each, Rfl: 11  •  vitamin C (ASCORBIC ACID) 500 MG tablet, Take 500 mg by mouth Daily., Disp: , Rfl:   •  Cholecalciferol (VITAMIN D3) 5000 units capsule capsule, Take 5,000 Units by mouth Daily., Disp: , Rfl:   MEDICATION LIST AND ALLERGIES REVIEWED.    Social History     Tobacco Use   • Smoking status: Former Smoker     Packs/day: 3.00     Years: 25.00     Pack years: 75.00     Types: Cigarettes     Last attempt to quit: 1982     Years since quittin.3   • Smokeless tobacco: Never Used   Substance Use Topics   • Alcohol use: No   • Drug use: No       FAMILY AND SOCIAL HISTORY REVIEWED.    Review of Systems   Constitutional: Positive for activity change and fatigue. Negative for appetite change, fever and unexpected  "weight change.   HENT: Positive for postnasal drip, rhinorrhea and sinus pressure. Negative for congestion, sore throat and voice change.    Eyes: Negative for visual disturbance.   Respiratory: Positive for shortness of breath. Negative for cough, chest tightness and wheezing.    Cardiovascular: Negative for chest pain, palpitations and leg swelling.   Gastrointestinal: Negative for abdominal distention, abdominal pain, nausea and vomiting.   Endocrine: Negative for cold intolerance and heat intolerance.   Genitourinary: Negative for difficulty urinating and urgency.   Musculoskeletal: Negative for arthralgias, back pain and neck pain.   Skin: Negative for color change and pallor.   Allergic/Immunologic: Negative for environmental allergies and food allergies.   Neurological: Negative for dizziness, syncope, weakness and light-headedness.   Hematological: Negative for adenopathy. Does not bruise/bleed easily.   Psychiatric/Behavioral: Negative for agitation and behavioral problems.   .    /62 (BP Location: Left arm, Patient Position: Sitting)   Pulse 71   Temp 97.8 °F (36.6 °C)   Ht 171.5 cm (67.5\")   Wt 89.3 kg (196 lb 12.8 oz)   LMP  (LMP Unknown) Comment: mammogram 2017   SpO2 96% Comment: resting at room air  BMI 30.37 kg/m²     Immunization History   Administered Date(s) Administered   • Flu Mist 10/31/2014, 09/01/2016   • Flu Vaccine High Dose PF 65YR+ 09/27/2018   • Pneumococcal Polysaccharide (PPSV23) 09/02/2016       Physical Exam   Constitutional: She is oriented to person, place, and time. She appears well-developed and well-nourished.   HENT:   Head: Normocephalic and atraumatic.   Eyes: Pupils are equal, round, and reactive to light.   Neck: Normal range of motion. Neck supple. No thyromegaly present.   Cardiovascular: Normal rate, regular rhythm, normal heart sounds and intact distal pulses. Exam reveals no gallop and no friction rub.   No murmur heard.  Pulmonary/Chest: Effort normal and " breath sounds normal. No respiratory distress. She has no wheezes. She has no rales. She exhibits no tenderness.   Abdominal: Soft. Bowel sounds are normal. There is no tenderness.   Musculoskeletal: Normal range of motion.   Lymphadenopathy:     She has no cervical adenopathy.   Neurological: She is alert and oriented to person, place, and time.   Skin: Skin is warm and dry. Capillary refill takes 2 to 3 seconds. She is not diaphoretic.   Psychiatric: She has a normal mood and affect. Her behavior is normal.   Nursing note and vitals reviewed.        RESULTS  6MWT: Patient attempted to do 6-minute walk test but during 3 minutes she had to sit down due to diaphoresis with no chest pain.  She states she felt like her blood sugar had dropped.  We checked her blood sugar was 159.  She did not wish to proceed with the rest of testing.  She states that she will complete this at her next appointment.    PROBLEM LIST    Problem List Items Addressed This Visit        Cardiovascular and Mediastinum    Pulmonary hypertension (CMS/HCC) - Primary    Overview     EVITA: 6/24/15, RVSP 66mmhg; moderate MR, LVEF 55-60%, no pericardial effusion or atrial thrombus, no ASD.            Respiratory    Pulmonary emphysema (CMS/HCC)    Overview     Impression: 04/21/2015 - with wheezing, PND and GLASER  ?exac by bystolic- felt like she did better with hctz- now using lasix  weight stable   states breathng and breathlessness are still her major problems   echo ok   may be worse with higher dose bystolic  having problems affording medications including inhalers; Description: A. FEV1 65%;  12/2014. H/O asthma; also long h/o cigs   B. Prior cigs; quit in 1980's after smoking 2-3 ppd for 25 yrs.- hosp for exacerbation 5/15 - PULM / NON CARDIAC DESPITE ELEVATED BNP, NORMAL EF         Obstructive sleep apnea syndrome    Overview     Severe  Description: A. UNTREATED; appeared to attempt to wear it sporadically 2007,  2008, split-night study of  03/10/2016 reported an overall AHI of 32.7/hr which required BiPAP therapy after failed CPAP attempt with final BiPAP 17 and EPAP 11 which still reported oxygen desaturations on 2 L O2, her lowest oxygen saturation was documented at 64% and several arrhythmias were observed during the study.  B. SLEEP STUDY; 11/2007, w/ AHI 57.8; followed by Dr. rader until 2008, during her sleep study CPAP of 7 treated her well; giving her an AHI of < 2.  Follow-up visits in the sleep clinic indicate she was still sleepy, he increased the pressure to 9, referred her to ENT to consider surgery.  C. Her weight at the time of the sleep study was the same as now; at 206 lbs.            Digestive    GERD (gastroesophageal reflux disease)            DISCUSSION    Ms. rosales was here for follow-up of her obstructive sleep apnea and fatigue.  She has not been using her BiPAP for the last month due to having a weird smell coming into her mass.  She did not take her machine to her local [employer] company to have this evaluated.  I did advise her to take this to patient aids and have them check the machine to make sure that it is working properly.  I also advised her to change out all of her hoses and nasal piece as well.  She may also need to change out the filter in the machine as well.  I did advise her to get this checked out soon and to start wearing her BiPAP again ASAP.  She states that she is going to take the machine out to the company tomorrow.    She will remain on Anoro for her stage II a COPD.  She will continue to use her Ventolin as needed for shortness of breath.    She will continue to follow with cardiology for her pulmonary hypertension.  She was last seen by Dr. Cooper in May and had a good checkup.    We discussed the results of her 6-minute walk test however she was unable to complete this due to diaphoresis and lightheadedness.  Her oxygen level was above 90% and her heart rate was in the 90s.  Her blood sugar was  159.  She states that she did not eat prior to coming to the office.  She did not want to complete the rest of the test.  I did advise her that we would need a 6-minute walk test to qualify her for a POC.  She is agreeable to have this done at her next office visit.    She will follow-up in 4 months or sooner if her symptoms worsen.  She will call with any additional concerns or questions.  I spent 25 minutes with the patient and family. I spent > 50% percent of this time counseling and discussing diagnosis, prognosis, diagnostic testing, evaluation, current status, treatment options and management.    Amara Lowe, APRN  08/15/569628:29 AM  Electronically signed     Please note that portions of this note were completed with a voice recognition program. Efforts were made to edit the dictations, but occasionally words are mistranscribed.      CC: Percy Allison MD

## 2019-08-19 NOTE — PROGRESS NOTES
DATE OF VISIT: 8/19/2019    REASON FOR VISIT: Followup for iron deficiency anemia.     HISTORY OF PRESENT ILLNESS: The patient is a very pleasant 79 y.o. female with past medical history significant for iron deficiency anemia diagnosed March 2017 fsecondary to GI bleed.  The patient is here today for scheduled follow-up visit.    SUBJECTIVE: The patient is here today with her .  She is doing fairly well.  She denies fever chills night sweats without any bleeding.  She is currently on aspirin 81 mg daily.    PAST MEDICAL HISTORY/SOCIAL HISTORY/FAMILY HISTORY: Reviewed by me and unchanged from my documentation done on 02/12/18.    Review of Systems   Constitutional: Positive for fatigue. Negative for activity change, appetite change, chills, fever and unexpected weight change.   HENT: Negative for hearing loss, mouth sores, nosebleeds, sore throat and trouble swallowing.    Eyes: Negative for visual disturbance.   Respiratory: Negative for cough, chest tightness, shortness of breath and wheezing.    Cardiovascular: Negative for chest pain, palpitations and leg swelling.   Gastrointestinal: Negative for abdominal distention, abdominal pain, blood in stool, constipation, diarrhea, nausea, rectal pain and vomiting.   Endocrine: Negative for cold intolerance and heat intolerance.   Genitourinary: Negative for difficulty urinating, dysuria, frequency and urgency.   Musculoskeletal: Negative for arthralgias, back pain, gait problem, joint swelling and myalgias.   Skin: Negative for rash.   Neurological: Positive for dizziness, light-headedness and headaches. Negative for tremors, syncope, weakness and numbness.   Hematological: Negative for adenopathy. Does not bruise/bleed easily.   Psychiatric/Behavioral: Negative for confusion, sleep disturbance and suicidal ideas. The patient is not nervous/anxious.          Current Outpatient Medications:   •  albuterol sulfate HFA (PROAIR HFA) 108 (90 Base) MCG/ACT inhaler,  Inhale 2 puffs 4 (Four) Times a Day As Needed for Wheezing or Shortness of Air., Disp: 1 inhaler, Rfl: 11  •  aspirin 81 MG chewable tablet, Chew 1 tablet Daily., Disp: 30 tablet, Rfl: 6  •  atorvastatin (LIPITOR) 40 MG tablet, TAKE 1 TABLET BY MOUTH ONCE DAILY, Disp: 90 tablet, Rfl: 3  •  Biotin w/ Vitamins C & E (HAIR/SKIN/NAILS PO), Take 1 tablet by mouth Daily., Disp: , Rfl:   •  bumetanide (BUMEX) 1 MG tablet, Take 1 mg by mouth Daily., Disp: , Rfl:   •  Cholecalciferol (VITAMIN D3) 5000 units capsule capsule, Take 5,000 Units by mouth Daily., Disp: , Rfl:   •  Cyanocobalamin (B-12 COMPLIANCE INJECTION) 1000 MCG/ML kit, Inject  as directed As Needed., Disp: , Rfl:   •  DULoxetine (CYMBALTA) 60 MG capsule, Take 60 mg by mouth Daily., Disp: , Rfl:   •  HYDROcodone-acetaminophen (NORCO) 5-325 MG per tablet, Take 1 tablet by mouth Every 6 (Six) Hours As Needed., Disp: , Rfl:   •  insulin detemir (LEVEMIR FLEXPEN) 100 UNIT/ML injection, Inject 20 Units under the skin into the appropriate area as directed Daily., Disp: , Rfl:   •  ketotifen (ZADITOR) 0.025 % ophthalmic solution, 1 drop 2 (Two) Times a Day As Needed (itchy eyes)., Disp: , Rfl:   •  levocetirizine (XYZAL ALLERGY 24HR) 5 MG tablet, Take 5 mg by mouth Every Evening., Disp: , Rfl:   •  levothyroxine (SYNTHROID, LEVOTHROID) 100 MCG tablet, Take 100 mcg by mouth Daily., Disp: , Rfl:   •  linaclotide (LINZESS) 145 MCG capsule capsule, Take 290 mcg by mouth Every Morning Before Breakfast., Disp: , Rfl:   •  losartan (COZAAR) 25 MG tablet, Take 1 tablet by mouth Daily., Disp: 90 tablet, Rfl: 3  •  metoprolol tartrate (LOPRESSOR) 25 MG tablet, Take 1 tablet by mouth Every 12 (Twelve) Hours., Disp: 180 tablet, Rfl: 3  •  montelukast (SINGULAIR) 10 MG tablet, Take 10 mg by mouth Daily., Disp: , Rfl:   •  Patiromer Sorbitex Calcium (VELTASSA) 8.4 g pack, Take 8.4 g by mouth 1 (One) Time., Disp: , Rfl:   •  repaglinide (PRANDIN) 1 MG tablet, Take 1 pill up to three  "times daily for blood sugar over 200, Disp: 90 tablet, Rfl: 11  •  spironolactone (ALDACTONE) 25 MG tablet, Take 25 mg by mouth Daily., Disp: , Rfl:   •  trolamine salicylate (ASPERCREME) 10 % cream, Apply  topically As Needed for Muscle / Joint Pain. Australian Dream Relief cream, Disp: , Rfl:   •  umeclidinium-vilanterol (ANORO ELLIPTA) 62.5-25 MCG/INH aerosol powder  inhaler, Inhale 1 puff Daily. 1 inh once a day, Disp: 1 each, Rfl: 11  •  vitamin C (ASCORBIC ACID) 500 MG tablet, Take 500 mg by mouth Daily., Disp: , Rfl:     PHYSICAL EXAMINATION:   /60   Pulse 80   Temp 98.1 °F (36.7 °C)   Resp 18   Ht 171.5 cm (67.5\")   Wt 88.9 kg (196 lb)   LMP  (LMP Unknown) Comment: mammogram 2017   SpO2 96%   BMI 30.24 kg/m²    ECOG Performance Status: 1 - Symptomatic but completely ambulatory  General Appearance:  alert, cooperative, no apparent distress and appears stated age   Neurologic/Psychiatric: A&O x 3, gait steady, appropriate affect, strength 5/5 in all muscle groups   HEENT:  Normocephalic, without obvious abnormality, mucous membranes moist   Neck: Supple, symmetrical, trachea midline, no adenopathy;  No thyromegaly, masses, or tenderness   Lungs:   Clear to auscultation bilaterally; respirations regular, even, and unlabored bilaterally   Heart:  Regular rate and rhythm, no murmurs appreciated   Abdomen:   Soft, non-tender, non-distended and no organomegaly   Lymph nodes: No cervical, supraclavicular, inguinal or axillary adenopathy noted   Extremities: Normal, atraumatic; no clubbing, cyanosis, or edema    Skin: No rashes, ulcers, or suspicious lesions noted     No visits with results within 2 Week(s) from this visit.   Latest known visit with results is:   Lab on 06/26/2019   Component Date Value Ref Range Status   • Glucose 06/26/2019 118* 65 - 99 mg/dL Final   • BUN 06/26/2019 53* 8 - 23 mg/dL Final   • Creatinine 06/26/2019 2.09* 0.57 - 1.00 mg/dL Final   • Sodium 06/26/2019 139  136 - 145 " mmol/L Final   • Potassium 06/26/2019 5.2  3.5 - 5.2 mmol/L Final   • Chloride 06/26/2019 100  98 - 107 mmol/L Final   • CO2 06/26/2019 26.6  22.0 - 29.0 mmol/L Final   • Calcium 06/26/2019 10.4  8.6 - 10.5 mg/dL Final   • Albumin 06/26/2019 4.40  3.50 - 5.20 g/dL Final   • Phosphorus 06/26/2019 4.0  2.5 - 4.5 mg/dL Final   • Anion Gap 06/26/2019 12.4  5.0 - 15.0 mmol/L Final   • BUN/Creatinine Ratio 06/26/2019 25.4* 7.0 - 25.0 Final   • eGFR Non African Amer 06/26/2019 23* >60 mL/min/1.73 Final   • Color, UA 06/26/2019 Yellow  Yellow, Straw Final   • Appearance, UA 06/26/2019 Clear  Clear Final   • pH, UA 06/26/2019 6.5  5.0 - 8.0 Final   • Specific Gravity, UA 06/26/2019 1.011  1.005 - 1.030 Final   • Glucose, UA 06/26/2019 Negative  Negative Final   • Ketones, UA 06/26/2019 Negative  Negative Final   • Bilirubin, UA 06/26/2019 Negative  Negative Final   • Blood, UA 06/26/2019 Negative  Negative Final   • Protein, UA 06/26/2019 Negative  Negative Final   • Leuk Esterase, UA 06/26/2019 Negative  Negative Final   • Nitrite, UA 06/26/2019 Negative  Negative Final   • Urobilinogen, UA 06/26/2019 1.0 E.U./dL  0.2 - 1.0 E.U./dL Final   • RBC, UA 06/26/2019 None Seen  None Seen, 0-2 /HPF Final   • WBC, UA 06/26/2019 0-2  None Seen, 0-2 /HPF Final   • Bacteria, UA 06/26/2019 None Seen  None Seen /HPF Final   • Squamous Epithelial Cells, UA 06/26/2019 0-2  None Seen, 0-2 /HPF Final   • Hyaline Casts, UA 06/26/2019 0-2  None Seen /LPF Final   • Methodology 06/26/2019 Automated Microscopy   Final        No results found.    ASSESSMENT: The patient is a very pleasant 79 y.o. female  with iron deficiency anemia.     PROBLEM LIST:  1. Iron deficiency anemia  A. 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  B. Status post acute GI bleeding  C. Repeat endoscopy with cauterization  2. Atrial fibrillation with previous Coumadin use- stopped 4/15/2017  A. Status post Watchman procedure done 9/26/2017.   3. Congestive heart  failure with cardiac ascites  4.  Chronic kidney disease stage III  5. Hypothyroid  6.  Hypercholesterolemia  7.  Hypertension       PLAN:  1.  I reviewed the patient's lab results from June and February 2019.  I repeat the patient's labs today and we will call her with any significant abnormalities.  2. We will follow up on the iron studies from today and notify her of the results. We will arrange for IV iron replacement if needed for evidence of recurrent iron deficiency.   3 We will see the patient back in 12 months with repeat labs including CBC, ferritin, and iron profile.    4.  She will continue Synthroid 100 mcg daily for hypothyroidisms.   5. She will continue follow up with Dr. Lopes following Watchman procedure for atrial fibrillation.   6.  She will continue Lipitor 40 mg daily.  7.  She will continue Lopressor 25 mg daily    Mable Neal MD  8/19/2019

## 2019-09-12 NOTE — PROGRESS NOTES
[facility] [place] [facility] Follow up    CHIEF COMPLAINT    Hospital follow-up    HISTORY OF PRESENT ILLNESS    Sarita Alegre is a 79 y.o.female here today for a hospital follow-up.  She was admitted to [facility] on August 25, 2019 to August 30, 2019.  She was admitted for congestive heart failure and possible pneumonia.  She had developed worsening shortness of breath and weakness at home and her  called EMS to take her to her local hospital.  She had a chest x-ray performed that showed a left-sided opacity that was felt to either be pneumonia or CHF.  She was treated with doxycycline, IV Rocephin steroids, IV diuresis and nebulizers.  She is essentially improved and was discharged.  She was sent home with a prescription for doxycycline and has completed this treatment.  She states that she feels a little better but has not returned back to baseline.    She remains on Anoro daily.  She will occasionally use her pro-air rescue inhaler 2-3 times per day.  She does have shortness of breath with any activity and has to take several breaks to recover.  She has been wearing her oxygen more since discharge during the day.    She continues to wear her BiPAP every night for obstructive sleep apnea.  She states she averages 8 to 9 hours every night.  She denies any daytime somnolence or fatigue.  She also has a so clean machine that she uses every day.    She denies fever, chills, sputum production, hemoptysis, night sweats, weight loss, chest pain or palpitations.  She denies any worsening lower extremity edema.  She does have some mild edema on occasion.  She does have chronic sinus and allergy symptoms and takes Singulair, Flonase and Xyzal daily.  She has noticed a dry cough since discharge.  She denies any reflux symptoms and states that she was placed on sodium bicarbonate and that is helping her stomach.  She does have some occasional reflux symptoms at nighttime.    She continues close follow-up  with cardiology for her congestive heart failure and pulmonary hypertension.  She had an echo performed while at [facility] which showed an EF of greater than 55%, right ventricle is moderately enlarged, mild aortic regurgitation, severe tricuspid regurgitation, RVSP 90.  She has a follow-up with Dr. Cooper in October.    She has a follow-up scheduled with Dr. Camacho her nephrologist on Monday for her chronic renal disease.  Since discharge she was taking off Bumex and Aldactone and placed on daily Lasix.  She states that her weight has been increasing over the last couple of months.    She is accompanied today by her .    Patient Active Problem List   Diagnosis   • Atrial fibrillation, persistent (CMS/HCC)   • Pulmonary emphysema (CMS/HCC)   • Obstructive sleep apnea syndrome   • Severe chronic ulcerative colitis (CMS/HCC)   • Tachy-marcela syndrome (CMS/HCC)   • Hypertension   • Type 2 diabetes mellitus (CMS/HCC)   • Obesity   • GERD (gastroesophageal reflux disease)   • Hypothyroidism   • Chronic kidney disease (CKD), stage III (moderate) (CMS/HCC)   • Shortness of breath   • Pulmonary hypertension (CMS/HCC)   • CIELO (obstructive sleep apnea)   • Upper GI hemorrhage   • Anemia due to acute blood loss   • Supratherapeutic INR   • Iron deficiency anemia secondary to blood loss (chronic)   • Iron malabsorption   • Abnormal CT of the chest   • Angina at rest (CMS/HCC)   • Hyperlipidemia LDL goal <100       Allergies   Allergen Reactions   • Propafenone Other (See Comments)     Hepatic failure   • Grass Cough   • Molds & Smuts Cough       Current Outpatient Medications:   •  albuterol sulfate HFA (PROAIR HFA) 108 (90 Base) MCG/ACT inhaler, Inhale 2 puffs 4 (Four) Times a Day As Needed for Wheezing or Shortness of Air., Disp: 1 inhaler, Rfl: 11  •  aspirin 81 MG chewable tablet, Chew 1 tablet Daily., Disp: 30 tablet, Rfl: 6  •  atorvastatin (LIPITOR) 40 MG tablet, TAKE 1 TABLET BY MOUTH  ONCE DAILY, Disp: 90 tablet, Rfl: 3  •  benzonatate (TESSALON) 100 MG capsule, Take 1 capsule by mouth 3 (Three) Times a Day As Needed for Cough., Disp: 42 capsule, Rfl: 0  •  Biotin w/ Vitamins C & E (HAIR/SKIN/NAILS PO), Take 1 tablet by mouth Daily., Disp: , Rfl:   •  bumetanide (BUMEX) 1 MG tablet, Take 1 mg by mouth Daily., Disp: , Rfl:   •  Cholecalciferol (VITAMIN D3) 5000 units capsule capsule, Take 5,000 Units by mouth Daily., Disp: , Rfl:   •  Cyanocobalamin (B-12 COMPLIANCE INJECTION) 1000 MCG/ML kit, Inject  as directed As Needed., Disp: , Rfl:   •  DULoxetine (CYMBALTA) 60 MG capsule, Take 60 mg by mouth Daily., Disp: , Rfl:   •  HYDROcodone-acetaminophen (NORCO) 5-325 MG per tablet, Take 1 tablet by mouth Every 6 (Six) Hours As Needed., Disp: , Rfl:   •  insulin detemir (LEVEMIR FLEXPEN) 100 UNIT/ML injection, Inject 20 Units under the skin into the appropriate area as directed Daily., Disp: , Rfl:   •  ketotifen (ZADITOR) 0.025 % ophthalmic solution, 1 drop 2 (Two) Times a Day As Needed (itchy eyes)., Disp: , Rfl:   •  levocetirizine (XYZAL ALLERGY 24HR) 5 MG tablet, Take 5 mg by mouth Every Evening., Disp: , Rfl:   •  levothyroxine (SYNTHROID, LEVOTHROID) 100 MCG tablet, Take 100 mcg by mouth Daily., Disp: , Rfl:   •  linaclotide (LINZESS) 145 MCG capsule capsule, Take 290 mcg by mouth Every Morning Before Breakfast., Disp: , Rfl:   •  metoprolol tartrate (LOPRESSOR) 25 MG tablet, Take 1 tablet by mouth Every 12 (Twelve) Hours., Disp: 180 tablet, Rfl: 3  •  montelukast (SINGULAIR) 10 MG tablet, Take 10 mg by mouth Daily., Disp: , Rfl:   •  Patiromer Sorbitex Calcium (VELTASSA) 8.4 g pack, Take 8.4 g by mouth 1 (One) Time., Disp: , Rfl:   •  repaglinide (PRANDIN) 1 MG tablet, Take 1 pill up to three times daily for blood sugar over 200, Disp: 90 tablet, Rfl: 11  •  sodium bicarbonate 325 MG tablet, Take 325 mg by mouth 2 (Two) Times a Day., Disp: , Rfl: 0  •  spironolactone (ALDACTONE) 25 MG tablet,  Take 25 mg by mouth Daily., Disp: , Rfl:   •  trolamine salicylate (ASPERCREME) 10 % cream, Apply  topically As Needed for Muscle / Joint Pain. Australian Dream Relief cream, Disp: , Rfl:   •  umeclidinium-vilanterol (ANORO ELLIPTA) 62.5-25 MCG/INH aerosol powder  inhaler, Inhale 1 puff Daily. 1 inh once a day, Disp: 1 each, Rfl: 11  •  vitamin C (ASCORBIC ACID) 500 MG tablet, Take 500 mg by mouth Daily., Disp: , Rfl:   •  losartan (COZAAR) 25 MG tablet, Take 1 tablet by mouth Daily., Disp: 90 tablet, Rfl: 3  MEDICATION LIST AND ALLERGIES REVIEWED.    Social History     Tobacco Use   • Smoking status: Former Smoker     Packs/day: 3.00     Years: 25.00     Pack years: 75.00     Types: Cigarettes     Last attempt to quit: 1982     Years since quittin.4   • Smokeless tobacco: Never Used   Substance Use Topics   • Alcohol use: No   • Drug use: No       FAMILY AND SOCIAL HISTORY REVIEWED.    Review of Systems   Constitutional: Positive for activity change and fatigue. Negative for appetite change, fever and unexpected weight change.   HENT: Positive for postnasal drip and rhinorrhea. Negative for congestion, sinus pressure, sore throat and voice change.    Eyes: Negative for visual disturbance.   Respiratory: Positive for cough and shortness of breath. Negative for chest tightness and wheezing.    Cardiovascular: Negative for chest pain, palpitations and leg swelling.   Gastrointestinal: Negative for abdominal distention, abdominal pain, nausea and vomiting.   Endocrine: Negative for cold intolerance and heat intolerance.   Genitourinary: Negative for difficulty urinating and urgency.   Musculoskeletal: Negative for arthralgias, back pain and neck pain.   Skin: Negative for color change and pallor.   Allergic/Immunologic: Negative for environmental allergies and food allergies.   Neurological: Negative for dizziness, syncope, weakness and light-headedness.   Hematological: Negative for adenopathy. Does not  "bruise/bleed easily.   Psychiatric/Behavioral: Negative for agitation and behavioral problems.   .    /60   Pulse 77   Temp 98.1 °F (36.7 °C)   Ht 171.5 cm (67.5\")   Wt 89.4 kg (197 lb)   LMP  (LMP Unknown) Comment: mammogram 2017   SpO2 97% Comment: resting, 2 liters cont  Breastfeeding? No   BMI 30.40 kg/m²     Immunization History   Administered Date(s) Administered   • Flu Mist 10/31/2014, 09/01/2016   • Flu Vaccine High Dose PF 65YR+ 09/27/2018   • Pneumococcal Polysaccharide (PPSV23) 09/02/2016       Physical Exam   Constitutional: She is oriented to person, place, and time. She appears well-developed and well-nourished.   HENT:   Head: Normocephalic and atraumatic.   Eyes: Pupils are equal, round, and reactive to light.   Neck: Normal range of motion. Neck supple. No thyromegaly present.   Cardiovascular: Normal rate, regular rhythm, normal heart sounds and intact distal pulses. Exam reveals no gallop and no friction rub.   No murmur heard.  Pulmonary/Chest: Effort normal and breath sounds normal. No respiratory distress. She has no wheezes. She has no rales. She exhibits no tenderness.   Abdominal: Soft. Bowel sounds are normal. There is no tenderness.   Musculoskeletal: Normal range of motion.   Lymphadenopathy:     She has no cervical adenopathy.   Neurological: She is alert and oriented to person, place, and time.   Skin: Skin is warm and dry. Capillary refill takes less than 2 seconds. She is not diaphoretic.   Psychiatric: She has a normal mood and affect. Her behavior is normal.   Nursing note and vitals reviewed.        RESULTS    PFTS in the office today, read by me.  FVC 1.78 65% predicted, FEV1 0.93 45% predicted, FEV1/FVC 52% predicted, moderate to severe obstruction noted.    Chest PA/lateral: Official report pending    PROBLEM LIST    Problem List Items Addressed This Visit        Cardiovascular and Mediastinum    Pulmonary hypertension (CMS/HCC)    Overview     EVITA: 6/24/15, RVSP " 66mmhg; moderate MR, LVEF 55-60%, no pericardial effusion or atrial thrombus, no ASD.            Respiratory    Pulmonary emphysema (CMS/[facility])    Overview     Impression: 04/21/2015 - with wheezing, PND and GLASER  ?exac by bystolic- felt like she did better with hctz- now using lasix  weight stable   states breathng and breathlessness are still her major problems   echo ok   may be worse with higher dose bystolic  having problems affording medications including inhalers; Description: A. FEV1 65%;  12/2014. H/O asthma; also long h/o cigs   B. Prior cigs; quit in 1980's after smoking 2-3 ppd for 25 yrs.- hosp for exacerbation 5/15 - PULM / NON CARDIAC DESPITE ELEVATED BNP, NORMAL EF         Obstructive sleep apnea syndrome    Overview     Severe  Description: A. UNTREATED; appeared to attempt to wear it sporadically 2007,  2008, split-night study of 03/10/2016 reported an overall AHI of 32.7/hr which required BiPAP therapy after failed CPAP attempt with final BiPAP 17 and EPAP 11 which still reported oxygen desaturations on 2 L O2, her lowest oxygen saturation was documented at 64% and several arrhythmias were observed during the study.  B. SLEEP STUDY; 11/2007, w/ AHI 57.8; followed by Dr. rader until 2008, during her sleep study CPAP of 7 treated her well; giving her an AHI of < 2.  Follow-up visits in the sleep clinic indicate she was still sleepy, he increased the pressure to 9, referred her to ENT to consider surgery.  C. Her weight at the time of the sleep study was the same as now; at 206 lbs.            Digestive    GERD (gastroesophageal reflux disease)    Relevant Medications    sodium bicarbonate 325 MG tablet       Genitourinary    Chronic kidney disease (CKD), stage III (moderate) (CMS/HCC)    Overview     Status post temporary hemodialysis at [facility], 8/24/2015 through 9/29/2015, worsening      11/15  with hosp chf   creat 1.56 - baseline gfr 31           Other Visit Diagnoses     Pneumonia, unspecified  organism    -  Primary    Relevant Orders    XR Chest PA & Lateral    Spirometry Without Bronchodilator (Completed)            DISCUSSION    Ms. Alegre was here for a hospital follow-up.  She was admitted to [facility] at the end of August for CHF versus pneumonia.  She was treated with IV antibiotics, steroids, IV diuresis and nebulizers.  She seems to have recovered somewhat.  She does not appear to be in any distress today.  She does note that her shortness of breath has remained about the same.    I did encourage her to wear her oxygen during the day and as needed for shortness of breath.  We reviewed her PFTs and they are similar to her prior testings.  She continues to have moderate to severe obstruction.  She is currently on Anoro and I gave her sample of Trelegy to try in the office today.  I did advise her to call her insurance company to see how much this was going to cost and if this was too expensive for her we would switch her to Symbicort and Spiriva.  I did encourage her to use her albuterol as needed for shortness of breath.    I did encourage her to keep her follow-up with Dr. Camacho on Monday for her chronic kidney disease.  I do suspect that some of her volume overload is causing her shortness of breath.    She will continue to have close follow-up with Dr. Cooper for her pulmonary hypertension and CHF.  Her RVSP on her latest echo was 90 I do suspect that this is playing a role in her shortness of breath as well.  She has an appointment in October scheduled.  I also encouraged her to weigh daily and to notify Dr. Cooper of her for weight gain.    She will continue wear her BiPAP every night for obstructive sleep apnea.  I did advise her that she could wear this during the day for naps as well.    We discussed reflux precautions in the office today such as elevating the head of the bed at night, not eating 2 to 3 hours before bed and avoiding foods that trigger  reflux symptoms.    I did advise her that if her shortness of breath were to worsen that she would need to present to the ED for further evaluation.    She has an appointment with me in December and will keep this appointment.  She will call with any concerns or questions.  I spent 25 minutes with the patient and family. I spent > 50% percent of this time counseling and discussing diagnosis, prognosis, diagnostic testing, evaluation, current status, treatment options and management.    KAILASH Moreira  09/12/201912:30 PM  Electronically signed     Please note that portions of this note were completed with a voice recognition program. Efforts were made to edit the dictations, but occasionally words are mistranscribed.      CC: Percy Allison MD

## 2019-10-10 NOTE — PROGRESS NOTES
[facility] Cardiology  Sarita Alegre  1940  79 y.o.  [phone]  [phone]    Date: 10/10/2019    PCP: Percy Allison MD    Chief Complaint   Patient presents with   • atrial fibn     Problem List:  1. Atrial fibrillation:  a. Diagnosed 06/21/2015 with controlled rate, [facility] ER.   b. EVITA/ECV, 06/24/2015: EF 55-60%. No thrombus. Mild-to-moderate MR. Moderate TR with RVSP 66 mmHg. ECV to NSR with 2 joules.  c. CHADS-VASc = 7 (CHF, HTN, Age > 75, DM, Vascular disease, Female sex)  2. Tachy-marcela syndrome:  a. Cardiac event monitor, 08/31/2016, placed for falls: 6.5 second pause.   b. PM implantation, 09/01/2016: [employer] PM, model L101 serial number [id number] generator, the atrial lead is a Guidant model 4135 serial number [id number] lead, the RV lead is a Guidant model 4136 serial number [id number] lead. DDIR  bpm.  c. Watchman, 09/26/2017: 21 mm Watchman occlusive device. ASAP-TOO study Dr. Lopes.  3. Diastolic congestive heart failure:  a. Echocardiogram, 03/24/2015: EF 55-60%. Mild MR/TR/AI.  b. Echocardiogram, 05/18/2015: EF 65%. Mild MR/AI. Moderate-to-severe TR. Dr. Dorantes.  c. Echocardiogram, 06/22/2015: EF 55-60%. RV mildly dilated. Mildly reduced RV global systolic function.Severe TR with RVSP 81 mmHg.  d. Echocardiogram, 08/31/2017: EF 56%. Mild MR. Aortic valve exhibits sclerosis.  e. EVITA, 12/18/2017: EF 60%. Mild MR/AI. Moderate TR.  f. EVITA, 08/27/2018: EF 55%. Mild MR/AI. Moderate TR with RVSP 55 mmHg. A 21 mm Watchman JUAN occlusion device is present within the JUAN. There is no flow around the device.  g. Echocardiogram, 09/17/2018: EF 55-60%, mild AI, trace MR, moderate TR, moderate-to-severe pulmonary HTN, RVSP 63 mmHg  4. Moderate pulmonary hypertension:  a. RHC, 06/05/2016: Normal cardiac output and index. Severe pulmonary HTN. No evidence of improvement with nitric oxide. No evidence of significant intracardiac  shunting. Elevated RA and wedge pressures.   5. Chest pain:  a. History of normal echocardiogram as well as Cardiolite GXT, March 2011, Sridhar Bustamante MD.  b. Abnormal EKG revealing NSR with first degree AV block and poor precordial R-wave progression (no evidence of anterior infarct).  c. Cardiolite GXT, 10/01/2018: EF 48%, no evidence of inducible ischemia.  d. LHC, 11/20/2018: Near-normal coronary arteries. Mildly elevated LVEDP, however the patient has been off diuretics for 2 days and has been hydrated overnight for renal protection.  6. Hypertension.  7. Hyperlipidemia.  8. Type 2 diabetes mellitus.  9. History of tobacco abuse with cessation 30 years ago.  10. Obesity.  11. CIELO with no CPAP use.  12. Asthma.  13. Depression.  14. GERD.  15. Hypothyroidism on chronic replacement therapy.  16. Bilateral hearing deficit with hearing aids.  17. Osteoarthritis.  18. History of left ankle fracture followed by Dr. Kennedy.  19. Surgical history:  a. Bilateral cataract extraction, 1996 and 1997.  b. Right knee repair, 1982.  c. Left elbow repair, 2006.  d. Hysterectomy.  e. Parathyroid gland removal.    Allergies   Allergen Reactions   • Propafenone Other (See Comments)     Hepatic failure   • Grass Cough   • Molds & Smuts Cough       Current Medications:      Current Outpatient Medications:   •  albuterol sulfate HFA (PROAIR HFA) 108 (90 Base) MCG/ACT inhaler, Inhale 2 puffs 4 (Four) Times a Day As Needed for Wheezing or Shortness of Air., Disp: 1 inhaler, Rfl: 11  •  aspirin 81 MG chewable tablet, Chew 1 tablet Daily., Disp: 30 tablet, Rfl: 6  •  atorvastatin (LIPITOR) 40 MG tablet, TAKE 1 TABLET BY MOUTH ONCE DAILY, Disp: 90 tablet, Rfl: 3  •  Biotin w/ Vitamins C & E (HAIR/SKIN/NAILS PO), Take 1 tablet by mouth Daily., Disp: , Rfl:   •  bumetanide (BUMEX) 1 MG tablet, Take 1 mg by mouth Daily., Disp: , Rfl:   •  Cyanocobalamin (B-12 COMPLIANCE INJECTION) 1000 MCG/ML kit, Inject  as directed As Needed., Disp: , Rfl:    •  DULoxetine (CYMBALTA) 60 MG capsule, Take 60 mg by mouth Daily., Disp: , Rfl:   •  Fluticasone-Umeclidin-Vilant (TRELEGY ELLIPTA) 100-62.5-25 MCG/INH aerosol powder , Inhale 1 each Daily., Disp: 1 each, Rfl: 6  •  HYDROcodone-acetaminophen (NORCO) 5-325 MG per tablet, Take 1 tablet by mouth Every 6 (Six) Hours As Needed., Disp: , Rfl:   •  insulin detemir (LEVEMIR FLEXPEN) 100 UNIT/ML injection, Inject 20 Units under the skin into the appropriate area as directed Daily., Disp: , Rfl:   •  ketotifen (ZADITOR) 0.025 % ophthalmic solution, 1 drop 2 (Two) Times a Day As Needed (itchy eyes)., Disp: , Rfl:   •  levocetirizine (XYZAL ALLERGY 24HR) 5 MG tablet, Take 5 mg by mouth Every Evening., Disp: , Rfl:   •  levothyroxine (SYNTHROID, LEVOTHROID) 100 MCG tablet, Take 75 mcg by mouth Daily., Disp: , Rfl:   •  linaclotide (LINZESS) 145 MCG capsule capsule, Take 290 mcg by mouth Every Morning Before Breakfast., Disp: , Rfl:   •  metoprolol tartrate (LOPRESSOR) 25 MG tablet, Take 1 tablet by mouth Every 12 (Twelve) Hours., Disp: 180 tablet, Rfl: 3  •  montelukast (SINGULAIR) 10 MG tablet, Take 10 mg by mouth Daily., Disp: , Rfl:   •  repaglinide (PRANDIN) 1 MG tablet, Take 1 pill up to three times daily for blood sugar over 200, Disp: 90 tablet, Rfl: 11  •  spironolactone (ALDACTONE) 25 MG tablet, Take 25 mg by mouth Daily., Disp: , Rfl:   •  trolamine salicylate (ASPERCREME) 10 % cream, Apply  topically As Needed for Muscle / Joint Pain. Australian Dream Relief cream, Disp: , Rfl:   •  vitamin C (ASCORBIC ACID) 500 MG tablet, Take 500 mg by mouth Daily., Disp: , Rfl:     HPI    Sarita Alegre is a 79 y.o. female who presents today for 4 month follow up of persistent atrial fibrillation, tachy-marcela syndrome, pulmonary hypertension, essential hypertension, and hyperlipidemia. Since last visit, she has continued to have shortness of breath. Diagnosed with PNA vs. CHF. She was given Abx and states breathing has gotten  "better. She requires continuous oxygen at 2L NC. She was placed on diuretics for edema. Her weight has been stable. 197-200 lbs but she has not gained more than 3 lbs in a day or 5 lbs in a week. She denies orthopnea, PND. Mild JAMES. Denies chest pain, palpitations, dizziness, and syncope.     The following portions of the patient's history were reviewed and updated as appropriate: allergies, current medications and problem list.    Pertinent positives as listed in the HPI.  All other systems reviewed are negative.    Vitals:    10/10/19 1501   BP: 118/70   BP Location: Right arm   Patient Position: Sitting   Pulse: 70   SpO2: 97%   Weight: 90.4 kg (199 lb 3.2 oz)   Height: 172.7 cm (68\")       Physical Exam:    General: Alert and oriented.  Neck: Jugular venous pressure is within normal limits. Carotids have normal upstrokes without bruits.   Cardiovascular: Heart has a nondisplaced focal PMI. Regular rate and rhythm without murmur, gallop or rub.  Lungs: Clear without rales or wheezes. Equal expansion is noted.   Extremities: Show 2+ pitting edema.  Skin: Warm and dry.  Neurologic: Nonfocal.    Diagnostic Data:    Lab Results   Component Value Date    GLUCOSE 204 (H) 09/12/2019    CALCIUM 9.1 09/12/2019     09/12/2019    K 4.8 09/12/2019    CO2 26.3 09/12/2019    CL 97 (L) 09/12/2019    BUN 29 (H) 09/12/2019    CREATININE 1.69 (H) 09/12/2019    EGFRIFNONA 29 (L) 09/12/2019    BCR 17.2 09/12/2019    ANIONGAP 12.7 09/12/2019      Last lipid, 02/09/2019:    TG 67  HDL 34  LDL 73    Device check 10/10/19: EF 63%, RV 97%, normal threshold and impedance, battery life 5 years.     Procedures    Assessment:      ICD-10-CM ICD-9-CM   1. Atrial fibrillation, persistent  S/p DDDI PPM   S/p watchman on ASA.  Rate controlled with BB.  I48.19 427.31   2. Hyperlipidemia LDL goal <100 E78.5 272.4   3. Essential hypertension I10 401.9   4. Tachy-marcela syndrome (CMS/HCC) with normal function today.  I49.5 427.81     Lab " results found above were reviewed with the patient.    Plan:    1. Daily weights, directed to notify us if she had more than a 5 lb weight gain in a week or 3 lb in a day.   2. Continue Bumex. Following with nephrology who is assisting with diuretics  3. Continue ASA, statin, metoprolol  4. Keep scheduled follow up with Dr. Lopes.   5. Continue all other current medications.  6. F/up in 6 months or sooner if needed.    Electronically signed by KAILASH Lee, 10/15/19, 11:52 AM.

## 2019-10-21 NOTE — PROGRESS NOTES
Subjective:     Patient ID: Sarita Alegre is a 79 y.o. female.  Chief Complaint   Patient presents with   • Atrial Fibrillation       History of Present Illness     79 y.o.  woman returns in followup  for ASAP TOO.      No neurological sx since last visit.    S/P implantation of Watchman device 9/26/17 without complication.   Stopped anti coagulation 6 weeks after placement.  No new neurological sx.      Maintained ASA and Plavix for 3 months then maintained on ASA alone.      Problem history:    Dx with AF on 6/21/15.  Pm implantation on 9/1/16.  Severe pulm HTN.  Cannot take anti coagulation due to anemia from recurrent GI bleeding on Warfarin.  Multiple procedures to find source of GI bleeding; EGG, colonoscopy, capsule endoscoopy.  Hgb 9.6/Hct 33  Receiving IV iron from Dr Neal.  Multiple blood transfusions in past.      The following portions of the patient's history were reviewed and updated as appropriate: allergies, current medications, past family history, past medical history, past social history, past surgical history and problem list.    Review of Systems   Constitutional: Positive for fatigue. Negative for activity change and unexpected weight change.   HENT: Negative for tinnitus and trouble swallowing.    Eyes: Negative for photophobia and visual disturbance.   Respiratory: Positive for chest tightness. Negative for apnea, cough and choking.    Cardiovascular: Negative for leg swelling.   Gastrointestinal: Negative for nausea and vomiting.   Endocrine: Negative for cold intolerance and heat intolerance.   Genitourinary: Negative for difficulty urinating, frequency, menstrual problem and urgency.   Musculoskeletal: Negative for back pain, gait problem, myalgias and neck pain.   Skin: Negative for color change and rash.   Allergic/Immunologic: Negative for immunocompromised state.   Neurological: Negative for tremors, seizures, syncope, facial asymmetry, speech difficulty, light-headedness,  "numbness and headaches.   Hematological: Negative for adenopathy. Does not bruise/bleed easily.   Psychiatric/Behavioral: Negative for behavioral problems, confusion, decreased concentration, hallucinations and sleep disturbance.        Objective:  Vitals:    10/21/19 1256   BP: 122/68   Pulse: 74   SpO2: 99%   Weight: 92.1 kg (203 lb)   Height: 172.7 cm (68\")     Neurologic Exam     Mental Status   Registration: recalls 3 of 3 objects. Recall at 5 minutes: recalls 3 of 3 objects. Follows 3 step commands.   Attention: normal. Concentration: normal.   Level of consciousness: alert  Knowledge: good and consistent with education.   Able to name object. Able to read. Able to repeat. Able to write. Normal comprehension.     Cranial Nerves     CN II   Visual fields full to confrontation.   Visual acuity: normal  Right visual field deficit: none  Left visual field deficit: none     CN III, IV, VI   Right pupil: Shape: regular. Reactivity: brisk. Consensual response: intact.   Left pupil: Shape: regular. Reactivity: brisk. Consensual response: intact.   Nystagmus: none   Diplopia: none  Ophthalmoparesis: none  Upgaze: normal  Downgaze: normal  Conjugate gaze: present  Vestibulo-ocular reflex: present    CN V   Facial sensation intact.   Right corneal reflex: normal  Left corneal reflex: normal    CN VII   Right facial weakness: none  Left facial weakness: none    CN VIII   Hearing: intact    CN IX, X   Palate: symmetric  Right gag reflex: normal  Left gag reflex: normal    CN XI   Right sternocleidomastoid strength: normal  Left sternocleidomastoid strength: normal    CN XII   Tongue: not atrophic  Fasciculations: absent  Tongue deviation: none    Motor Exam   Muscle bulk: normal  Overall muscle tone: normal  Right arm tone: normal  Left arm tone: normal  Right leg tone: normal  Left leg tone: normal    Sensory Exam   Light touch normal.   Vibration normal.   Proprioception normal.   Pinprick normal.     Gait, Coordination, " and Reflexes     Tremor   Resting tremor: absent  Intention tremor: absent  Action tremor: absent    Reflexes   Reflexes 2+ except as noted.       Physical Exam   Constitutional: She appears well-developed and well-nourished.   Nursing note and vitals reviewed.      Assessment/Plan:       Problems Addressed this Visit        Cardiovascular and Mediastinum    Atrial fibrillation, persistent - Primary     No new neurological issues

## 2019-12-12 NOTE — PROGRESS NOTES
" CHIEF COMPLAINT: Followup for COPD.    HISTORY OF PRESENT ILLNESS: A 76-year-old white female with multiple medical problems including atrial fibrillation, status post EVITA cardioversion x1, pacemaker implantation for tachybrady syndrome 2016, diastolic heart failure, moderate group 2 plus group 3 pulmonary hypertension, status post right heart cath with mean PA pressure of 38, wedge of 18, cardiac index of 2.1, performed by Dr. Cooper, hypertension, hyperlipidemia, type 2 diabetes, prior tobacco abuse, obesity, CIELO, asthma, depression, GERD, hypothyroidism, osteoarthritis, who has also had recurrent ascites requiring recurrent large volume paracentesis. She also has chronic kidney disease followed by [facility]. Patient has also had ongoing iron deficiency anemia, serial transfusions. She is being followed by Dr. Muñoz and recently had a capsule study. Her autoimmune workup to date appears to be negative from the records in Epic. Her hepatitis panel is negative. Patient’s most recent paracentesis was performed in 2016 with almost 7.5 L removed.     Had watchman device placed.  No further paracentesis needed.  Using bipap with good compliance.  Recent LHC w/ Non-obstructive CAD.    Her son  unexpectedly in 2018.  She has had a lot of anxiety and depression.  Had chest pain leading to LHC which was ok.     INTERVAL HISTORY:    Returns today for follow up.  Has been gaining weight rapidly.  No recent paracentesis.  Also much more short of breath with some prominent allergic symptoms and typical asthma triggers.  Recent hospitalization for \"pneumonia\".    PMH:    GOLD Stage 2 COPD  Questionable Asthma  Allergic Rhinitis  Prior Tobacco Abuse  Severe CIELO  Group 2+3 Moderate Pulmonary HTN  Diastolic HF, LVH, LAE, Mild to Mod MR EF 55-60%  Afib s/p PPM for Tachy-Leoindas Syndrome, s/p Watchman  CKD Stage IV  Recurrent Ascites (no cirrhosis on GI eval) s/p recurrent LVP  Iron " Def Anemia requiring intermittent transfusion  Duodenal AVM's  T2DM  HTN  HLD  GERD  Hypothyroidism  Hearing Loss  Osteoarthritis    PSH:  Recurrent Large Volume Paracentesis - none needed > 1 year  Cataracts  Right Knee  Left Elbow  INNA  Parathyroid Gland Removal    FH    SH  50-75 py smoking, quit 1980's    ROS  All other systems were reviewed and negative except per HPI    Physical Exam   Constitutional: Oriented to person, place, and time. Appears well-developed and well-nourished.   Head: Normocephalic and atraumatic.   Nose: Nose normal.   Mouth/Throat: Oropharynx is clear and moist.   Eyes: Conjunctivae are normal.   Neck: No tracheal deviation present.   Cardiovascular: Normal rate, regular rhythm, normal heart sounds and intact distal pulses.  Exam reveals no gallop and no friction rub.    No murmur heard.  Pulmonary/Chest: Effort normal and breath sounds normal. No stridor. No respiratory distress. No wheezes. No rales. No tenderness.   Abdominal: Soft. Bowel sounds are normal. No distended w/ ascites.   Musculoskeletal: Normal range of motion. No edema.   Lymphadenopathy:  No cervical adenopathy.   Neurological: Alert and oriented to person, place, and time.  No Focal Neurological Deficits Observed   Skin: Skin is warm and dry. No rash noted.   Psychiatric: Normal mood and affect.  Behavior is normal. Judgment normal.     DIAGNOSTIC DATA (Reviewed and interpreted by me):    6MW 1/25/17 resting room air sat 98%, minimum sat 91%, no O2 needed    Prior Full PFT form 12/2014 (No pleural effusions on CXR at time of PFT):  Moderate obstruction, air trapping mild reduction in DLCO over-corrects for alveolar volume  FEV1/FVC = 65%  FEV1 = 65%  FVC = 76%  TLC = 85%  RV/TLC = 47%  DLCO = 66-->124% for alveolar volume    CPAP download - 12/19/18-1/17/19 - compliance w/ > 4 hrs + 83%, AHI 1.1    Severe obstructive sleep apnea (327.23) (G47.33)      A. UNTREATED; appeared to attempt to wear it sporadically 2007, 2008,  split-night study      of 03/10/2016 reported an overall AHI of 32.7/hr which required BiPAP therapy after failed      CPAP attempt with final BiPAP 17 and EPAP 11 which still reported oxygen desaturations      on 2 L O2, her lowest oxygen saturation was documented at 64% and several      arrhythmias were observed during the study.      B. SLEEP STUDY; 11/2007, w/ AHI 57.8; followed by Dr. rader until 2008, during her      sleep study CPAP of 7 treated her well; giving her an AHI of < 2. Follow-up      visits in the sleep clinic indicate she was still sleepy, he increased the pressure to 9,      referred her to ENT to consider surgery.    CT Chest 8/28/15 - mosaic attenuation c/w obstructive lung disease, bilateral pleural effusions, scarring from prior granulomatous disease    CT Chest 12/31/18 - resolution of left sided infiltrates, small left sided effusion    V/Q Scan 8/25/15 - no evidence of CTEPH or acute clot, air trapping c/w obstructive lung disease    Echo 5/24/16 - EF 55-60%, LVH, mild LAE, mild to mod MR, RV moderately dilated, RV Systolic Function Severely reduced, RVSP 80, severe TR    RHC 6/2016  HEMODYNAMIC DATA (Pressures in mmHg):  PULMONARY CAPILLARY WEDGE PRESSURE: 18.  PA: 70/22  Mean PA:  36  RV: 70/10.  RA: Mean of 16.  OXYGEN SATURATIONS: IVC 51%, SVC 59%, RA 56%, RV 54%, PA 60%, and arterial 92%.       NITRIC OXIDE CHALLENGE:  CARDIAC OUTPUT: Baseline 4.23 with cardiac index of 2.28. Systemic blood  pressure 140/89 with a PA pressure of 78/25 and a mean of 37.      POST- NITRIC OXIDE CHALLENGE:  CARDIAC OUTPUT: 3.9 with a cardiac index of 2.1. Systemic pressure 151/75. PA  pressure is 70/20 with a mean of 36.     Impression & Plan    1) Moderate Group 2+3 Pulmonary Hypertension - complex situation.  No evidence of cirrhosis - therefore ascites is cardiac/renal in nature and she cannot have portopulmonary htn by definition. The severity is moderate - mean PA 36 (moderate 35-45) and is clearly  "proportional to the degree of Diastolic HF (PCWP 18), COPD FEV1 65%, CKD IV, and Severe untreated CIELO w/ Nocturnal Hypoxemia AHI of 32.7.  For that reason selective pulmonary vasodilators are not indicated, and in fact, could potentially make things worse with worsening V/Q matching or volume overloading a non-compliant left ventricle.  No paracentesis needed for > 1 year.  Also needs good BP control.  I'm worried her CHF if worsening.  If significant Ascites on next scan may need paracentesis.    2) Severe CIELO and Nocturnal Hypoxemia - now using Bipap regularly.  CPAP download looks good.    3) GOLD 2A COPD w/ likely asthma overlap - asthma symptoms more prominent today.  High eos in past.  Check lab markers of allergic inflammation.  D/c trelegy.  Start nebulized pulmicort 0.5 mg bid and formoterol or brovana bid (cheapest) as well as spiriva.  Flonase / xyzal / singulair for allergies.    4) Abnormal CT - was recently at [facility] for CHF.  CTA done there 9/17/18 showed no PE, \"small bilateral effusions\", \"focal GGO in OLIVA suspect focal pneumonic infiltrates vs pulmonary edema\", \"recommend follow up CT in 1-2 months\".  Repeat CT w/ resolution of these findings.  Will repeat CT now to follow up on LLL abnormalities from recent CXR.  R/o pleural effusion, upper abdominal ascites.    5) Depression - refer to psych    RTC 1 month w/ PFT    Johan Thao MD  Pulmonology and Critical Care Medicine  12/12/19 5:44 PM  Electronically Signed    "

## 2020-01-01 ENCOUNTER — APPOINTMENT (OUTPATIENT)
Dept: GENERAL RADIOLOGY | Facility: HOSPITAL | Age: 80
End: 2020-01-01

## 2020-01-01 ENCOUNTER — APPOINTMENT (OUTPATIENT)
Dept: CT IMAGING | Facility: HOSPITAL | Age: 80
End: 2020-01-01

## 2020-01-01 ENCOUNTER — APPOINTMENT (OUTPATIENT)
Dept: NEPHROLOGY | Facility: HOSPITAL | Age: 80
End: 2020-01-01

## 2020-01-01 ENCOUNTER — TRANSCRIBE ORDERS (OUTPATIENT)
Dept: ADMINISTRATIVE | Facility: HOSPITAL | Age: 80
End: 2020-01-01

## 2020-01-01 ENCOUNTER — OFFICE VISIT (OUTPATIENT)
Dept: CARDIOLOGY | Facility: CLINIC | Age: 80
End: 2020-01-01

## 2020-01-01 ENCOUNTER — ANESTHESIA EVENT (OUTPATIENT)
Dept: GASTROENTEROLOGY | Facility: HOSPITAL | Age: 80
End: 2020-01-01

## 2020-01-01 ENCOUNTER — OFFICE VISIT (OUTPATIENT)
Dept: INTERNAL MEDICINE | Facility: CLINIC | Age: 80
End: 2020-01-01

## 2020-01-01 ENCOUNTER — CLINICAL SUPPORT NO REQUIREMENTS (OUTPATIENT)
Dept: CARDIOLOGY | Facility: CLINIC | Age: 80
End: 2020-01-01

## 2020-01-01 ENCOUNTER — OFFICE VISIT (OUTPATIENT)
Dept: PULMONOLOGY | Facility: CLINIC | Age: 80
End: 2020-01-01

## 2020-01-01 ENCOUNTER — OUTSIDE FACILITY SERVICE (OUTPATIENT)
Dept: INTERNAL MEDICINE | Facility: CLINIC | Age: 80
End: 2020-01-01

## 2020-01-01 ENCOUNTER — TELEPHONE (OUTPATIENT)
Dept: CARDIOLOGY | Facility: CLINIC | Age: 80
End: 2020-01-01

## 2020-01-01 ENCOUNTER — ANESTHESIA (OUTPATIENT)
Dept: GASTROENTEROLOGY | Facility: HOSPITAL | Age: 80
End: 2020-01-01

## 2020-01-01 ENCOUNTER — TELEPHONE (OUTPATIENT)
Dept: INTERNAL MEDICINE | Facility: CLINIC | Age: 80
End: 2020-01-01

## 2020-01-01 ENCOUNTER — APPOINTMENT (OUTPATIENT)
Dept: CARDIOLOGY | Facility: HOSPITAL | Age: 80
End: 2020-01-01

## 2020-01-01 ENCOUNTER — HOSPITAL ENCOUNTER (INPATIENT)
Facility: HOSPITAL | Age: 80
LOS: 14 days | Discharge: NURSING, REHAB OR LONG-TERM CARE | End: 2020-08-05
Attending: EMERGENCY MEDICINE | Admitting: INTERNAL MEDICINE

## 2020-01-01 ENCOUNTER — HOSPITAL ENCOUNTER (INPATIENT)
Facility: HOSPITAL | Age: 80
LOS: 1 days | End: 2020-08-17
Attending: EMERGENCY MEDICINE | Admitting: INTERNAL MEDICINE

## 2020-01-01 ENCOUNTER — HOSPITAL ENCOUNTER (OUTPATIENT)
Dept: CT IMAGING | Facility: HOSPITAL | Age: 80
Discharge: HOME | End: 2020-05-05
Admitting: INTERNAL MEDICINE

## 2020-01-01 ENCOUNTER — DOCUMENTATION (OUTPATIENT)
Dept: PULMONOLOGY | Facility: CLINIC | Age: 80
End: 2020-01-01

## 2020-01-01 VITALS
BODY MASS INDEX: 24.71 KG/M2 | OXYGEN SATURATION: 92 % | WEIGHT: 163 LBS | TEMPERATURE: 97.5 F | SYSTOLIC BLOOD PRESSURE: 128 MMHG | HEART RATE: 69 BPM | DIASTOLIC BLOOD PRESSURE: 70 MMHG | HEIGHT: 68 IN

## 2020-01-01 VITALS
BODY MASS INDEX: 27.43 KG/M2 | SYSTOLIC BLOOD PRESSURE: 130 MMHG | RESPIRATION RATE: 20 BRPM | DIASTOLIC BLOOD PRESSURE: 86 MMHG | HEIGHT: 68 IN | OXYGEN SATURATION: 95 % | WEIGHT: 181 LBS | TEMPERATURE: 97 F | HEART RATE: 73 BPM

## 2020-01-01 VITALS
HEIGHT: 68 IN | BODY MASS INDEX: 27.16 KG/M2 | WEIGHT: 179.2 LBS | OXYGEN SATURATION: 91 % | DIASTOLIC BLOOD PRESSURE: 78 MMHG | HEART RATE: 87 BPM | SYSTOLIC BLOOD PRESSURE: 110 MMHG | TEMPERATURE: 97.8 F

## 2020-01-01 VITALS
DIASTOLIC BLOOD PRESSURE: 72 MMHG | TEMPERATURE: 97.3 F | HEIGHT: 68 IN | OXYGEN SATURATION: 95 % | HEART RATE: 80 BPM | WEIGHT: 180 LBS | BODY MASS INDEX: 27.28 KG/M2 | SYSTOLIC BLOOD PRESSURE: 128 MMHG

## 2020-01-01 VITALS
SYSTOLIC BLOOD PRESSURE: 110 MMHG | WEIGHT: 177.3 LBS | HEART RATE: 99 BPM | TEMPERATURE: 98.5 F | RESPIRATION RATE: 16 BRPM | OXYGEN SATURATION: 97 % | HEIGHT: 67 IN | DIASTOLIC BLOOD PRESSURE: 63 MMHG | BODY MASS INDEX: 27.83 KG/M2

## 2020-01-01 VITALS
SYSTOLIC BLOOD PRESSURE: 123 MMHG | WEIGHT: 163 LBS | DIASTOLIC BLOOD PRESSURE: 64 MMHG | BODY MASS INDEX: 24.71 KG/M2 | HEIGHT: 68 IN | HEART RATE: 73 BPM

## 2020-01-01 VITALS — HEART RATE: 70 BPM | SYSTOLIC BLOOD PRESSURE: 127 MMHG | DIASTOLIC BLOOD PRESSURE: 68 MMHG

## 2020-01-01 VITALS
TEMPERATURE: 96.9 F | OXYGEN SATURATION: 98 % | BODY MASS INDEX: 25.16 KG/M2 | RESPIRATION RATE: 16 BRPM | WEIGHT: 166 LBS | SYSTOLIC BLOOD PRESSURE: 126 MMHG | HEART RATE: 81 BPM | DIASTOLIC BLOOD PRESSURE: 80 MMHG | HEIGHT: 68 IN

## 2020-01-01 VITALS
BODY MASS INDEX: 27.78 KG/M2 | WEIGHT: 177 LBS | HEIGHT: 67 IN | TEMPERATURE: 97.5 F | DIASTOLIC BLOOD PRESSURE: 69 MMHG | OXYGEN SATURATION: 93 % | SYSTOLIC BLOOD PRESSURE: 85 MMHG

## 2020-01-01 DIAGNOSIS — N18.6 STAGE 5 CHRONIC KIDNEY DISEASE ON CHRONIC DIALYSIS (HCC): ICD-10-CM

## 2020-01-01 DIAGNOSIS — M54.50 CHRONIC MIDLINE LOW BACK PAIN, UNSPECIFIED WHETHER SCIATICA PRESENT: ICD-10-CM

## 2020-01-01 DIAGNOSIS — E11.9 TYPE 2 DIABETES MELLITUS WITHOUT COMPLICATION, WITH LONG-TERM CURRENT USE OF INSULIN (HCC): ICD-10-CM

## 2020-01-01 DIAGNOSIS — J43.8 OTHER EMPHYSEMA (HCC): Primary | ICD-10-CM

## 2020-01-01 DIAGNOSIS — S22.42XA MULTIPLE FRACTURES OF RIBS, LEFT SIDE, INITIAL ENCOUNTER FOR CLOSED FRACTURE: ICD-10-CM

## 2020-01-01 DIAGNOSIS — Z79.4 TYPE 2 DIABETES MELLITUS WITHOUT COMPLICATION, WITH LONG-TERM CURRENT USE OF INSULIN (HCC): Primary | ICD-10-CM

## 2020-01-01 DIAGNOSIS — F41.8 DEPRESSION WITH ANXIETY: ICD-10-CM

## 2020-01-01 DIAGNOSIS — E11.9 TYPE 2 DIABETES MELLITUS WITHOUT COMPLICATION, WITH LONG-TERM CURRENT USE OF INSULIN (HCC): Primary | ICD-10-CM

## 2020-01-01 DIAGNOSIS — Z99.2 STAGE 5 CHRONIC KIDNEY DISEASE ON CHRONIC DIALYSIS (HCC): ICD-10-CM

## 2020-01-01 DIAGNOSIS — M25.50 ARTHRALGIA, UNSPECIFIED JOINT: ICD-10-CM

## 2020-01-01 DIAGNOSIS — E53.9 VITAMIN B DEFICIENCY: ICD-10-CM

## 2020-01-01 DIAGNOSIS — J90 PLEURAL EFFUSION ON LEFT: ICD-10-CM

## 2020-01-01 DIAGNOSIS — J44.9 CHRONIC OBSTRUCTIVE PULMONARY DISEASE, UNSPECIFIED COPD TYPE (HCC): ICD-10-CM

## 2020-01-01 DIAGNOSIS — Z76.89 ESTABLISHING CARE WITH NEW DOCTOR, ENCOUNTER FOR: ICD-10-CM

## 2020-01-01 DIAGNOSIS — I15.1 HYPERTENSION SECONDARY TO OTHER RENAL DISORDERS: ICD-10-CM

## 2020-01-01 DIAGNOSIS — I27.20 PULMONARY HYPERTENSION (HCC): ICD-10-CM

## 2020-01-01 DIAGNOSIS — Z13.29 THYROID DISORDER SCREENING: ICD-10-CM

## 2020-01-01 DIAGNOSIS — J45.909 ASTHMA, UNSPECIFIED ASTHMA SEVERITY, UNSPECIFIED WHETHER COMPLICATED, UNSPECIFIED WHETHER PERSISTENT: ICD-10-CM

## 2020-01-01 DIAGNOSIS — A41.9 SEPSIS WITHOUT ACUTE ORGAN DYSFUNCTION, DUE TO UNSPECIFIED ORGANISM (HCC): ICD-10-CM

## 2020-01-01 DIAGNOSIS — N28.89 HYPERTENSION SECONDARY TO OTHER RENAL DISORDERS: ICD-10-CM

## 2020-01-01 DIAGNOSIS — E03.9 HYPOTHYROIDISM, UNSPECIFIED TYPE: ICD-10-CM

## 2020-01-01 DIAGNOSIS — J90 PLEURAL EFFUSION ON LEFT: Primary | ICD-10-CM

## 2020-01-01 DIAGNOSIS — J43.8 OTHER EMPHYSEMA (HCC): ICD-10-CM

## 2020-01-01 DIAGNOSIS — R07.9 CHEST PAIN, UNSPECIFIED TYPE: ICD-10-CM

## 2020-01-01 DIAGNOSIS — J96.21 ACUTE ON CHRONIC RESPIRATORY FAILURE WITH HYPOXIA (HCC): Primary | ICD-10-CM

## 2020-01-01 DIAGNOSIS — Z74.09 IMPAIRED MOBILITY AND ADLS: ICD-10-CM

## 2020-01-01 DIAGNOSIS — Z79.4 TYPE 2 DIABETES MELLITUS WITHOUT COMPLICATION, WITH LONG-TERM CURRENT USE OF INSULIN (HCC): ICD-10-CM

## 2020-01-01 DIAGNOSIS — G89.29 CHRONIC MIDLINE LOW BACK PAIN, UNSPECIFIED WHETHER SCIATICA PRESENT: ICD-10-CM

## 2020-01-01 DIAGNOSIS — J18.9 RECURRENT PNEUMONIA: ICD-10-CM

## 2020-01-01 DIAGNOSIS — E55.9 VITAMIN D DEFICIENCY: ICD-10-CM

## 2020-01-01 DIAGNOSIS — Z13.220 LIPID SCREENING: ICD-10-CM

## 2020-01-01 DIAGNOSIS — Z78.9 IMPAIRED MOBILITY AND ADLS: ICD-10-CM

## 2020-01-01 DIAGNOSIS — M25.50 ARTHRALGIA, UNSPECIFIED JOINT: Primary | ICD-10-CM

## 2020-01-01 DIAGNOSIS — N18.6 END STAGE RENAL DISEASE (HCC): ICD-10-CM

## 2020-01-01 DIAGNOSIS — J45.40 MODERATE PERSISTENT ASTHMA, UNSPECIFIED WHETHER COMPLICATED: Primary | ICD-10-CM

## 2020-01-01 DIAGNOSIS — Z74.09 IMPAIRED FUNCTIONAL MOBILITY, BALANCE, GAIT, AND ENDURANCE: ICD-10-CM

## 2020-01-01 DIAGNOSIS — N10 ACUTE PYELONEPHRITIS: ICD-10-CM

## 2020-01-01 DIAGNOSIS — I48.19 PERSISTENT ATRIAL FIBRILLATION (HCC): ICD-10-CM

## 2020-01-01 DIAGNOSIS — I49.5 TACHY-BRADY SYNDROME (HCC): Primary | ICD-10-CM

## 2020-01-01 DIAGNOSIS — I48.0 PAF (PAROXYSMAL ATRIAL FIBRILLATION) (HCC): Primary | ICD-10-CM

## 2020-01-01 DIAGNOSIS — G47.00 INSOMNIA, UNSPECIFIED TYPE: ICD-10-CM

## 2020-01-01 DIAGNOSIS — F51.01 PRIMARY INSOMNIA: ICD-10-CM

## 2020-01-01 DIAGNOSIS — K21.9 GASTROESOPHAGEAL REFLUX DISEASE, ESOPHAGITIS PRESENCE NOT SPECIFIED: ICD-10-CM

## 2020-01-01 DIAGNOSIS — R13.10 DYSPHAGIA, UNSPECIFIED TYPE: ICD-10-CM

## 2020-01-01 DIAGNOSIS — I48.19 ATRIAL FIBRILLATION, PERSISTENT (HCC): Primary | ICD-10-CM

## 2020-01-01 DIAGNOSIS — R13.10 DYSPHAGIA: ICD-10-CM

## 2020-01-01 LAB
25(OH)D3 SERPL-MCNC: 51.1 NG/ML (ref 30–100)
ABO GROUP BLD: NORMAL
ALBUMIN SERPL-MCNC: 2.8 G/DL (ref 3.5–5.2)
ALBUMIN SERPL-MCNC: 3 G/DL (ref 3.5–5.2)
ALBUMIN SERPL-MCNC: 3.1 G/DL (ref 3.5–5.2)
ALBUMIN SERPL-MCNC: 3.3 G/DL (ref 3.5–5.2)
ALBUMIN SERPL-MCNC: 3.4 G/DL (ref 3.5–5.2)
ALBUMIN SERPL-MCNC: 3.8 G/DL (ref 3.5–5.2)
ALBUMIN SERPL-MCNC: 4 G/DL (ref 3.5–5.2)
ALBUMIN SERPL-MCNC: 4 G/DL (ref 3.5–5.2)
ALBUMIN/GLOB SERPL: 0.9 G/DL
ALBUMIN/GLOB SERPL: 1 G/DL
ALBUMIN/GLOB SERPL: 1.1 G/DL
ALBUMIN/GLOB SERPL: 1.2 G/DL
ALBUMIN/GLOB SERPL: 1.2 G/DL
ALBUMIN/GLOB SERPL: 1.3 G/DL
ALBUMIN/GLOB SERPL: 1.5 G/DL
ALP SERPL-CCNC: 159 U/L (ref 39–117)
ALP SERPL-CCNC: 61 U/L (ref 39–117)
ALP SERPL-CCNC: 64 U/L (ref 39–117)
ALP SERPL-CCNC: 68 U/L (ref 39–117)
ALP SERPL-CCNC: 72 U/L (ref 39–117)
ALP SERPL-CCNC: 81 U/L (ref 39–117)
ALP SERPL-CCNC: 84 U/L (ref 39–117)
ALT SERPL W P-5'-P-CCNC: 10 U/L (ref 1–33)
ALT SERPL W P-5'-P-CCNC: 6 U/L (ref 1–33)
ALT SERPL W P-5'-P-CCNC: 66 U/L (ref 1–33)
ALT SERPL W P-5'-P-CCNC: 8 U/L (ref 1–33)
ALT SERPL W P-5'-P-CCNC: 9 U/L (ref 1–33)
ANION GAP SERPL CALCULATED.3IONS-SCNC: 11 MMOL/L (ref 5–15)
ANION GAP SERPL CALCULATED.3IONS-SCNC: 11.8 MMOL/L (ref 5–15)
ANION GAP SERPL CALCULATED.3IONS-SCNC: 12 MMOL/L (ref 5–15)
ANION GAP SERPL CALCULATED.3IONS-SCNC: 13 MMOL/L (ref 5–15)
ANION GAP SERPL CALCULATED.3IONS-SCNC: 14 MMOL/L (ref 5–15)
ANION GAP SERPL CALCULATED.3IONS-SCNC: 14 MMOL/L (ref 5–15)
ANION GAP SERPL CALCULATED.3IONS-SCNC: 15 MMOL/L (ref 5–15)
ANION GAP SERPL CALCULATED.3IONS-SCNC: 16 MMOL/L (ref 5–15)
ANION GAP SERPL CALCULATED.3IONS-SCNC: 16 MMOL/L (ref 5–15)
ANION GAP SERPL CALCULATED.3IONS-SCNC: 17 MMOL/L (ref 5–15)
ANION GAP SERPL CALCULATED.3IONS-SCNC: 17 MMOL/L (ref 5–15)
ANION GAP SERPL CALCULATED.3IONS-SCNC: 18 MMOL/L (ref 5–15)
ANION GAP SERPL CALCULATED.3IONS-SCNC: 18 MMOL/L (ref 5–15)
APPEARANCE FLD: ABNORMAL
APTT PPP: 39.7 SECONDS (ref 24–37)
ARTERIAL PATENCY WRIST A: ABNORMAL
ARTERIAL PATENCY WRIST A: ABNORMAL
AST SERPL-CCNC: 22 U/L (ref 1–32)
AST SERPL-CCNC: 23 U/L (ref 1–32)
AST SERPL-CCNC: 26 U/L (ref 1–32)
AST SERPL-CCNC: 28 U/L (ref 1–32)
AST SERPL-CCNC: 290 U/L (ref 1–32)
AST SERPL-CCNC: 35 U/L (ref 1–32)
AST SERPL-CCNC: 39 U/L (ref 1–32)
ATMOSPHERIC PRESS: ABNORMAL MM[HG]
B PARAPERT DNA SPEC QL NAA+PROBE: NOT DETECTED
B PERT DNA SPEC QL NAA+PROBE: NOT DETECTED
BACTERIA SPEC AEROBE CULT: ABNORMAL
BACTERIA SPEC AEROBE CULT: NORMAL
BACTERIA SPEC AEROBE CULT: NORMAL
BACTERIA UR QL AUTO: ABNORMAL /HPF
BACTERIA UR QL AUTO: ABNORMAL /HPF
BASE EXCESS BLDA CALC-SCNC: -0.9 MMOL/L (ref 0–2)
BASE EXCESS BLDA CALC-SCNC: 1.3 MMOL/L (ref 0–2)
BASE EXCESS BLDV CALC-SCNC: 1.2 MMOL/L (ref -2–2)
BASOPHILS # BLD AUTO: 0.01 10*3/MM3 (ref 0–0.2)
BASOPHILS # BLD AUTO: 0.01 10*3/MM3 (ref 0–0.2)
BASOPHILS # BLD AUTO: 0.02 10*3/MM3 (ref 0–0.2)
BASOPHILS # BLD AUTO: 0.03 10*3/MM3 (ref 0–0.2)
BASOPHILS # BLD AUTO: 0.05 10*3/MM3 (ref 0–0.2)
BASOPHILS # BLD AUTO: 0.07 10*3/MM3 (ref 0–0.2)
BASOPHILS # BLD MANUAL: 0 10*3/MM3 (ref 0–0.2)
BASOPHILS # BLD MANUAL: 0 10*3/MM3 (ref 0–0.2)
BASOPHILS NFR BLD AUTO: 0 % (ref 0–1.5)
BASOPHILS NFR BLD AUTO: 0 % (ref 0–1.5)
BASOPHILS NFR BLD AUTO: 0.1 % (ref 0–1.5)
BASOPHILS NFR BLD AUTO: 0.3 % (ref 0–1.5)
BASOPHILS NFR BLD AUTO: 0.3 % (ref 0–1.5)
BASOPHILS NFR BLD AUTO: 0.9 % (ref 0–1.5)
BASOPHILS NFR BLD AUTO: 1 % (ref 0–1.5)
BASOPHILS NFR BLD AUTO: 1.1 % (ref 0–1.5)
BDY SITE: ABNORMAL
BH CV ECHO MEAS - AO MAX PG (FULL): 5.6 MMHG
BH CV ECHO MEAS - AO MAX PG: 7.5 MMHG
BH CV ECHO MEAS - AO MEAN PG (FULL): 3 MMHG
BH CV ECHO MEAS - AO MEAN PG: 4 MMHG
BH CV ECHO MEAS - AO ROOT AREA (BSA CORRECTED): 1.6
BH CV ECHO MEAS - AO ROOT AREA: 7.1 CM^2
BH CV ECHO MEAS - AO ROOT DIAM: 3 CM
BH CV ECHO MEAS - AO V2 MAX: 137 CM/SEC
BH CV ECHO MEAS - AO V2 MEAN: 94.9 CM/SEC
BH CV ECHO MEAS - AO V2 VTI: 23.3 CM
BH CV ECHO MEAS - ASC AORTA: 2.8 CM
BH CV ECHO MEAS - AVA(I,A): 1.4 CM^2
BH CV ECHO MEAS - AVA(I,D): 1.4 CM^2
BH CV ECHO MEAS - AVA(V,A): 1.6 CM^2
BH CV ECHO MEAS - AVA(V,D): 1.6 CM^2
BH CV ECHO MEAS - BSA(HAYCOCK): 1.9 M^2
BH CV ECHO MEAS - BSA: 1.9 M^2
BH CV ECHO MEAS - BZI_BMI: 24.3 KILOGRAMS/M^2
BH CV ECHO MEAS - BZI_METRIC_HEIGHT: 172.7 CM
BH CV ECHO MEAS - BZI_METRIC_WEIGHT: 72.6 KG
BH CV ECHO MEAS - EDV(CUBED): 33.4 ML
BH CV ECHO MEAS - EDV(MOD-SP2): 43 ML
BH CV ECHO MEAS - EDV(MOD-SP4): 58 ML
BH CV ECHO MEAS - EDV(TEICH): 41.6 ML
BH CV ECHO MEAS - EF(CUBED): 52.6 %
BH CV ECHO MEAS - EF(MOD-BP): 60 %
BH CV ECHO MEAS - EF(MOD-SP2): 62.8 %
BH CV ECHO MEAS - EF(MOD-SP4): 58.6 %
BH CV ECHO MEAS - EF(TEICH): 45.8 %
BH CV ECHO MEAS - ESV(CUBED): 15.8 ML
BH CV ECHO MEAS - ESV(MOD-SP2): 16 ML
BH CV ECHO MEAS - ESV(MOD-SP4): 24 ML
BH CV ECHO MEAS - ESV(TEICH): 22.5 ML
BH CV ECHO MEAS - FS: 22 %
BH CV ECHO MEAS - IVS/LVPW: 0.73
BH CV ECHO MEAS - IVSD: 0.98 CM
BH CV ECHO MEAS - LA DIMENSION: 3.4 CM
BH CV ECHO MEAS - LA/AO: 1.1
BH CV ECHO MEAS - LAD MAJOR: 5.3 CM
BH CV ECHO MEAS - LAT PEAK E' VEL: 12.5 CM/SEC
BH CV ECHO MEAS - LATERAL E/E' RATIO: 7.1
BH CV ECHO MEAS - LV DIASTOLIC VOL/BSA (35-75): 31.2 ML/M^2
BH CV ECHO MEAS - LV MASS(C)D: 114.4 GRAMS
BH CV ECHO MEAS - LV MASS(C)DI: 61.6 GRAMS/M^2
BH CV ECHO MEAS - LV MAX PG: 1.9 MMHG
BH CV ECHO MEAS - LV MEAN PG: 1 MMHG
BH CV ECHO MEAS - LV SYSTOLIC VOL/BSA (12-30): 12.9 ML/M^2
BH CV ECHO MEAS - LV V1 MAX: 69.3 CM/SEC
BH CV ECHO MEAS - LV V1 MEAN: 45.1 CM/SEC
BH CV ECHO MEAS - LV V1 VTI: 10.5 CM
BH CV ECHO MEAS - LVIDD: 3.2 CM
BH CV ECHO MEAS - LVIDS: 2.5 CM
BH CV ECHO MEAS - LVLD AP2: 5.7 CM
BH CV ECHO MEAS - LVLD AP4: 6.3 CM
BH CV ECHO MEAS - LVLS AP2: 4.9 CM
BH CV ECHO MEAS - LVLS AP4: 5.8 CM
BH CV ECHO MEAS - LVOT AREA (M): 3.1 CM^2
BH CV ECHO MEAS - LVOT AREA: 3.1 CM^2
BH CV ECHO MEAS - LVOT DIAM: 2 CM
BH CV ECHO MEAS - LVPWD: 1 CM
BH CV ECHO MEAS - MED PEAK E' VEL: 7.4 CM/SEC
BH CV ECHO MEAS - MEDIAL E/E' RATIO: 12
BH CV ECHO MEAS - MV DEC SLOPE: 544.5 CM/SEC^2
BH CV ECHO MEAS - MV E MAX VEL: 88.4 CM/SEC
BH CV ECHO MEAS - MV MAX PG: 4.8 MMHG
BH CV ECHO MEAS - MV MEAN PG: 2 MMHG
BH CV ECHO MEAS - MV P1/2T MAX VEL: 113 CM/SEC
BH CV ECHO MEAS - MV P1/2T: 60.8 MSEC
BH CV ECHO MEAS - MV V2 MAX: 109 CM/SEC
BH CV ECHO MEAS - MV V2 MEAN: 54.6 CM/SEC
BH CV ECHO MEAS - MV V2 VTI: 15.3 CM
BH CV ECHO MEAS - MVA P1/2T LCG: 1.9 CM^2
BH CV ECHO MEAS - MVA(P1/2T): 3.6 CM^2
BH CV ECHO MEAS - MVA(VTI): 2.2 CM^2
BH CV ECHO MEAS - PA ACC SLOPE: 987 CM/SEC^2
BH CV ECHO MEAS - PA ACC TIME: 0.08 SEC
BH CV ECHO MEAS - PA MAX PG: 4.6 MMHG
BH CV ECHO MEAS - PA PR(ACCEL): 42.6 MMHG
BH CV ECHO MEAS - PA V2 MAX: 107 CM/SEC
BH CV ECHO MEAS - PI END-D VEL: 154.5 CM/SEC
BH CV ECHO MEAS - RAP SYSTOLE: 15 MMHG
BH CV ECHO MEAS - RVSP: 71 MMHG
BH CV ECHO MEAS - SI(AO): 88.6 ML/M^2
BH CV ECHO MEAS - SI(CUBED): 9.5 ML/M^2
BH CV ECHO MEAS - SI(LVOT): 17.7 ML/M^2
BH CV ECHO MEAS - SI(MOD-SP2): 14.5 ML/M^2
BH CV ECHO MEAS - SI(MOD-SP4): 18.3 ML/M^2
BH CV ECHO MEAS - SI(TEICH): 10.2 ML/M^2
BH CV ECHO MEAS - SV(AO): 164.7 ML
BH CV ECHO MEAS - SV(CUBED): 17.6 ML
BH CV ECHO MEAS - SV(LVOT): 33 ML
BH CV ECHO MEAS - SV(MOD-SP2): 27 ML
BH CV ECHO MEAS - SV(MOD-SP4): 34 ML
BH CV ECHO MEAS - SV(TEICH): 19 ML
BH CV ECHO MEAS - TAPSE (>1.6): 1.3 CM2
BH CV ECHO MEAS - TR MAX PG: 56 MMHG
BH CV ECHO MEAS - TR MAX VEL: 373 CM/SEC
BH CV ECHO MEASUREMENTS AVERAGE E/E' RATIO: 8.88
BH CV VAS BP LEFT ARM: NORMAL MMHG
BH CV XLRA - RV BASE: 5.1 CM
BH CV XLRA - RV LENGTH: 7.2 CM
BH CV XLRA - RV MID: 4.5 CM
BH CV XLRA - TDI S': 5.7 CM/SEC
BILIRUB SERPL-MCNC: 0.8 MG/DL (ref 0.2–1.2)
BILIRUB SERPL-MCNC: 1.3 MG/DL (ref 0–1.2)
BILIRUB SERPL-MCNC: 1.7 MG/DL (ref 0–1.2)
BILIRUB SERPL-MCNC: 5.3 MG/DL (ref 0–1.2)
BILIRUB UR QL STRIP: ABNORMAL
BILIRUB UR QL STRIP: ABNORMAL
BLD GP AB SCN SERPL QL: NEGATIVE
BODY TEMPERATURE: 37 C
BUN BLD-MCNC: 9 MG/DL (ref 8–23)
BUN SERPL-MCNC: 11 MG/DL (ref 8–23)
BUN SERPL-MCNC: 14 MG/DL (ref 8–23)
BUN SERPL-MCNC: 15 MG/DL (ref 8–23)
BUN SERPL-MCNC: 19 MG/DL (ref 8–23)
BUN SERPL-MCNC: 20 MG/DL (ref 8–23)
BUN SERPL-MCNC: 21 MG/DL (ref 8–23)
BUN SERPL-MCNC: 21 MG/DL (ref 8–23)
BUN SERPL-MCNC: 22 MG/DL (ref 8–23)
BUN SERPL-MCNC: 23 MG/DL (ref 8–23)
BUN SERPL-MCNC: 26 MG/DL (ref 8–23)
BUN SERPL-MCNC: 26 MG/DL (ref 8–23)
BUN SERPL-MCNC: 27 MG/DL (ref 8–23)
BUN SERPL-MCNC: 28 MG/DL (ref 8–23)
BUN SERPL-MCNC: 29 MG/DL (ref 8–23)
BUN/CREAT SERPL: 3.3 (ref 7–25)
BUN/CREAT SERPL: 3.3 (ref 7–25)
BUN/CREAT SERPL: 4.1 (ref 7–25)
BUN/CREAT SERPL: 4.2 (ref 7–25)
BUN/CREAT SERPL: 5.2 (ref 7–25)
BUN/CREAT SERPL: 5.2 (ref 7–25)
BUN/CREAT SERPL: 5.3 (ref 7–25)
BUN/CREAT SERPL: 5.5 (ref 7–25)
BUN/CREAT SERPL: 5.7 (ref 7–25)
BUN/CREAT SERPL: 5.8 (ref 7–25)
BUN/CREAT SERPL: 6 (ref 7–25)
BUN/CREAT SERPL: 6.5 (ref 7–25)
BUN/CREAT SERPL: 6.6 (ref 7–25)
BUN/CREAT SERPL: 7 (ref 7–25)
BUN/CREAT SERPL: 7 (ref 7–25)
BUN/CREAT SERPL: 7.1 (ref 7–25)
BUN/CREAT SERPL: 7.7 (ref 7–25)
C PNEUM DNA NPH QL NAA+NON-PROBE: NOT DETECTED
CALCIUM SPEC-SCNC: 10 MG/DL (ref 8.6–10.5)
CALCIUM SPEC-SCNC: 10 MG/DL (ref 8.6–10.5)
CALCIUM SPEC-SCNC: 8.9 MG/DL (ref 8.6–10.5)
CALCIUM SPEC-SCNC: 9.2 MG/DL (ref 8.6–10.5)
CALCIUM SPEC-SCNC: 9.3 MG/DL (ref 8.6–10.5)
CALCIUM SPEC-SCNC: 9.4 MG/DL (ref 8.6–10.5)
CALCIUM SPEC-SCNC: 9.4 MG/DL (ref 8.6–10.5)
CALCIUM SPEC-SCNC: 9.5 MG/DL (ref 8.6–10.5)
CALCIUM SPEC-SCNC: 9.5 MG/DL (ref 8.6–10.5)
CALCIUM SPEC-SCNC: 9.6 MG/DL (ref 8.6–10.5)
CALCIUM SPEC-SCNC: 9.7 MG/DL (ref 8.6–10.5)
CALCIUM SPEC-SCNC: 9.7 MG/DL (ref 8.6–10.5)
CALCIUM SPEC-SCNC: 9.8 MG/DL (ref 8.6–10.5)
CALCIUM SPEC-SCNC: 9.9 MG/DL (ref 8.6–10.5)
CALCIUM SPEC-SCNC: 9.9 MG/DL (ref 8.6–10.5)
CHLORIDE SERPL-SCNC: 89 MMOL/L (ref 98–107)
CHLORIDE SERPL-SCNC: 92 MMOL/L (ref 98–107)
CHLORIDE SERPL-SCNC: 93 MMOL/L (ref 98–107)
CHLORIDE SERPL-SCNC: 94 MMOL/L (ref 98–107)
CHLORIDE SERPL-SCNC: 95 MMOL/L (ref 98–107)
CHLORIDE SERPL-SCNC: 96 MMOL/L (ref 98–107)
CHLORIDE SERPL-SCNC: 96 MMOL/L (ref 98–107)
CHLORIDE SERPL-SCNC: 97 MMOL/L (ref 98–107)
CHLORIDE SERPL-SCNC: 98 MMOL/L (ref 98–107)
CHLORIDE SERPL-SCNC: 98 MMOL/L (ref 98–107)
CHLORIDE SERPL-SCNC: 99 MMOL/L (ref 98–107)
CHLORIDE SERPL-SCNC: 99 MMOL/L (ref 98–107)
CHOLEST SERPL-MCNC: 84 MG/DL (ref 0–200)
CK MB SERPL-CCNC: 1.92 NG/ML
CK SERPL-CCNC: 70 U/L (ref 20–180)
CLARITY UR: ABNORMAL
CLARITY UR: CLEAR
CO2 BLDA-SCNC: 26.6 MMOL/L (ref 22–33)
CO2 BLDA-SCNC: 26.7 MMOL/L (ref 22–33)
CO2 BLDA-SCNC: 30.5 MMOL/L (ref 22–33)
CO2 SERPL-SCNC: 17 MMOL/L (ref 22–29)
CO2 SERPL-SCNC: 20 MMOL/L (ref 22–29)
CO2 SERPL-SCNC: 20 MMOL/L (ref 22–29)
CO2 SERPL-SCNC: 21 MMOL/L (ref 22–29)
CO2 SERPL-SCNC: 22 MMOL/L (ref 22–29)
CO2 SERPL-SCNC: 24 MMOL/L (ref 22–29)
CO2 SERPL-SCNC: 24 MMOL/L (ref 22–29)
CO2 SERPL-SCNC: 25 MMOL/L (ref 22–29)
CO2 SERPL-SCNC: 26 MMOL/L (ref 22–29)
CO2 SERPL-SCNC: 28 MMOL/L (ref 22–29)
CO2 SERPL-SCNC: 29.2 MMOL/L (ref 22–29)
COHGB MFR BLD: 1 %
COHGB MFR BLD: 1.1 % (ref 0–2)
COHGB MFR BLD: 1.6 % (ref 0–2)
COLOR FLD: ABNORMAL
COLOR UR: ABNORMAL
COLOR UR: YELLOW
CORTIS AM PEAK SERPL-MCNC: 25.41 MCG/DL
CORTIS SERPL-MCNC: 197.4 MCG/DL
CREAT BLD-MCNC: 1.7 MG/DL (ref 0.57–1)
CREAT SERPL-MCNC: 2.71 MG/DL (ref 0.57–1)
CREAT SERPL-MCNC: 3.02 MG/DL (ref 0.57–1)
CREAT SERPL-MCNC: 3.08 MG/DL (ref 0.57–1)
CREAT SERPL-MCNC: 3.34 MG/DL (ref 0.57–1)
CREAT SERPL-MCNC: 3.43 MG/DL (ref 0.57–1)
CREAT SERPL-MCNC: 3.5 MG/DL (ref 0.57–1)
CREAT SERPL-MCNC: 3.53 MG/DL (ref 0.57–1)
CREAT SERPL-MCNC: 3.56 MG/DL (ref 0.57–1)
CREAT SERPL-MCNC: 3.62 MG/DL (ref 0.57–1)
CREAT SERPL-MCNC: 3.64 MG/DL (ref 0.57–1)
CREAT SERPL-MCNC: 3.73 MG/DL (ref 0.57–1)
CREAT SERPL-MCNC: 4.34 MG/DL (ref 0.57–1)
CREAT SERPL-MCNC: 4.36 MG/DL (ref 0.57–1)
CREAT SERPL-MCNC: 4.41 MG/DL (ref 0.57–1)
CREAT SERPL-MCNC: 4.43 MG/DL (ref 0.57–1)
CREAT SERPL-MCNC: 4.49 MG/DL (ref 0.57–1)
CYTO UR: NORMAL
D-LACTATE SERPL-SCNC: 2.3 MMOL/L (ref 0.5–2)
D-LACTATE SERPL-SCNC: 3.9 MMOL/L (ref 0.5–2)
D-LACTATE SERPL-SCNC: 4.2 MMOL/L (ref 0.5–2)
D-LACTATE SERPL-SCNC: 4.3 MMOL/L (ref 0.5–2)
DEPRECATED RDW RBC AUTO: 55.3 FL (ref 37–54)
DEPRECATED RDW RBC AUTO: 58.9 FL (ref 37–54)
DEPRECATED RDW RBC AUTO: 59.1 FL (ref 37–54)
DEPRECATED RDW RBC AUTO: 59.3 FL (ref 37–54)
DEPRECATED RDW RBC AUTO: 59.7 FL (ref 37–54)
DEPRECATED RDW RBC AUTO: 60.2 FL (ref 37–54)
DEPRECATED RDW RBC AUTO: 60.5 FL (ref 37–54)
DEPRECATED RDW RBC AUTO: 60.5 FL (ref 37–54)
DEPRECATED RDW RBC AUTO: 60.9 FL (ref 37–54)
DEPRECATED RDW RBC AUTO: 61.4 FL (ref 37–54)
DEPRECATED RDW RBC AUTO: 61.8 FL (ref 37–54)
DEPRECATED RDW RBC AUTO: 62.4 FL (ref 37–54)
DEPRECATED RDW RBC AUTO: 63.4 FL (ref 37–54)
DEPRECATED RDW RBC AUTO: 63.7 FL (ref 37–54)
DEPRECATED RDW RBC AUTO: 64.4 FL (ref 37–54)
DEPRECATED RDW RBC AUTO: 65.4 FL (ref 37–54)
DEPRECATED RDW RBC AUTO: 65.9 FL (ref 37–54)
DIGOXIN SERPL-MCNC: 0.31 NG/ML (ref 0.6–1.2)
EOSINOPHIL # BLD AUTO: 0.03 10*3/MM3 (ref 0–0.4)
EOSINOPHIL # BLD AUTO: 0.07 10*3/MM3 (ref 0–0.4)
EOSINOPHIL # BLD AUTO: 0.19 10*3/MM3 (ref 0–0.4)
EOSINOPHIL # BLD AUTO: 0.19 10*3/MM3 (ref 0–0.4)
EOSINOPHIL # BLD AUTO: 0.21 10*3/MM3 (ref 0–0.4)
EOSINOPHIL # BLD AUTO: 0.22 10*3/MM3 (ref 0–0.4)
EOSINOPHIL # BLD AUTO: 0.28 10*3/MM3 (ref 0–0.4)
EOSINOPHIL # BLD AUTO: 0.28 10*3/MM3 (ref 0–0.4)
EOSINOPHIL # BLD MANUAL: 0 10*3/MM3 (ref 0–0.4)
EOSINOPHIL # BLD MANUAL: 0 10*3/MM3 (ref 0–0.4)
EOSINOPHIL NFR BLD AUTO: 0.3 % (ref 0.3–6.2)
EOSINOPHIL NFR BLD AUTO: 0.5 % (ref 0.3–6.2)
EOSINOPHIL NFR BLD AUTO: 1.1 % (ref 0.3–6.2)
EOSINOPHIL NFR BLD AUTO: 2.6 % (ref 0.3–6.2)
EOSINOPHIL NFR BLD AUTO: 2.6 % (ref 0.3–6.2)
EOSINOPHIL NFR BLD AUTO: 2.7 % (ref 0.3–6.2)
EOSINOPHIL NFR BLD AUTO: 2.8 % (ref 0.3–6.2)
EOSINOPHIL NFR BLD AUTO: 4.5 % (ref 0.3–6.2)
EOSINOPHIL NFR BLD MANUAL: 0 % (ref 0.3–6.2)
EOSINOPHIL NFR BLD MANUAL: 0 % (ref 0.3–6.2)
EPAP: 0
ERYTHROCYTE [DISTWIDTH] IN BLOOD BY AUTOMATED COUNT: 17 % (ref 12.3–15.4)
ERYTHROCYTE [DISTWIDTH] IN BLOOD BY AUTOMATED COUNT: 17.2 % (ref 12.3–15.4)
ERYTHROCYTE [DISTWIDTH] IN BLOOD BY AUTOMATED COUNT: 17.2 % (ref 12.3–15.4)
ERYTHROCYTE [DISTWIDTH] IN BLOOD BY AUTOMATED COUNT: 17.4 % (ref 12.3–15.4)
ERYTHROCYTE [DISTWIDTH] IN BLOOD BY AUTOMATED COUNT: 17.4 % (ref 12.3–15.4)
ERYTHROCYTE [DISTWIDTH] IN BLOOD BY AUTOMATED COUNT: 17.7 % (ref 12.3–15.4)
ERYTHROCYTE [DISTWIDTH] IN BLOOD BY AUTOMATED COUNT: 17.9 % (ref 12.3–15.4)
ERYTHROCYTE [DISTWIDTH] IN BLOOD BY AUTOMATED COUNT: 18 % (ref 12.3–15.4)
ERYTHROCYTE [DISTWIDTH] IN BLOOD BY AUTOMATED COUNT: 18.1 % (ref 12.3–15.4)
ERYTHROCYTE [DISTWIDTH] IN BLOOD BY AUTOMATED COUNT: 18.1 % (ref 12.3–15.4)
ERYTHROCYTE [DISTWIDTH] IN BLOOD BY AUTOMATED COUNT: 18.4 % (ref 12.3–15.4)
ERYTHROCYTE [DISTWIDTH] IN BLOOD BY AUTOMATED COUNT: 18.6 % (ref 12.3–15.4)
ERYTHROCYTE [DISTWIDTH] IN BLOOD BY AUTOMATED COUNT: 18.6 % (ref 12.3–15.4)
ERYTHROCYTE [DISTWIDTH] IN BLOOD BY AUTOMATED COUNT: 18.9 % (ref 12.3–15.4)
ERYTHROCYTE [DISTWIDTH] IN BLOOD BY AUTOMATED COUNT: 20.8 % (ref 12.3–15.4)
FLUAV H1 2009 PAND RNA NPH QL NAA+PROBE: NOT DETECTED
FLUAV H1 HA GENE NPH QL NAA+PROBE: NOT DETECTED
FLUAV H3 RNA NPH QL NAA+PROBE: NOT DETECTED
FLUAV SUBTYP SPEC NAA+PROBE: NOT DETECTED
FLUBV RNA ISLT QL NAA+PROBE: NOT DETECTED
FOLATE SERPL-MCNC: >20 NG/ML (ref 4.78–24.2)
GFR SERPL CREATININE-BSD FRML MDRD: 10 ML/MIN/1.73
GFR SERPL CREATININE-BSD FRML MDRD: 12 ML/MIN/1.73
GFR SERPL CREATININE-BSD FRML MDRD: 13 ML/MIN/1.73
GFR SERPL CREATININE-BSD FRML MDRD: 14 ML/MIN/1.73
GFR SERPL CREATININE-BSD FRML MDRD: 15 ML/MIN/1.73
GFR SERPL CREATININE-BSD FRML MDRD: 17 ML/MIN/1.73
GFR SERPL CREATININE-BSD FRML MDRD: 29 ML/MIN/1.73
GFR SERPL CREATININE-BSD FRML MDRD: 9 ML/MIN/1.73
GFR SERPL CREATININE-BSD FRML MDRD: ABNORMAL ML/MIN/{1.73_M2}
GLOBULIN UR ELPH-MCNC: 2.6 GM/DL
GLOBULIN UR ELPH-MCNC: 2.6 GM/DL
GLOBULIN UR ELPH-MCNC: 2.7 GM/DL
GLOBULIN UR ELPH-MCNC: 3.1 GM/DL
GLOBULIN UR ELPH-MCNC: 3.2 GM/DL
GLOBULIN UR ELPH-MCNC: 3.3 GM/DL
GLOBULIN UR ELPH-MCNC: 3.6 GM/DL
GLUCOSE BLD-MCNC: 67 MG/DL (ref 65–99)
GLUCOSE BLDC GLUCOMTR-MCNC: 100 MG/DL (ref 70–130)
GLUCOSE BLDC GLUCOMTR-MCNC: 100 MG/DL (ref 70–130)
GLUCOSE BLDC GLUCOMTR-MCNC: 102 MG/DL (ref 70–130)
GLUCOSE BLDC GLUCOMTR-MCNC: 103 MG/DL (ref 70–130)
GLUCOSE BLDC GLUCOMTR-MCNC: 103 MG/DL (ref 70–130)
GLUCOSE BLDC GLUCOMTR-MCNC: 104 MG/DL (ref 70–130)
GLUCOSE BLDC GLUCOMTR-MCNC: 105 MG/DL (ref 70–130)
GLUCOSE BLDC GLUCOMTR-MCNC: 105 MG/DL (ref 70–130)
GLUCOSE BLDC GLUCOMTR-MCNC: 109 MG/DL (ref 70–130)
GLUCOSE BLDC GLUCOMTR-MCNC: 115 MG/DL (ref 70–130)
GLUCOSE BLDC GLUCOMTR-MCNC: 119 MG/DL (ref 70–130)
GLUCOSE BLDC GLUCOMTR-MCNC: 120 MG/DL (ref 70–130)
GLUCOSE BLDC GLUCOMTR-MCNC: 121 MG/DL (ref 70–130)
GLUCOSE BLDC GLUCOMTR-MCNC: 123 MG/DL (ref 70–130)
GLUCOSE BLDC GLUCOMTR-MCNC: 127 MG/DL (ref 70–130)
GLUCOSE BLDC GLUCOMTR-MCNC: 128 MG/DL (ref 70–130)
GLUCOSE BLDC GLUCOMTR-MCNC: 131 MG/DL (ref 70–130)
GLUCOSE BLDC GLUCOMTR-MCNC: 141 MG/DL (ref 70–130)
GLUCOSE BLDC GLUCOMTR-MCNC: 147 MG/DL (ref 70–130)
GLUCOSE BLDC GLUCOMTR-MCNC: 160 MG/DL (ref 70–130)
GLUCOSE BLDC GLUCOMTR-MCNC: 161 MG/DL (ref 70–130)
GLUCOSE BLDC GLUCOMTR-MCNC: 163 MG/DL (ref 70–130)
GLUCOSE BLDC GLUCOMTR-MCNC: 168 MG/DL (ref 70–130)
GLUCOSE BLDC GLUCOMTR-MCNC: 180 MG/DL (ref 70–130)
GLUCOSE BLDC GLUCOMTR-MCNC: 29 MG/DL (ref 70–130)
GLUCOSE BLDC GLUCOMTR-MCNC: 44 MG/DL (ref 70–130)
GLUCOSE BLDC GLUCOMTR-MCNC: 52 MG/DL (ref 70–130)
GLUCOSE BLDC GLUCOMTR-MCNC: 54 MG/DL (ref 70–130)
GLUCOSE BLDC GLUCOMTR-MCNC: 68 MG/DL (ref 70–130)
GLUCOSE BLDC GLUCOMTR-MCNC: 73 MG/DL (ref 70–130)
GLUCOSE BLDC GLUCOMTR-MCNC: 75 MG/DL (ref 70–130)
GLUCOSE BLDC GLUCOMTR-MCNC: 76 MG/DL (ref 70–130)
GLUCOSE BLDC GLUCOMTR-MCNC: 77 MG/DL (ref 70–130)
GLUCOSE BLDC GLUCOMTR-MCNC: 77 MG/DL (ref 70–130)
GLUCOSE BLDC GLUCOMTR-MCNC: 80 MG/DL (ref 70–130)
GLUCOSE BLDC GLUCOMTR-MCNC: 81 MG/DL (ref 70–130)
GLUCOSE BLDC GLUCOMTR-MCNC: 81 MG/DL (ref 70–130)
GLUCOSE BLDC GLUCOMTR-MCNC: 83 MG/DL (ref 70–130)
GLUCOSE BLDC GLUCOMTR-MCNC: 86 MG/DL (ref 70–130)
GLUCOSE BLDC GLUCOMTR-MCNC: 89 MG/DL (ref 70–130)
GLUCOSE BLDC GLUCOMTR-MCNC: 89 MG/DL (ref 70–130)
GLUCOSE BLDC GLUCOMTR-MCNC: 90 MG/DL (ref 70–130)
GLUCOSE BLDC GLUCOMTR-MCNC: 91 MG/DL (ref 70–130)
GLUCOSE BLDC GLUCOMTR-MCNC: 93 MG/DL (ref 70–130)
GLUCOSE BLDC GLUCOMTR-MCNC: 94 MG/DL (ref 70–130)
GLUCOSE BLDC GLUCOMTR-MCNC: 95 MG/DL (ref 70–130)
GLUCOSE BLDC GLUCOMTR-MCNC: 96 MG/DL (ref 70–130)
GLUCOSE BLDC GLUCOMTR-MCNC: 98 MG/DL (ref 70–130)
GLUCOSE BLDC GLUCOMTR-MCNC: 99 MG/DL (ref 70–130)
GLUCOSE SERPL-MCNC: 100 MG/DL (ref 65–99)
GLUCOSE SERPL-MCNC: 104 MG/DL (ref 65–99)
GLUCOSE SERPL-MCNC: 108 MG/DL (ref 65–99)
GLUCOSE SERPL-MCNC: 109 MG/DL (ref 65–99)
GLUCOSE SERPL-MCNC: 110 MG/DL (ref 65–99)
GLUCOSE SERPL-MCNC: 115 MG/DL (ref 65–99)
GLUCOSE SERPL-MCNC: 119 MG/DL (ref 65–99)
GLUCOSE SERPL-MCNC: 127 MG/DL (ref 65–99)
GLUCOSE SERPL-MCNC: 131 MG/DL (ref 65–99)
GLUCOSE SERPL-MCNC: 138 MG/DL (ref 65–99)
GLUCOSE SERPL-MCNC: 181 MG/DL (ref 65–99)
GLUCOSE SERPL-MCNC: 185 MG/DL (ref 65–99)
GLUCOSE SERPL-MCNC: 67 MG/DL (ref 65–99)
GLUCOSE SERPL-MCNC: 87 MG/DL (ref 65–99)
GLUCOSE SERPL-MCNC: 92 MG/DL (ref 65–99)
GLUCOSE SERPL-MCNC: 93 MG/DL (ref 65–99)
GLUCOSE UR STRIP-MCNC: ABNORMAL MG/DL
GLUCOSE UR STRIP-MCNC: ABNORMAL MG/DL
HADV DNA SPEC NAA+PROBE: NOT DETECTED
HAV IGM SERPL QL IA: NORMAL
HBA1C MFR BLD: 4.8 % (ref 4.8–5.6)
HBA1C MFR BLD: 5.49 % (ref 4.8–5.6)
HBA1C MFR BLD: 5.8 %
HBV CORE IGM SERPL QL IA: NORMAL
HBV SURFACE AG SERPL QL IA: NORMAL
HCO3 BLDA-SCNC: 25.2 MMOL/L (ref 20–26)
HCO3 BLDA-SCNC: 25.4 MMOL/L (ref 20–26)
HCO3 BLDV-SCNC: 28.7 MMOL/L (ref 22–28)
HCOV 229E RNA SPEC QL NAA+PROBE: NOT DETECTED
HCOV HKU1 RNA SPEC QL NAA+PROBE: NOT DETECTED
HCOV NL63 RNA SPEC QL NAA+PROBE: NOT DETECTED
HCOV OC43 RNA SPEC QL NAA+PROBE: NOT DETECTED
HCT VFR BLD AUTO: 23.4 % (ref 34–46.6)
HCT VFR BLD AUTO: 29.9 % (ref 34–46.6)
HCT VFR BLD AUTO: 31.7 % (ref 34–46.6)
HCT VFR BLD AUTO: 32.6 % (ref 34–46.6)
HCT VFR BLD AUTO: 34.2 % (ref 34–46.6)
HCT VFR BLD AUTO: 34.8 % (ref 34–46.6)
HCT VFR BLD AUTO: 34.9 % (ref 34–46.6)
HCT VFR BLD AUTO: 35.1 % (ref 34–46.6)
HCT VFR BLD AUTO: 35.5 % (ref 34–46.6)
HCT VFR BLD AUTO: 37.5 % (ref 34–46.6)
HCT VFR BLD AUTO: 38.6 % (ref 34–46.6)
HCT VFR BLD AUTO: 38.8 % (ref 34–46.6)
HCT VFR BLD AUTO: 39.5 % (ref 34–46.6)
HCT VFR BLD AUTO: 40.2 % (ref 34–46.6)
HCT VFR BLD AUTO: 40.4 % (ref 34–46.6)
HCT VFR BLD AUTO: 40.8 % (ref 34–46.6)
HCT VFR BLD AUTO: 40.9 % (ref 34–46.6)
HCT VFR BLD CALC: 27.8 %
HCT VFR BLD CALC: 36.5 %
HCV AB SER DONR QL: NORMAL
HDLC SERPL-MCNC: 36 MG/DL (ref 40–60)
HGB BLD-MCNC: 10.1 G/DL (ref 12–15.9)
HGB BLD-MCNC: 10.6 G/DL (ref 12–15.9)
HGB BLD-MCNC: 10.7 G/DL (ref 12–15.9)
HGB BLD-MCNC: 10.9 G/DL (ref 12–15.9)
HGB BLD-MCNC: 11.1 G/DL (ref 12–15.9)
HGB BLD-MCNC: 11.5 G/DL (ref 12–15.9)
HGB BLD-MCNC: 11.8 G/DL (ref 12–15.9)
HGB BLD-MCNC: 11.9 G/DL (ref 12–15.9)
HGB BLD-MCNC: 12 G/DL (ref 12–15.9)
HGB BLD-MCNC: 12.5 G/DL (ref 12–15.9)
HGB BLD-MCNC: 12.7 G/DL (ref 12–15.9)
HGB BLD-MCNC: 12.7 G/DL (ref 12–15.9)
HGB BLD-MCNC: 7.5 G/DL (ref 12–15.9)
HGB BLD-MCNC: 9.6 G/DL (ref 12–15.9)
HGB BLD-MCNC: 9.8 G/DL (ref 12–15.9)
HGB BLDA-MCNC: 11.9 G/DL (ref 14–18)
HGB BLDA-MCNC: 12.1 G/DL (ref 14–18)
HGB BLDA-MCNC: 9.1 G/DL (ref 14–18)
HGB UR QL STRIP.AUTO: ABNORMAL
HGB UR QL STRIP.AUTO: ABNORMAL
HMPV RNA NPH QL NAA+NON-PROBE: NOT DETECTED
HOLD SPECIMEN: NORMAL
HPIV1 RNA SPEC QL NAA+PROBE: NOT DETECTED
HPIV2 RNA SPEC QL NAA+PROBE: NOT DETECTED
HPIV3 RNA NPH QL NAA+PROBE: NOT DETECTED
HPIV4 P GENE NPH QL NAA+PROBE: NOT DETECTED
HYALINE CASTS UR QL AUTO: ABNORMAL /LPF
IMM GRANULOCYTES # BLD AUTO: 0.02 10*3/MM3 (ref 0–0.05)
IMM GRANULOCYTES # BLD AUTO: 0.03 10*3/MM3 (ref 0–0.05)
IMM GRANULOCYTES # BLD AUTO: 0.05 10*3/MM3 (ref 0–0.05)
IMM GRANULOCYTES # BLD AUTO: 0.06 10*3/MM3 (ref 0–0.05)
IMM GRANULOCYTES # BLD AUTO: 0.06 10*3/MM3 (ref 0–0.05)
IMM GRANULOCYTES # BLD AUTO: 0.08 10*3/MM3 (ref 0–0.05)
IMM GRANULOCYTES # BLD AUTO: 0.12 10*3/MM3 (ref 0–0.05)
IMM GRANULOCYTES # BLD AUTO: 0.2 10*3/MM3 (ref 0–0.05)
IMM GRANULOCYTES NFR BLD AUTO: 0.3 % (ref 0–0.5)
IMM GRANULOCYTES NFR BLD AUTO: 0.5 % (ref 0–0.5)
IMM GRANULOCYTES NFR BLD AUTO: 0.6 % (ref 0–0.5)
IMM GRANULOCYTES NFR BLD AUTO: 0.6 % (ref 0–0.5)
IMM GRANULOCYTES NFR BLD AUTO: 0.7 % (ref 0–0.5)
IMM GRANULOCYTES NFR BLD AUTO: 1.8 % (ref 0–0.5)
INHALED O2 CONCENTRATION: 32 %
INHALED O2 CONCENTRATION: 32 %
INHALED O2 CONCENTRATION: 80 %
INR PPP: 1.18 (ref 0.85–1.16)
IPAP: 0
KETONES UR QL STRIP: ABNORMAL
KETONES UR QL STRIP: NEGATIVE
LAB AP CASE REPORT: NORMAL
LAB AP CASE REPORT: NORMAL
LAB AP CLINICAL INFORMATION: NORMAL
LACTATE HOLD SPECIMEN: NORMAL
LACTATE HOLD SPECIMEN: NORMAL
LDH FLD-CCNC: 138 U/L
LDH SERPL-CCNC: 288 U/L (ref 135–214)
LDLC SERPL CALC-MCNC: 35 MG/DL (ref 0–100)
LDLC/HDLC SERPL: 0.98 {RATIO}
LEFT ATRIUM VOLUME INDEX: 22.1 ML/M^2
LEFT ATRIUM VOLUME: 41 ML
LEUKOCYTE ESTERASE UR QL STRIP.AUTO: ABNORMAL
LEUKOCYTE ESTERASE UR QL STRIP.AUTO: ABNORMAL
LIPASE SERPL-CCNC: 17 U/L (ref 13–60)
LV EF 2D ECHO EST: 55 %
LYMPHOCYTES # BLD AUTO: 0.95 10*3/MM3 (ref 0.7–3.1)
LYMPHOCYTES # BLD AUTO: 1.11 10*3/MM3 (ref 0.7–3.1)
LYMPHOCYTES # BLD AUTO: 1.2 10*3/MM3 (ref 0.7–3.1)
LYMPHOCYTES # BLD AUTO: 1.2 10*3/MM3 (ref 0.7–3.1)
LYMPHOCYTES # BLD AUTO: 1.3 10*3/MM3 (ref 0.7–3.1)
LYMPHOCYTES # BLD AUTO: 1.46 10*3/MM3 (ref 0.7–3.1)
LYMPHOCYTES # BLD AUTO: 1.64 10*3/MM3 (ref 0.7–3.1)
LYMPHOCYTES # BLD AUTO: 1.71 10*3/MM3 (ref 0.7–3.1)
LYMPHOCYTES # BLD MANUAL: 0.8 10*3/MM3 (ref 0.7–3.1)
LYMPHOCYTES # BLD MANUAL: 1.66 10*3/MM3 (ref 0.7–3.1)
LYMPHOCYTES NFR BLD AUTO: 11.3 % (ref 19.6–45.3)
LYMPHOCYTES NFR BLD AUTO: 14.2 % (ref 19.6–45.3)
LYMPHOCYTES NFR BLD AUTO: 14.8 % (ref 19.6–45.3)
LYMPHOCYTES NFR BLD AUTO: 24 % (ref 19.6–45.3)
LYMPHOCYTES NFR BLD AUTO: 26.2 % (ref 19.6–45.3)
LYMPHOCYTES NFR BLD AUTO: 8.3 % (ref 19.6–45.3)
LYMPHOCYTES NFR BLD AUTO: 8.8 % (ref 19.6–45.3)
LYMPHOCYTES NFR BLD AUTO: 9.2 % (ref 19.6–45.3)
LYMPHOCYTES NFR BLD MANUAL: 2 % (ref 5–12)
LYMPHOCYTES NFR BLD MANUAL: 4 % (ref 19.6–45.3)
LYMPHOCYTES NFR BLD MANUAL: 6 % (ref 5–12)
LYMPHOCYTES NFR BLD MANUAL: 8 % (ref 19.6–45.3)
LYMPHOCYTES NFR FLD MANUAL: 21 %
M PNEUMO IGG SER IA-ACNC: NOT DETECTED
MACROPHAGE FLUID: 14 %
MAGNESIUM SERPL-MCNC: 2.1 MG/DL (ref 1.6–2.4)
MAGNESIUM SERPL-MCNC: 2.1 MG/DL (ref 1.6–2.4)
MAGNESIUM SERPL-MCNC: 2.2 MG/DL (ref 1.6–2.4)
MAGNESIUM SERPL-MCNC: 2.2 MG/DL (ref 1.6–2.4)
MAGNESIUM SERPL-MCNC: 2.3 MG/DL (ref 1.6–2.4)
MAXIMAL PREDICTED HEART RATE: 138 BPM
MCH RBC QN AUTO: 28.3 PG (ref 26.6–33)
MCH RBC QN AUTO: 28.9 PG (ref 26.6–33)
MCH RBC QN AUTO: 29 PG (ref 26.6–33)
MCH RBC QN AUTO: 29 PG (ref 26.6–33)
MCH RBC QN AUTO: 29.1 PG (ref 26.6–33)
MCH RBC QN AUTO: 29.3 PG (ref 26.6–33)
MCH RBC QN AUTO: 29.4 PG (ref 26.6–33)
MCH RBC QN AUTO: 29.5 PG (ref 26.6–33)
MCH RBC QN AUTO: 29.6 PG (ref 26.6–33)
MCH RBC QN AUTO: 30 PG (ref 26.6–33)
MCHC RBC AUTO-ENTMCNC: 29.1 G/DL (ref 31.5–35.7)
MCHC RBC AUTO-ENTMCNC: 29.9 G/DL (ref 31.5–35.7)
MCHC RBC AUTO-ENTMCNC: 30.2 G/DL (ref 31.5–35.7)
MCHC RBC AUTO-ENTMCNC: 30.4 G/DL (ref 31.5–35.7)
MCHC RBC AUTO-ENTMCNC: 30.8 G/DL (ref 31.5–35.7)
MCHC RBC AUTO-ENTMCNC: 30.9 G/DL (ref 31.5–35.7)
MCHC RBC AUTO-ENTMCNC: 31 G/DL (ref 31.5–35.7)
MCHC RBC AUTO-ENTMCNC: 31.1 G/DL (ref 31.5–35.7)
MCHC RBC AUTO-ENTMCNC: 31.2 G/DL (ref 31.5–35.7)
MCHC RBC AUTO-ENTMCNC: 31.3 G/DL (ref 31.5–35.7)
MCHC RBC AUTO-ENTMCNC: 31.4 G/DL (ref 31.5–35.7)
MCHC RBC AUTO-ENTMCNC: 31.6 G/DL (ref 31.5–35.7)
MCHC RBC AUTO-ENTMCNC: 31.7 G/DL (ref 31.5–35.7)
MCHC RBC AUTO-ENTMCNC: 31.9 G/DL (ref 31.5–35.7)
MCHC RBC AUTO-ENTMCNC: 32.1 G/DL (ref 31.5–35.7)
MCHC RBC AUTO-ENTMCNC: 32.1 G/DL (ref 31.5–35.7)
MCHC RBC AUTO-ENTMCNC: 32.4 G/DL (ref 31.5–35.7)
MCV RBC AUTO: 103 FL (ref 79–97)
MCV RBC AUTO: 89.1 FL (ref 79–97)
MCV RBC AUTO: 90.7 FL (ref 79–97)
MCV RBC AUTO: 90.8 FL (ref 79–97)
MCV RBC AUTO: 91.4 FL (ref 79–97)
MCV RBC AUTO: 92.6 FL (ref 79–97)
MCV RBC AUTO: 92.8 FL (ref 79–97)
MCV RBC AUTO: 93.4 FL (ref 79–97)
MCV RBC AUTO: 94 FL (ref 79–97)
MCV RBC AUTO: 94.1 FL (ref 79–97)
MCV RBC AUTO: 94.5 FL (ref 79–97)
MCV RBC AUTO: 94.7 FL (ref 79–97)
MCV RBC AUTO: 94.9 FL (ref 79–97)
MCV RBC AUTO: 95.1 FL (ref 79–97)
MCV RBC AUTO: 95.3 FL (ref 79–97)
MCV RBC AUTO: 97.2 FL (ref 79–97)
MCV RBC AUTO: 98 FL (ref 79–97)
MESOTHL CELL NFR FLD MANUAL: 6 %
METHGB BLD QL: 0.2 %
METHGB BLD QL: 0.5 % (ref 0–1.5)
METHGB BLD QL: 0.9 % (ref 0–1.5)
MODALITY: ABNORMAL
MONOCYTES # BLD AUTO: 0.4 10*3/MM3 (ref 0.1–0.9)
MONOCYTES # BLD AUTO: 0.68 10*3/MM3 (ref 0.1–0.9)
MONOCYTES # BLD AUTO: 0.83 10*3/MM3 (ref 0.1–0.9)
MONOCYTES # BLD AUTO: 0.85 10*3/MM3 (ref 0.1–0.9)
MONOCYTES # BLD AUTO: 0.87 10*3/MM3 (ref 0.1–0.9)
MONOCYTES # BLD AUTO: 0.96 10*3/MM3 (ref 0.1–0.9)
MONOCYTES # BLD AUTO: 1.1 10*3/MM3 (ref 0.1–0.9)
MONOCYTES # BLD AUTO: 1.15 10*3/MM3 (ref 0.1–0.9)
MONOCYTES # BLD AUTO: 1.24 10*3/MM3 (ref 0.1–0.9)
MONOCYTES # BLD AUTO: 1.49 10*3/MM3 (ref 0.1–0.9)
MONOCYTES NFR BLD AUTO: 11.3 % (ref 5–12)
MONOCYTES NFR BLD AUTO: 13 % (ref 5–12)
MONOCYTES NFR BLD AUTO: 15.4 % (ref 5–12)
MONOCYTES NFR BLD AUTO: 7.3 % (ref 5–12)
MONOCYTES NFR BLD AUTO: 8.1 % (ref 5–12)
MONOCYTES NFR BLD AUTO: 8.2 % (ref 5–12)
MONOCYTES NFR BLD AUTO: 9 % (ref 5–12)
MONOCYTES NFR BLD AUTO: 9.5 % (ref 5–12)
MONOCYTES NFR FLD: 15 %
MRSA DNA SPEC QL NAA+PROBE: NEGATIVE
NEUTROPHILS # BLD AUTO: 17.82 10*3/MM3 (ref 1.7–7)
NEUTROPHILS # BLD AUTO: 18.79 10*3/MM3 (ref 1.7–7)
NEUTROPHILS # BLD AUTO: 4.46 10*3/MM3 (ref 1.7–7)
NEUTROPHILS NFR BLD AUTO: 11.53 10*3/MM3 (ref 1.7–7)
NEUTROPHILS NFR BLD AUTO: 13.25 10*3/MM3 (ref 1.7–7)
NEUTROPHILS NFR BLD AUTO: 3.27 10*3/MM3 (ref 1.7–7)
NEUTROPHILS NFR BLD AUTO: 5.21 10*3/MM3 (ref 1.7–7)
NEUTROPHILS NFR BLD AUTO: 5.88 10*3/MM3 (ref 1.7–7)
NEUTROPHILS NFR BLD AUTO: 52.3 % (ref 42.7–76)
NEUTROPHILS NFR BLD AUTO: 62.5 % (ref 42.7–76)
NEUTROPHILS NFR BLD AUTO: 69.4 % (ref 42.7–76)
NEUTROPHILS NFR BLD AUTO: 69.5 % (ref 42.7–76)
NEUTROPHILS NFR BLD AUTO: 77.2 % (ref 42.7–76)
NEUTROPHILS NFR BLD AUTO: 8.16 10*3/MM3 (ref 1.7–7)
NEUTROPHILS NFR BLD AUTO: 80.1 % (ref 42.7–76)
NEUTROPHILS NFR BLD AUTO: 81.5 % (ref 42.7–76)
NEUTROPHILS NFR BLD AUTO: 82 % (ref 42.7–76)
NEUTROPHILS NFR BLD AUTO: 9.36 10*3/MM3 (ref 1.7–7)
NEUTROPHILS NFR BLD MANUAL: 80 % (ref 42.7–76)
NEUTROPHILS NFR BLD MANUAL: 87 % (ref 42.7–76)
NEUTROPHILS NFR FLD MANUAL: 44 %
NEUTS BAND NFR BLD MANUAL: 6 % (ref 0–5)
NEUTS BAND NFR BLD MANUAL: 7 % (ref 0–5)
NITRITE UR QL STRIP: POSITIVE
NITRITE UR QL STRIP: POSITIVE
NOTE: ABNORMAL
NRBC BLD AUTO-RTO: 0 /100 WBC (ref 0–0.2)
NRBC BLD AUTO-RTO: 0.1 /100 WBC (ref 0–0.2)
NT-PROBNP SERPL-MCNC: ABNORMAL PG/ML (ref 0–1800)
NT-PROBNP SERPL-MCNC: ABNORMAL PG/ML (ref 0–1800)
OXYHGB MFR BLDV: 25.9 %
OXYHGB MFR BLDV: 97 % (ref 94–99)
OXYHGB MFR BLDV: 97.6 % (ref 94–99)
PATH REPORT.FINAL DX SPEC: NORMAL
PATH REPORT.FINAL DX SPEC: NORMAL
PATH REPORT.GROSS SPEC: NORMAL
PAW @ PEAK INSP FLOW SETTING VENT: 0 CMH2O
PCO2 BLDA: 38 MM HG (ref 35–45)
PCO2 BLDA: 47.9 MM HG (ref 35–45)
PCO2 BLDV: 58.7 MM HG (ref 41–51)
PCO2 TEMP ADJ BLD: 38 MM HG (ref 35–45)
PCO2 TEMP ADJ BLD: 47.9 MM HG (ref 35–45)
PH BLDA: 7.33 PH UNITS (ref 7.35–7.45)
PH BLDA: 7.43 PH UNITS (ref 7.35–7.45)
PH BLDV: 7.3 PH UNITS
PH UR STRIP.AUTO: <=5 [PH] (ref 5–8)
PH UR STRIP.AUTO: <=5 [PH] (ref 5–8)
PH, TEMP CORRECTED: 7.33 PH UNITS
PH, TEMP CORRECTED: 7.43 PH UNITS
PHOSPHATE SERPL-MCNC: 2.5 MG/DL (ref 2.5–4.5)
PLAT MORPH BLD: NORMAL
PLAT MORPH BLD: NORMAL
PLATELET # BLD AUTO: 111 10*3/MM3 (ref 140–450)
PLATELET # BLD AUTO: 120 10*3/MM3 (ref 140–450)
PLATELET # BLD AUTO: 128 10*3/MM3 (ref 140–450)
PLATELET # BLD AUTO: 135 10*3/MM3 (ref 140–450)
PLATELET # BLD AUTO: 141 10*3/MM3 (ref 140–450)
PLATELET # BLD AUTO: 142 10*3/MM3 (ref 140–450)
PLATELET # BLD AUTO: 142 10*3/MM3 (ref 140–450)
PLATELET # BLD AUTO: 154 10*3/MM3 (ref 140–450)
PLATELET # BLD AUTO: 165 10*3/MM3 (ref 140–450)
PLATELET # BLD AUTO: 168 10*3/MM3 (ref 140–450)
PLATELET # BLD AUTO: 170 10*3/MM3 (ref 140–450)
PLATELET # BLD AUTO: 172 10*3/MM3 (ref 140–450)
PLATELET # BLD AUTO: 176 10*3/MM3 (ref 140–450)
PLATELET # BLD AUTO: 187 10*3/MM3 (ref 140–450)
PLATELET # BLD AUTO: 190 10*3/MM3 (ref 140–450)
PLATELET # BLD AUTO: 196 10*3/MM3 (ref 140–450)
PLATELET # BLD AUTO: 228 10*3/MM3 (ref 140–450)
PMV BLD AUTO: 10 FL (ref 6–12)
PMV BLD AUTO: 10 FL (ref 6–12)
PMV BLD AUTO: 10.1 FL (ref 6–12)
PMV BLD AUTO: 10.2 FL (ref 6–12)
PMV BLD AUTO: 10.2 FL (ref 6–12)
PMV BLD AUTO: 10.3 FL (ref 6–12)
PMV BLD AUTO: 10.4 FL (ref 6–12)
PMV BLD AUTO: 10.6 FL (ref 6–12)
PMV BLD AUTO: 10.6 FL (ref 6–12)
PMV BLD AUTO: 10.9 FL (ref 6–12)
PMV BLD AUTO: 10.9 FL (ref 6–12)
PMV BLD AUTO: 11 FL (ref 6–12)
PMV BLD AUTO: 11.1 FL (ref 6–12)
PMV BLD AUTO: 11.7 FL (ref 6–12)
PMV BLD AUTO: 9.4 FL (ref 6–12)
PMV BLD AUTO: 9.7 FL (ref 6–12)
PMV BLD AUTO: 9.7 FL (ref 6–12)
PO2 BLDA: 108 MM HG (ref 83–108)
PO2 BLDA: 189 MM HG (ref 83–108)
PO2 BLDV: 20.4 MM HG (ref 27–53)
PO2 TEMP ADJ BLD: 108 MM HG (ref 83–108)
PO2 TEMP ADJ BLD: 189 MM HG (ref 83–108)
POTASSIUM BLD-SCNC: 4.3 MMOL/L (ref 3.5–5.2)
POTASSIUM SERPL-SCNC: 3.2 MMOL/L (ref 3.5–5.2)
POTASSIUM SERPL-SCNC: 3.2 MMOL/L (ref 3.5–5.2)
POTASSIUM SERPL-SCNC: 3.4 MMOL/L (ref 3.5–5.2)
POTASSIUM SERPL-SCNC: 3.6 MMOL/L (ref 3.5–5.2)
POTASSIUM SERPL-SCNC: 3.7 MMOL/L (ref 3.5–5.2)
POTASSIUM SERPL-SCNC: 3.8 MMOL/L (ref 3.5–5.2)
POTASSIUM SERPL-SCNC: 3.9 MMOL/L (ref 3.5–5.2)
POTASSIUM SERPL-SCNC: 3.9 MMOL/L (ref 3.5–5.2)
POTASSIUM SERPL-SCNC: 4 MMOL/L (ref 3.5–5.2)
POTASSIUM SERPL-SCNC: 4.1 MMOL/L (ref 3.5–5.2)
POTASSIUM SERPL-SCNC: 4.2 MMOL/L (ref 3.5–5.2)
POTASSIUM SERPL-SCNC: 4.2 MMOL/L (ref 3.5–5.2)
POTASSIUM SERPL-SCNC: 4.3 MMOL/L (ref 3.5–5.2)
POTASSIUM SERPL-SCNC: 4.3 MMOL/L (ref 3.5–5.2)
POTASSIUM SERPL-SCNC: 4.4 MMOL/L (ref 3.5–5.2)
POTASSIUM SERPL-SCNC: 4.6 MMOL/L (ref 3.5–5.2)
PREALB SERPL-MCNC: 4.6 MG/DL (ref 20–40)
PROCALCITONIN SERPL-MCNC: 2.17 NG/ML (ref 0–0.25)
PROCALCITONIN SERPL-MCNC: 2.43 NG/ML (ref 0–0.25)
PROT FLD-MCNC: 2.7 G/DL
PROT SERPL-MCNC: 5.4 G/DL (ref 6–8.5)
PROT SERPL-MCNC: 5.9 G/DL (ref 6–8.5)
PROT SERPL-MCNC: 6.2 G/DL (ref 6–8.5)
PROT SERPL-MCNC: 6.3 G/DL (ref 6–8.5)
PROT SERPL-MCNC: 6.7 G/DL (ref 6–8.5)
PROT SERPL-MCNC: 6.9 G/DL (ref 6–8.5)
PROT SERPL-MCNC: 7 G/DL (ref 6–8.5)
PROT SERPL-MCNC: 7.3 G/DL (ref 6–8.5)
PROT UR QL STRIP: ABNORMAL
PROT UR QL STRIP: ABNORMAL
PROTHROMBIN TIME: 14.7 SECONDS (ref 11.5–14)
RBC # BLD AUTO: 2.58 10*6/MM3 (ref 3.77–5.28)
RBC # BLD AUTO: 3.27 10*6/MM3 (ref 3.77–5.28)
RBC # BLD AUTO: 3.34 10*6/MM3 (ref 3.77–5.28)
RBC # BLD AUTO: 3.45 10*6/MM3 (ref 3.77–5.28)
RBC # BLD AUTO: 3.61 10*6/MM3 (ref 3.77–5.28)
RBC # BLD AUTO: 3.61 10*6/MM3 (ref 3.77–5.28)
RBC # BLD AUTO: 3.75 10*6/MM3 (ref 3.77–5.28)
RBC # BLD AUTO: 3.77 10*6/MM3 (ref 3.77–5.28)
RBC # BLD AUTO: 3.91 10*6/MM3 (ref 3.77–5.28)
RBC # BLD AUTO: 3.97 10*6/MM3 (ref 3.77–5.28)
RBC # BLD AUTO: 4.07 10*6/MM3 (ref 3.77–5.28)
RBC # BLD AUTO: 4.1 10*6/MM3 (ref 3.77–5.28)
RBC # BLD AUTO: 4.1 10*6/MM3 (ref 3.77–5.28)
RBC # BLD AUTO: 4.21 10*6/MM3 (ref 3.77–5.28)
RBC # BLD AUTO: 4.23 10*6/MM3 (ref 3.77–5.28)
RBC # BLD AUTO: 4.29 10*6/MM3 (ref 3.77–5.28)
RBC # BLD AUTO: 4.3 10*6/MM3 (ref 3.77–5.28)
RBC # FLD AUTO: ABNORMAL /MM3
RBC # UR: ABNORMAL /HPF
RBC # UR: ABNORMAL /HPF
RBC MORPH BLD: NORMAL
RBC MORPH BLD: NORMAL
REF LAB TEST METHOD: ABNORMAL
REF LAB TEST METHOD: ABNORMAL
REF LAB TEST METHOD: NORMAL
REF LAB TEST METHOD: NORMAL
RH BLD: POSITIVE
RHINOVIRUS RNA SPEC NAA+PROBE: NOT DETECTED
RSV RNA NPH QL NAA+NON-PROBE: NOT DETECTED
SARS-COV-2 RDRP RESP QL NAA+PROBE: NOT DETECTED
SARS-COV-2 RNA NPH QL NAA+NON-PROBE: NOT DETECTED
SARS-COV-2 RNA RESP QL NAA+PROBE: NOT DETECTED
SARS-COV-2 RNA RESP QL NAA+PROBE: NOT DETECTED
SODIUM BLD-SCNC: 139 MMOL/L (ref 136–145)
SODIUM SERPL-SCNC: 130 MMOL/L (ref 136–145)
SODIUM SERPL-SCNC: 130 MMOL/L (ref 136–145)
SODIUM SERPL-SCNC: 131 MMOL/L (ref 136–145)
SODIUM SERPL-SCNC: 132 MMOL/L (ref 136–145)
SODIUM SERPL-SCNC: 133 MMOL/L (ref 136–145)
SODIUM SERPL-SCNC: 134 MMOL/L (ref 136–145)
SODIUM SERPL-SCNC: 135 MMOL/L (ref 136–145)
SODIUM SERPL-SCNC: 137 MMOL/L (ref 136–145)
SP GR UR STRIP: 1.02 (ref 1–1.03)
SP GR UR STRIP: >=1.03 (ref 1–1.03)
SQUAMOUS #/AREA URNS HPF: ABNORMAL /HPF
SQUAMOUS #/AREA URNS HPF: ABNORMAL /HPF
STRESS TARGET HR: 117 BPM
T&S EXPIRATION DATE: NORMAL
TOTAL RATE: 0 BREATHS/MINUTE
TRIGL SERPL-MCNC: 64 MG/DL (ref 0–150)
TROPONIN T SERPL-MCNC: 0.03 NG/ML (ref 0–0.03)
TROPONIN T SERPL-MCNC: 0.04 NG/ML (ref 0–0.03)
TROPONIN T SERPL-MCNC: 0.04 NG/ML (ref 0–0.03)
TROPONIN T SERPL-MCNC: 0.05 NG/ML (ref 0–0.03)
TROPONIN T SERPL-MCNC: 0.23 NG/ML (ref 0–0.03)
TSH SERPL DL<=0.05 MIU/L-ACNC: 4.11 UIU/ML (ref 0.27–4.2)
UROBILINOGEN UR QL STRIP: ABNORMAL
UROBILINOGEN UR QL STRIP: ABNORMAL
VANCOMYCIN SERPL-MCNC: 15.6 MCG/ML (ref 5–40)
VENTILATOR MODE: ABNORMAL
VENTILATOR MODE: ABNORMAL
VIT B12 BLD-MCNC: 1418 PG/ML (ref 211–946)
VLDLC SERPL-MCNC: 12.8 MG/DL (ref 5–40)
WBC # BLD AUTO: 10.43 10*3/MM3 (ref 3.4–10.8)
WBC # BLD AUTO: 10.58 10*3/MM3 (ref 3.4–10.8)
WBC # BLD AUTO: 11.4 10*3/MM3 (ref 3.4–10.8)
WBC # BLD AUTO: 14.15 10*3/MM3 (ref 3.4–10.8)
WBC # BLD AUTO: 16.56 10*3/MM3 (ref 3.4–10.8)
WBC # BLD AUTO: 19.99 10*3/MM3 (ref 3.4–10.8)
WBC # BLD AUTO: 20.72 10*3/MM3 (ref 3.4–10.8)
WBC # BLD AUTO: 4.61 10*3/MM3 (ref 3.4–10.8)
WBC # BLD AUTO: 5.33 10*3/MM3 (ref 3.4–10.8)
WBC # BLD AUTO: 6.25 10*3/MM3 (ref 3.4–10.8)
WBC # BLD AUTO: 7.5 10*3/MM3 (ref 3.4–10.8)
WBC # BLD AUTO: 8.01 10*3/MM3 (ref 3.4–10.8)
WBC # BLD AUTO: 8.08 10*3/MM3 (ref 3.4–10.8)
WBC # BLD AUTO: 8.16 10*3/MM3 (ref 3.4–10.8)
WBC # BLD AUTO: 8.47 10*3/MM3 (ref 3.4–10.8)
WBC # BLD AUTO: 8.84 10*3/MM3 (ref 3.4–10.8)
WBC # FLD AUTO: 938 /MM3
WBC MORPH BLD: NORMAL
WBC MORPH BLD: NORMAL
WBC NRBC COR # BLD: 7.13 10*3/MM3 (ref 3.4–10.8)
WBC UR QL AUTO: ABNORMAL /HPF
WBC UR QL AUTO: ABNORMAL /HPF
WHOLE BLOOD HOLD SPECIMEN: NORMAL

## 2020-01-01 PROCEDURE — 74176 CT ABD & PELVIS W/O CONTRAST: CPT

## 2020-01-01 PROCEDURE — 5A1D70Z PERFORMANCE OF URINARY FILTRATION, INTERMITTENT, LESS THAN 6 HOURS PER DAY: ICD-10-PCS | Performed by: INTERNAL MEDICINE

## 2020-01-01 PROCEDURE — 71250 CT THORAX DX C-: CPT

## 2020-01-01 PROCEDURE — 84157 ASSAY OF PROTEIN OTHER: CPT | Performed by: NURSE PRACTITIONER

## 2020-01-01 PROCEDURE — 71045 X-RAY EXAM CHEST 1 VIEW: CPT

## 2020-01-01 PROCEDURE — 86850 RBC ANTIBODY SCREEN: CPT | Performed by: INTERNAL MEDICINE

## 2020-01-01 PROCEDURE — 99291 CRITICAL CARE FIRST HOUR: CPT | Performed by: INTERNAL MEDICINE

## 2020-01-01 PROCEDURE — 25010000002 PIPERACILLIN SOD-TAZOBACTAM PER 1 G

## 2020-01-01 PROCEDURE — 83735 ASSAY OF MAGNESIUM: CPT | Performed by: INTERNAL MEDICINE

## 2020-01-01 PROCEDURE — 97530 THERAPEUTIC ACTIVITIES: CPT

## 2020-01-01 PROCEDURE — 82607 VITAMIN B-12: CPT | Performed by: NURSE PRACTITIONER

## 2020-01-01 PROCEDURE — 85027 COMPLETE CBC AUTOMATED: CPT | Performed by: INTERNAL MEDICINE

## 2020-01-01 PROCEDURE — 85025 COMPLETE CBC W/AUTO DIFF WBC: CPT | Performed by: INTERNAL MEDICINE

## 2020-01-01 PROCEDURE — 25010000002 PIPERACILLIN SOD-TAZOBACTAM PER 1 G: Performed by: INTERNAL MEDICINE

## 2020-01-01 PROCEDURE — P9047 ALBUMIN (HUMAN), 25%, 50ML: HCPCS | Performed by: INTERNAL MEDICINE

## 2020-01-01 PROCEDURE — 25010000002 ALBUMIN HUMAN 25% PER 50 ML: Performed by: INTERNAL MEDICINE

## 2020-01-01 PROCEDURE — 82805 BLOOD GASES W/O2 SATURATION: CPT

## 2020-01-01 PROCEDURE — 36556 INSERT NON-TUNNEL CV CATH: CPT | Performed by: NURSE PRACTITIONER

## 2020-01-01 PROCEDURE — 25010000002 PIPERACILLIN SOD-TAZOBACTAM PER 1 G: Performed by: NURSE PRACTITIONER

## 2020-01-01 PROCEDURE — 0DB68ZX EXCISION OF STOMACH, VIA NATURAL OR ARTIFICIAL OPENING ENDOSCOPIC, DIAGNOSTIC: ICD-10-PCS | Performed by: INTERNAL MEDICINE

## 2020-01-01 PROCEDURE — 97116 GAIT TRAINING THERAPY: CPT

## 2020-01-01 PROCEDURE — 94660 CPAP INITIATION&MGMT: CPT

## 2020-01-01 PROCEDURE — 25010000002 HYDROCORTISONE SODIUM SUCCINATE 100 MG RECONSTITUTED SOLUTION: Performed by: INTERNAL MEDICINE

## 2020-01-01 PROCEDURE — 93306 TTE W/DOPPLER COMPLETE: CPT | Performed by: INTERNAL MEDICINE

## 2020-01-01 PROCEDURE — 83605 ASSAY OF LACTIC ACID: CPT | Performed by: INTERNAL MEDICINE

## 2020-01-01 PROCEDURE — 86900 BLOOD TYPING SEROLOGIC ABO: CPT | Performed by: INTERNAL MEDICINE

## 2020-01-01 PROCEDURE — 99214 OFFICE O/P EST MOD 30 MIN: CPT | Performed by: INTERNAL MEDICINE

## 2020-01-01 PROCEDURE — 85025 COMPLETE CBC W/AUTO DIFF WBC: CPT | Performed by: NURSE PRACTITIONER

## 2020-01-01 PROCEDURE — 93294 REM INTERROG EVL PM/LDLS PM: CPT | Performed by: INTERNAL MEDICINE

## 2020-01-01 PROCEDURE — 25010000002 MORPHINE PER 10 MG: Performed by: INTERNAL MEDICINE

## 2020-01-01 PROCEDURE — 99441 PR PHYS/QHP TELEPHONE EVALUATION 5-10 MIN: CPT | Performed by: INTERNAL MEDICINE

## 2020-01-01 PROCEDURE — 80048 BASIC METABOLIC PNL TOTAL CA: CPT | Performed by: INTERNAL MEDICINE

## 2020-01-01 PROCEDURE — 94799 UNLISTED PULMONARY SVC/PX: CPT

## 2020-01-01 PROCEDURE — 99233 SBSQ HOSP IP/OBS HIGH 50: CPT | Performed by: INTERNAL MEDICINE

## 2020-01-01 PROCEDURE — 83036 HEMOGLOBIN GLYCOSYLATED A1C: CPT | Performed by: NURSE PRACTITIONER

## 2020-01-01 PROCEDURE — 25010000002 VANCOMYCIN 10 G RECONSTITUTED SOLUTION: Performed by: EMERGENCY MEDICINE

## 2020-01-01 PROCEDURE — 80053 COMPREHEN METABOLIC PANEL: CPT | Performed by: INTERNAL MEDICINE

## 2020-01-01 PROCEDURE — 36600 WITHDRAWAL OF ARTERIAL BLOOD: CPT

## 2020-01-01 PROCEDURE — 93010 ELECTROCARDIOGRAM REPORT: CPT | Performed by: INTERNAL MEDICINE

## 2020-01-01 PROCEDURE — 25010000002 PROMETHAZINE PER 50 MG: Performed by: INTERNAL MEDICINE

## 2020-01-01 PROCEDURE — 82962 GLUCOSE BLOOD TEST: CPT

## 2020-01-01 PROCEDURE — 87635 SARS-COV-2 COVID-19 AMP PRB: CPT | Performed by: EMERGENCY MEDICINE

## 2020-01-01 PROCEDURE — 85610 PROTHROMBIN TIME: CPT | Performed by: NURSE PRACTITIONER

## 2020-01-01 PROCEDURE — 93005 ELECTROCARDIOGRAM TRACING: CPT | Performed by: EMERGENCY MEDICINE

## 2020-01-01 PROCEDURE — 83735 ASSAY OF MAGNESIUM: CPT | Performed by: EMERGENCY MEDICINE

## 2020-01-01 PROCEDURE — 99232 SBSQ HOSP IP/OBS MODERATE 35: CPT | Performed by: INTERNAL MEDICINE

## 2020-01-01 PROCEDURE — 43248 EGD GUIDE WIRE INSERTION: CPT | Performed by: INTERNAL MEDICINE

## 2020-01-01 PROCEDURE — 82553 CREATINE MB FRACTION: CPT | Performed by: NURSE PRACTITIONER

## 2020-01-01 PROCEDURE — G0179 MD RECERTIFICATION HHA PT: HCPCS | Performed by: NURSE PRACTITIONER

## 2020-01-01 PROCEDURE — C1729 CATH, DRAINAGE: HCPCS

## 2020-01-01 PROCEDURE — 84484 ASSAY OF TROPONIN QUANT: CPT | Performed by: EMERGENCY MEDICINE

## 2020-01-01 PROCEDURE — 25010000002 ALBUMIN HUMAN 25% PER 50 ML: Performed by: NURSE PRACTITIONER

## 2020-01-01 PROCEDURE — 94640 AIRWAY INHALATION TREATMENT: CPT

## 2020-01-01 PROCEDURE — 85730 THROMBOPLASTIN TIME PARTIAL: CPT | Performed by: NURSE PRACTITIONER

## 2020-01-01 PROCEDURE — 83605 ASSAY OF LACTIC ACID: CPT | Performed by: EMERGENCY MEDICINE

## 2020-01-01 PROCEDURE — 97164 PT RE-EVAL EST PLAN CARE: CPT

## 2020-01-01 PROCEDURE — 84484 ASSAY OF TROPONIN QUANT: CPT | Performed by: NURSE PRACTITIONER

## 2020-01-01 PROCEDURE — 0D758ZZ DILATION OF ESOPHAGUS, VIA NATURAL OR ARTIFICIAL OPENING ENDOSCOPIC: ICD-10-PCS | Performed by: INTERNAL MEDICINE

## 2020-01-01 PROCEDURE — 63710000001 ONDANSETRON PER 8 MG: Performed by: NURSE PRACTITIONER

## 2020-01-01 PROCEDURE — 93005 ELECTROCARDIOGRAM TRACING: CPT | Performed by: NURSE PRACTITIONER

## 2020-01-01 PROCEDURE — 94060 EVALUATION OF WHEEZING: CPT | Performed by: NURSE PRACTITIONER

## 2020-01-01 PROCEDURE — 87040 BLOOD CULTURE FOR BACTERIA: CPT | Performed by: EMERGENCY MEDICINE

## 2020-01-01 PROCEDURE — 25010000002 VANCOMYCIN 10 G RECONSTITUTED SOLUTION

## 2020-01-01 PROCEDURE — 99223 1ST HOSP IP/OBS HIGH 75: CPT | Performed by: FAMILY MEDICINE

## 2020-01-01 PROCEDURE — 25010000002 ONDANSETRON PER 1 MG: Performed by: NURSE PRACTITIONER

## 2020-01-01 PROCEDURE — 83615 LACTATE (LD) (LDH) ENZYME: CPT | Performed by: HOSPITALIST

## 2020-01-01 PROCEDURE — 83615 LACTATE (LD) (LDH) ENZYME: CPT | Performed by: NURSE PRACTITIONER

## 2020-01-01 PROCEDURE — 86901 BLOOD TYPING SEROLOGIC RH(D): CPT | Performed by: INTERNAL MEDICINE

## 2020-01-01 PROCEDURE — 82820 HEMOGLOBIN-OXYGEN AFFINITY: CPT

## 2020-01-01 PROCEDURE — 88305 TISSUE EXAM BY PATHOLOGIST: CPT | Performed by: NURSE PRACTITIONER

## 2020-01-01 PROCEDURE — 0DB48ZX EXCISION OF ESOPHAGOGASTRIC JUNCTION, VIA NATURAL OR ARTIFICIAL OPENING ENDOSCOPIC, DIAGNOSTIC: ICD-10-PCS | Performed by: INTERNAL MEDICINE

## 2020-01-01 PROCEDURE — 85025 COMPLETE CBC W/AUTO DIFF WBC: CPT | Performed by: EMERGENCY MEDICINE

## 2020-01-01 PROCEDURE — P9047 ALBUMIN (HUMAN), 25%, 50ML: HCPCS | Performed by: NURSE PRACTITIONER

## 2020-01-01 PROCEDURE — 93005 ELECTROCARDIOGRAM TRACING: CPT

## 2020-01-01 PROCEDURE — 87088 URINE BACTERIA CULTURE: CPT | Performed by: INTERNAL MEDICINE

## 2020-01-01 PROCEDURE — 02HV33Z INSERTION OF INFUSION DEVICE INTO SUPERIOR VENA CAVA, PERCUTANEOUS APPROACH: ICD-10-PCS | Performed by: INTERNAL MEDICINE

## 2020-01-01 PROCEDURE — 81001 URINALYSIS AUTO W/SCOPE: CPT | Performed by: INTERNAL MEDICINE

## 2020-01-01 PROCEDURE — 87641 MR-STAPH DNA AMP PROBE: CPT | Performed by: INTERNAL MEDICINE

## 2020-01-01 PROCEDURE — 99152 MOD SED SAME PHYS/QHP 5/>YRS: CPT

## 2020-01-01 PROCEDURE — 80053 COMPREHEN METABOLIC PANEL: CPT | Performed by: NURSE PRACTITIONER

## 2020-01-01 PROCEDURE — 82533 TOTAL CORTISOL: CPT | Performed by: INTERNAL MEDICINE

## 2020-01-01 PROCEDURE — 75989 ABSCESS DRAINAGE UNDER X-RAY: CPT

## 2020-01-01 PROCEDURE — 82550 ASSAY OF CK (CPK): CPT | Performed by: NURSE PRACTITIONER

## 2020-01-01 PROCEDURE — 85007 BL SMEAR W/DIFF WBC COUNT: CPT | Performed by: NURSE PRACTITIONER

## 2020-01-01 PROCEDURE — 25010000002 MIDAZOLAM PER 1 MG: Performed by: RADIOLOGY

## 2020-01-01 PROCEDURE — 99443 PR PHYS/QHP TELEPHONE EVALUATION 21-30 MIN: CPT | Performed by: NURSE PRACTITIONER

## 2020-01-01 PROCEDURE — 25010000002 LORAZEPAM PER 2 MG: Performed by: INTERNAL MEDICINE

## 2020-01-01 PROCEDURE — 80048 BASIC METABOLIC PNL TOTAL CA: CPT | Performed by: NURSE PRACTITIONER

## 2020-01-01 PROCEDURE — 80048 BASIC METABOLIC PNL TOTAL CA: CPT | Performed by: HOSPITALIST

## 2020-01-01 PROCEDURE — 25010000002 FENTANYL CITRATE (PF) 100 MCG/2ML SOLUTION: Performed by: RADIOLOGY

## 2020-01-01 PROCEDURE — 83690 ASSAY OF LIPASE: CPT | Performed by: EMERGENCY MEDICINE

## 2020-01-01 PROCEDURE — 80202 ASSAY OF VANCOMYCIN: CPT

## 2020-01-01 PROCEDURE — 97162 PT EVAL MOD COMPLEX 30 MIN: CPT

## 2020-01-01 PROCEDURE — 84134 ASSAY OF PREALBUMIN: CPT | Performed by: INTERNAL MEDICINE

## 2020-01-01 PROCEDURE — 84155 ASSAY OF PROTEIN SERUM: CPT | Performed by: HOSPITALIST

## 2020-01-01 PROCEDURE — 83735 ASSAY OF MAGNESIUM: CPT | Performed by: NURSE PRACTITIONER

## 2020-01-01 PROCEDURE — 0W9B3ZZ DRAINAGE OF LEFT PLEURAL CAVITY, PERCUTANEOUS APPROACH: ICD-10-PCS | Performed by: INTERNAL MEDICINE

## 2020-01-01 PROCEDURE — 99232 SBSQ HOSP IP/OBS MODERATE 35: CPT | Performed by: NURSE PRACTITIONER

## 2020-01-01 PROCEDURE — 85027 COMPLETE CBC AUTOMATED: CPT | Performed by: NURSE PRACTITIONER

## 2020-01-01 PROCEDURE — 80074 ACUTE HEPATITIS PANEL: CPT | Performed by: INTERNAL MEDICINE

## 2020-01-01 PROCEDURE — 84132 ASSAY OF SERUM POTASSIUM: CPT | Performed by: INTERNAL MEDICINE

## 2020-01-01 PROCEDURE — 99239 HOSP IP/OBS DSCHRG MGMT >30: CPT | Performed by: NURSE PRACTITIONER

## 2020-01-01 PROCEDURE — 87040 BLOOD CULTURE FOR BACTERIA: CPT | Performed by: NURSE PRACTITIONER

## 2020-01-01 PROCEDURE — 80053 COMPREHEN METABOLIC PANEL: CPT | Performed by: EMERGENCY MEDICINE

## 2020-01-01 PROCEDURE — U0002 COVID-19 LAB TEST NON-CDC: HCPCS | Performed by: NURSE PRACTITIONER

## 2020-01-01 PROCEDURE — 84145 PROCALCITONIN (PCT): CPT | Performed by: INTERNAL MEDICINE

## 2020-01-01 PROCEDURE — 87086 URINE CULTURE/COLONY COUNT: CPT | Performed by: EMERGENCY MEDICINE

## 2020-01-01 PROCEDURE — 97168 OT RE-EVAL EST PLAN CARE: CPT

## 2020-01-01 PROCEDURE — 43239 EGD BIOPSY SINGLE/MULTIPLE: CPT | Performed by: INTERNAL MEDICINE

## 2020-01-01 PROCEDURE — 97535 SELF CARE MNGMENT TRAINING: CPT

## 2020-01-01 PROCEDURE — 25010000002 ONDANSETRON PER 1 MG: Performed by: INTERNAL MEDICINE

## 2020-01-01 PROCEDURE — 83880 ASSAY OF NATRIURETIC PEPTIDE: CPT | Performed by: EMERGENCY MEDICINE

## 2020-01-01 PROCEDURE — 97110 THERAPEUTIC EXERCISES: CPT

## 2020-01-01 PROCEDURE — 85007 BL SMEAR W/DIFF WBC COUNT: CPT | Performed by: INTERNAL MEDICINE

## 2020-01-01 PROCEDURE — 25010000002 PROPOFOL 10 MG/ML EMULSION: Performed by: NURSE ANESTHETIST, CERTIFIED REGISTERED

## 2020-01-01 PROCEDURE — 89051 BODY FLUID CELL COUNT: CPT | Performed by: NURSE PRACTITIONER

## 2020-01-01 PROCEDURE — 99214 OFFICE O/P EST MOD 30 MIN: CPT | Performed by: NURSE PRACTITIONER

## 2020-01-01 PROCEDURE — 88342 IMHCHEM/IMCYTCHM 1ST ANTB: CPT | Performed by: NURSE PRACTITIONER

## 2020-01-01 PROCEDURE — 99222 1ST HOSP IP/OBS MODERATE 55: CPT | Performed by: INTERNAL MEDICINE

## 2020-01-01 PROCEDURE — 25010000002 PIPERACILLIN SOD-TAZOBACTAM PER 1 G: Performed by: EMERGENCY MEDICINE

## 2020-01-01 PROCEDURE — 82746 ASSAY OF FOLIC ACID SERUM: CPT | Performed by: NURSE PRACTITIONER

## 2020-01-01 PROCEDURE — 0202U NFCT DS 22 TRGT SARS-COV-2: CPT | Performed by: NURSE PRACTITIONER

## 2020-01-01 PROCEDURE — 88112 CYTOPATH CELL ENHANCE TECH: CPT | Performed by: NURSE PRACTITIONER

## 2020-01-01 PROCEDURE — 85027 COMPLETE CBC AUTOMATED: CPT | Performed by: HOSPITALIST

## 2020-01-01 PROCEDURE — 84443 ASSAY THYROID STIM HORMONE: CPT | Performed by: NURSE PRACTITIONER

## 2020-01-01 PROCEDURE — 93296 REM INTERROG EVL PM/IDS: CPT | Performed by: INTERNAL MEDICINE

## 2020-01-01 PROCEDURE — 99238 HOSP IP/OBS DSCHRG MGMT 30/<: CPT | Performed by: NURSE PRACTITIONER

## 2020-01-01 PROCEDURE — 82306 VITAMIN D 25 HYDROXY: CPT | Performed by: NURSE PRACTITIONER

## 2020-01-01 PROCEDURE — 92610 EVALUATE SWALLOWING FUNCTION: CPT

## 2020-01-01 PROCEDURE — U0002 COVID-19 LAB TEST NON-CDC: HCPCS | Performed by: INTERNAL MEDICINE

## 2020-01-01 PROCEDURE — 87086 URINE CULTURE/COLONY COUNT: CPT | Performed by: INTERNAL MEDICINE

## 2020-01-01 PROCEDURE — P9612 CATHETERIZE FOR URINE SPEC: HCPCS

## 2020-01-01 PROCEDURE — 93306 TTE W/DOPPLER COMPLETE: CPT

## 2020-01-01 PROCEDURE — 99233 SBSQ HOSP IP/OBS HIGH 50: CPT | Performed by: HOSPITALIST

## 2020-01-01 PROCEDURE — 94729 DIFFUSING CAPACITY: CPT | Performed by: NURSE PRACTITIONER

## 2020-01-01 PROCEDURE — 88341 IMHCHEM/IMCYTCHM EA ADD ANTB: CPT | Performed by: NURSE PRACTITIONER

## 2020-01-01 PROCEDURE — 94726 PLETHYSMOGRAPHY LUNG VOLUMES: CPT | Performed by: NURSE PRACTITIONER

## 2020-01-01 PROCEDURE — 25010000002 AMIODARONE IN DEXTROSE 5% 150-4.21 MG/100ML-% SOLUTION: Performed by: INTERNAL MEDICINE

## 2020-01-01 PROCEDURE — 25010000003 POTASSIUM CHLORIDE PER 2 MEQ: Performed by: INTERNAL MEDICINE

## 2020-01-01 PROCEDURE — 99285 EMERGENCY DEPT VISIT HI MDM: CPT

## 2020-01-01 PROCEDURE — C1769 GUIDE WIRE: HCPCS | Performed by: INTERNAL MEDICINE

## 2020-01-01 PROCEDURE — 87186 SC STD MICRODIL/AGAR DIL: CPT | Performed by: INTERNAL MEDICINE

## 2020-01-01 PROCEDURE — 80069 RENAL FUNCTION PANEL: CPT | Performed by: INTERNAL MEDICINE

## 2020-01-01 PROCEDURE — 88305 TISSUE EXAM BY PATHOLOGIST: CPT | Performed by: INTERNAL MEDICINE

## 2020-01-01 PROCEDURE — 99231 SBSQ HOSP IP/OBS SF/LOW 25: CPT | Performed by: PHYSICIAN ASSISTANT

## 2020-01-01 PROCEDURE — 83880 ASSAY OF NATRIURETIC PEPTIDE: CPT | Performed by: INTERNAL MEDICINE

## 2020-01-01 PROCEDURE — 99284 EMERGENCY DEPT VISIT MOD MDM: CPT

## 2020-01-01 PROCEDURE — 80061 LIPID PANEL: CPT | Performed by: NURSE PRACTITIONER

## 2020-01-01 PROCEDURE — C1751 CATH, INF, PER/CENT/MIDLINE: HCPCS

## 2020-01-01 PROCEDURE — 83036 HEMOGLOBIN GLYCOSYLATED A1C: CPT | Performed by: HOSPITALIST

## 2020-01-01 PROCEDURE — 97166 OT EVAL MOD COMPLEX 45 MIN: CPT

## 2020-01-01 PROCEDURE — 80162 ASSAY OF DIGOXIN TOTAL: CPT | Performed by: INTERNAL MEDICINE

## 2020-01-01 PROCEDURE — 81001 URINALYSIS AUTO W/SCOPE: CPT | Performed by: EMERGENCY MEDICINE

## 2020-01-01 RX ORDER — FLUTICASONE PROPIONATE 50 MCG
2 SPRAY, SUSPENSION (ML) NASAL
Qty: 18.2 ML | Refills: 11 | Status: SHIPPED | OUTPATIENT
Start: 2020-01-01

## 2020-01-01 RX ORDER — ATORVASTATIN CALCIUM 40 MG/1
40 TABLET, FILM COATED ORAL DAILY
Status: DISCONTINUED | OUTPATIENT
Start: 2020-01-01 | End: 2020-01-01 | Stop reason: HOSPADM

## 2020-01-01 RX ORDER — LIDOCAINE AND PRILOCAINE 25; 25 MG/G; MG/G
1 CREAM TOPICAL AS NEEDED
COMMUNITY
Start: 2020-01-01 | End: 2020-01-01 | Stop reason: HOSPADM

## 2020-01-01 RX ORDER — ACETAMINOPHEN 325 MG/1
650 TABLET ORAL EVERY 4 HOURS PRN
Start: 2020-01-01

## 2020-01-01 RX ORDER — ALBUMIN (HUMAN) 12.5 G/50ML
12.5 SOLUTION INTRAVENOUS AS NEEDED
Status: ACTIVE | OUTPATIENT
Start: 2020-01-01 | End: 2020-01-01

## 2020-01-01 RX ORDER — OXYCODONE HYDROCHLORIDE AND ACETAMINOPHEN 5; 325 MG/1; MG/1
1 TABLET ORAL EVERY 8 HOURS PRN
Qty: 9 TABLET | Refills: 0 | Status: SHIPPED | OUTPATIENT
Start: 2020-01-01

## 2020-01-01 RX ORDER — BISACODYL 5 MG/1
5 TABLET, DELAYED RELEASE ORAL DAILY PRN
Start: 2020-01-01

## 2020-01-01 RX ORDER — MIDODRINE HYDROCHLORIDE 10 MG/1
10 TABLET ORAL
Start: 2020-01-01

## 2020-01-01 RX ORDER — OXYCODONE HYDROCHLORIDE AND ACETAMINOPHEN 5; 325 MG/1; MG/1
1 TABLET ORAL EVERY 8 HOURS PRN
Status: DISCONTINUED | OUTPATIENT
Start: 2020-01-01 | End: 2020-01-01 | Stop reason: HOSPADM

## 2020-01-01 RX ORDER — MORPHINE SULFATE 4 MG/ML
4 INJECTION, SOLUTION INTRAMUSCULAR; INTRAVENOUS
Status: DISCONTINUED | OUTPATIENT
Start: 2020-01-01 | End: 2020-08-18 | Stop reason: HOSPADM

## 2020-01-01 RX ORDER — PROMETHAZINE HYDROCHLORIDE 25 MG/ML
12.5 INJECTION, SOLUTION INTRAMUSCULAR; INTRAVENOUS ONCE
Status: COMPLETED | OUTPATIENT
Start: 2020-01-01 | End: 2020-01-01

## 2020-01-01 RX ORDER — NOREPINEPHRINE BIT/0.9 % NACL 8 MG/250ML
.02-.3 INFUSION BOTTLE (ML) INTRAVENOUS
Status: DISCONTINUED | OUTPATIENT
Start: 2020-01-01 | End: 2020-01-01

## 2020-01-01 RX ORDER — PANTOPRAZOLE SODIUM 40 MG/1
40 TABLET, DELAYED RELEASE ORAL
Status: DISCONTINUED | OUTPATIENT
Start: 2020-01-01 | End: 2020-01-01 | Stop reason: HOSPADM

## 2020-01-01 RX ORDER — SODIUM CHLORIDE 0.9 % (FLUSH) 0.9 %
10 SYRINGE (ML) INJECTION AS NEEDED
Status: DISCONTINUED | OUTPATIENT
Start: 2020-01-01 | End: 2020-08-18 | Stop reason: HOSPADM

## 2020-01-01 RX ORDER — ALBUMIN (HUMAN) 12.5 G/50ML
50 SOLUTION INTRAVENOUS AS NEEDED
Status: COMPLETED | OUTPATIENT
Start: 2020-01-01 | End: 2020-01-01

## 2020-01-01 RX ORDER — SODIUM CHLORIDE 0.9 % (FLUSH) 0.9 %
10 SYRINGE (ML) INJECTION AS NEEDED
Status: DISCONTINUED | OUTPATIENT
Start: 2020-01-01 | End: 2020-01-01 | Stop reason: HOSPADM

## 2020-01-01 RX ORDER — DEXTROSE MONOHYDRATE 25 G/50ML
50 INJECTION, SOLUTION INTRAVENOUS ONCE
Status: COMPLETED | OUTPATIENT
Start: 2020-01-01 | End: 2020-01-01

## 2020-01-01 RX ORDER — LEVOCETIRIZINE DIHYDROCHLORIDE 5 MG/1
2.5 TABLET, FILM COATED ORAL EVERY EVENING
Qty: 30 TABLET | Refills: 11 | Status: SHIPPED | OUTPATIENT
Start: 2020-01-01

## 2020-01-01 RX ORDER — LANOLIN ALCOHOL/MO/W.PET/CERES
800 CREAM (GRAM) TOPICAL DAILY
COMMUNITY

## 2020-01-01 RX ORDER — ERYTHROMYCIN 5 MG/G
1 OINTMENT OPHTHALMIC 2 TIMES DAILY
COMMUNITY
Start: 2020-01-01 | End: 2020-01-01 | Stop reason: HOSPADM

## 2020-01-01 RX ORDER — ACETAMINOPHEN 160 MG/5ML
650 SOLUTION ORAL EVERY 4 HOURS PRN
Status: DISCONTINUED | OUTPATIENT
Start: 2020-01-01 | End: 2020-01-01 | Stop reason: HOSPADM

## 2020-01-01 RX ORDER — SODIUM CHLORIDE 9 MG/ML
INJECTION, SOLUTION INTRAVENOUS CONTINUOUS PRN
Status: DISCONTINUED | OUTPATIENT
Start: 2020-01-01 | End: 2020-01-01 | Stop reason: SURG

## 2020-01-01 RX ORDER — DEXTROSE MONOHYDRATE 25 G/50ML
25 INJECTION, SOLUTION INTRAVENOUS
Status: DISCONTINUED | OUTPATIENT
Start: 2020-01-01 | End: 2020-01-01 | Stop reason: HOSPADM

## 2020-01-01 RX ORDER — FENTANYL CITRATE 50 UG/ML
INJECTION, SOLUTION INTRAMUSCULAR; INTRAVENOUS
Status: COMPLETED | OUTPATIENT
Start: 2020-01-01 | End: 2020-01-01

## 2020-01-01 RX ORDER — ALBUMIN (HUMAN) 12.5 G/50ML
12.5 SOLUTION INTRAVENOUS AS NEEDED
Status: DISPENSED | OUTPATIENT
Start: 2020-01-01 | End: 2020-01-01

## 2020-01-01 RX ORDER — POLYETHYLENE GLYCOL 3350 17 G/17G
17 POWDER, FOR SOLUTION ORAL DAILY
Start: 2020-01-01

## 2020-01-01 RX ORDER — ALBUMIN (HUMAN) 12.5 G/50ML
12.5 SOLUTION INTRAVENOUS AS NEEDED
Status: DISCONTINUED | OUTPATIENT
Start: 2020-01-01 | End: 2020-01-01

## 2020-01-01 RX ORDER — LEVOTHYROXINE SODIUM 112 UG/1
112 TABLET ORAL EVERY MORNING
COMMUNITY
Start: 2020-01-01 | End: 2020-01-01 | Stop reason: SDUPTHER

## 2020-01-01 RX ORDER — LEVOTHYROXINE SODIUM 112 UG/1
112 TABLET ORAL EVERY MORNING
Start: 2020-01-01

## 2020-01-01 RX ORDER — ALBUMIN (HUMAN) 12.5 G/50ML
25 SOLUTION INTRAVENOUS AS NEEDED
Status: ACTIVE | OUTPATIENT
Start: 2020-01-01 | End: 2020-01-01

## 2020-01-01 RX ORDER — DULOXETIN HYDROCHLORIDE 30 MG/1
90 CAPSULE, DELAYED RELEASE ORAL DAILY
Qty: 270 CAPSULE | Refills: 2 | Status: SHIPPED | OUTPATIENT
Start: 2020-01-01

## 2020-01-01 RX ORDER — SODIUM CHLORIDE 0.9 % (FLUSH) 0.9 %
10 SYRINGE (ML) INJECTION AS NEEDED
Status: DISCONTINUED | OUTPATIENT
Start: 2020-01-01 | End: 2020-01-01

## 2020-01-01 RX ORDER — AMIODARONE HYDROCHLORIDE 200 MG/1
200 TABLET ORAL
Qty: 30 TABLET | Refills: 0 | Status: SHIPPED | OUTPATIENT
Start: 2020-01-01

## 2020-01-01 RX ORDER — DEXTROSE MONOHYDRATE 25 G/50ML
INJECTION, SOLUTION INTRAVENOUS
Status: COMPLETED
Start: 2020-01-01 | End: 2020-01-01

## 2020-01-01 RX ORDER — ONDANSETRON 2 MG/ML
4 INJECTION INTRAMUSCULAR; INTRAVENOUS EVERY 6 HOURS PRN
Status: DISCONTINUED | OUTPATIENT
Start: 2020-01-01 | End: 2020-01-01 | Stop reason: HOSPADM

## 2020-01-01 RX ORDER — BISACODYL 10 MG
10 SUPPOSITORY, RECTAL RECTAL DAILY PRN
Status: DISCONTINUED | OUTPATIENT
Start: 2020-01-01 | End: 2020-01-01 | Stop reason: HOSPADM

## 2020-01-01 RX ORDER — GLYCOPYRROLATE 0.2 MG/ML
0.1 INJECTION INTRAMUSCULAR; INTRAVENOUS ONCE
Status: COMPLETED | OUTPATIENT
Start: 2020-01-01 | End: 2020-01-01

## 2020-01-01 RX ORDER — BLOOD-GLUCOSE METER
1 KIT MISCELLANEOUS 4 TIMES DAILY
COMMUNITY
Start: 2020-01-01 | End: 2020-01-01

## 2020-01-01 RX ORDER — LORAZEPAM 2 MG/ML
1 INJECTION INTRAMUSCULAR EVERY 4 HOURS PRN
Status: DISCONTINUED | OUTPATIENT
Start: 2020-01-01 | End: 2020-08-18 | Stop reason: HOSPADM

## 2020-01-01 RX ORDER — DEXTROSE MONOHYDRATE 25 G/50ML
25 INJECTION, SOLUTION INTRAVENOUS ONCE
Status: COMPLETED | OUTPATIENT
Start: 2020-01-01 | End: 2020-01-01

## 2020-01-01 RX ORDER — ALBUMIN (HUMAN) 12.5 G/50ML
12.5 SOLUTION INTRAVENOUS AS NEEDED
Status: CANCELLED | OUTPATIENT
Start: 2020-01-01 | End: 2020-08-18

## 2020-01-01 RX ORDER — PANTOPRAZOLE SODIUM 40 MG/1
40 TABLET, DELAYED RELEASE ORAL
Start: 2020-01-01

## 2020-01-01 RX ORDER — LIDOCAINE AND PRILOCAINE 25; 25 MG/G; MG/G
1 CREAM TOPICAL 3 TIMES WEEKLY
COMMUNITY

## 2020-01-01 RX ORDER — MONTELUKAST SODIUM 10 MG/1
10 TABLET ORAL DAILY
Qty: 30 TABLET | Refills: 11 | Status: SHIPPED | OUTPATIENT
Start: 2020-01-01

## 2020-01-01 RX ORDER — HYDROCODONE BITARTRATE AND ACETAMINOPHEN 7.5; 325 MG/1; MG/1
1 TABLET ORAL EVERY 6 HOURS PRN
Status: DISCONTINUED | OUTPATIENT
Start: 2020-01-01 | End: 2020-01-01

## 2020-01-01 RX ORDER — SODIUM CHLORIDE 0.9 % (FLUSH) 0.9 %
10 SYRINGE (ML) INJECTION EVERY 12 HOURS SCHEDULED
Status: DISCONTINUED | OUTPATIENT
Start: 2020-01-01 | End: 2020-08-18 | Stop reason: HOSPADM

## 2020-01-01 RX ORDER — PROMETHAZINE HYDROCHLORIDE 25 MG/ML
12.5 INJECTION, SOLUTION INTRAMUSCULAR; INTRAVENOUS EVERY 4 HOURS PRN
Status: DISCONTINUED | OUTPATIENT
Start: 2020-01-01 | End: 2020-01-01 | Stop reason: HOSPADM

## 2020-01-01 RX ORDER — BISACODYL 5 MG/1
5 TABLET, DELAYED RELEASE ORAL DAILY PRN
Status: DISCONTINUED | OUTPATIENT
Start: 2020-01-01 | End: 2020-01-01 | Stop reason: HOSPADM

## 2020-01-01 RX ORDER — MIDODRINE HYDROCHLORIDE 5 MG/1
10 TABLET ORAL
Status: DISCONTINUED | OUTPATIENT
Start: 2020-01-01 | End: 2020-01-01 | Stop reason: HOSPADM

## 2020-01-01 RX ORDER — MORPHINE SULFATE 2 MG/ML
1 INJECTION, SOLUTION INTRAMUSCULAR; INTRAVENOUS ONCE
Status: COMPLETED | OUTPATIENT
Start: 2020-01-01 | End: 2020-01-01

## 2020-01-01 RX ORDER — AMIODARONE HYDROCHLORIDE 200 MG/1
200 TABLET ORAL
Status: DISCONTINUED | OUTPATIENT
Start: 2020-01-01 | End: 2020-01-01 | Stop reason: HOSPADM

## 2020-01-01 RX ORDER — ALBUTEROL SULFATE 90 UG/1
4 AEROSOL, METERED RESPIRATORY (INHALATION) ONCE
Status: COMPLETED | OUTPATIENT
Start: 2020-01-01 | End: 2020-01-01

## 2020-01-01 RX ORDER — BUDESONIDE AND FORMOTEROL FUMARATE DIHYDRATE 160; 4.5 UG/1; UG/1
2 AEROSOL RESPIRATORY (INHALATION) 2 TIMES DAILY
Qty: 1 INHALER | Refills: 11 | Status: SHIPPED | OUTPATIENT
Start: 2020-01-01

## 2020-01-01 RX ORDER — BUMETANIDE 0.25 MG/ML
0.5 INJECTION INTRAMUSCULAR; INTRAVENOUS EVERY 12 HOURS SCHEDULED
Status: DISCONTINUED | OUTPATIENT
Start: 2020-01-01 | End: 2020-01-01

## 2020-01-01 RX ORDER — HYDROCODONE BITARTRATE AND ACETAMINOPHEN 5; 325 MG/1; MG/1
1 TABLET ORAL EVERY 6 HOURS PRN
Qty: 90 TABLET | Refills: 0 | Status: ON HOLD | OUTPATIENT
Start: 2020-01-01 | End: 2020-01-01

## 2020-01-01 RX ORDER — AMIODARONE HYDROCHLORIDE 200 MG/1
200 TABLET ORAL EVERY 12 HOURS SCHEDULED
Status: DISCONTINUED | OUTPATIENT
Start: 2020-01-01 | End: 2020-01-01

## 2020-01-01 RX ORDER — ONDANSETRON 4 MG/1
4 TABLET, FILM COATED ORAL EVERY 6 HOURS PRN
Status: DISCONTINUED | OUTPATIENT
Start: 2020-01-01 | End: 2020-01-01 | Stop reason: HOSPADM

## 2020-01-01 RX ORDER — LEVOTHYROXINE SODIUM 112 UG/1
112 TABLET ORAL EVERY MORNING
Status: DISCONTINUED | OUTPATIENT
Start: 2020-01-01 | End: 2020-01-01 | Stop reason: HOSPADM

## 2020-01-01 RX ORDER — AMOXICILLIN 250 MG
2 CAPSULE ORAL NIGHTLY
Status: DISCONTINUED | OUTPATIENT
Start: 2020-01-01 | End: 2020-01-01 | Stop reason: HOSPADM

## 2020-01-01 RX ORDER — BUDESONIDE AND FORMOTEROL FUMARATE DIHYDRATE 160; 4.5 UG/1; UG/1
2 AEROSOL RESPIRATORY (INHALATION)
Status: DISCONTINUED | OUTPATIENT
Start: 2020-01-01 | End: 2020-01-01 | Stop reason: HOSPADM

## 2020-01-01 RX ORDER — NICOTINE POLACRILEX 4 MG
15 LOZENGE BUCCAL
Status: DISCONTINUED | OUTPATIENT
Start: 2020-01-01 | End: 2020-01-01 | Stop reason: HOSPADM

## 2020-01-01 RX ORDER — SODIUM CHLORIDE 0.9 % (FLUSH) 0.9 %
10 SYRINGE (ML) INJECTION EVERY 12 HOURS SCHEDULED
Status: DISCONTINUED | OUTPATIENT
Start: 2020-01-01 | End: 2020-01-01 | Stop reason: HOSPADM

## 2020-01-01 RX ORDER — ACETAMINOPHEN 325 MG/1
650 TABLET ORAL EVERY 4 HOURS PRN
Status: DISCONTINUED | OUTPATIENT
Start: 2020-01-01 | End: 2020-01-01 | Stop reason: HOSPADM

## 2020-01-01 RX ORDER — FOLIC ACID/VIT B COMPLEX AND C 0.8 MG
1 TABLET ORAL DAILY
Qty: 30 TABLET
Start: 2020-01-01

## 2020-01-01 RX ORDER — DEXTROSE MONOHYDRATE 50 MG/ML
30 INJECTION, SOLUTION INTRAVENOUS CONTINUOUS
Status: DISCONTINUED | OUTPATIENT
Start: 2020-01-01 | End: 2020-01-01

## 2020-01-01 RX ORDER — LIDOCAINE HYDROCHLORIDE 10 MG/ML
20 INJECTION, SOLUTION EPIDURAL; INFILTRATION; INTRACAUDAL; PERINEURAL ONCE
Status: COMPLETED | OUTPATIENT
Start: 2020-01-01 | End: 2020-01-01

## 2020-01-01 RX ORDER — BUMETANIDE 0.25 MG/ML
1 INJECTION INTRAMUSCULAR; INTRAVENOUS EVERY 12 HOURS SCHEDULED
Status: DISCONTINUED | OUTPATIENT
Start: 2020-01-01 | End: 2020-01-01

## 2020-01-01 RX ORDER — PANTOPRAZOLE SODIUM 40 MG/1
40 TABLET, DELAYED RELEASE ORAL
Status: DISCONTINUED | OUTPATIENT
Start: 2020-01-01 | End: 2020-01-01

## 2020-01-01 RX ORDER — OXYCODONE HYDROCHLORIDE AND ACETAMINOPHEN 5; 325 MG/1; MG/1
1 TABLET ORAL EVERY 8 HOURS PRN
Qty: 9 TABLET | Refills: 0 | OUTPATIENT
Start: 2020-01-01 | End: 2020-01-01

## 2020-01-01 RX ORDER — ALBUMIN (HUMAN) 12.5 G/50ML
25 SOLUTION INTRAVENOUS ONCE
Status: COMPLETED | OUTPATIENT
Start: 2020-01-01 | End: 2020-01-01

## 2020-01-01 RX ORDER — POTASSIUM CHLORIDE 29.8 MG/ML
20 INJECTION INTRAVENOUS
Status: COMPLETED | OUTPATIENT
Start: 2020-01-01 | End: 2020-01-01

## 2020-01-01 RX ORDER — HYDROCODONE BITARTRATE AND ACETAMINOPHEN 7.5; 325 MG/1; MG/1
TABLET ORAL SEE ADMIN INSTRUCTIONS
COMMUNITY
Start: 2020-01-01 | End: 2020-01-01 | Stop reason: HOSPADM

## 2020-01-01 RX ORDER — DIGOXIN 125 MCG
125 TABLET ORAL 3 TIMES WEEKLY
Status: DISCONTINUED | OUTPATIENT
Start: 2020-01-01 | End: 2020-01-01

## 2020-01-01 RX ORDER — ATORVASTATIN CALCIUM 40 MG/1
40 TABLET, FILM COATED ORAL DAILY
Qty: 90 TABLET | Refills: 3 | Status: SHIPPED | OUTPATIENT
Start: 2020-01-01

## 2020-01-01 RX ORDER — ACETAMINOPHEN 650 MG/1
650 SUPPOSITORY RECTAL EVERY 4 HOURS PRN
Status: DISCONTINUED | OUTPATIENT
Start: 2020-01-01 | End: 2020-01-01 | Stop reason: HOSPADM

## 2020-01-01 RX ORDER — CHOLECALCIFEROL (VITAMIN D3) 125 MCG
5 CAPSULE ORAL NIGHTLY PRN
Start: 2020-01-01

## 2020-01-01 RX ORDER — SODIUM CHLORIDE 9 MG/ML
50 INJECTION, SOLUTION INTRAVENOUS ONCE
Status: COMPLETED | OUTPATIENT
Start: 2020-01-01 | End: 2020-01-01

## 2020-01-01 RX ORDER — BISACODYL 10 MG
10 SUPPOSITORY, RECTAL RECTAL ONCE
Status: COMPLETED | OUTPATIENT
Start: 2020-01-01 | End: 2020-01-01

## 2020-01-01 RX ORDER — HYDROXYZINE HYDROCHLORIDE 25 MG/1
25 TABLET, FILM COATED ORAL ONCE
Status: COMPLETED | OUTPATIENT
Start: 2020-01-01 | End: 2020-01-01

## 2020-01-01 RX ORDER — NOREPINEPHRINE BIT/0.9 % NACL 8 MG/250ML
.02-.3 INFUSION BOTTLE (ML) INTRAVENOUS
Status: DISCONTINUED | OUTPATIENT
Start: 2020-01-01 | End: 2020-08-18 | Stop reason: HOSPADM

## 2020-01-01 RX ORDER — FOLIC ACID/VIT B COMPLEX AND C 0.8 MG
1 TABLET ORAL DAILY
Status: DISCONTINUED | OUTPATIENT
Start: 2020-01-01 | End: 2020-01-01 | Stop reason: HOSPADM

## 2020-01-01 RX ORDER — MIDAZOLAM HYDROCHLORIDE 1 MG/ML
INJECTION INTRAMUSCULAR; INTRAVENOUS
Status: DISPENSED
Start: 2020-01-01 | End: 2020-01-01

## 2020-01-01 RX ORDER — AMOXICILLIN 250 MG
2 CAPSULE ORAL 2 TIMES DAILY PRN
Status: DISCONTINUED | OUTPATIENT
Start: 2020-01-01 | End: 2020-01-01 | Stop reason: HOSPADM

## 2020-01-01 RX ORDER — LIDOCAINE 40 MG/G
CREAM TOPICAL AS NEEDED
Status: DISCONTINUED | OUTPATIENT
Start: 2020-01-01 | End: 2020-01-01 | Stop reason: HOSPADM

## 2020-01-01 RX ORDER — TRAZODONE HYDROCHLORIDE 50 MG/1
TABLET ORAL
Qty: 90 TABLET | Refills: 2 | Status: SHIPPED | OUTPATIENT
Start: 2020-01-01 | End: 2020-01-01

## 2020-01-01 RX ORDER — ALUMINA, MAGNESIA, AND SIMETHICONE 2400; 2400; 240 MG/30ML; MG/30ML; MG/30ML
15 SUSPENSION ORAL EVERY 6 HOURS PRN
Status: DISCONTINUED | OUTPATIENT
Start: 2020-01-01 | End: 2020-01-01 | Stop reason: HOSPADM

## 2020-01-01 RX ORDER — PROPOFOL 10 MG/ML
VIAL (ML) INTRAVENOUS AS NEEDED
Status: DISCONTINUED | OUTPATIENT
Start: 2020-01-01 | End: 2020-01-01 | Stop reason: SURG

## 2020-01-01 RX ORDER — AMOXICILLIN 250 MG
2 CAPSULE ORAL NIGHTLY
Start: 2020-01-01

## 2020-01-01 RX ORDER — SODIUM CHLORIDE, SODIUM LACTATE, POTASSIUM CHLORIDE, CALCIUM CHLORIDE 600; 310; 30; 20 MG/100ML; MG/100ML; MG/100ML; MG/100ML
30 INJECTION, SOLUTION INTRAVENOUS CONTINUOUS PRN
Status: DISCONTINUED | OUTPATIENT
Start: 2020-01-01 | End: 2020-01-01 | Stop reason: HOSPADM

## 2020-01-01 RX ORDER — ALBUTEROL SULFATE 90 UG/1
2 AEROSOL, METERED RESPIRATORY (INHALATION) 4 TIMES DAILY PRN
Qty: 1 INHALER | Refills: 11 | Status: SHIPPED | OUTPATIENT
Start: 2020-01-01

## 2020-01-01 RX ORDER — HYDROCODONE BITARTRATE AND ACETAMINOPHEN 5; 325 MG/1; MG/1
1 TABLET ORAL EVERY 6 HOURS PRN
Qty: 28 TABLET | Refills: 0 | Status: SHIPPED | OUTPATIENT
Start: 2020-01-01 | End: 2020-01-01 | Stop reason: SDUPTHER

## 2020-01-01 RX ORDER — B COMPLEX, C NO.20/FOLIC ACID 1 MG
1 CAPSULE ORAL DAILY
COMMUNITY
Start: 2020-01-01 | End: 2020-01-01 | Stop reason: HOSPADM

## 2020-01-01 RX ORDER — MIDODRINE HYDROCHLORIDE 5 MG/1
5 TABLET ORAL
Status: DISCONTINUED | OUTPATIENT
Start: 2020-01-01 | End: 2020-01-01

## 2020-01-01 RX ORDER — FENTANYL CITRATE 50 UG/ML
INJECTION, SOLUTION INTRAMUSCULAR; INTRAVENOUS
Status: DISPENSED
Start: 2020-01-01 | End: 2020-01-01

## 2020-01-01 RX ORDER — MIDAZOLAM HYDROCHLORIDE 1 MG/ML
INJECTION INTRAMUSCULAR; INTRAVENOUS
Status: COMPLETED | OUTPATIENT
Start: 2020-01-01 | End: 2020-01-01

## 2020-01-01 RX ORDER — IPRATROPIUM BROMIDE AND ALBUTEROL SULFATE 2.5; .5 MG/3ML; MG/3ML
3 SOLUTION RESPIRATORY (INHALATION) EVERY 6 HOURS PRN
Status: DISCONTINUED | OUTPATIENT
Start: 2020-01-01 | End: 2020-01-01 | Stop reason: HOSPADM

## 2020-01-01 RX ORDER — CHOLECALCIFEROL (VITAMIN D3) 125 MCG
5 CAPSULE ORAL NIGHTLY PRN
Status: DISCONTINUED | OUTPATIENT
Start: 2020-01-01 | End: 2020-01-01 | Stop reason: HOSPADM

## 2020-01-01 RX ORDER — MIDODRINE HYDROCHLORIDE 5 MG/1
5 TABLET ORAL ONCE
Status: DISCONTINUED | OUTPATIENT
Start: 2020-01-01 | End: 2020-01-01

## 2020-01-01 RX ORDER — LEVOTHYROXINE SODIUM 112 UG/1
112 TABLET ORAL EVERY MORNING
Qty: 90 TABLET | Refills: 1 | Status: SHIPPED | OUTPATIENT
Start: 2020-01-01 | End: 2020-01-01 | Stop reason: HOSPADM

## 2020-01-01 RX ADMIN — PROMETHAZINE HYDROCHLORIDE 12.5 MG: 25 INJECTION INTRAMUSCULAR; INTRAVENOUS at 12:30

## 2020-01-01 RX ADMIN — AMIODARONE HYDROCHLORIDE 200 MG: 200 TABLET ORAL at 09:00

## 2020-01-01 RX ADMIN — Medication 1 TABLET: at 10:54

## 2020-01-01 RX ADMIN — IPRATROPIUM BROMIDE 0.5 MG: 0.5 SOLUTION RESPIRATORY (INHALATION) at 19:49

## 2020-01-01 RX ADMIN — SODIUM CHLORIDE, PRESERVATIVE FREE 10 ML: 5 INJECTION INTRAVENOUS at 08:21

## 2020-01-01 RX ADMIN — MIDODRINE HYDROCHLORIDE 10 MG: 5 TABLET ORAL at 06:30

## 2020-01-01 RX ADMIN — ONDANSETRON 4 MG: 2 INJECTION INTRAMUSCULAR; INTRAVENOUS at 20:38

## 2020-01-01 RX ADMIN — MIDODRINE HYDROCHLORIDE 10 MG: 5 TABLET ORAL at 12:16

## 2020-01-01 RX ADMIN — ONDANSETRON 4 MG: 2 INJECTION INTRAMUSCULAR; INTRAVENOUS at 09:07

## 2020-01-01 RX ADMIN — ALBUTEROL SULFATE 4 PUFF: 90 AEROSOL, METERED RESPIRATORY (INHALATION) at 12:29

## 2020-01-01 RX ADMIN — LEVOTHYROXINE SODIUM 112 MCG: 112 TABLET ORAL at 05:20

## 2020-01-01 RX ADMIN — SODIUM CHLORIDE, PRESERVATIVE FREE 10 ML: 5 INJECTION INTRAVENOUS at 19:56

## 2020-01-01 RX ADMIN — BUDESONIDE AND FORMOTEROL FUMARATE DIHYDRATE 2 PUFF: 160; 4.5 AEROSOL RESPIRATORY (INHALATION) at 09:15

## 2020-01-01 RX ADMIN — DOCUSATE SODIUM 50MG AND SENNOSIDES 8.6MG 2 TABLET: 8.6; 5 TABLET, FILM COATED ORAL at 20:14

## 2020-01-01 RX ADMIN — Medication 0.02 MCG/KG/MIN: at 14:58

## 2020-01-01 RX ADMIN — TAZOBACTAM SODIUM AND PIPERACILLIN SODIUM 3.38 G: 375; 3 INJECTION, SOLUTION INTRAVENOUS at 12:06

## 2020-01-01 RX ADMIN — SODIUM CHLORIDE, PRESERVATIVE FREE 10 ML: 5 INJECTION INTRAVENOUS at 20:14

## 2020-01-01 RX ADMIN — ALBUMIN HUMAN 50 G: 0.25 SOLUTION INTRAVENOUS at 08:11

## 2020-01-01 RX ADMIN — IPRATROPIUM BROMIDE 0.5 MG: 0.5 SOLUTION RESPIRATORY (INHALATION) at 15:57

## 2020-01-01 RX ADMIN — BUDESONIDE AND FORMOTEROL FUMARATE DIHYDRATE 2 PUFF: 160; 4.5 AEROSOL RESPIRATORY (INHALATION) at 07:46

## 2020-01-01 RX ADMIN — LEVOTHYROXINE SODIUM 112 MCG: 112 TABLET ORAL at 05:52

## 2020-01-01 RX ADMIN — ATORVASTATIN CALCIUM 40 MG: 40 TABLET, FILM COATED ORAL at 19:56

## 2020-01-01 RX ADMIN — METOPROLOL TARTRATE 25 MG: 25 TABLET, FILM COATED ORAL at 21:34

## 2020-01-01 RX ADMIN — BISACODYL 5 MG: 5 TABLET, COATED ORAL at 21:34

## 2020-01-01 RX ADMIN — IPRATROPIUM BROMIDE 0.5 MG: 0.5 SOLUTION RESPIRATORY (INHALATION) at 20:27

## 2020-01-01 RX ADMIN — BUDESONIDE AND FORMOTEROL FUMARATE DIHYDRATE 2 PUFF: 160; 4.5 AEROSOL RESPIRATORY (INHALATION) at 19:54

## 2020-01-01 RX ADMIN — PANTOPRAZOLE SODIUM 40 MG: 40 TABLET, DELAYED RELEASE ORAL at 17:28

## 2020-01-01 RX ADMIN — TAZOBACTAM SODIUM AND PIPERACILLIN SODIUM 3.38 G: 375; 3 INJECTION, SOLUTION INTRAVENOUS at 08:21

## 2020-01-01 RX ADMIN — MIDODRINE HYDROCHLORIDE 5 MG: 5 TABLET ORAL at 17:44

## 2020-01-01 RX ADMIN — IPRATROPIUM BROMIDE 0.5 MG: 0.5 SOLUTION RESPIRATORY (INHALATION) at 17:04

## 2020-01-01 RX ADMIN — IPRATROPIUM BROMIDE 0.5 MG: 0.5 SOLUTION RESPIRATORY (INHALATION) at 20:59

## 2020-01-01 RX ADMIN — ALBUMIN HUMAN 25 G: 0.25 SOLUTION INTRAVENOUS at 19:58

## 2020-01-01 RX ADMIN — TAZOBACTAM SODIUM AND PIPERACILLIN SODIUM 3.38 G: 375; 3 INJECTION, SOLUTION INTRAVENOUS at 11:30

## 2020-01-01 RX ADMIN — SODIUM CHLORIDE 50 ML/HR: 9 INJECTION, SOLUTION INTRAVENOUS at 16:54

## 2020-01-01 RX ADMIN — AMIODARONE HYDROCHLORIDE 200 MG: 200 TABLET ORAL at 20:14

## 2020-01-01 RX ADMIN — HYDROCORTISONE SODIUM SUCCINATE 100 MG: 100 INJECTION, POWDER, FOR SOLUTION INTRAMUSCULAR; INTRAVENOUS at 22:41

## 2020-01-01 RX ADMIN — MIDODRINE HYDROCHLORIDE 10 MG: 5 TABLET ORAL at 17:48

## 2020-01-01 RX ADMIN — Medication 1 TABLET: at 08:12

## 2020-01-01 RX ADMIN — IPRATROPIUM BROMIDE 0.5 MG: 0.5 SOLUTION RESPIRATORY (INHALATION) at 19:54

## 2020-01-01 RX ADMIN — MIDAZOLAM 1 MG: 1 INJECTION INTRAMUSCULAR; INTRAVENOUS at 12:46

## 2020-01-01 RX ADMIN — IPRATROPIUM BROMIDE 0.5 MG: 0.5 SOLUTION RESPIRATORY (INHALATION) at 16:11

## 2020-01-01 RX ADMIN — BUDESONIDE AND FORMOTEROL FUMARATE DIHYDRATE 2 PUFF: 160; 4.5 AEROSOL RESPIRATORY (INHALATION) at 19:49

## 2020-01-01 RX ADMIN — Medication 1 TABLET: at 09:00

## 2020-01-01 RX ADMIN — SODIUM CHLORIDE, PRESERVATIVE FREE 10 ML: 5 INJECTION INTRAVENOUS at 19:38

## 2020-01-01 RX ADMIN — POTASSIUM CHLORIDE 20 MEQ: 29.8 INJECTION, SOLUTION INTRAVENOUS at 12:06

## 2020-01-01 RX ADMIN — TAZOBACTAM SODIUM AND PIPERACILLIN SODIUM 3.38 G: 375; 3 INJECTION, SOLUTION INTRAVENOUS at 00:12

## 2020-01-01 RX ADMIN — ALBUMIN HUMAN 25 G: 0.25 SOLUTION INTRAVENOUS at 22:41

## 2020-01-01 RX ADMIN — BUMETANIDE 0.5 MG: 0.25 INJECTION INTRAMUSCULAR; INTRAVENOUS at 08:25

## 2020-01-01 RX ADMIN — PANTOPRAZOLE SODIUM 40 MG: 40 TABLET, DELAYED RELEASE ORAL at 05:19

## 2020-01-01 RX ADMIN — POLYETHYLENE GLYCOL 3350 17 G: 17 POWDER, FOR SOLUTION ORAL at 09:00

## 2020-01-01 RX ADMIN — MIDODRINE HYDROCHLORIDE 10 MG: 5 TABLET ORAL at 17:38

## 2020-01-01 RX ADMIN — SODIUM CHLORIDE, PRESERVATIVE FREE 10 ML: 5 INJECTION INTRAVENOUS at 20:24

## 2020-01-01 RX ADMIN — BUDESONIDE AND FORMOTEROL FUMARATE DIHYDRATE 2 PUFF: 160; 4.5 AEROSOL RESPIRATORY (INHALATION) at 07:45

## 2020-01-01 RX ADMIN — HYDROCODONE BITARTRATE AND ACETAMINOPHEN 1 TABLET: 7.5; 325 TABLET ORAL at 11:23

## 2020-01-01 RX ADMIN — SODIUM CHLORIDE, PRESERVATIVE FREE 10 ML: 5 INJECTION INTRAVENOUS at 08:13

## 2020-01-01 RX ADMIN — IPRATROPIUM BROMIDE 0.5 MG: 0.5 SOLUTION RESPIRATORY (INHALATION) at 12:46

## 2020-01-01 RX ADMIN — PANTOPRAZOLE SODIUM 40 MG: 40 TABLET, DELAYED RELEASE ORAL at 18:11

## 2020-01-01 RX ADMIN — TAZOBACTAM SODIUM AND PIPERACILLIN SODIUM 3.38 G: 375; 3 INJECTION, SOLUTION INTRAVENOUS at 23:30

## 2020-01-01 RX ADMIN — OXYCODONE HYDROCHLORIDE AND ACETAMINOPHEN 1 TABLET: 5; 325 TABLET ORAL at 20:24

## 2020-01-01 RX ADMIN — MIDODRINE HYDROCHLORIDE 10 MG: 5 TABLET ORAL at 14:55

## 2020-01-01 RX ADMIN — IPRATROPIUM BROMIDE 0.5 MG: 0.5 SOLUTION RESPIRATORY (INHALATION) at 15:59

## 2020-01-01 RX ADMIN — ONDANSETRON 4 MG: 2 INJECTION INTRAMUSCULAR; INTRAVENOUS at 18:59

## 2020-01-01 RX ADMIN — MIDODRINE HYDROCHLORIDE 10 MG: 5 TABLET ORAL at 06:31

## 2020-01-01 RX ADMIN — TAZOBACTAM SODIUM AND PIPERACILLIN SODIUM 3.38 G: 375; 3 INJECTION, SOLUTION INTRAVENOUS at 00:31

## 2020-01-01 RX ADMIN — METOPROLOL TARTRATE 25 MG: 25 TABLET, FILM COATED ORAL at 11:14

## 2020-01-01 RX ADMIN — DOCUSATE SODIUM 50MG AND SENNOSIDES 8.6MG 2 TABLET: 8.6; 5 TABLET, FILM COATED ORAL at 21:34

## 2020-01-01 RX ADMIN — LEVOTHYROXINE SODIUM 112 MCG: 112 TABLET ORAL at 06:29

## 2020-01-01 RX ADMIN — IPRATROPIUM BROMIDE 0.5 MG: 0.5 SOLUTION RESPIRATORY (INHALATION) at 19:57

## 2020-01-01 RX ADMIN — IPRATROPIUM BROMIDE 0.5 MG: 0.5 SOLUTION RESPIRATORY (INHALATION) at 19:46

## 2020-01-01 RX ADMIN — IPRATROPIUM BROMIDE 0.5 MG: 0.5 SOLUTION RESPIRATORY (INHALATION) at 11:58

## 2020-01-01 RX ADMIN — IPRATROPIUM BROMIDE 0.5 MG: 0.5 SOLUTION RESPIRATORY (INHALATION) at 15:46

## 2020-01-01 RX ADMIN — ATORVASTATIN CALCIUM 40 MG: 40 TABLET, FILM COATED ORAL at 20:11

## 2020-01-01 RX ADMIN — TAZOBACTAM SODIUM AND PIPERACILLIN SODIUM 3.38 G: 375; 3 INJECTION, SOLUTION INTRAVENOUS at 12:16

## 2020-01-01 RX ADMIN — BUDESONIDE AND FORMOTEROL FUMARATE DIHYDRATE 2 PUFF: 160; 4.5 AEROSOL RESPIRATORY (INHALATION) at 20:27

## 2020-01-01 RX ADMIN — VANCOMYCIN HYDROCHLORIDE 1500 MG: 10 INJECTION, POWDER, LYOPHILIZED, FOR SOLUTION INTRAVENOUS at 11:47

## 2020-01-01 RX ADMIN — METOPROLOL TARTRATE 25 MG: 25 TABLET, FILM COATED ORAL at 20:14

## 2020-01-01 RX ADMIN — IPRATROPIUM BROMIDE 0.5 MG: 0.5 SOLUTION RESPIRATORY (INHALATION) at 20:57

## 2020-01-01 RX ADMIN — POLYETHYLENE GLYCOL 3350 17 G: 17 POWDER, FOR SOLUTION ORAL at 12:11

## 2020-01-01 RX ADMIN — NYSTATIN 500000 UNITS: 100000 SUSPENSION ORAL at 19:37

## 2020-01-01 RX ADMIN — AMIODARONE HYDROCHLORIDE 200 MG: 200 TABLET ORAL at 10:18

## 2020-01-01 RX ADMIN — SODIUM CHLORIDE, PRESERVATIVE FREE 10 ML: 5 INJECTION INTRAVENOUS at 21:57

## 2020-01-01 RX ADMIN — DOCUSATE SODIUM 50MG AND SENNOSIDES 8.6MG 2 TABLET: 8.6; 5 TABLET, FILM COATED ORAL at 20:54

## 2020-01-01 RX ADMIN — IPRATROPIUM BROMIDE 0.5 MG: 0.5 SOLUTION RESPIRATORY (INHALATION) at 13:24

## 2020-01-01 RX ADMIN — SODIUM CHLORIDE, PRESERVATIVE FREE 10 ML: 5 INJECTION INTRAVENOUS at 09:01

## 2020-01-01 RX ADMIN — NYSTATIN 500000 UNITS: 100000 SUSPENSION ORAL at 20:11

## 2020-01-01 RX ADMIN — NYSTATIN 500000 UNITS: 100000 SUSPENSION ORAL at 20:23

## 2020-01-01 RX ADMIN — ATORVASTATIN CALCIUM 40 MG: 40 TABLET, FILM COATED ORAL at 20:14

## 2020-01-01 RX ADMIN — MIDODRINE HYDROCHLORIDE 10 MG: 5 TABLET ORAL at 09:00

## 2020-01-01 RX ADMIN — MIDODRINE HYDROCHLORIDE 10 MG: 5 TABLET ORAL at 05:20

## 2020-01-01 RX ADMIN — DOCUSATE SODIUM 50MG AND SENNOSIDES 8.6MG 2 TABLET: 8.6; 5 TABLET, FILM COATED ORAL at 20:28

## 2020-01-01 RX ADMIN — PANTOPRAZOLE SODIUM 40 MG: 40 TABLET, DELAYED RELEASE ORAL at 17:13

## 2020-01-01 RX ADMIN — Medication 1 TABLET: at 08:21

## 2020-01-01 RX ADMIN — ATORVASTATIN CALCIUM 40 MG: 40 TABLET, FILM COATED ORAL at 21:34

## 2020-01-01 RX ADMIN — LIDOCAINE 4%: 4 CREAM TOPICAL at 15:11

## 2020-01-01 RX ADMIN — OXYCODONE HYDROCHLORIDE AND ACETAMINOPHEN 1 TABLET: 5; 325 TABLET ORAL at 20:11

## 2020-01-01 RX ADMIN — AMIODARONE HYDROCHLORIDE 150 MG: 1.5 INJECTION, SOLUTION INTRAVENOUS at 09:50

## 2020-01-01 RX ADMIN — IPRATROPIUM BROMIDE 0.5 MG: 0.5 SOLUTION RESPIRATORY (INHALATION) at 20:25

## 2020-01-01 RX ADMIN — TAZOBACTAM SODIUM AND PIPERACILLIN SODIUM 3.38 G: 375; 3 INJECTION, SOLUTION INTRAVENOUS at 12:17

## 2020-01-01 RX ADMIN — ONDANSETRON 4 MG: 2 INJECTION INTRAMUSCULAR; INTRAVENOUS at 12:47

## 2020-01-01 RX ADMIN — HYDROCODONE BITARTRATE AND ACETAMINOPHEN 1 TABLET: 7.5; 325 TABLET ORAL at 19:29

## 2020-01-01 RX ADMIN — DOCUSATE SODIUM 50MG AND SENNOSIDES 8.6MG 2 TABLET: 8.6; 5 TABLET, FILM COATED ORAL at 20:16

## 2020-01-01 RX ADMIN — DEXTROSE MONOHYDRATE 30 ML/HR: 50 INJECTION, SOLUTION INTRAVENOUS at 12:23

## 2020-01-01 RX ADMIN — METOPROLOL TARTRATE 25 MG: 25 TABLET, FILM COATED ORAL at 17:25

## 2020-01-01 RX ADMIN — ATORVASTATIN CALCIUM 40 MG: 40 TABLET, FILM COATED ORAL at 21:57

## 2020-01-01 RX ADMIN — BUDESONIDE AND FORMOTEROL FUMARATE DIHYDRATE 2 PUFF: 160; 4.5 AEROSOL RESPIRATORY (INHALATION) at 20:57

## 2020-01-01 RX ADMIN — MIDODRINE HYDROCHLORIDE 10 MG: 5 TABLET ORAL at 12:17

## 2020-01-01 RX ADMIN — IPRATROPIUM BROMIDE 0.5 MG: 0.5 SOLUTION RESPIRATORY (INHALATION) at 07:56

## 2020-01-01 RX ADMIN — Medication 1 TABLET: at 11:14

## 2020-01-01 RX ADMIN — DOCUSATE SODIUM 50MG AND SENNOSIDES 8.6MG 2 TABLET: 8.6; 5 TABLET, FILM COATED ORAL at 21:57

## 2020-01-01 RX ADMIN — ALBUMIN HUMAN 50 G: 0.25 SOLUTION INTRAVENOUS at 13:48

## 2020-01-01 RX ADMIN — NYSTATIN 500000 UNITS: 100000 SUSPENSION ORAL at 10:17

## 2020-01-01 RX ADMIN — TAZOBACTAM SODIUM AND PIPERACILLIN SODIUM 3.38 G: 375; 3 INJECTION, SOLUTION INTRAVENOUS at 00:23

## 2020-01-01 RX ADMIN — MIDODRINE HYDROCHLORIDE 10 MG: 5 TABLET ORAL at 17:13

## 2020-01-01 RX ADMIN — MIDODRINE HYDROCHLORIDE 10 MG: 5 TABLET ORAL at 12:06

## 2020-01-01 RX ADMIN — OXYCODONE HYDROCHLORIDE AND ACETAMINOPHEN 1 TABLET: 5; 325 TABLET ORAL at 23:30

## 2020-01-01 RX ADMIN — ATORVASTATIN CALCIUM 40 MG: 40 TABLET, FILM COATED ORAL at 20:05

## 2020-01-01 RX ADMIN — IPRATROPIUM BROMIDE 0.5 MG: 0.5 SOLUTION RESPIRATORY (INHALATION) at 07:46

## 2020-01-01 RX ADMIN — HYDROCODONE BITARTRATE AND ACETAMINOPHEN 1 TABLET: 7.5; 325 TABLET ORAL at 10:07

## 2020-01-01 RX ADMIN — PANTOPRAZOLE SODIUM 40 MG: 40 TABLET, DELAYED RELEASE ORAL at 12:11

## 2020-01-01 RX ADMIN — PROMETHAZINE HYDROCHLORIDE 12.5 MG: 25 INJECTION INTRAMUSCULAR; INTRAVENOUS at 06:11

## 2020-01-01 RX ADMIN — LIDOCAINE 4%: 4 CREAM TOPICAL at 14:55

## 2020-01-01 RX ADMIN — NYSTATIN 500000 UNITS: 100000 SUSPENSION ORAL at 08:44

## 2020-01-01 RX ADMIN — LEVOTHYROXINE SODIUM 112 MCG: 112 TABLET ORAL at 05:02

## 2020-01-01 RX ADMIN — DOCUSATE SODIUM 50MG AND SENNOSIDES 8.6MG 2 TABLET: 8.6; 5 TABLET, FILM COATED ORAL at 20:24

## 2020-01-01 RX ADMIN — NYSTATIN 500000 UNITS: 100000 SUSPENSION ORAL at 12:13

## 2020-01-01 RX ADMIN — Medication 1 TABLET: at 12:05

## 2020-01-01 RX ADMIN — MIDODRINE HYDROCHLORIDE 10 MG: 5 TABLET ORAL at 17:28

## 2020-01-01 RX ADMIN — IPRATROPIUM BROMIDE 0.5 MG: 0.5 SOLUTION RESPIRATORY (INHALATION) at 20:24

## 2020-01-01 RX ADMIN — BUDESONIDE AND FORMOTEROL FUMARATE DIHYDRATE 2 PUFF: 160; 4.5 AEROSOL RESPIRATORY (INHALATION) at 19:15

## 2020-01-01 RX ADMIN — DOCUSATE SODIUM 50MG AND SENNOSIDES 8.6MG 2 TABLET: 8.6; 5 TABLET, FILM COATED ORAL at 20:11

## 2020-01-01 RX ADMIN — MIDODRINE HYDROCHLORIDE 10 MG: 5 TABLET ORAL at 17:45

## 2020-01-01 RX ADMIN — PROMETHAZINE HYDROCHLORIDE 12.5 MG: 25 INJECTION INTRAMUSCULAR; INTRAVENOUS at 20:28

## 2020-01-01 RX ADMIN — MIDODRINE HYDROCHLORIDE 10 MG: 5 TABLET ORAL at 12:36

## 2020-01-01 RX ADMIN — HYDROCODONE BITARTRATE AND ACETAMINOPHEN 1 TABLET: 7.5; 325 TABLET ORAL at 19:56

## 2020-01-01 RX ADMIN — TAZOBACTAM SODIUM AND PIPERACILLIN SODIUM 3.38 G: 375; 3 INJECTION, SOLUTION INTRAVENOUS at 23:39

## 2020-01-01 RX ADMIN — SODIUM CHLORIDE, PRESERVATIVE FREE 10 ML: 5 INJECTION INTRAVENOUS at 10:54

## 2020-01-01 RX ADMIN — IPRATROPIUM BROMIDE 0.5 MG: 0.5 SOLUTION RESPIRATORY (INHALATION) at 15:54

## 2020-01-01 RX ADMIN — HYDROCODONE BITARTRATE AND ACETAMINOPHEN 1 TABLET: 7.5; 325 TABLET ORAL at 20:37

## 2020-01-01 RX ADMIN — TAZOBACTAM SODIUM AND PIPERACILLIN SODIUM 3.38 G: 375; 3 INJECTION, SOLUTION INTRAVENOUS at 01:40

## 2020-01-01 RX ADMIN — Medication 1 TABLET: at 08:44

## 2020-01-01 RX ADMIN — IPRATROPIUM BROMIDE AND ALBUTEROL SULFATE 3 ML: 2.5; .5 SOLUTION RESPIRATORY (INHALATION) at 13:21

## 2020-01-01 RX ADMIN — BUDESONIDE AND FORMOTEROL FUMARATE DIHYDRATE 2 PUFF: 160; 4.5 AEROSOL RESPIRATORY (INHALATION) at 08:15

## 2020-01-01 RX ADMIN — IPRATROPIUM BROMIDE 0.5 MG: 0.5 SOLUTION RESPIRATORY (INHALATION) at 07:16

## 2020-01-01 RX ADMIN — MIDODRINE HYDROCHLORIDE 10 MG: 5 TABLET ORAL at 09:27

## 2020-01-01 RX ADMIN — NYSTATIN 500000 UNITS: 100000 SUSPENSION ORAL at 08:38

## 2020-01-01 RX ADMIN — BUDESONIDE AND FORMOTEROL FUMARATE DIHYDRATE 2 PUFF: 160; 4.5 AEROSOL RESPIRATORY (INHALATION) at 07:24

## 2020-01-01 RX ADMIN — ONDANSETRON HYDROCHLORIDE 4 MG: 4 TABLET, FILM COATED ORAL at 10:07

## 2020-01-01 RX ADMIN — ONDANSETRON 4 MG: 2 INJECTION INTRAMUSCULAR; INTRAVENOUS at 04:16

## 2020-01-01 RX ADMIN — LEVOTHYROXINE SODIUM 112 MCG: 112 TABLET ORAL at 05:48

## 2020-01-01 RX ADMIN — IPRATROPIUM BROMIDE 0.5 MG: 0.5 SOLUTION RESPIRATORY (INHALATION) at 16:30

## 2020-01-01 RX ADMIN — IPRATROPIUM BROMIDE 0.5 MG: 0.5 SOLUTION RESPIRATORY (INHALATION) at 20:33

## 2020-01-01 RX ADMIN — SODIUM CHLORIDE, PRESERVATIVE FREE 10 ML: 5 INJECTION INTRAVENOUS at 08:25

## 2020-01-01 RX ADMIN — MORPHINE SULFATE 4 MG: 4 INJECTION, SOLUTION INTRAMUSCULAR; INTRAVENOUS at 19:05

## 2020-01-01 RX ADMIN — AMIODARONE HYDROCHLORIDE 200 MG: 200 TABLET ORAL at 08:43

## 2020-01-01 RX ADMIN — AMIODARONE HYDROCHLORIDE 200 MG: 200 TABLET ORAL at 20:11

## 2020-01-01 RX ADMIN — MIDODRINE HYDROCHLORIDE 10 MG: 5 TABLET ORAL at 12:13

## 2020-01-01 RX ADMIN — IPRATROPIUM BROMIDE 0.5 MG: 0.5 SOLUTION RESPIRATORY (INHALATION) at 12:28

## 2020-01-01 RX ADMIN — IPRATROPIUM BROMIDE 0.5 MG: 0.5 SOLUTION RESPIRATORY (INHALATION) at 07:34

## 2020-01-01 RX ADMIN — HYDROCODONE BITARTRATE AND ACETAMINOPHEN 1 TABLET: 7.5; 325 TABLET ORAL at 21:33

## 2020-01-01 RX ADMIN — LEVOTHYROXINE SODIUM 112 MCG: 112 TABLET ORAL at 05:22

## 2020-01-01 RX ADMIN — AMIODARONE HYDROCHLORIDE 200 MG: 200 TABLET ORAL at 19:37

## 2020-01-01 RX ADMIN — IPRATROPIUM BROMIDE 0.5 MG: 0.5 SOLUTION RESPIRATORY (INHALATION) at 13:11

## 2020-01-01 RX ADMIN — HYDROCODONE BITARTRATE AND ACETAMINOPHEN 1 TABLET: 7.5; 325 TABLET ORAL at 05:21

## 2020-01-01 RX ADMIN — PANTOPRAZOLE SODIUM 40 MG: 40 TABLET, DELAYED RELEASE ORAL at 09:00

## 2020-01-01 RX ADMIN — ATORVASTATIN CALCIUM 40 MG: 40 TABLET, FILM COATED ORAL at 20:24

## 2020-01-01 RX ADMIN — Medication 1 TABLET: at 12:11

## 2020-01-01 RX ADMIN — IPRATROPIUM BROMIDE 0.5 MG: 0.5 SOLUTION RESPIRATORY (INHALATION) at 16:27

## 2020-01-01 RX ADMIN — MORPHINE SULFATE 1 MG: 2 INJECTION, SOLUTION INTRAMUSCULAR; INTRAVENOUS at 16:01

## 2020-01-01 RX ADMIN — Medication 1 TABLET: at 08:38

## 2020-01-01 RX ADMIN — BUMETANIDE 0.5 MG: 0.25 INJECTION INTRAMUSCULAR; INTRAVENOUS at 20:13

## 2020-01-01 RX ADMIN — ONDANSETRON 4 MG: 2 INJECTION INTRAMUSCULAR; INTRAVENOUS at 11:56

## 2020-01-01 RX ADMIN — LIDOCAINE HYDROCHLORIDE 20 ML: 10 INJECTION, SOLUTION EPIDURAL; INFILTRATION; INTRACAUDAL; PERINEURAL at 13:17

## 2020-01-01 RX ADMIN — IPRATROPIUM BROMIDE 0.5 MG: 0.5 SOLUTION RESPIRATORY (INHALATION) at 07:11

## 2020-01-01 RX ADMIN — LORAZEPAM 1 MG: 2 INJECTION INTRAMUSCULAR; INTRAVENOUS at 19:10

## 2020-01-01 RX ADMIN — BUDESONIDE AND FORMOTEROL FUMARATE DIHYDRATE 2 PUFF: 160; 4.5 AEROSOL RESPIRATORY (INHALATION) at 07:16

## 2020-01-01 RX ADMIN — NYSTATIN 500000 UNITS: 100000 SUSPENSION ORAL at 17:48

## 2020-01-01 RX ADMIN — PANTOPRAZOLE SODIUM 40 MG: 40 TABLET, DELAYED RELEASE ORAL at 10:18

## 2020-01-01 RX ADMIN — VANCOMYCIN HYDROCHLORIDE 1500 MG: 10 INJECTION, POWDER, LYOPHILIZED, FOR SOLUTION INTRAVENOUS at 01:14

## 2020-01-01 RX ADMIN — AMIODARONE HYDROCHLORIDE 200 MG: 200 TABLET ORAL at 08:44

## 2020-01-01 RX ADMIN — BUDESONIDE AND FORMOTEROL FUMARATE DIHYDRATE 2 PUFF: 160; 4.5 AEROSOL RESPIRATORY (INHALATION) at 07:10

## 2020-01-01 RX ADMIN — POTASSIUM CHLORIDE 20 MEQ: 29.8 INJECTION, SOLUTION INTRAVENOUS at 13:40

## 2020-01-01 RX ADMIN — MIDODRINE HYDROCHLORIDE 10 MG: 5 TABLET ORAL at 18:11

## 2020-01-01 RX ADMIN — BUDESONIDE AND FORMOTEROL FUMARATE DIHYDRATE 2 PUFF: 160; 4.5 AEROSOL RESPIRATORY (INHALATION) at 20:59

## 2020-01-01 RX ADMIN — SODIUM CHLORIDE, PRESERVATIVE FREE 10 ML: 5 INJECTION INTRAVENOUS at 08:35

## 2020-01-01 RX ADMIN — AMIODARONE HYDROCHLORIDE 200 MG: 200 TABLET ORAL at 20:24

## 2020-01-01 RX ADMIN — NYSTATIN 500000 UNITS: 100000 SUSPENSION ORAL at 12:17

## 2020-01-01 RX ADMIN — SODIUM CHLORIDE, PRESERVATIVE FREE 10 ML: 5 INJECTION INTRAVENOUS at 20:00

## 2020-01-01 RX ADMIN — ALBUMIN HUMAN 50 G: 0.25 SOLUTION INTRAVENOUS at 09:15

## 2020-01-01 RX ADMIN — BUDESONIDE AND FORMOTEROL FUMARATE DIHYDRATE 2 PUFF: 160; 4.5 AEROSOL RESPIRATORY (INHALATION) at 07:56

## 2020-01-01 RX ADMIN — LEVOTHYROXINE SODIUM 112 MCG: 112 TABLET ORAL at 08:43

## 2020-01-01 RX ADMIN — SODIUM CHLORIDE, PRESERVATIVE FREE 10 ML: 5 INJECTION INTRAVENOUS at 08:44

## 2020-01-01 RX ADMIN — MIDODRINE HYDROCHLORIDE 5 MG: 5 TABLET ORAL at 12:41

## 2020-01-01 RX ADMIN — NYSTATIN 500000 UNITS: 100000 SUSPENSION ORAL at 09:00

## 2020-01-01 RX ADMIN — ONDANSETRON 4 MG: 2 INJECTION INTRAMUSCULAR; INTRAVENOUS at 18:11

## 2020-01-01 RX ADMIN — PANTOPRAZOLE SODIUM 40 MG: 40 TABLET, DELAYED RELEASE ORAL at 17:48

## 2020-01-01 RX ADMIN — ONDANSETRON 4 MG: 2 INJECTION INTRAMUSCULAR; INTRAVENOUS at 12:31

## 2020-01-01 RX ADMIN — IPRATROPIUM BROMIDE 0.5 MG: 0.5 SOLUTION RESPIRATORY (INHALATION) at 16:25

## 2020-01-01 RX ADMIN — Medication 0.14 MCG/KG/MIN: at 11:25

## 2020-01-01 RX ADMIN — MORPHINE SULFATE 4 MG: 4 INJECTION, SOLUTION INTRAMUSCULAR; INTRAVENOUS at 20:49

## 2020-01-01 RX ADMIN — NYSTATIN 500000 UNITS: 100000 SUSPENSION ORAL at 14:54

## 2020-01-01 RX ADMIN — AMIODARONE HYDROCHLORIDE 200 MG: 200 TABLET ORAL at 08:39

## 2020-01-01 RX ADMIN — PROPOFOL 30 MG: 10 INJECTION, EMULSION INTRAVENOUS at 17:12

## 2020-01-01 RX ADMIN — IPRATROPIUM BROMIDE 0.5 MG: 0.5 SOLUTION RESPIRATORY (INHALATION) at 12:45

## 2020-01-01 RX ADMIN — IPRATROPIUM BROMIDE 0.5 MG: 0.5 SOLUTION RESPIRATORY (INHALATION) at 07:10

## 2020-01-01 RX ADMIN — LIDOCAINE 4%: 4 CREAM TOPICAL at 07:45

## 2020-01-01 RX ADMIN — BUDESONIDE AND FORMOTEROL FUMARATE DIHYDRATE 2 PUFF: 160; 4.5 AEROSOL RESPIRATORY (INHALATION) at 09:25

## 2020-01-01 RX ADMIN — PANTOPRAZOLE SODIUM 40 MG: 40 TABLET, DELAYED RELEASE ORAL at 08:39

## 2020-01-01 RX ADMIN — IPRATROPIUM BROMIDE AND ALBUTEROL SULFATE 3 ML: 2.5; .5 SOLUTION RESPIRATORY (INHALATION) at 08:15

## 2020-01-01 RX ADMIN — MIDODRINE HYDROCHLORIDE 10 MG: 5 TABLET ORAL at 08:38

## 2020-01-01 RX ADMIN — SODIUM CHLORIDE, PRESERVATIVE FREE 10 ML: 5 INJECTION INTRAVENOUS at 09:23

## 2020-01-01 RX ADMIN — PROMETHAZINE HYDROCHLORIDE 12.5 MG: 25 INJECTION INTRAMUSCULAR; INTRAVENOUS at 20:00

## 2020-01-01 RX ADMIN — ATORVASTATIN CALCIUM 40 MG: 40 TABLET, FILM COATED ORAL at 20:38

## 2020-01-01 RX ADMIN — DEXTROSE MONOHYDRATE 25 ML: 25 INJECTION, SOLUTION INTRAVENOUS at 12:03

## 2020-01-01 RX ADMIN — Medication 1 TABLET: at 08:25

## 2020-01-01 RX ADMIN — AMIODARONE HYDROCHLORIDE 200 MG: 200 TABLET ORAL at 09:50

## 2020-01-01 RX ADMIN — AMIODARONE HYDROCHLORIDE 200 MG: 200 TABLET ORAL at 20:29

## 2020-01-01 RX ADMIN — GLYCOPYRROLATE 0.1 MG: 0.2 INJECTION, SOLUTION INTRAMUSCULAR; INTRAVENOUS at 15:54

## 2020-01-01 RX ADMIN — SODIUM CHLORIDE, PRESERVATIVE FREE 10 ML: 5 INJECTION INTRAVENOUS at 08:43

## 2020-01-01 RX ADMIN — ONDANSETRON 4 MG: 2 INJECTION INTRAMUSCULAR; INTRAVENOUS at 15:28

## 2020-01-01 RX ADMIN — SODIUM CHLORIDE, PRESERVATIVE FREE 10 ML: 5 INJECTION INTRAVENOUS at 20:49

## 2020-01-01 RX ADMIN — PANTOPRAZOLE SODIUM 40 MG: 40 TABLET, DELAYED RELEASE ORAL at 17:07

## 2020-01-01 RX ADMIN — BISACODYL 5 MG: 5 TABLET, COATED ORAL at 12:20

## 2020-01-01 RX ADMIN — BUDESONIDE AND FORMOTEROL FUMARATE DIHYDRATE 2 PUFF: 160; 4.5 AEROSOL RESPIRATORY (INHALATION) at 20:24

## 2020-01-01 RX ADMIN — IPRATROPIUM BROMIDE 0.5 MG: 0.5 SOLUTION RESPIRATORY (INHALATION) at 19:02

## 2020-01-01 RX ADMIN — LEVOTHYROXINE SODIUM 112 MCG: 112 TABLET ORAL at 06:22

## 2020-01-01 RX ADMIN — LIDOCAINE 4%: 4 CREAM TOPICAL at 07:17

## 2020-01-01 RX ADMIN — ATORVASTATIN CALCIUM 40 MG: 40 TABLET, FILM COATED ORAL at 20:29

## 2020-01-01 RX ADMIN — NYSTATIN 500000 UNITS: 100000 SUSPENSION ORAL at 12:36

## 2020-01-01 RX ADMIN — IPRATROPIUM BROMIDE 0.5 MG: 0.5 SOLUTION RESPIRATORY (INHALATION) at 08:14

## 2020-01-01 RX ADMIN — TAZOBACTAM SODIUM AND PIPERACILLIN SODIUM 3.38 G: 375; 3 INJECTION, SOLUTION INTRAVENOUS at 16:06

## 2020-01-01 RX ADMIN — HYDROCORTISONE SODIUM SUCCINATE 100 MG: 100 INJECTION, POWDER, FOR SOLUTION INTRAMUSCULAR; INTRAVENOUS at 06:23

## 2020-01-01 RX ADMIN — TAZOBACTAM SODIUM AND PIPERACILLIN SODIUM 3.38 G: 375; 3 INJECTION, SOLUTION INTRAVENOUS at 23:06

## 2020-01-01 RX ADMIN — IPRATROPIUM BROMIDE 0.5 MG: 0.5 SOLUTION RESPIRATORY (INHALATION) at 12:35

## 2020-01-01 RX ADMIN — METOPROLOL TARTRATE 25 MG: 25 TABLET, FILM COATED ORAL at 08:25

## 2020-01-01 RX ADMIN — HYDROXYZINE HYDROCHLORIDE 25 MG: 25 TABLET, FILM COATED ORAL at 00:20

## 2020-01-01 RX ADMIN — BUDESONIDE AND FORMOTEROL FUMARATE DIHYDRATE 2 PUFF: 160; 4.5 AEROSOL RESPIRATORY (INHALATION) at 07:12

## 2020-01-01 RX ADMIN — MIDODRINE HYDROCHLORIDE 10 MG: 5 TABLET ORAL at 08:43

## 2020-01-01 RX ADMIN — HYDROCORTISONE SODIUM SUCCINATE 100 MG: 100 INJECTION, POWDER, FOR SOLUTION INTRAMUSCULAR; INTRAVENOUS at 14:19

## 2020-01-01 RX ADMIN — PROMETHAZINE HYDROCHLORIDE 12.5 MG: 25 INJECTION INTRAMUSCULAR; INTRAVENOUS at 13:31

## 2020-01-01 RX ADMIN — TAZOBACTAM SODIUM AND PIPERACILLIN SODIUM 3.38 G: 375; 3 INJECTION, SOLUTION INTRAVENOUS at 00:13

## 2020-01-01 RX ADMIN — MORPHINE SULFATE 4 MG: 4 INJECTION, SOLUTION INTRAMUSCULAR; INTRAVENOUS at 15:50

## 2020-01-01 RX ADMIN — BUMETANIDE 1 MG: 0.25 INJECTION INTRAMUSCULAR; INTRAVENOUS at 22:45

## 2020-01-01 RX ADMIN — POLYETHYLENE GLYCOL 3350 17 G: 17 POWDER, FOR SOLUTION ORAL at 10:17

## 2020-01-01 RX ADMIN — SODIUM CHLORIDE, PRESERVATIVE FREE 10 ML: 5 INJECTION INTRAVENOUS at 22:47

## 2020-01-01 RX ADMIN — IPRATROPIUM BROMIDE 0.5 MG: 0.5 SOLUTION RESPIRATORY (INHALATION) at 15:43

## 2020-01-01 RX ADMIN — PROPOFOL 50 MG: 10 INJECTION, EMULSION INTRAVENOUS at 17:08

## 2020-01-01 RX ADMIN — BUDESONIDE AND FORMOTEROL FUMARATE DIHYDRATE 2 PUFF: 160; 4.5 AEROSOL RESPIRATORY (INHALATION) at 19:57

## 2020-01-01 RX ADMIN — LEVOTHYROXINE SODIUM 112 MCG: 112 TABLET ORAL at 06:16

## 2020-01-01 RX ADMIN — BUDESONIDE AND FORMOTEROL FUMARATE DIHYDRATE 2 PUFF: 160; 4.5 AEROSOL RESPIRATORY (INHALATION) at 20:25

## 2020-01-01 RX ADMIN — NYSTATIN 500000 UNITS: 100000 SUSPENSION ORAL at 17:28

## 2020-01-01 RX ADMIN — ATORVASTATIN CALCIUM 40 MG: 40 TABLET, FILM COATED ORAL at 20:54

## 2020-01-01 RX ADMIN — IPRATROPIUM BROMIDE 0.5 MG: 0.5 SOLUTION RESPIRATORY (INHALATION) at 09:25

## 2020-01-01 RX ADMIN — Medication 0.1 MCG/KG/MIN: at 12:37

## 2020-01-01 RX ADMIN — ONDANSETRON 4 MG: 2 INJECTION INTRAMUSCULAR; INTRAVENOUS at 20:13

## 2020-01-01 RX ADMIN — DOCUSATE SODIUM 50MG AND SENNOSIDES 8.6MG 2 TABLET: 8.6; 5 TABLET, FILM COATED ORAL at 19:36

## 2020-01-01 RX ADMIN — TAZOBACTAM SODIUM AND PIPERACILLIN SODIUM 3.38 G: 375; 3 INJECTION, SOLUTION INTRAVENOUS at 12:36

## 2020-01-01 RX ADMIN — MIDODRINE HYDROCHLORIDE 10 MG: 5 TABLET ORAL at 12:10

## 2020-01-01 RX ADMIN — POLYETHYLENE GLYCOL 3350 17 G: 17 POWDER, FOR SOLUTION ORAL at 08:44

## 2020-01-01 RX ADMIN — BUDESONIDE AND FORMOTEROL FUMARATE DIHYDRATE 2 PUFF: 160; 4.5 AEROSOL RESPIRATORY (INHALATION) at 19:02

## 2020-01-01 RX ADMIN — OXYCODONE HYDROCHLORIDE AND ACETAMINOPHEN 1 TABLET: 5; 325 TABLET ORAL at 04:35

## 2020-01-01 RX ADMIN — IPRATROPIUM BROMIDE 0.5 MG: 0.5 SOLUTION RESPIRATORY (INHALATION) at 11:56

## 2020-01-01 RX ADMIN — ALBUMIN HUMAN 50 G: 0.25 SOLUTION INTRAVENOUS at 08:00

## 2020-01-01 RX ADMIN — LIDOCAINE 4%: 4 CREAM TOPICAL at 07:15

## 2020-01-01 RX ADMIN — DIGOXIN 125 MCG: 125 TABLET ORAL at 15:35

## 2020-01-01 RX ADMIN — MELATONIN TAB 5 MG 5 MG: 5 TAB at 22:06

## 2020-01-01 RX ADMIN — POLYETHYLENE GLYCOL 3350 17 G: 17 POWDER, FOR SOLUTION ORAL at 08:38

## 2020-01-01 RX ADMIN — IPRATROPIUM BROMIDE 0.5 MG: 0.5 SOLUTION RESPIRATORY (INHALATION) at 20:44

## 2020-01-01 RX ADMIN — AMIODARONE HYDROCHLORIDE 200 MG: 200 TABLET ORAL at 20:16

## 2020-01-01 RX ADMIN — IPRATROPIUM BROMIDE 0.5 MG: 0.5 SOLUTION RESPIRATORY (INHALATION) at 13:04

## 2020-01-01 RX ADMIN — BUDESONIDE AND FORMOTEROL FUMARATE DIHYDRATE 2 PUFF: 160; 4.5 AEROSOL RESPIRATORY (INHALATION) at 07:34

## 2020-01-01 RX ADMIN — IPRATROPIUM BROMIDE AND ALBUTEROL SULFATE 3 ML: 2.5; .5 SOLUTION RESPIRATORY (INHALATION) at 21:20

## 2020-01-01 RX ADMIN — MIDODRINE HYDROCHLORIDE 5 MG: 5 TABLET ORAL at 07:59

## 2020-01-01 RX ADMIN — SODIUM CHLORIDE 250 ML: 9 INJECTION, SOLUTION INTRAVENOUS at 04:06

## 2020-01-01 RX ADMIN — NYSTATIN 500000 UNITS: 100000 SUSPENSION ORAL at 17:07

## 2020-01-01 RX ADMIN — DEXTROSE MONOHYDRATE 50 ML: 25 INJECTION, SOLUTION INTRAVENOUS at 15:24

## 2020-01-01 RX ADMIN — IPRATROPIUM BROMIDE AND ALBUTEROL SULFATE 3 ML: 2.5; .5 SOLUTION RESPIRATORY (INHALATION) at 00:45

## 2020-01-01 RX ADMIN — TAZOBACTAM SODIUM AND PIPERACILLIN SODIUM 3.38 G: 375; 3 INJECTION, SOLUTION INTRAVENOUS at 12:12

## 2020-01-01 RX ADMIN — DIGOXIN 125 MCG: 125 TABLET ORAL at 11:44

## 2020-01-01 RX ADMIN — SODIUM CHLORIDE, PRESERVATIVE FREE 10 ML: 5 INJECTION INTRAVENOUS at 20:15

## 2020-01-01 RX ADMIN — ALBUMIN HUMAN 50 G: 0.25 SOLUTION INTRAVENOUS at 08:58

## 2020-01-01 RX ADMIN — DEXTROSE MONOHYDRATE 50 ML: 25 INJECTION, SOLUTION INTRAVENOUS at 19:58

## 2020-01-01 RX ADMIN — TAZOBACTAM SODIUM AND PIPERACILLIN SODIUM 3.38 G: 375; 3 INJECTION, SOLUTION INTRAVENOUS at 00:06

## 2020-01-01 RX ADMIN — ACETAMINOPHEN 650 MG: 325 TABLET, FILM COATED ORAL at 00:22

## 2020-01-01 RX ADMIN — IPRATROPIUM BROMIDE 0.5 MG: 0.5 SOLUTION RESPIRATORY (INHALATION) at 07:24

## 2020-01-01 RX ADMIN — IPRATROPIUM BROMIDE 0.5 MG: 0.5 SOLUTION RESPIRATORY (INHALATION) at 19:14

## 2020-01-01 RX ADMIN — IPRATROPIUM BROMIDE 0.5 MG: 0.5 SOLUTION RESPIRATORY (INHALATION) at 07:45

## 2020-01-01 RX ADMIN — PROMETHAZINE HYDROCHLORIDE 12.5 MG: 25 INJECTION INTRAMUSCULAR; INTRAVENOUS at 08:25

## 2020-01-01 RX ADMIN — LEVOTHYROXINE SODIUM 112 MCG: 112 TABLET ORAL at 11:13

## 2020-01-01 RX ADMIN — FENTANYL CITRATE 50 MCG: 0.05 INJECTION, SOLUTION INTRAMUSCULAR; INTRAVENOUS at 12:46

## 2020-01-01 RX ADMIN — LEVOTHYROXINE SODIUM 112 MCG: 112 TABLET ORAL at 05:35

## 2020-01-01 RX ADMIN — Medication 1 TABLET: at 08:43

## 2020-01-01 RX ADMIN — IPRATROPIUM BROMIDE AND ALBUTEROL SULFATE 3 ML: 2.5; .5 SOLUTION RESPIRATORY (INHALATION) at 11:57

## 2020-01-01 RX ADMIN — SODIUM CHLORIDE, PRESERVATIVE FREE 10 ML: 5 INJECTION INTRAVENOUS at 20:37

## 2020-01-01 RX ADMIN — ALBUMIN HUMAN 12.5 G: 0.25 SOLUTION INTRAVENOUS at 07:35

## 2020-01-01 RX ADMIN — PROMETHAZINE HYDROCHLORIDE 12.5 MG: 25 INJECTION INTRAMUSCULAR; INTRAVENOUS at 18:24

## 2020-01-01 RX ADMIN — BUDESONIDE AND FORMOTEROL FUMARATE DIHYDRATE 2 PUFF: 160; 4.5 AEROSOL RESPIRATORY (INHALATION) at 19:46

## 2020-01-01 RX ADMIN — MELATONIN TAB 5 MG 5 MG: 5 TAB at 20:11

## 2020-01-01 RX ADMIN — TAZOBACTAM SODIUM AND PIPERACILLIN SODIUM 3.38 G: 375; 3 INJECTION, SOLUTION INTRAVENOUS at 12:13

## 2020-01-01 RX ADMIN — BUDESONIDE AND FORMOTEROL FUMARATE DIHYDRATE 2 PUFF: 160; 4.5 AEROSOL RESPIRATORY (INHALATION) at 20:38

## 2020-01-01 RX ADMIN — ATORVASTATIN CALCIUM 40 MG: 40 TABLET, FILM COATED ORAL at 20:16

## 2020-01-01 RX ADMIN — SODIUM CHLORIDE 250 ML: 9 INJECTION, SOLUTION INTRAVENOUS at 21:12

## 2020-01-01 RX ADMIN — HYDROCODONE BITARTRATE AND ACETAMINOPHEN 1 TABLET: 7.5; 325 TABLET ORAL at 21:57

## 2020-01-01 RX ADMIN — Medication 1 TABLET: at 10:17

## 2020-01-01 RX ADMIN — PROMETHAZINE HYDROCHLORIDE 12.5 MG: 25 INJECTION INTRAMUSCULAR; INTRAVENOUS at 11:44

## 2020-01-01 RX ADMIN — MIDODRINE HYDROCHLORIDE 10 MG: 5 TABLET ORAL at 17:07

## 2020-01-01 RX ADMIN — SODIUM CHLORIDE: 9 INJECTION, SOLUTION INTRAVENOUS at 17:05

## 2020-01-01 RX ADMIN — ATORVASTATIN CALCIUM 40 MG: 40 TABLET, FILM COATED ORAL at 19:37

## 2020-01-01 RX ADMIN — BUDESONIDE AND FORMOTEROL FUMARATE DIHYDRATE 2 PUFF: 160; 4.5 AEROSOL RESPIRATORY (INHALATION) at 20:44

## 2020-01-01 RX ADMIN — BISACODYL 10 MG: 10 SUPPOSITORY RECTAL at 14:06

## 2020-01-01 RX ADMIN — NYSTATIN 500000 UNITS: 100000 SUSPENSION ORAL at 17:13

## 2020-01-01 RX ADMIN — ALBUMIN HUMAN 25 G: 0.25 SOLUTION INTRAVENOUS at 04:16

## 2020-01-01 RX ADMIN — AMIODARONE HYDROCHLORIDE 200 MG: 200 TABLET ORAL at 12:12

## 2020-01-01 RX ADMIN — ONDANSETRON 4 MG: 2 INJECTION INTRAMUSCULAR; INTRAVENOUS at 08:11

## 2020-01-01 RX ADMIN — SODIUM CHLORIDE, PRESERVATIVE FREE 10 ML: 5 INJECTION INTRAVENOUS at 20:22

## 2020-01-01 RX ADMIN — Medication 0.02 MCG/KG/MIN: at 23:25

## 2020-01-14 NOTE — PROGRESS NOTES
Multiple attempts by office staff and CS to contact patient. Certified letter sent with number to CS to schedule CT Chest scan and number to office to reschedule office visit with Dr Johan Thao. Also referral by Dr Thao has been closed d/t inability to contact pt. If she states that she in interested in referral, then a new order can be placed.       Certified letter received and signed for on delivery.

## 2020-02-11 NOTE — PROGRESS NOTES
[facility], [place] [facility] Follow up    CHIEF COMPLAINT    COPD    HISTORY OF PRESENT ILLNESS    Sarita Alegre is a 80 y.o.female here today for follow-up of her COPD.  She was last seen in the office by Dr. Johan Thao in December.  At that time Dr. Thao was worried that her congestive heart failure was worsening and was concerned that she may not require another paracentesis.  She has not had a paracentesis since 2016.  He had also ordered a CT scan to be performed and have her follow-up in 1 month.  Unfortunately she was hospitalized at [facility] for pneumonia on January 1 and had required ventilation during this hospital stay.  She was discharged 2 weeks later to a nursing home and has been living in a nursing home for the last month.  She is hopeful that she will be discharged next week back to home.    Since her hospitalization she has had a mixup with her COPD medications and is only been using Spiriva 1 puff daily since out of the hospital.  She had been on Perforomist and budesonide nebulizers prior to her hospitalization.  She states that she is not had these in over a month.  She does notice worsening shortness of breath with any activity.  She remains on 2 L continuously.  Prior to her hospitalization she was only wearing oxygen at nighttime.  She does have to take frequent breaks to recover.  She is currently walking with a walker.    She also had difficulty with renal failure while hospitalized at [facility] and is now requiring dialysis every Monday, Wednesday and Friday.  She is currently discussing getting a fistula placed.    She also has a BiPAP at home but has not been using this since discharge from the hospital.  She is unable to use the connection at the nursing home.  She states that she is going to start using her BiPAP as soon as she gets back home.  She denies any daytime somnolence or fatigue.    She denies fever, chills, sputum production, hemoptysis, night  sweats, weight loss, chest pain or palpitations.  She has chronic lower extremity edema.  She states that her right leg has been more edematous.  She had a duplex of her right leg and was told she did not have any blood clots about a month ago.  She denies any sinus or allergy symptoms.  She denies reflux symptoms.    She is up-to-date on her current vaccinations.    Patient Active Problem List   Diagnosis   • Atrial fibrillation, persistent   • Pulmonary emphysema (CMS/HCC)   • Obstructive sleep apnea syndrome   • Severe chronic ulcerative colitis (CMS/HCC)   • Tachy-marcela syndrome (CMS/HCC)   • Hypertension   • Type 2 diabetes mellitus (CMS/HCC)   • Obesity   • GERD (gastroesophageal reflux disease)   • Hypothyroidism   • Chronic kidney disease (CKD), stage III (moderate) (CMS/HCC)   • Shortness of breath   • Pulmonary hypertension (CMS/HCC)   • CIELO (obstructive sleep apnea)   • Upper GI hemorrhage   • Anemia due to acute blood loss   • Supratherapeutic INR   • Iron deficiency anemia secondary to blood loss (chronic)   • Iron malabsorption   • Abnormal CT of the chest   • Angina at rest (CMS/HCC)   • Hyperlipidemia LDL goal <100       Allergies   Allergen Reactions   • Propafenone Other (See Comments)     Hepatic failure   • Grass Cough   • Heparin Other (See Comments)   • Molds & Smuts Cough       Current Outpatient Medications:   •  albuterol sulfate HFA (PROAIR HFA) 108 (90 Base) MCG/ACT inhaler, Inhale 2 puffs 4 (Four) Times a Day As Needed for Wheezing or Shortness of Air., Disp: 1 inhaler, Rfl: 11  •  aspirin 81 MG chewable tablet, Chew 1 tablet Daily., Disp: 30 tablet, Rfl: 6  •  atorvastatin (LIPITOR) 40 MG tablet, TAKE 1 TABLET BY MOUTH ONCE DAILY, Disp: 90 tablet, Rfl: 3  •  Biotin w/ Vitamins C & E (HAIR/SKIN/NAILS PO), Take 1 tablet by mouth Daily., Disp: , Rfl:   •  bumetanide (BUMEX) 1 MG tablet, Take 1 mg by mouth Daily., Disp: , Rfl:   •  Cyanocobalamin (B-12 COMPLIANCE INJECTION) 1000 MCG/ML kit,  Inject  as directed As Needed., Disp: , Rfl:   •  DULoxetine (CYMBALTA) 60 MG capsule, Take 60 mg by mouth Daily., Disp: , Rfl:   •  HYDROcodone-acetaminophen (NORCO) 5-325 MG per tablet, Take 1 tablet by mouth Every 6 (Six) Hours As Needed., Disp: , Rfl:   •  insulin detemir (LEVEMIR FLEXPEN) 100 UNIT/ML injection, Inject 20 Units under the skin into the appropriate area as directed Daily., Disp: , Rfl:   •  ketotifen (ZADITOR) 0.025 % ophthalmic solution, 1 drop 2 (Two) Times a Day As Needed (itchy eyes)., Disp: , Rfl:   •  levocetirizine (XYZAL ALLERGY 24HR) 5 MG tablet, Take 5 mg by mouth Every Evening., Disp: , Rfl:   •  levothyroxine (SYNTHROID, LEVOTHROID) 100 MCG tablet, Take 75 mcg by mouth Daily., Disp: , Rfl:   •  levothyroxine (SYNTHROID, LEVOTHROID) 75 MCG tablet, Take 75 mcg by mouth Daily., Disp: , Rfl: 2  •  linaclotide (LINZESS) 145 MCG capsule capsule, Take 290 mcg by mouth Every Morning Before Breakfast., Disp: , Rfl:   •  metoprolol tartrate (LOPRESSOR) 25 MG tablet, Take 1 tablet by mouth Every 12 (Twelve) Hours., Disp: 180 tablet, Rfl: 3  •  montelukast (SINGULAIR) 10 MG tablet, Take 10 mg by mouth Daily., Disp: , Rfl:   •  repaglinide (PRANDIN) 1 MG tablet, Take 1 pill up to three times daily for blood sugar over 200, Disp: 90 tablet, Rfl: 11  •  spironolactone (ALDACTONE) 25 MG tablet, Take 25 mg by mouth Daily., Disp: , Rfl:   •  tiotropium bromide monohydrate (SPIRIVA RESPIMAT) 2.5 MCG/ACT aerosol solution inhaler, Inhale 2 puffs Daily., Disp: 1 inhaler, Rfl: 11  •  trolamine salicylate (ASPERCREME) 10 % cream, Apply  topically As Needed for Muscle / Joint Pain. Australian Dream Relief cream, Disp: , Rfl:   •  vitamin C (ASCORBIC ACID) 500 MG tablet, Take 500 mg by mouth Daily., Disp: , Rfl:   No current facility-administered medications for this visit.   MEDICATION LIST AND ALLERGIES REVIEWED.    Social History     Tobacco Use   • Smoking status: Former Smoker     Packs/day: 3.00      "Years: 25.00     Pack years: 75.00     Types: Cigarettes     Last attempt to quit: 1982     Years since quittin.8   • Smokeless tobacco: Never Used   Substance Use Topics   • Alcohol use: No   • Drug use: No       FAMILY AND SOCIAL HISTORY REVIEWED.    Review of Systems   Constitutional: Positive for activity change and fatigue. Negative for appetite change, fever and unexpected weight change.   HENT: Positive for congestion. Negative for postnasal drip, rhinorrhea, sinus pressure, sore throat and voice change.    Eyes: Negative for visual disturbance.   Respiratory: Positive for shortness of breath. Negative for cough, chest tightness and wheezing.    Cardiovascular: Negative for chest pain, palpitations and leg swelling.   Gastrointestinal: Negative for abdominal distention, abdominal pain, nausea and vomiting.   Endocrine: Negative for cold intolerance and heat intolerance.   Genitourinary: Negative for difficulty urinating and urgency.   Musculoskeletal: Negative for arthralgias, back pain and neck pain.   Skin: Negative for color change and pallor.   Allergic/Immunologic: Negative for environmental allergies and food allergies.   Neurological: Positive for weakness. Negative for dizziness, syncope and light-headedness.   Hematological: Negative for adenopathy. Does not bruise/bleed easily.   Psychiatric/Behavioral: Negative for agitation and behavioral problems.   .    /78   Pulse 87   Temp 97.8 °F (36.6 °C)   Ht 172.7 cm (68\")   Wt 81.3 kg (179 lb 3.2 oz)   LMP  (LMP Unknown) Comment: mammogram    SpO2 91% Comment: 2liters  BMI 27.25 kg/m²     Immunization History   Administered Date(s) Administered   • Flu Mist 10/31/2014, 2016   • Flu Vaccine Split Quad 10/17/2019   • Fluzone High Dose =>65 Years ([employer] ONLY) 2018   • Pneumococcal Conjugate 13-Valent (PCV13) 2018   • Pneumococcal Polysaccharide (PPSV23) 2016, 2018       Physical Exam "   Constitutional: She is oriented to person, place, and time. She appears well-developed and well-nourished.   HENT:   Head: Normocephalic and atraumatic.   Eyes: Pupils are equal, round, and reactive to light.   Neck: Normal range of motion. Neck supple. No thyromegaly present.   Cardiovascular: Normal rate, regular rhythm, normal heart sounds and intact distal pulses. Exam reveals no gallop and no friction rub.   No murmur heard.  Pulmonary/Chest: Effort normal and breath sounds normal. No respiratory distress. She has no wheezes. She has no rales. She exhibits no tenderness.   Abdominal: Soft. Bowel sounds are normal. There is no tenderness.   Musculoskeletal: Normal range of motion.   Right lower extremity edema, no pitting edema   Lymphadenopathy:     She has no cervical adenopathy.   Neurological: She is alert and oriented to person, place, and time.   Skin: Skin is warm and dry. Capillary refill takes less than 2 seconds. She is not diaphoretic.   Psychiatric: She has a normal mood and affect. Her behavior is normal.   Nursing note and vitals reviewed.        RESULTS    PFTS in the office today, read by me.  FVC 1.39 48% predicted, FEV1 0.69 32% predicted, FEV1/FVC 50% predicted, TLC 3.68 69% predicted, DLCO 43% predicted, moderate to severe obstruction with no postbronchodilator response, mild restriction and severely reduced DLCO.    PROBLEM LIST    Problem List Items Addressed This Visit        Respiratory    Pulmonary emphysema (CMS/HCC) - Primary    Overview     Impression: 04/21/2015 - with wheezing, PND and GLASER  ?exac by bystolic- felt like she did better with hctz- now using lasix  weight stable   states breathng and breathlessness are still her major problems   echo ok   may be worse with higher dose bystolic  having problems affording medications including inhalers; Description: A. FEV1 65%;  12/2014. H/O asthma; also long h/o cigs   B. Prior cigs; quit in 1980's after smoking 2-3 ppd for 25  yrs.- hosp for exacerbation 5/15 - PULM / NON CARDIAC DESPITE ELEVATED BNP, NORMAL EF         Relevant Medications    tiotropium bromide monohydrate (SPIRIVA RESPIMAT) 2.5 MCG/ACT aerosol solution inhaler    albuterol sulfate HFA (PROVENTIL HFA;VENTOLIN HFA;PROAIR HFA) inhaler 4 puff (Completed)    Other Relevant Orders    Pulmonary Function Test (Completed)      Other Visit Diagnoses     Establishing care with new doctor, encounter for        Relevant Orders    Ambulatory Referral to Internal Medicine            DISCUSSION    Ms. rosales was here for follow-up of her COPD.  Unfortunately she stated [facility] for about 2 weeks for respiratory failure.  She required ventilation and has been in a nursing home for the last month.  She states that she is slowly getting back to normal but continues to have difficulty with shortness of breath.  Due to the fact that she is not at home currently and is not on any of her current COPD medications other than Spiriva.  She also tells me that her Spiriva was only given 1 puff daily instead of 2.  We did review her PFTs in detail today and she does have worsening function however she is not on her current therapy for her COPD.  I did advise her that once she returns home next week she needs to get back on Perforomist and Pulmicort nebulizers twice a day with the addition of Spiriva 2 puffs daily.  She will continue to use albuterol as needed for shortness of breath.  I do not believe she has been using this as well.    She was discussing the possibility of having surgery for a fistula placed in her arm for long-term dialysis.  I did advise her that I would put this off for at least a month and get back on her normal regimen for her COPD before discussing any type of procedures that require sedation.    She does not have a PCP and would like to be referred to someone in the [place] area.  I am going to place this referral today.  I did advise her that she would  need to follow-up with him after she was discharged from the nursing home in a couple weeks after her discharge.    Unfortunately she was unable to perform a CT scan of the chest after her last appointment in December.  I am going to see if she had any CT scans performed at [facility] and if she has I will put this in the chart for our review.  I do suspect that her abnormal chest x-ray at her last appointment was the early stages of pneumonia or worsening CHF.  She does not appear to be in any distress today.    She will follow-up in 2 to 3 months or sooner if her symptoms worsen.  She will call with any additional concerns or questions.  I spent 25 minutes with the patient and family. I spent > 50% percent of this time counseling and discussing diagnosis, prognosis, diagnostic testing, evaluation, current status, treatment options and management.    Amara Lowe, KAILASH  02/11/20202:30 PM  Electronically signed     Please note that portions of this note were completed with a voice recognition program. Efforts were made to edit the dictations, but occasionally words are mistranscribed.      CC: Percy Allison MD

## 2020-03-15 PROBLEM — Z99.2 STAGE 5 CHRONIC KIDNEY DISEASE ON CHRONIC DIALYSIS (HCC): Status: ACTIVE | Noted: 2020-01-01

## 2020-03-15 PROBLEM — N18.6 STAGE 5 CHRONIC KIDNEY DISEASE ON CHRONIC DIALYSIS (HCC): Status: ACTIVE | Noted: 2020-01-01

## 2020-03-27 NOTE — TELEPHONE ENCOUNTER
Patient called is having a lot of congestion is having severe coughing needing something for cough will call in [place]  
3

## 2020-04-03 NOTE — TELEPHONE ENCOUNTER
PT CALLED TO REQUEST A REFILL FOR levothyroxine (SYNTHROID, LEVOTHROID) 112 MCG tablet. PT HAD A QUESTIONS ABOUT WHAT SHE NEEDS TO TAKE.    PT CONTACT [phone]     PHARMACY VERIFIED [facility]

## 2020-04-16 NOTE — TELEPHONE ENCOUNTER
Kellie from [facility] has been seeing her for PT and would like to continue with therapy 1 time a week.  She can be reached at [phone]

## 2020-04-21 NOTE — PROGRESS NOTES
CHIEF COMPLAINT: Followup for COPD.    HISTORY OF PRESENT ILLNESS: A 76-year-old white female with multiple medical problems including atrial fibrillation, status post EVITA cardioversion x1, pacemaker implantation for tachybrady syndrome 2016, diastolic heart failure, moderate group 2 plus group 3 pulmonary hypertension, status post right heart cath with mean PA pressure of 38, wedge of 18, cardiac index of 2.1, performed by Dr. Cooper, hypertension, hyperlipidemia, type 2 diabetes, prior tobacco abuse, obesity, CIELO, asthma, depression, GERD, hypothyroidism, osteoarthritis, who has also had recurrent ascites requiring recurrent large volume paracentesis. She also has chronic kidney disease followed by [facility]. Patient has also had ongoing iron deficiency anemia, serial transfusions. She is being followed by Dr. Muñoz and recently had a capsule study. Her autoimmune workup to date appears to be negative from the records in Epic. Her hepatitis panel is negative. Patient’s most recent paracentesis was performed in 2016 with almost 7.5 L removed.     Had watchman device placed.  No further paracentesis needed.  Using bipap with good compliance.  Recent LHC w/ Non-obstructive CAD.    Her son  unexpectedly in 2018.  She has had a lot of anxiety and depression.  Had chest pain leading to LHC which was ok.     INTERVAL HISTORY:    Returns today for follow up.  Got admitted to [facility] earlier this year.  Required ICU admission with ventilator.  Also started on Dialysis.  Still having SOA, weakness, fatigue, etc.    PMH:    GOLD Stage 2 COPD  Questionable Asthma  Allergic Rhinitis  Prior Tobacco Abuse  Severe CIELO  Group 2+3 Moderate Pulmonary HTN  Diastolic HF, LVH, LAE, Mild to Mod MR EF 55-60%  Afib s/p PPM for Tachy-Leonidas Syndrome, s/p Watchman  CKD Stage IV  Recurrent Ascites (no cirrhosis on GI eval) s/p recurrent LVP  Iron Def Anemia requiring intermittent  transfusion  Duodenal AVM's  T2DM  HTN  HLD  GERD  Hypothyroidism  Hearing Loss  Osteoarthritis    PSH:  Recurrent Large Volume Paracentesis - none needed > 1 year  Cataracts  Right Knee  Left Elbow  INNA  Parathyroid Gland Removal    FH    SH  50-75 py smoking, quit 1980's    ROS  All other systems were reviewed and negative except per HPI    Physical Exam   Constitutional: Oriented to person, place, and time. Appears well-developed and well-nourished.   Head: Normocephalic and atraumatic.   Nose: Nose normal.   Mouth/Throat: Oropharynx is clear and moist.   Eyes: Conjunctivae are normal.   Neck: No tracheal deviation present.   Cardiovascular: Normal rate, regular rhythm, normal heart sounds and intact distal pulses.  Exam reveals no gallop and no friction rub.    No murmur heard.  Pulmonary/Chest: Effort normal and breath sounds normal. No stridor. No respiratory distress. No wheezes. No rales. No tenderness.   Abdominal: Soft. Bowel sounds are normal. No distended w/ ascites.   Musculoskeletal: Normal range of motion. No edema.   Lymphadenopathy:  No cervical adenopathy.   Neurological: Alert and oriented to person, place, and time.  No Focal Neurological Deficits Observed       Skin: Skin is warm and dry. No rash noted.       Psychiatric: Normal mood and affect.  Behavior is normal. Judgment normal.     DIAGNOSTIC DATA (Reviewed and interpreted by me):    6MW 1/25/17 resting room air sat 98%, minimum sat 91%, no O2 needed    Prior Full PFT form 12/2014 (No pleural effusions on CXR at time of PFT):  Moderate obstruction, air trapping mild reduction in DLCO over-corrects for alveolar volume  FEV1/FVC = 65%  FEV1 = 65%  FVC = 76%  TLC = 85%  RV/TLC = 47%  DLCO = 66-->124% for alveolar volume    CPAP download - 12/19/18-1/17/19 - compliance w/ > 4 hrs + 83%, AHI 1.1    Severe obstructive sleep apnea (327.23) (G47.33)      A. UNTREATED; appeared to attempt to wear it sporadically 2007, 2008, split-night study      of  03/10/2016 reported an overall AHI of 32.7/hr which required BiPAP therapy after failed      CPAP attempt with final BiPAP 17 and EPAP 11 which still reported oxygen desaturations      on 2 L O2, her lowest oxygen saturation was documented at 64% and several      arrhythmias were observed during the study.      JEANINE SLEEP STUDY; 11/2007, w/ AHI 57.8; followed by Dr. rader until 2008, during her      sleep study CPAP of 7 treated her well; giving her an AHI of < 2. Follow-up      visits in the sleep clinic indicate she was still sleepy, he increased the pressure to 9,      referred her to ENT to consider surgery.    CT Chest 8/28/15 - mosaic attenuation c/w obstructive lung disease, bilateral pleural effusions, scarring from prior granulomatous disease    CT Chest 12/31/18 - resolution of left sided infiltrates, small left sided effusion    V/Q Scan 8/25/15 - no evidence of CTEPH or acute clot, air trapping c/w obstructive lung disease    Echo 5/24/16 - EF 55-60%, LVH, mild LAE, mild to mod MR, RV moderately dilated, RV Systolic Function Severely reduced, RVSP 80, severe TR    RHC 6/2016  HEMODYNAMIC DATA (Pressures in mmHg):  PULMONARY CAPILLARY WEDGE PRESSURE: 18.  PA: 70/22  Mean PA:  36  RV: 70/10.  RA: Mean of 16.  OXYGEN SATURATIONS: IVC 51%, SVC 59%, RA 56%, RV 54%, PA 60%, and arterial 92%.       NITRIC OXIDE CHALLENGE:  CARDIAC OUTPUT: Baseline 4.23 with cardiac index of 2.28. Systemic blood  pressure 140/89 with a PA pressure of 78/25 and a mean of 37.      POST- NITRIC OXIDE CHALLENGE:  CARDIAC OUTPUT: 3.9 with a cardiac index of 2.1. Systemic pressure 151/75. PA  pressure is 70/20 with a mean of 36.     Impression & Plan    1) Moderate Group 2+3 Pulmonary Hypertension - complex situation.  No evidence of cirrhosis - therefore ascites is cardiac/renal in nature and she cannot have portopulmonary htn by definition. The severity is moderate - mean PA 36 (moderate 35-45) and is clearly proportional to the  degree of Diastolic HF (PCWP 18), COPD FEV1 65%, CKD IV, and Severe untreated CIELO w/ Nocturnal Hypoxemia AHI of 32.7.  For that reason selective pulmonary vasodilators are not indicated, and in fact, could potentially make things worse with worsening V/Q matching or volume overloading a non-compliant left ventricle.  Recently started on dialysis which is controlling her volume status.    2) Severe CIELO and Nocturnal Hypoxemia - now using Bipap regularly.      3) GOLD 2A COPD w/ likely asthma overlap - asthma symptoms more prominent today.  High eos in past.  Too difficult for her to comply with nebs bid.  Trelegy too expensive in past.  Start symbicort w/ spacer and spiriva respimat.  Flonase / xyzal / singulair for allergies.    4) Abnormal CT - recent abnormal CT Chest at [facility].  Will get Repeat to ensure resolution of edema / pneumonia.    RTC 3 months w/ Full PFT and 6MW    Johan Thao MD  Pulmonology and Critical Care Medicine  04/21/20 15:43  Electronically Signed

## 2020-05-07 PROBLEM — N18.9 ANEMIA DUE TO CHRONIC KIDNEY DISEASE: Status: ACTIVE | Noted: 2017-04-15

## 2020-05-07 PROBLEM — D63.1 ANEMIA DUE TO CHRONIC KIDNEY DISEASE: Status: ACTIVE | Noted: 2017-04-15

## 2020-05-14 NOTE — TELEPHONE ENCOUNTER
JARRET FROM [facility] STATES THAT PT HAD SEVERAL OTHER APPTS AND WASN'T UP TO HAVING HER VISIT SHE WAS TIRED.    JARRET IS REQUESTING HER VISIT TO TAKE PLACE NEXT WEEK INSTEAD    PLEASE ADVISE @ [phone]

## 2020-05-18 NOTE — PROGRESS NOTES
[facility]  Telehealth follow-up note    Patient ID: Sarita Alegre is a 80 y.o. female.  : 1940   Contact: [phone]     Encounter date: 2020    PCP: Jade Quach APRN      Chief complaint:   Chief Complaint   Patient presents with   • Atrial fibrillation, persistent (CMS/HCC)       Problem List:  1. Atrial fibrillation:  a. Diagnosed 2015 with controlled rate,  ER.   b. EVITA/ECV, 2015: EF 55-60%. No thrombus. Mild-to-moderate MR. Moderate TR with RVSP 66 mmHg. ECV to NSR with 2 joules.  c. CHADS-VASc = 7 (CHF, HTN, Age > 75, DM, Vascular disease, Female)  2. Tachy-marcela syndrome:  a. Cardiac event monitor, 2016, placed for falls: 6.5 second pause.   b. PM implantation, 2016: [employer] PM, model L101 serial number [id number] generator, the atrial lead is a Guidant model 4135 serial number [id number] lead, the RV lead is a Guidant model 4136 serial number [id number] lead. DDIR  bpm.  rene Henderson, 2017: 21 mm Watchman occlusive device. ASAP-[facility] study Dr. Lopes.  3. Diastolic congestive heart failure:  a. Echocardiogram, 2015: EF 55-60%. Mild MR/TR/AI.  b. Echocardiogram, 2015: EF 65%. Mild MR/AI. Moderate-to-severe TR. Dr. Dorantes.  c. Echocardiogram, 2015: EF 55-60%. RV mildly dilated. Mildly reduced RV global systolic function.Severe TR with RVSP 81 mmHg.  d. Echocardiogram, 2017: EF 56%. Mild MR. Aortic valve exhibits sclerosis.  e. EVITA, 2017: EF 60%. Mild MR/AI. Moderate TR.  f. EVITA, 2018: EF 55%. Mild MR/AI. Moderate TR with RVSP 55 mmHg. A 21 mm Watchman JUAN occlusion device is present within the JUAN. There is no flow around the device.  g. Echocardiogram, 2018: EF 55-60%, mild AI, trace MR, moderate TR, moderate-to-severe pulmonary HTN, RVSP 63 mmHg  h. Echocardiogram [facility] 1/3/2020 LVEF  estimated at 70%.  Pulmonary hypertension, severe, with an RVSP of greater than 90 mmHg.  4. Moderate pulmonary hypertension:  a. RHC, 06/05/2016: Normal cardiac output and index. Severe pulmonary HTN. No evidence of improvement with nitric oxide. No evidence of significant intracardiac shunting. Elevated RA and wedge pressures.  b. RVSP greater than 90 mmHg by echo 1/3/[facility].  5. Chest pain:  a. History of normal echocardiogram as well as Cardiolite GXT, March 2011, Sridhar Bustamante MD.  b. Abnormal EKG revealing NSR with first degree AV block and poor precordial R-wave progression (no evidence of anterior infarct).  c. Cardiolite GXT, 10/01/2018: EF 48%, no evidence of inducible ischemia.  d. LHC, 11/20/2018: Near-normal coronary arteries. Mildly elevated LVEDP, however the patient has been off diuretics for 2 days and has been hydrated overnight for renal protection.  6. Hypertension.  7. Hyperlipidemia.  8. Type 2 diabetes mellitus.  Off insulin as of May 2020 after she began dialysis  9. ESRD following pneumonia and associated respiratory failure January 2020 now on hemodialysis 3 times a week.  Sees Dr. Hopson.  10. History of tobacco abuse with cessation 30 years ago.  11. Obesity.  12. CIELO with no CPAP use.  13. Asthma.  14. Depression.  15. GERD.  16. Hypothyroidism on chronic replacement therapy.  17. Bilateral hearing deficit with hearing aids.  18. Osteoarthritis.  19. History of left ankle fracture followed by Dr. Kennedy.  20. Surgical history:  a. Bilateral cataract extraction, 1996 and 1997.  b. Right knee repair, 1982.  c. Left elbow repair, 2006.  d. Hysterectomy.  e. Parathyroid gland removal.      Allergies   Allergen Reactions   • Propafenone Other (See Comments)     Hepatic failure   • Grass Cough   • Heparin Other (See Comments)   • Molds & Smuts Cough       Current Medications:    Current Outpatient Medications:   •  albuterol sulfate HFA (ProAir HFA) 108 (90 Base) MCG/ACT inhaler,  Inhale 2 puffs 4 (Four) Times a Day As Needed for Wheezing or Shortness of Air., Disp: 1 inhaler, Rfl: 11  •  aspirin 81 MG chewable tablet, Chew 1 tablet Daily., Disp: 30 tablet, Rfl: 6  •  atorvastatin (LIPITOR) 40 MG tablet, TAKE 1 TABLET BY MOUTH ONCE DAILY, Disp: 90 tablet, Rfl: 3  •  Biotin w/ Vitamins C & E (HAIR/SKIN/NAILS PO), Take 1 tablet by mouth Daily., Disp: , Rfl:   •  budesonide-formoterol (SYMBICORT) 160-4.5 MCG/ACT inhaler, Inhale 2 puffs 2 (Two) Times a Day., Disp: 1 inhaler, Rfl: 11  •  Cyanocobalamin (B-12 COMPLIANCE INJECTION) 1000 MCG/ML kit, Inject  as directed As Needed., Disp: , Rfl:   •  DULoxetine (CYMBALTA) 30 MG capsule, Take 3 capsules by mouth Daily., Disp: 270 capsule, Rfl: 2  •  fluticasone (Flonase) 50 MCG/ACT nasal spray, 2 sprays by Each Nare route 2 (Two) Times a Day., Disp: 18.2 mL, Rfl: 11  •  HYDROcodone-acetaminophen (NORCO) 5-325 MG per tablet, Take 1 tablet by mouth Every 6 (Six) Hours As Needed., Disp: , Rfl:   •  ketotifen (ZADITOR) 0.025 % ophthalmic solution, 1 drop 2 (Two) Times a Day As Needed (itchy eyes)., Disp: , Rfl:   •  levocetirizine (Xyzal Allergy 24HR) 5 MG tablet, Take 0.5 tablets by mouth Every Evening., Disp: 30 tablet, Rfl: 11  •  levothyroxine (SYNTHROID, LEVOTHROID) 112 MCG tablet, Take 1 tablet by mouth Every Morning., Disp: 90 tablet, Rfl: 1  •  linaclotide (LINZESS) 145 MCG capsule capsule, Take 290 mcg by mouth Every Morning Before Breakfast., Disp: , Rfl:   •  metoprolol tartrate (LOPRESSOR) 25 MG tablet, Take 1 tablet by mouth Every 12 (Twelve) Hours., Disp: 180 tablet, Rfl: 3  •  montelukast (SINGULAIR) 10 MG tablet, Take 1 tablet by mouth Daily., Disp: 30 tablet, Rfl: 11  •  repaglinide (PRANDIN) 1 MG tablet, Take 1 pill up to three times daily for blood sugar over 200, Disp: 90 tablet, Rfl: 11  •  tiotropium bromide monohydrate (Spiriva Respimat) 2.5 MCG/ACT aerosol solution inhaler, Inhale 2 puffs Daily., Disp: 1 inhaler, Rfl: 11  •   traZODone (DESYREL) 50 MG tablet, Take 1/2-1 tablet one hour before bedtime as needed for sleep., Disp: 90 tablet, Rfl: 2  •  triphrocaps (NEPHROCAPS) 1 MG capsule capsule, Take 1 capsule by mouth Daily., Disp: , Rfl:   •  trolamine salicylate (ASPERCREME) 10 % cream, Apply  topically As Needed for Muscle / Joint Pain. Australian Dream Relief cream, Disp: , Rfl:   •  vitamin C (ASCORBIC ACID) 500 MG tablet, Take 500 mg by mouth Daily., Disp: , Rfl:   •  bumetanide (BUMEX) 1 MG tablet, Take 1 mg by mouth Daily., Disp: , Rfl:   •  spironolactone (ALDACTONE) 25 MG tablet, Take 25 mg by mouth Daily., Disp: , Rfl:     HPI    Sarita Alegre is a 80 y.o. female who has a telephone visit today for a 6 month follow up. She has a history of atrial fibrillation, sick sinus syndrome s/p pacemaker, pulmonary hypertension, diastolic heart failure, essential hypertension, and hyperlipidemia. Since last visit, Sarita was hospitalized at [facility] for pneumonia and sepsis and was on the ventilator for total of 8 days.  She developed renal failure and is now on hemodialysis 3 days a week.  She is also extremely deconditioned and has to walk with a walker at home and cannot walk very far at all.  She has a transport chair for when she does have to get out.      She had a fistula placed by Dr. Jose Watkins and apparently it has not matured.  She is due to have a redo fistula on May 26.  She continues to see Dr. Thao for her pulmonary issues.  She is no longer taking Bumex or spironolactone.    She was taking insulin previously for her diabetes however since she has begun dialysis she has not required any insulin and her hemoglobin A1c is 5.4.  She has not had any cardiac issues.      The following portions of the patient's history were reviewed and updated as appropriate: allergies, current medications and problem list.    Pertinent positives as listed in the HPI.  All other systems reviewed are negative.         Vitals:  "   05/19/20 0958   BP: 123/64   BP Location: Left arm   Patient Position: Sitting   Pulse: 73   Weight: 73.9 kg (163 lb)   Height: 172.7 cm (68\")       Physical Exam:  Oriented to time place and person   Recent and remote memory is intact  Hearing is normal  Respiratory effort is normal  Judgment and insight is normal  Appropriate mood and affect      Diagnostic Data (reviewed with patient):  Lab Results   Component Value Date    GLUCOSE 67 02/27/2020    BUN 9 02/27/2020    CREATININE 1.70 (H) 02/27/2020    EGFRIFNONA 29 (L) 02/27/2020    BCR 5.3 (L) 02/27/2020     02/27/2020    K 4.3 02/27/2020    CL 98 02/27/2020    CO2 29.2 (H) 02/27/2020    CALCIUM 9.8 02/27/2020    ALBUMIN 4.00 02/27/2020    ALKPHOS 72 02/27/2020    AST 22 02/27/2020    ALT 6 02/27/2020     Lab Results   Component Value Date    CHOL 84 02/27/2020    TRIG 64 02/27/2020    HDL 36 (L) 02/27/2020    LDL 35 02/27/2020      Lab Results   Component Value Date    WBC 7.13 02/27/2020    RBC 4.21 02/27/2020    HGB 11.9 (L) 02/27/2020    HCT 37.5 02/27/2020    MCV 89.1 02/27/2020     02/27/2020      Lab Results   Component Value Date    TSH 4.110 02/27/2020       Lab Results   Component Value Date    HGBA1C 5.49 02/27/2020        Procedures      Assessment:    ICD-10-CM ICD-9-CM   1. Atrial fibrillation, persistent I48.19 427.31   2. Pulmonary hypertension (CMS/[facility]) I27.20 416.8         Plan:  1. Continue metoprolol for hypertension and rate control on her atrial fibrillation.  She needs a refill on her metoprolol.  2. No anticoagulation as she has a watchman device  3. Continue all other current medications.  4. F/up in 6 months, sooner if needed.      This patient has consented to a telehealth visit via telephone. The visit was scheduled as a follow-up visit to comply with patient safety concerns in accordance with CDC recommendations.  All vitals recorded within this visit are reported by the patient.  I spent 16 minutes in total including " but not limited to the 9 minutes spent in direct conversation with this patient.       Radha Cooper MD, FACC

## 2020-06-14 NOTE — PROGRESS NOTES
Subjective   Chief Complaint   Patient presents with   • Follow-up     4 wk recheck      Sarita Alegre is a 80 y.o. female here today for follow up on hypertension, kidney disease, copd, depression, anxiety, and insomnia. Blood pressures have been going up and down. She has chronic kidney disease and on dialysis. Blood pressure varies depending on kidney function. Blood sugars have been doing well. No headaches, changes in vision, shortness of breath, or chest pain. She stopped her insulin and blood sugars have normalized and averaging around 95. COPD is well controlled with spiriva and symbicort. Depression and anxiety have been difficult to manage. She struggles with fatigue and feeling poorly. She feels no one understands how horrible being on dialysis is including her . Cymbalta helps some. She does not want to add on any further medication. Trazodone is helping with sleep.       I have reviewed the following portions of the patient's history and confirmed they are accurate: allergies, current medications, past family history, past medical history, past social history, past surgical history and problem list    I have personally completed the patient's review of systems.    Review of Systems   Constitutional: Positive for fatigue. Negative for activity change, appetite change, chills, diaphoresis, fever, unexpected weight gain and unexpected weight loss.   HENT: Negative for ear discharge, ear pain, mouth sores, nosebleeds, sinus pressure, sneezing and sore throat.    Eyes: Negative for pain, discharge and itching.   Respiratory: Negative for cough, chest tightness, shortness of breath and wheezing.    Cardiovascular: Negative for chest pain, palpitations and leg swelling.   Gastrointestinal: Negative for abdominal pain, constipation, diarrhea, nausea and vomiting.   Endocrine: Negative for heat intolerance, polydipsia and polyphagia.   Genitourinary: Negative for dysuria, flank pain, frequency,  hematuria and urgency.   Musculoskeletal: Positive for arthralgias, gait problem and myalgias. Negative for back pain, joint swelling, neck pain and neck stiffness.   Skin: Negative for color change, pallor and rash.   Allergic/Immunologic: Positive for immunocompromised state.   Neurological: Positive for weakness. Negative for seizures, speech difficulty and numbness.   Hematological: Negative for adenopathy. Bruises/bleeds easily.   Psychiatric/Behavioral: Positive for depressed mood and stress. Negative for agitation, decreased concentration, sleep disturbance and suicidal ideas. The patient is nervous/anxious.        Current Outpatient Medications on File Prior to Visit   Medication Sig   • albuterol sulfate HFA (ProAir HFA) 108 (90 Base) MCG/ACT inhaler Inhale 2 puffs 4 (Four) Times a Day As Needed for Wheezing or Shortness of Air.   • aspirin 81 MG chewable tablet Chew 1 tablet Daily.   • atorvastatin (LIPITOR) 40 MG tablet TAKE 1 TABLET BY MOUTH ONCE DAILY   • Biotin w/ Vitamins C & E (HAIR/SKIN/NAILS PO) Take 1 tablet by mouth Daily.   • budesonide-formoterol (SYMBICORT) 160-4.5 MCG/ACT inhaler Inhale 2 puffs 2 (Two) Times a Day.   • bumetanide (BUMEX) 1 MG tablet Take 1 mg by mouth Daily.   • Cyanocobalamin (B-12 COMPLIANCE INJECTION) 1000 MCG/ML kit Inject  as directed As Needed.   • DULoxetine (CYMBALTA) 30 MG capsule Take 3 capsules by mouth Daily.   • fluticasone (Flonase) 50 MCG/ACT nasal spray 2 sprays by Each Nare route 2 (Two) Times a Day.   • ketotifen (ZADITOR) 0.025 % ophthalmic solution 1 drop 2 (Two) Times a Day As Needed (itchy eyes).   • levocetirizine (Xyzal Allergy 24HR) 5 MG tablet Take 0.5 tablets by mouth Every Evening.   • levothyroxine (SYNTHROID, LEVOTHROID) 112 MCG tablet Take 1 tablet by mouth Every Morning.   • linaclotide (LINZESS) 145 MCG capsule capsule Take 290 mcg by mouth Every Morning Before Breakfast.   • metoprolol tartrate (LOPRESSOR) 25 MG tablet Take 1 tablet by mouth  "Every 12 (Twelve) Hours.   • montelukast (SINGULAIR) 10 MG tablet Take 1 tablet by mouth Daily.   • spironolactone (ALDACTONE) 25 MG tablet Take 25 mg by mouth Daily.   • tiotropium bromide monohydrate (Spiriva Respimat) 2.5 MCG/ACT aerosol solution inhaler Inhale 2 puffs Daily.   • traZODone (DESYREL) 50 MG tablet Take 1/2-1 tablet one hour before bedtime as needed for sleep.   • triphrocaps (NEPHROCAPS) 1 MG capsule capsule Take 1 capsule by mouth Daily.   • trolamine salicylate (ASPERCREME) 10 % cream Apply  topically As Needed for Muscle / Joint Pain. Australian Dream Relief cream   • vitamin C (ASCORBIC ACID) 500 MG tablet Take 500 mg by mouth Daily.     No current facility-administered medications on file prior to visit.        Objective   Vitals:    05/21/20 1310   BP: 128/70   BP Location: Left arm   Patient Position: Sitting   Cuff Size: Large Adult   Pulse: 69   Temp: 97.5 °F (36.4 °C)   TempSrc: Temporal   SpO2: 92%  Comment: on 2 liters   Weight: 73.9 kg (163 lb)   Height: 172.7 cm (68\")     Body mass index is 24.78 kg/m².    Physical Exam   Constitutional: She is oriented to person, place, and time. She appears well-developed and well-nourished.   HENT:   Head: Normocephalic and atraumatic.   Nose: Nose normal.   Eyes: Pupils are equal, round, and reactive to light. Conjunctivae, EOM and lids are normal.   Neck: Trachea normal and normal range of motion. No thyromegaly present.   Cardiovascular: Normal rate and normal heart sounds. An irregularly irregular rhythm present.   Pulses:       Carotid pulses are 2+ on the right side, and 2+ on the left side.  Pulmonary/Chest: Effort normal and breath sounds normal. No respiratory distress.   Musculoskeletal:   In wheelchair   Neurological: She is alert and oriented to person, place, and time. She has normal strength. GCS eye subscore is 4. GCS verbal subscore is 5. GCS motor subscore is 6.   Skin: Skin is warm and dry. Capillary refill takes 2 to 3 seconds. " Turgor is normal.   Psychiatric: Her speech is normal and behavior is normal. Thought content normal. Her mood appears anxious. Cognition and memory are normal. She exhibits a depressed mood. She expresses no homicidal and no suicidal ideation. She expresses no suicidal plans and no homicidal plans.   Vitals reviewed.      Assessment/Plan   Sarita was seen today for follow-up.    Diagnoses and all orders for this visit:    Type 2 diabetes mellitus without complication, with long-term current use of insulin (CMS/HCC)  Chronic issue stable requiring medication management and monitoring. Will eat well balanced heart healthy diabetic diet, drink adequate water, increase physical activity, and get adequate rest. Will monitor blood sugars daily and keep log for next appt. Will contact office earlier if blood sugars are averaging above 250.   -     POC Glycosylated Hemoglobin (Hb A1C)    Hypertension secondary to other renal disorders  Chronic issue unstable requiring medication management and monitoring. Will eat well balanced heart healthy diet, drink adequate water, increase physical activity, and get adequate rest. Monitor blood pressures daily and contact office for any readings consistently above 140/90. Patient will report any associated symptoms such as headaches, blurry vision, or nausea. Patient will go to ER for any chest pressure or chest pain.  Continue bumex, spironolactone, and metoprolol      Other emphysema (CMS/HCC)  Chronic stable requiring medication management, lifestyle changes, and monitoring. Will eat heart healthy diet, drink adequate water, and increase physical activity as tolerated. Discussed aspects of COPD including chronic bronchitis (recurrent infection or over production of bronchial secretions with chronic productive cough) and emphysema (permenant lung damage). Discussed smoking as the most common source of COPD and necessity of smoking cessation. Discussed importance of being compliant  with inhalers and medications. Discussed importance of keeping up to date on immunizations/vaccines including pneumovax and influenza. Discussed breathing techniques including pursed-lip breathing.   Continue Spiriva, Symbicort, and albuterol    Depression with anxiety  Chronic issue unstable requiring medication management and monitoring. Will eat well balanced diet, increase water intake, increase physical activity during the day, and get adequate rest. Discussed relaxation and coping skills and exercises. Suggested self referral to behavioral therapist. Given contact number to [facility] crisis line [phone] and [facility] psychiatric care clinic [phone].  Continue Cymbalta.  Discussed adding on Wellbutrin.    Primary insomnia  Chronic issue stable requiring medication management and monitoring. Will eat well balanced diet, drink adequate water decreasing caffeine intake, and increase physical activity during the day. Discussed relaxation and coping skills and exercises. Discussed sleep hygiene and limiting electronic devices prior to bedtime.    Continue trazodone.           Current Outpatient Medications:   •  albuterol sulfate HFA (ProAir HFA) 108 (90 Base) MCG/ACT inhaler, Inhale 2 puffs 4 (Four) Times a Day As Needed for Wheezing or Shortness of Air., Disp: 1 inhaler, Rfl: 11  •  aspirin 81 MG chewable tablet, Chew 1 tablet Daily., Disp: 30 tablet, Rfl: 6  •  atorvastatin (LIPITOR) 40 MG tablet, TAKE 1 TABLET BY MOUTH ONCE DAILY, Disp: 90 tablet, Rfl: 3  •  Biotin w/ Vitamins C & E (HAIR/SKIN/NAILS PO), Take 1 tablet by mouth Daily., Disp: , Rfl:   •  budesonide-formoterol (SYMBICORT) 160-4.5 MCG/ACT inhaler, Inhale 2 puffs 2 (Two) Times a Day., Disp: 1 inhaler, Rfl: 11  •  bumetanide (BUMEX) 1 MG tablet, Take 1 mg by mouth Daily., Disp: , Rfl:   •  Cyanocobalamin (B-12 COMPLIANCE INJECTION) 1000 MCG/ML kit, Inject  as directed As Needed., Disp: , Rfl:   •  DULoxetine (CYMBALTA) 30 MG  capsule, Take 3 capsules by mouth Daily., Disp: 270 capsule, Rfl: 2  •  fluticasone (Flonase) 50 MCG/ACT nasal spray, 2 sprays by Each Nare route 2 (Two) Times a Day., Disp: 18.2 mL, Rfl: 11  •  HYDROcodone-acetaminophen (NORCO) 5-325 MG per tablet, Take 1 tablet by mouth Every 6 (Six) Hours As Needed for Moderate Pain ., Disp: 28 tablet, Rfl: 0  •  ketotifen (ZADITOR) 0.025 % ophthalmic solution, 1 drop 2 (Two) Times a Day As Needed (itchy eyes)., Disp: , Rfl:   •  levocetirizine (Xyzal Allergy 24HR) 5 MG tablet, Take 0.5 tablets by mouth Every Evening., Disp: 30 tablet, Rfl: 11  •  levothyroxine (SYNTHROID, LEVOTHROID) 112 MCG tablet, Take 1 tablet by mouth Every Morning., Disp: 90 tablet, Rfl: 1  •  linaclotide (LINZESS) 145 MCG capsule capsule, Take 290 mcg by mouth Every Morning Before Breakfast., Disp: , Rfl:   •  metoprolol tartrate (LOPRESSOR) 25 MG tablet, Take 1 tablet by mouth Every 12 (Twelve) Hours., Disp: 180 tablet, Rfl: 3  •  montelukast (SINGULAIR) 10 MG tablet, Take 1 tablet by mouth Daily., Disp: 30 tablet, Rfl: 11  •  spironolactone (ALDACTONE) 25 MG tablet, Take 25 mg by mouth Daily., Disp: , Rfl:   •  tiotropium bromide monohydrate (Spiriva Respimat) 2.5 MCG/ACT aerosol solution inhaler, Inhale 2 puffs Daily., Disp: 1 inhaler, Rfl: 11  •  traZODone (DESYREL) 50 MG tablet, Take 1/2-1 tablet one hour before bedtime as needed for sleep., Disp: 90 tablet, Rfl: 2  •  triphrocaps (NEPHROCAPS) 1 MG capsule capsule, Take 1 capsule by mouth Daily., Disp: , Rfl:   •  trolamine salicylate (ASPERCREME) 10 % cream, Apply  topically As Needed for Muscle / Joint Pain. Australian Dream Relief cream, Disp: , Rfl:   •  vitamin C (ASCORBIC ACID) 500 MG tablet, Take 500 mg by mouth Daily., Disp: , Rfl:        Plan of care reviewed with the patient at the conclusion of today's visit.  Education was provided regarding diagnosis, management, and any prescribed or recommended OTC medications.  Patient verbalized  understanding of and agreement with management plan.     No follow-ups on file.      KAILASH Burgess    Please note that portions of this note were completed with a voice recognition program. Efforts were made to edit the dictations, but occasionally words are mistranscribed.

## 2020-06-23 NOTE — PROGRESS NOTES
Subjective   Chief Complaint   Patient presents with   • Diabetes      Sarita Alegre is a 80 y.o. female here today for diabetes and chronic pain.  Blood sugars have been doing well and averaging around 120.  Her morning blood sugar today was 117.  She is going to dialysis 3 times weekly.  Her fatigue and outlook has been a bit better the past couple of weeks.  She reports she has had more energy.  She continues to have overall musculoskeletal tenderness and pain.  She had her fistula revised in her right arm and has been experiencing pain from this.  The hydrocodone has been helping with her pain and low back pain.   states that since she is been taking hydrocodone she is resting better and quality of life is better.  She has shortness of breath related to COPD but this is stable.  No chest pain.    I have reviewed the following portions of the patient's history and confirmed they are accurate: allergies, current medications, past family history, past medical history, past social history, past surgical history and problem list    I have personally completed the patient's review of systems.    Review of Systems   Constitutional: Positive for fatigue. Negative for activity change, appetite change, chills, diaphoresis, fever, unexpected weight gain and unexpected weight loss.   HENT: Negative for ear discharge, ear pain, mouth sores, nosebleeds, sinus pressure, sneezing and sore throat.    Eyes: Negative for pain, discharge and itching.   Respiratory: Negative for cough, chest tightness, shortness of breath and wheezing.    Cardiovascular: Negative for chest pain, palpitations and leg swelling.   Gastrointestinal: Negative for abdominal pain, constipation, diarrhea, nausea and vomiting.   Endocrine: Negative for heat intolerance, polydipsia and polyphagia.   Genitourinary: Negative for dysuria, flank pain, frequency, hematuria and urgency.   Musculoskeletal: Positive for arthralgias, gait problem and  myalgias. Negative for back pain, joint swelling, neck pain and neck stiffness.   Skin: Negative for color change, pallor and rash.   Allergic/Immunologic: Positive for immunocompromised state.   Neurological: Positive for weakness. Negative for seizures, speech difficulty and numbness.   Hematological: Negative for adenopathy. Bruises/bleeds easily.   Psychiatric/Behavioral: Positive for depressed mood and stress. Negative for agitation, decreased concentration, sleep disturbance and suicidal ideas. The patient is nervous/anxious.        Current Outpatient Medications on File Prior to Visit   Medication Sig   • albuterol sulfate HFA (ProAir HFA) 108 (90 Base) MCG/ACT inhaler Inhale 2 puffs 4 (Four) Times a Day As Needed for Wheezing or Shortness of Air.   • aspirin 81 MG chewable tablet Chew 1 tablet Daily.   • atorvastatin (LIPITOR) 40 MG tablet TAKE 1 TABLET BY MOUTH ONCE DAILY   • Biotin w/ Vitamins C & E (HAIR/SKIN/NAILS PO) Take 1 tablet by mouth Daily.   • DULoxetine (CYMBALTA) 30 MG capsule Take 3 capsules by mouth Daily.   • fluticasone (Flonase) 50 MCG/ACT nasal spray 2 sprays by Each Nare route 2 (Two) Times a Day.   • FREESTYLE LITE test strip 1 each by Other route 4 (Four) Times a Day. use 1 strip to check glucose 4 times daily   • HYDROcodone-acetaminophen (NORCO) 7.5-325 MG per tablet Take  by mouth See Admin Instructions. Take 1 tablet by mouth every 4-6 hours as needed for pain   • ketotifen (ZADITOR) 0.025 % ophthalmic solution 1 drop 2 (Two) Times a Day As Needed (itchy eyes).   • levocetirizine (Xyzal Allergy 24HR) 5 MG tablet Take 0.5 tablets by mouth Every Evening.   • levothyroxine (SYNTHROID, LEVOTHROID) 112 MCG tablet Take 1 tablet by mouth Every Morning.   • linaclotide (LINZESS) 145 MCG capsule capsule Take 290 mcg by mouth Every Morning Before Breakfast.   • metoprolol tartrate (LOPRESSOR) 25 MG tablet Take 1 tablet by mouth Every 12 (Twelve) Hours.   • montelukast (SINGULAIR) 10 MG  "tablet Take 1 tablet by mouth Daily.   • tiotropium bromide monohydrate (Spiriva Respimat) 2.5 MCG/ACT aerosol solution inhaler Inhale 2 puffs Daily.   • traZODone (DESYREL) 50 MG tablet Take 1/2-1 tablet one hour before bedtime as needed for sleep.   • trolamine salicylate (ASPERCREME) 10 % cream Apply  topically As Needed for Muscle / Joint Pain. [place] Dream Relief cream   • budesonide-formoterol (SYMBICORT) 160-4.5 MCG/ACT inhaler Inhale 2 puffs 2 (Two) Times a Day.   • bumetanide (BUMEX) 1 MG tablet Take 1 mg by mouth Daily.   • Cyanocobalamin (B-12 COMPLIANCE INJECTION) 1000 MCG/ML kit Inject  as directed As Needed.   • spironolactone (ALDACTONE) 25 MG tablet Take 25 mg by mouth Daily.   • triphrocaps (NEPHROCAPS) 1 MG capsule capsule Take 1 capsule by mouth Daily.   • vitamin C (ASCORBIC ACID) 500 MG tablet Take 500 mg by mouth Daily.     No current facility-administered medications on file prior to visit.        Objective   Vitals:    06/23/20 1435   BP: 126/80   Pulse: 81   Resp: 16   Temp: 96.9 °F (36.1 °C)   TempSrc: Temporal   SpO2: 98%   Weight: 75.3 kg (166 lb)   Height: 172.7 cm (68\")     Body mass index is 25.24 kg/m².    Physical Exam   Constitutional: She is oriented to person, place, and time. She appears well-developed and well-nourished.   HENT:   Head: Normocephalic and atraumatic.   Nose: Nose normal.   Eyes: Pupils are equal, round, and reactive to light. Conjunctivae, EOM and lids are normal.   Neck: Trachea normal and normal range of motion. No thyromegaly present.   Cardiovascular: Normal rate and normal heart sounds. A regularly irregular rhythm present.   Pulmonary/Chest: Effort normal and breath sounds normal. No respiratory distress.   Musculoskeletal:   In wheelchair   Neurological: She is alert and oriented to person, place, and time. She has normal strength. GCS eye subscore is 4. GCS verbal subscore is 5. GCS motor subscore is 6.   Skin: Skin is warm and dry. Capillary refill " takes 2 to 3 seconds. Turgor is normal.   Psychiatric: Her speech is normal and behavior is normal. Thought content normal. Her mood appears anxious. Cognition and memory are normal. She exhibits a depressed mood. She expresses no homicidal and no suicidal ideation. She expresses no suicidal plans and no homicidal plans.   Vitals reviewed.      Assessment/Plan   Sarita was seen today for diabetes.    Diagnoses and all orders for this visit:    Type 2 diabetes mellitus without complication, with long-term current use of insulin (CMS/[facility])  Chronic issue stable requiring medication management and monitoring. Will eat well balanced heart healthy diabetic diet, drink adequate water, increase physical activity, and get adequate rest. Will monitor blood sugars daily and keep log for next appt. Will contact office earlier if blood sugars are averaging above 250.     Arthralgia, unspecified joint  Chronic issue unstable requiring medication management and monitoring. Will eat well balanced diet, increase water intake, increase physical activity as tolerated, and get adequate rest. Can use warmth or ice for discomfort in this area. Discussed stretching exercises.   -     HYDROcodone-acetaminophen (NORCO) 5-325 MG per tablet; Take 1 tablet by mouth Every 6 (Six) Hours As Needed for Moderate Pain .    Chronic midline low back pain, unspecified whether sciatica present  Chronic issue unstable requiring medication management and monitoring. Will eat well balanced diet, increase water intake, increase physical activity as tolerated, and get adequate rest. Can use warmth or ice for discomfort in this area. Discussed stretching exercises.   -     HYDROcodone-acetaminophen (NORCO) 5-325 MG per tablet; Take 1 tablet by mouth Every 6 (Six) Hours As Needed for Moderate Pain .           Current Outpatient Medications:   •  albuterol sulfate HFA (ProAir HFA) 108 (90 Base) MCG/ACT inhaler, Inhale 2 puffs 4 (Four) Times a Day As Needed for  Wheezing or Shortness of Air., Disp: 1 inhaler, Rfl: 11  •  aspirin 81 MG chewable tablet, Chew 1 tablet Daily., Disp: 30 tablet, Rfl: 6  •  atorvastatin (LIPITOR) 40 MG tablet, TAKE 1 TABLET BY MOUTH ONCE DAILY, Disp: 90 tablet, Rfl: 3  •  Biotin w/ Vitamins C & E (HAIR/SKIN/NAILS PO), Take 1 tablet by mouth Daily., Disp: , Rfl:   •  DULoxetine (CYMBALTA) 30 MG capsule, Take 3 capsules by mouth Daily., Disp: 270 capsule, Rfl: 2  •  fluticasone (Flonase) 50 MCG/ACT nasal spray, 2 sprays by Each Nare route 2 (Two) Times a Day., Disp: 18.2 mL, Rfl: 11  •  FREESTYLE LITE test strip, 1 each by Other route 4 (Four) Times a Day. use 1 strip to check glucose 4 times daily, Disp: , Rfl:   •  HYDROcodone-acetaminophen (NORCO) 7.5-325 MG per tablet, Take  by mouth See Admin Instructions. Take 1 tablet by mouth every 4-6 hours as needed for pain, Disp: , Rfl:   •  ketotifen (ZADITOR) 0.025 % ophthalmic solution, 1 drop 2 (Two) Times a Day As Needed (itchy eyes)., Disp: , Rfl:   •  levocetirizine (Xyzal Allergy 24HR) 5 MG tablet, Take 0.5 tablets by mouth Every Evening., Disp: 30 tablet, Rfl: 11  •  levothyroxine (SYNTHROID, LEVOTHROID) 112 MCG tablet, Take 1 tablet by mouth Every Morning., Disp: 90 tablet, Rfl: 1  •  linaclotide (LINZESS) 145 MCG capsule capsule, Take 290 mcg by mouth Every Morning Before Breakfast., Disp: , Rfl:   •  metoprolol tartrate (LOPRESSOR) 25 MG tablet, Take 1 tablet by mouth Every 12 (Twelve) Hours., Disp: 180 tablet, Rfl: 3  •  montelukast (SINGULAIR) 10 MG tablet, Take 1 tablet by mouth Daily., Disp: 30 tablet, Rfl: 11  •  tiotropium bromide monohydrate (Spiriva Respimat) 2.5 MCG/ACT aerosol solution inhaler, Inhale 2 puffs Daily., Disp: 1 inhaler, Rfl: 11  •  traZODone (DESYREL) 50 MG tablet, Take 1/2-1 tablet one hour before bedtime as needed for sleep., Disp: 90 tablet, Rfl: 2  •  trolamine salicylate (ASPERCREME) 10 % cream, Apply  topically As Needed for Muscle / Joint Pain. [place] Dream  Relief cream, Disp: , Rfl:   •  budesonide-formoterol (SYMBICORT) 160-4.5 MCG/ACT inhaler, Inhale 2 puffs 2 (Two) Times a Day., Disp: 1 inhaler, Rfl: 11  •  bumetanide (BUMEX) 1 MG tablet, Take 1 mg by mouth Daily., Disp: , Rfl:   •  Cyanocobalamin (B-12 COMPLIANCE INJECTION) 1000 MCG/ML kit, Inject  as directed As Needed., Disp: , Rfl:   •  HYDROcodone-acetaminophen (NORCO) 5-325 MG per tablet, Take 1 tablet by mouth Every 6 (Six) Hours As Needed for Moderate Pain ., Disp: 90 tablet, Rfl: 0  •  spironolactone (ALDACTONE) 25 MG tablet, Take 25 mg by mouth Daily., Disp: , Rfl:   •  triphrocaps (NEPHROCAPS) 1 MG capsule capsule, Take 1 capsule by mouth Daily., Disp: , Rfl:   •  vitamin C (ASCORBIC ACID) 500 MG tablet, Take 500 mg by mouth Daily., Disp: , Rfl:        Plan of care reviewed with the patient at the conclusion of today's visit.  Education was provided regarding diagnosis, management, and any prescribed or recommended OTC medications.  Patient verbalized understanding of and agreement with management plan.     Return in about 1 month (around 7/23/2020), or if symptoms worsen or fail to improve.      KAILASH Burgess    Please note that portions of this note were completed with a voice recognition program. Efforts were made to edit the dictations, but occasionally words are mistranscribed.

## 2020-07-27 PROBLEM — I95.9 HYPOTENSION: Status: ACTIVE | Noted: 2020-01-01

## 2020-07-28 PROBLEM — N10 ACUTE PYELONEPHRITIS: Status: ACTIVE | Noted: 2020-01-01

## 2020-07-30 PROBLEM — I47.29 NSVT (NONSUSTAINED VENTRICULAR TACHYCARDIA) (HCC): Status: ACTIVE | Noted: 2020-01-01

## 2020-08-01 PROBLEM — R13.10 DYSPHAGIA: Status: ACTIVE | Noted: 2020-01-01

## 2020-08-01 NOTE — PROGRESS NOTES
[facility] Medicine Services  PROGRESS NOTE    Patient Name: Sarita Alegre  : 1940  MRN: [id number]    Date of Admission: 2020  Primary Care Physician: Jade Quach APRN    Subjective   Subjective     CC:  F/U chest pain, SOA    HPI:   Pt seen in HD this am, she denies any issues overnight.       Review of Systems -    Gen- No fevers, chills  CV- No chest pain, palpitations  Resp- No cough, dyspnea  GI- No N/V/D, abd pain    All other systems reviewed and negative except any additional pertinent positives and negatives as discussed in HPI.      Objective   Objective     Vital Signs:   Temp:  [97.9 °F (36.6 °C)-99 °F (37.2 °C)] 98.2 °F (36.8 °C)  Heart Rate:  [] 89  Resp:  [16-24] 16  BP: ()/(38-66) 108/52        Physical Exam:  Gen-  Sleeping initially while in HD, wakes after gently shaking her  HENT-NCAT, mucous membranes moist, RIJ present  CV-RRR, S1 S2 normal, no m/r/g  Resp- CTAB   Abd-soft,NT, ND, normoactive BS  Ext-trace BLE edema  Neuro-A&Ox3, no focal deficits  Skin- no rashes  Psych-appropriate mood  Neuro - MAEW      Results Reviewed:  Results from last 7 days   Lab Units 20  0345 20  0458 20  0606  20  2135   WBC 10*3/mm3 8.47 10.58 14.15*   < >  --    HEMOGLOBIN g/dL 10.1* 11.5* 11.1*   < >  --    HEMATOCRIT % 32.6* 35.5 34.8   < >  --    PLATELETS 10*3/mm3 111* 142 165   < >  --    PROCALCITONIN ng/mL  --  2.17*  --   --  2.43*    < > = values in this interval not displayed.     Results from last 7 days   Lab Units 20  0345 20  0458 20  1918 20  0606 20  0604 20  0909 07/27/20  2135   SODIUM mmol/L 134* 133*  --  132* 130* 137 135*   POTASSIUM mmol/L 4.1 3.7 3.9 3.2* 3.8 4.0 3.9   CHLORIDE mmol/L 96* 97*  --  97* 98 99 99   CO2 mmol/L 24.0 21.0*  --  22.0 17.0* 20.0* 21.0*   BUN mg/dL 14 27*  --  21 26* 15 11   CREATININE mg/dL 2.71* 3.50*  --  3.02* 3.73* 3.56* 3.34*      GLUCOSE mg/dL 119* 108*  --  138* 185* 127* 181*   CALCIUM mg/dL 9.4 9.2  --  9.4 9.3 10.0 9.6   ALT (SGPT) U/L  --   --   --   --  9 8 10   AST (SGOT) U/L  --   --   --   --  23 26 35*   TROPONIN T ng/mL  --   --   --   --   --  0.033*  --    PROBNP pg/mL  --  52,812.0*  --   --   --   --   --      Estimated Creatinine Clearance: 17.4 mL/min (A) (by C-G formula based on SCr of 2.71 mg/dL (H)).    Microbiology Results Abnormal     Procedure Component Value - Date/Time    Blood Culture - Blood, Hand, Left [[id number]] Collected:  07/27/20 2307    Lab Status:  Preliminary result Specimen:  Blood from Hand, Left Updated:  08/01/20 0000     Blood Culture No growth at 4 days    Blood Culture - Blood, Hand, Left [[id number]] Collected:  07/27/20 2255    Lab Status:  Preliminary result Specimen:  Blood from Hand, Left Updated:  08/01/20 0000     Blood Culture No growth at 4 days    Urine Culture - Urine, Urine, Clean Catch [[id number]]  (Abnormal)  (Susceptibility) Collected:  07/28/20 0429    Lab Status:  Final result Specimen:  Urine, Clean Catch Updated:  07/30/20 0305     Urine Culture >100,000 CFU/mL Escherichia coli ESBL     Comment:   Consider infectious disease consult.  Susceptibility results may not correlate to clinical outcomes.       Susceptibility      Escherichia coli ESBL     NEDRA     Amikacin Susceptible     Ertapenem Susceptible     Gentamicin Resistant     Levofloxacin Resistant     Meropenem Susceptible     Nitrofurantoin Susceptible     Piperacillin + Tazobactam Susceptible     Tetracycline Resistant     Tobramycin Intermediate     Trimethoprim + Sulfamethoxazole Resistant                    MRSA Screen, PCR (Inpatient) - Swab, Nares [[id number]]  (Normal) Collected:  07/28/20 1610    Lab Status:  Final result Specimen:  Swab from Nares Updated:  07/28/20 1915     MRSA PCR Negative    Narrative:       MRSA Negative    COVID PRE-OP / PRE-PROCEDURE SCREENING ORDER (NO ISOLATION) - Swab, Nasopharynx  [[id number]] Collected:  07/26/20 2148    Lab Status:  Final result Specimen:  Swab from Nasopharynx Updated:  07/27/20 1907    Narrative:       The following orders were created for panel order COVID PRE-OP / PRE-PROCEDURE SCREENING ORDER (NO ISOLATION) - Swab, Nasopharynx.  Procedure                               Abnormality         Status                     ---------                               -----------         ------                     COVID-19,[facility], NP ...[[id number]]                      Final result                 Please view results for these tests on the individual orders.    COVID-19,[facility], NP SWAB IN LEXAR SALINE MEDIA 24-30 HR TAT - Swab, Nasopharynx [[id number]] Collected:  07/26/20 2148    Lab Status:  Final result Specimen:  Swab from Nasopharynx Updated:  07/27/20 1907     Reference Lab Report --     Comment: See scanned report          COVID19 Not Detected    COVID PRE-OP / PRE-PROCEDURE SCREENING ORDER (NO ISOLATION) - Swab, Nasopharynx [[id number]] Collected:  07/22/20 1806    Lab Status:  Final result Specimen:  Swab from Nasopharynx Updated:  07/23/20 0017    Narrative:       The following orders were created for panel order COVID PRE-OP / PRE-PROCEDURE SCREENING ORDER (NO ISOLATION) - Swab, Nasopharynx.  Procedure                               Abnormality         Status                     ---------                               -----------         ------                     Respiratory Panel PCR w/...[[id number]]  Normal              Final result                 Please view results for these tests on the individual orders.    Respiratory Panel PCR w/COVID-19(SARS-CoV-2) [facility] In-House, NP swab in UTM/VTM - Swab, Nasopharynx [[id number]]  (Normal) Collected:  07/22/20 1806    Lab Status:  Final result Specimen:  Swab from Nasopharynx Updated:  07/23/20 0017     ADENOVIRUS, PCR Not Detected     Coronavirus 229E Not Detected     Coronavirus HKU1 Not Detected     Coronavirus NL63 Not  Detected     Coronavirus OC43 Not Detected     COVID19 Not Detected     Human Metapneumovirus Not Detected     Human Rhinovirus/Enterovirus Not Detected     Influenza A PCR Not Detected     Influenza A H1 Not Detected     Influenza A H1 2009 PCR Not Detected     Influenza A H3 Not Detected     Influenza B PCR Not Detected     Parainfluenza Virus 1 Not Detected     Parainfluenza Virus 2 Not Detected     Parainfluenza Virus 3 Not Detected     Parainfluenza Virus 4 Not Detected     RSV, PCR Not Detected     Bordetella pertussis pcr Not Detected     Bordetella parapertussis PCR Not Detected     Chlamydophila pneumoniae PCR Not Detected     Mycoplasma pneumo by PCR Not Detected    Narrative:       Fact sheet for providers: https://[employer]/wp-content/uploads/CEO7349-0671-RE4.1-EUA-Provider-Fact-Sheet-3.pdf    Fact sheet for patients: https://[employer]/wp-content/uploads/PGE2426-9207-HH8.1-EUA-Patient-Fact-Sheet-1.pdf  Fact sheet for providers: https://[employer]/wp-content/uploads/YQP6749-5248-JT2.1-EUA-Provider-Fact-Sheet-3.pdf    Fact sheet for patients: https://[employer]/wp-content/uploads/TQG0424-7294-DV8.1-EUA-Patient-Fact-Sheet-1.pdf          Imaging Results (Last 24 Hours)     ** No results found for the last 24 hours. **          Results for orders placed during the hospital encounter of 07/22/20   Adult Transthoracic Echo Complete W/ Cont if Necessary Per Protocol    Narrative · Estimated EF = 50-55%.  · Left ventricular systolic function is low normal.  · Left ventricular diastolic dysfunction.  · Right ventricular cavity is moderate-to-severely dilated.  · Moderately reduced right ventricular systolic function noted.  · Mild aortic valve regurgitation is present.  · Trace mitral valve regurgitation is present  · Moderate tricuspid valve regurgitation is present.  · Calculated right ventricular systolic pressure from tricuspid   regurgitation is 71 mmHg.          I have  reviewed the medications:  Scheduled Meds:    amiodarone 200 mg Oral Q12H   atorvastatin 40 mg Oral Daily   b complex-vitamin c-folic acid 1 tablet Oral Daily   budesonide-formoterol 2 puff Inhalation BID - RT   ipratropium 0.5 mg Nebulization 4x Daily - RT   levothyroxine 112 mcg Oral QAM   midodrine 10 mg Oral TID AC   nystatin 5 mL Swish & Swallow 4x Daily   pantoprazole 40 mg Oral BID AC   pharmacy consult - MTM  Does not apply Daily   piperacillin-tazobactam 3.375 g Intravenous Q12H   polyethylene glycol 17 g Oral Daily   senna-docusate sodium 2 tablet Oral Nightly   sodium chloride 10 mL Intravenous Q12H     Continuous Infusions:     PRN Meds:.•  acetaminophen **OR** acetaminophen **OR** acetaminophen  •  albumin human  •  aluminum-magnesium hydroxide-simethicone  •  bisacodyl  •  bisacodyl  •  ipratropium-albuterol  •  lidocaine  •  melatonin  •  ondansetron **OR** ondansetron  •  oxyCODONE-acetaminophen  •  promethazine  •  sennosides-docusate  •  sodium chloride    Assessment/Plan   Assessment & Plan     Active Hospital Problems    Diagnosis  POA   • **Persistent hypotension, multifactorial [I95.9]  Yes     Priority: High   • NSVT (nonsustained ventricular tachycardia) (CMS/[facility]) [I47.2]  No     Priority: High   • Acute ESBL pyelonephritis and possible sepsis [N10]  Yes     Priority: High   • Left pleural effusion. Transudate of by protein and LDH criteria. 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]  Yes     Priority: Medium   • Atrial fibrillation, persistent (CMS/HCC) [I48.19]  Yes     Priority: Medium   • COPD (CMS/HCC) [J44.9]  Yes     Priority: Low   • GERD (gastroesophageal reflux disease) [K21.9]  Yes     Priority: Low   • History of hypertension  [I10]  Yes     Priority: Low   • Hypothyroidism [E03.9]  Yes     Priority: Low   • Type 2 diabetes mellitus (CMS/HCC) [E11.9]  Yes     Priority: Low   • (HFpEF) heart failure with preserved ejection fraction (CMS/HCC) [I50.30]  Yes     Priority: Low   • Obstructive sleep apnea syndrome [G47.33]  Yes     Priority: Low      Resolved Hospital Problems   No resolved problems to display.        Brief Hospital Course to date:  Sarita Alegre is a 80 y.o. female with hx of Afib, chronic diastolic CHF, ESRD on hemodialysis, COPD on home oxygen 2 liters, DM2, HTN, HLD, GERD, CIELO, hypothyroidism, recurrent ascites requiring paracentesis, chronic anemia, and depression.  She came to ER  7/22 due to left sided chest pain during dialysis. She was found to have a large left pleural effusion and was admitted to our service.  Pt underwent thoracentesis on 7/23 ultimately found to be transudate by Light's Criteria.  On 7/27, post-HD, pt became hypoglycemic which improved with D50.  A few hours later, she became significant hypotensively unresponsive to albumin and was transferred to the ICU for possible pressor support. She was ultimately found to have an ESBL E coli UTI and has since been on IV Zosyn.  She also had an episode of nonsustained Vtach and has been started on Amiodarone (Dr. Cooper).  Pt also has had complaints of dysphagia and GI has been consulted and, once she is more stable, plan to do an EGD.  Pt was transferred back to our service on 8/1/20.    Plan:    Sepsis due to ESBL E Coli UTI  --as mentioned, pt hypotensive on 7/27 requiring transfer to ICU. Pt also tachypnic at that time with RR of 38.  Ultimately found to have ESBL E coli UTI  --continue IV Zosyn for total of ten days    Hypotension  --started on midodrine, pressures much improved    Left sided chest pain  Recent fall  7/16 with left sided rib fractures  Large left pleural effusion -transudative per Light's criteria  --IR guided thoracentesis  7/23 w  1.6 liters removed on L.   - decrease opioids    Dysphagia  --GI consulted, started PPI BID. Also started on nystatin to cover fungal esophagitis (unable to do Diflucan due to Amiodarone)  --Likely will do EGD with dilatation once she is stable    ESRD  - HD MWF  - minimal UOP. Stopped bumex     Acute on chronic diastolic CHF  -- EF 55 w dilated RV, RVSP, TV regurg.  --Follows with Dr. Cooper     Afib s/p PPM and Watchman device  Nonsustained VTach  --No anticoagulation as has a Watchman device.  --Cardiology following, on Amiodarone    History of cirrhosis/ascites/paracentesis  - small ascites currently    Hypothyroidism  --Continue Synthroid.    CIELO  --BiPAP/CPAP nightly.    COPD   Chronic hypoxic respiratory failure  --On 2 liters home oxygen.    DVT Prophylaxis:  allergy to heparin; mechanical      Disposition: I expect the patient to be discharged TBD     Complex patient    CODE STATUS:   Code Status and Medical Interventions:   Ordered at: 07/28/20 1458     Limited Support to NOT Include:    Intubation     Level Of Support Discussed With:    Patient    Next of Kin (If No Surrogate)     Code Status:    No CPR     Medical Interventions (Level of Support Prior to Arrest):    Limited         Electronically signed by Yazmin Osorio MD, 08/01/20, 08:30.

## 2020-08-01 NOTE — PLAN OF CARE
Problem: Hemodialysis (Adult)  Goal: Signs and Symptoms of Listed Potential Problems Will be Absent, Minimized or Managed (Hemodialysis)  Outcome: Ongoing (interventions implemented as appropriate)  Flowsheets (Taken 8/1/2020 0327 by Zaire Ayon RN)  Problems Assessed (Hemodialysis): all  Problems Present (Hemodialysis): electrolyte imbalance;fluid imbalance  Note:   HD in progress.

## 2020-08-01 NOTE — PLAN OF CARE
Problem: Hemodialysis (Adult)  Goal: Signs and Symptoms of Listed Potential Problems Will be Absent, Minimized or Managed (Hemodialysis)  Outcome: Ongoing (interventions implemented as appropriate)  Flowsheets (Taken 8/1/2020 0327 by Zaire Ayon RN)  Problems Assessed (Hemodialysis): all  Problems Present (Hemodialysis): electrolyte imbalance;fluid imbalance   Pt to dialysis today, 2L removed, pt very tired upon return to room, pt has been resting during shift, will continue to monitor

## 2020-08-01 NOTE — PROGRESS NOTES
" LOS: 10 days   Patient Care Team:  Jade Quach APRN as PCP - General (Nurse Practitioner)  Percy Allison MD as PCP - Claims Attributed    Chief Complaint: ESRD    Subjective   Sleepy, seen on dialysis for UF only.     Review of system:SOB  All other negative.    Objective     Vital Sign Min/Max for last 24 hours  Temp  Min: 97.9 °F (36.6 °C)  Max: 99 °F (37.2 °C)   BP  Min: 87/41  Max: 125/52   Pulse  Min: 86  Max: 129   Resp  Min: 16  Max: 24   SpO2  Min: 89 %  Max: 100 %   Flow (L/min)  Min: 2  Max: 3   Weight  Min: 79.9 kg (176 lb 2.4 oz)  Max: 79.9 kg (176 lb 2.4 oz)     Flowsheet Rows      First Filed Value   Admission Height  171.5 cm (67.5\") Documented at 07/22/2020 1503   Admission Weight  72.6 kg (160 lb) Documented at 07/22/2020 1503          No intake/output data recorded.  I/O last 3 completed shifts:  In: 1043.1 [P.O.:120; I.V.:444.1; IV Piggyback:479]  Out: 2850 [Other:2850]    Objective:  General Appearance:  Ill-appearing, in no acute distress and not in pain.    Vital signs: (most recent): Blood pressure (!) 87/41, pulse 104, temperature 98.2 °F (36.8 °C), temperature source Axillary, resp. rate 16, height 170.2 cm (67\"), weight 79.9 kg (176 lb 2.4 oz), SpO2 100 %, not currently breastfeeding.    Output: No urine output.    HEENT: Normal HEENT exam.    Lungs:  Normal effort.  There are rales.    Heart: Normal rate.  S1 normal and S2 normal.    Abdomen: Abdomen is soft and flat.  Bowel sounds are normal.     Extremities: Normal range of motion.  (Edema positive. )  Neurological: (Alert, no focal deficit noted grossly. ).              Results Review:     I reviewed the patient's new clinical results.    WBC WBC   Date Value Ref Range Status   08/01/2020 8.47 3.40 - 10.80 10*3/mm3 Final   07/31/2020 10.58 3.40 - 10.80 10*3/mm3 Final   07/30/2020 14.15 (H) 3.40 - 10.80 10*3/mm3 Final      HGB Hemoglobin   Date Value Ref Range Status   08/01/2020 10.1 (L) 12.0 - 15.9 g/dL Final "   07/31/2020 11.5 (L) 12.0 - 15.9 g/dL Final   07/30/2020 11.1 (L) 12.0 - 15.9 g/dL Final      HCT Hematocrit   Date Value Ref Range Status   08/01/2020 32.6 (L) 34.0 - 46.6 % Final   07/31/2020 35.5 34.0 - 46.6 % Final   07/30/2020 34.8 34.0 - 46.6 % Final      Platlets No results found for: LABPLAT   MCV MCV   Date Value Ref Range Status   08/01/2020 94.5 79.0 - 97.0 fL Final   07/31/2020 90.8 79.0 - 97.0 fL Final   07/30/2020 92.8 79.0 - 97.0 fL Final          Sodium Sodium   Date Value Ref Range Status   08/01/2020 134 (L) 136 - 145 mmol/L Final   07/31/2020 133 (L) 136 - 145 mmol/L Final   07/30/2020 132 (L) 136 - 145 mmol/L Final      Potassium Potassium   Date Value Ref Range Status   08/01/2020 4.1 3.5 - 5.2 mmol/L Final   07/31/2020 3.7 3.5 - 5.2 mmol/L Final   07/30/2020 3.9 3.5 - 5.2 mmol/L Final     Comment:     Specimen hemolyzed.  Results may be affected.   07/30/2020 3.2 (L) 3.5 - 5.2 mmol/L Final     Comment:     Specimen hemolyzed.  Results may be affected.      Chloride Chloride   Date Value Ref Range Status   08/01/2020 96 (L) 98 - 107 mmol/L Final   07/31/2020 97 (L) 98 - 107 mmol/L Final   07/30/2020 97 (L) 98 - 107 mmol/L Final      CO2 CO2   Date Value Ref Range Status   08/01/2020 24.0 22.0 - 29.0 mmol/L Final   07/31/2020 21.0 (L) 22.0 - 29.0 mmol/L Final   07/30/2020 22.0 22.0 - 29.0 mmol/L Final      BUN BUN   Date Value Ref Range Status   08/01/2020 14 8 - 23 mg/dL Final   07/31/2020 27 (H) 8 - 23 mg/dL Final   07/30/2020 21 8 - 23 mg/dL Final      Creatinine Creatinine   Date Value Ref Range Status   08/01/2020 2.71 (H) 0.57 - 1.00 mg/dL Final   07/31/2020 3.50 (H) 0.57 - 1.00 mg/dL Final   07/30/2020 3.02 (H) 0.57 - 1.00 mg/dL Final      Calcium Calcium   Date Value Ref Range Status   08/01/2020 9.4 8.6 - 10.5 mg/dL Final   07/31/2020 9.2 8.6 - 10.5 mg/dL Final   07/30/2020 9.4 8.6 - 10.5 mg/dL Final      PO4 No results found for: CAPO4   Albumin Albumin   Date Value Ref Range Status    07/31/2020 3.00 (L) 3.50 - 5.20 g/dL Final      Magnesium Magnesium   Date Value Ref Range Status   08/01/2020 2.1 1.6 - 2.4 mg/dL Final   07/30/2020 2.2 1.6 - 2.4 mg/dL Final      Uric Acid No results found for: URICACID     Medication Review: Yes    Assessment/Plan       Persistent hypotension, multifactorial    Atrial fibrillation, persistent (CMS/HCC)    Obstructive sleep apnea syndrome    Left pleural effusion. Transudate of by protein and LDH criteria. Thoracentesis 7/23/2020    (HFpEF) heart failure with preserved ejection fraction (CMS/[facility])    Tachy-marcela syndrome (CMS/[facility])    History of hypertension    Type 2 diabetes mellitus (CMS/[facility])    GERD (gastroesophageal reflux disease)    Hypothyroidism    Pulmonary hypertension (CMS/[facility])    COPD (CMS/[facility])    ESRD (end stage renal disease) (CMS/[facility])    Acute ESBL pyelonephritis and possible sepsis    NSVT (nonsustained ventricular tachycardia) (CMS/[facility])    Dysphagia      Assessment:  (1.  ESRD: MWF [facility] ( Outpatient Nephrologist)  On HD since Jan 2020      2.  Anemia: MARQUITA on HD days     3.  Volume status: Issues with fluid overload due to ESRD, Cirhosis, CHF.      4.  Acid base and Elytes: Hyponatremia management with HD     5.  BMD:      6.  Recurrent fall: Multiple rib fracture: Underwent thoracentesis for hemothorax on this admission.     Recommendation.  Patient seen on dialysis. UF as tolerated. Limited UF because of hypotension.   Next HD will be on Monday. Will try Sodium profiling and cool dialysate  If still hypotensive and UF is limited than will have to consider CRRT.   Continue with Midodrine.     High risk patient with critical condition  Multiple medical problems  Case discussed with RN ).       Hue Antony MD  08/01/20  09:24

## 2020-08-01 NOTE — PLAN OF CARE
Problem: Patient Care Overview  Goal: Plan of Care Review  Flowsheets (Taken 8/1/2020 0327)  Progress: improving  Plan of Care Reviewed With: patient  Outcome Summary: pt recieved from the unit. VSS, A&O x 4. on 3lpm of O2 per NC. bottom & heels are red/blanchable. waffle mattress & heels boots in place, barrier cream applied, and turned q2. Tele - Afib with PVC's. no urine output this shift. no complaint of pain thist shift. will continue to monitor.     Problem: Pain, Chronic (Adult)  Goal: Identify Related Risk Factors and Signs and Symptoms  Flowsheets (Taken 8/1/2020 0327)  Related Risk Factors (Chronic Pain): knowledge deficit; procedures/treatments  Signs and Symptoms (Chronic Pain): fatigue/weakness; BADLs/IADLs, reluctance/inability to perform     Problem: Fall Risk (Adult)  Goal: Identify Related Risk Factors and Signs and Symptoms  Flowsheets (Taken 8/1/2020 0327)  Related Risk Factors (Fall Risk): age-related changes; gait/mobility problems; history of falls; fatigue/slow reaction  Signs and Symptoms (Fall Risk): presence of risk factors     Problem: Skin Injury Risk (Adult)  Goal: Identify Related Risk Factors and Signs and Symptoms  Flowsheets (Taken 8/1/2020 0327)  Related Risk Factors (Skin Injury Risk): advanced age; mobility impaired     Problem: Hemodialysis (Adult)  Goal: Signs and Symptoms of Listed Potential Problems Will be Absent, Minimized or Managed (Hemodialysis)  Flowsheets (Taken 8/1/2020 0327)  Problems Assessed (Hemodialysis): all  Problems Present (Hemodialysis): electrolyte imbalance; fluid imbalance

## 2020-08-01 NOTE — PROGRESS NOTES
"Clinical Nutrition   Reason For Visit: Follow-up protocol, Calorie count    Patient Name: Sarita Alegre  YOB: 1940  MRN: [id number]  Date of Encounter: 08/01/20 13:34  Admission date: 7/22/2020    Comments:  -Calorie count in progress, although it has been well documented in the I&O's that pt has been eating minimal amounts since admission (x10 days). Calorie count sheet from yesterday (7/31) is not able to be found. Today's (8/1) calorie count sheet is hanging on pt's room door, and  reports that as of lunch time, pt has not eaten anything yet today.   -Will increase [employer] Renal oral nutrition supplements from 2x/day to 3x/day= 1425 kcal, 63 g pro  - reports that pt would not want to have a feeding tube placed  -The only other option at this point is to provide parenteral nutrition until pt is able to tolerate PO again and/or until pt is able to at least drink 3 [employer] Renal oral nutrition supplements/day       Nutrition Assessment     Admission Problem List:  L-sided chest pain, improved  L pleural effusion  A/C DHF  Multiple rib fractures from recent fall (7/16)  Recurrent ascites  S/p L thoracentesis (7/23)- 1.6 L fluid removed  ESRD, HD   N/V, new (7/25)  Persistent hypotension (transferred to the ICU 7/27)  Nonsustained V. tach  ESBL E. coli UTI  Dyspepsia  Dysphagia    Per GI note (7/31)- \"Will continue to follow.  As her respiratory condition improves may be able to proceed with EGD dilation at a later date.\"    Applicable PMH:  A.fib  PPM  CHF  HTN  DLP  Tachy-marcela syndrome  T2DM  Hyperparathyroidism  Hypothyroidism  COPD  CIELO  GERD  HH  GI bleed  ESRD, HD (Mon, Wed, Fri)  Cirrhosis  Anemia  Recurrent ascites, requiring recurrent large volumeparacentesis    Applicable Nutrition-Related Information:  (7/28) SLP bedside eval rec thin liquids, soft textures, whole  (7/30) calorie count ordered per intensivist  (7/31) SLP bedside re-eval rec soft textures, whole, thin " "liquids  (7/31) per RN note- \"Patient also verbalized that she does not want a feeding tube placed.\"     Reported/Observed/Food/Nutrition Related History     Pt had HD done this AM. Pt currently in isolation precautions, therefore unable to visit with pt at the bedside. RD called pt's room phone and spoke to pt's .  reports that pt has been sleeping since coming back from HD today. States that pt has not eaten anything yet today. Reports that pt has bene having issues with vomiting foods back up if she eats more than a couple bites at a time. States that pt has bene able to keep a few bites of food down over the past couple days. Reports that yesterday pt only ate a few bites of mashed potatoes and a few bites of green beans. States that pt has been drinking a few sips of [employer] Renal oral nutrition supplements, but that pt does not like them.     RD discussed with  that pt would benefit from having temporary feeding tube placed and starting tube feeding as pt has had minimal PO intake since admission (x10 days).  reports that pt had a keofeed placed in 2015 without sedation and on anticoagulation. States that pt had a lot of bleeding and pain from keofeed placement and that pt is traumatized from that event and would not want to have a feeding tube placed again. RD voiced understanding, but also encouraged  to talk with pt about this. Also discussed with  that if pt were to drink 3 [employer] Renal oral nutrition supplements/day then that would meet most of pt's estimated kcal and pro needs.  states that he has been encouraging pt to drink the supplements.        Anthropometrics   Height: 67 in  Weight: 160 lb per stated weight (7/22)  BMI: 25  BMI classification: Overweight: 25.0-29.9kg/m2         Labs reviewed   Labs reviewed: Yes    Results from last 7 days   Lab Units 08/01/20  0345 07/31/20  0458 07/30/20  1918 07/30/20  0606 07/29/20  0604   SODIUM mmol/L " 134* 133*  --  132* 130*   POTASSIUM mmol/L 4.1 3.7 3.9 3.2* 3.8   CHLORIDE mmol/L 96* 97*  --  97* 98   CO2 mmol/L 24.0 21.0*  --  22.0 17.0*   BUN mg/dL 14 27*  --  21 26*   CREATININE mg/dL 2.71* 3.50*  --  3.02* 3.73*   GLUCOSE mg/dL 119* 108*  --  138* 185*   CALCIUM mg/dL 9.4 9.2  --  9.4 9.3   PHOSPHORUS mg/dL  --  2.5  --   --   --    MAGNESIUM mg/dL 2.1  --   --  2.2 2.3     Results from last 7 days   Lab Units 08/01/20  0345 07/31/20  0458 07/30/20  0606 07/29/20  0604 07/28/20  0909   WBC 10*3/mm3 8.47 10.58 14.15* 19.99* 20.72*   ALBUMIN g/dL  --  3.00*  --  2.80* 4.00   PREALBUMIN mg/dL  --  4.6*  --   --   --      Results from last 7 days   Lab Units 08/01/20  1132 08/01/20  0948 08/01/20  0731 07/31/20  1654 07/31/20  1132 07/31/20  0722   GLUCOSE mg/dL 109 103 141* 102 76 89     Lab Results   Lab Value Date/Time    HGBA1C 4.80 07/23/2020 0835    HGBA1C 5.8 05/21/2020 1400    HGBA1C 5.49 02/27/2020 1424     Medications reviewed   Medications reviewed: Yes  Pertinent: nephro-fazal, insulin, protonix, antibiotics, miralax, pericolace  PRN: albumin, dulcolax, melatonin, zofran, percocet      Needs Assessment  (8/1)   Height used: 67 in/170 cm  Weight used: 160 lb/70 kg    Estimated Calories needs: ~1800 kcal/day  25-30 kcal/kg= 5499-6198 kcal    Estimated Protein needs:   1.0-1.2 g/kg= 70-84 g pro/day      Current Nutrition Prescription   PO: Diet Soft Texture; [employer] Foods; Cardiac, Renal  Oral Nutrition Supplement: cafe mocha [employer] Renal 2x/day      Evaluation of Received Nutrient/Fluid Intake-PO:  No calorie count sheet available from yesterday (7/31), however  reports that pt only ate a few bites of mashed potatoes and a few bites of green beans yesterday (7/31)  6%/10 meals      Nutrition Diagnosis     Problem 1      Row Name 07/31/20 1438                 Nutrition Diagnoses Problem 1     Problem 1  Inadequate Intake/Infusion       Inadequate Intake Type  Oral       Macronutrient   Kcal;Protein       Micronutrient  Vitamin;Mineral       Etiology (related to)  Medical Diagnosis       Hepatic  Cirrhosis       Renal  ESRD;Hemodialysis       Signs/Symptoms (evidenced by)  PO Intake       Percent (%) intake recorded  0 %       Over number of meals  3         Status: ongoing, 6%/10 meals      Intervention   Intervention: Follow treatment progress, Care plan reviewed, Interview for preferences, Encourage intake   -Calorie count in progress, although it has been well documented in the I&O's that pt has been eating minimal amounts since admission (x10 days). Calorie count sheet from yesterday (7/31) is not able to be found. Today's (8/1) calorie count sheet is hanging on pt's room door, and  reports that as of lunch time, pt has not eaten anything yet today.   -Will increase [employer] Renal oral nutrition supplements from 2x/day to 3x/day= 1425 kcal, 63 g pro  - reports that pt would not want to have a feeding tube placed  -The only other option at this point is to provide parenteral nutrition until pt is able to tolerate PO again and/or until pt able to at least drink 3 [employer] Renal oral nutrition supplements/day      Goal:   General: Nutrition support treatment  PO: Tolerate PO   EN/PN: Initiate EN, Initiate PN       Monitoring/Evaluation:       Monitoring/Evaluation: Per protocol, PO intake, Supplement intake, Pertinent labs, Weight, GI status, Symptoms, Swallow function     Will Continue to follow per protocol  Jazmín Bull, MS RD/LD [facility]  Time Spent: 60 minutes

## 2020-08-02 NOTE — PROGRESS NOTES
"GI Daily Progress Note  Subjective:    Chief Complaint: Follow-up dysphagia     Patient resting in bed with spouse at the bedside attempting to eat biscuits and gravy at time of exam.  Patient notes that she is still struggling with her swallowing noting that it feels as though her food is getting hung in the top of her chest.  Patient also states that she is breathing better today than previous days and that overall she is doing better.  Patient denies any new gastrointestinal symptoms denies any pain at this time.    Objective:    /51 (BP Location: Left arm, Patient Position: Lying)   Pulse 109   Temp 97.7 °F (36.5 °C) (Oral)   Resp 20   Ht 170.2 cm (67\")   Wt 79.9 kg (176 lb 2.4 oz)   LMP  (LMP Unknown) Comment: mammogram 2017   SpO2 95%   BMI 27.59 kg/m²     Physical Exam   Constitutional: She is oriented to person, place, and time. She appears well-developed and well-nourished. No distress.   Pleasantly conversant female in no acute distress, currently only requiring 2 L nasal cannula to maintain oxygen sat greater than 92%.   HENT:   Head: Normocephalic and atraumatic.   Mouth/Throat: Oropharynx is clear and moist. No oropharyngeal exudate.   Eyes: Pupils are equal, round, and reactive to light. Conjunctivae and EOM are normal. No scleral icterus.   Neck: Normal range of motion. Neck supple. No tracheal deviation present. No thyromegaly present.   Cardiovascular: Normal rate, S1 normal and S2 normal. An irregularly irregular rhythm present. Exam reveals no gallop and no friction rub.   Murmur heard.  Appreciated a 3/6 murmur heard best at left sternal border extending over towards PMI.   Pulmonary/Chest: Effort normal and breath sounds normal. No stridor. No respiratory distress. She has no wheezes. She has no rales.   Abdominal: Soft. Bowel sounds are normal. She exhibits no distension and no mass. There is tenderness. There is no rebound and no guarding. No hernia.   Epigastric tenderness to " palpation noted   Genitourinary:   Genitourinary Comments: Defer   Musculoskeletal: Normal range of motion. She exhibits no edema or deformity.   Right brachial AV fistula   Lymphadenopathy:     She has no cervical adenopathy.   Neurological: She is alert and oriented to person, place, and time.   Skin: Skin is warm and dry. Capillary refill takes less than 2 seconds. She is not diaphoretic.   Psychiatric: She has a normal mood and affect. Her behavior is normal. Judgment and thought content normal.   Nursing note and vitals reviewed.      Lab  Lab Results   Component Value Date    WBC 8.84 08/02/2020    HGB 10.7 (L) 08/02/2020    HGB 10.1 (L) 08/01/2020    HGB 11.5 (L) 07/31/2020    MCV 94.7 08/02/2020     (L) 08/02/2020    INR 1.18 (H) 07/23/2020    INR 1.00 12/05/2017    INR 1.01 09/25/2017    INR 1.08 05/01/2017    INR 1.54 04/18/2017       Lab Results   Component Value Date    GLUCOSE 104 (H) 08/02/2020    BUN 21 08/02/2020    CREATININE 3.64 (H) 08/02/2020    EGFRIFNONA 12 (L) 08/02/2020    EGFRIFAFRI  08/02/2020      Comment:      <15 Indicative of kidney failure.    BCR 5.8 (L) 08/02/2020     (L) 08/02/2020    K 4.4 08/02/2020    CO2 20.0 (L) 08/02/2020    CALCIUM 10.0 08/02/2020    PROTENTOTREF 7.1 12/14/2016    ALBUMIN 3.00 (L) 07/31/2020    ALKPHOS 61 07/29/2020    BILITOT 1.3 (H) 07/29/2020    BILIDIR 1.0 (H) 11/07/2015    ALT 9 07/29/2020    AST 23 07/29/2020     Assessment:      Persistent hypotension, multifactorial    Atrial fibrillation, persistent (CMS/HCC)    Obstructive sleep apnea syndrome    Left pleural effusion. Transudate of by protein and LDH criteria. Thoracentesis 7/23/2020    (HFpEF) heart failure with preserved ejection fraction (CMS/HCC)    Tachy-marcela syndrome (CMS/HCC)    History of hypertension    Type 2 diabetes mellitus (CMS/HCC)    GERD (gastroesophageal reflux disease)    Hypothyroidism    Pulmonary hypertension (CMS/HCC)    COPD (CMS/HCC)    ESRD (end stage renal  disease) (CMS/[facility])    Acute ESBL pyelonephritis and possible sepsis    NSVT (nonsustained ventricular tachycardia) (CMS/[facility])    Dysphagia      Plan:  >>> Patient respiratory status is greatly improved after back-to-back days of hemodialysis  >>> Okay to proceed with EGD tomorrow if respiratory status remains improved  >>> N.p.o. at midnight  >>> Obtain informed consent for esophagogastroduodenoscopy with Dr. Jacob Linn, KAILASH  08/02/20  13:37

## 2020-08-02 NOTE — NURSING NOTE
Per RN April request - Pt continues with low fidel score, limited mobility, advanced age and high risk for skin breakdown.  Dolphin mattress ordered from [employer] [phone].      Pressure injury prevention orders placed per protocol.  Including floating heels with Heel boots, repositioning q 2 hour with use of wedge, seat cushion, HOB less than or equal to 30 degrees and moisture barrier cream on coccyx/buttocks as needed.      Heel boots come in 3 sizes Petit, Standard and Bariatric.  Use the measuring guide found in the heel boot packaging for guidelines for appropriate size.      Please consult [facility] nurse if needed, Thank you

## 2020-08-02 NOTE — PLAN OF CARE
Problem: Patient Care Overview  Goal: Plan of Care Review  Flowsheets (Taken 8/2/2020 0401)  Progress: no change  Plan of Care Reviewed With: patient  Outcome Summary: pt VSS, A&O x4, RA. tele - Afib with PVC's. on 3lpm of O2 per NC. bottom & heels red/blanchable. barrier cream applied, waffle mattress in place, heel boots, and turned q2. no urine output this shift. will continue to monitor.     Problem: Pain, Chronic (Adult)  Goal: Acceptable Pain/Comfort Level and Functional Ability  Flowsheets (Taken 8/2/2020 0401)  Acceptable Pain/Comfort Level and Functional Ability: making progress toward outcome     Problem: Fall Risk (Adult)  Goal: Absence of Fall  Flowsheets (Taken 8/2/2020 0401)  Absence of Fall: making progress toward outcome     Problem: Skin Injury Risk (Adult)  Goal: Skin Health and Integrity  Flowsheets (Taken 8/2/2020 0401)  Skin Health and Integrity: making progress toward outcome

## 2020-08-02 NOTE — PLAN OF CARE
Problem: Patient Care Overview  Goal: Plan of Care Review  Outcome: Ongoing (interventions implemented as appropriate)  Flowsheets  Taken 8/2/2020 1440 by Percy Gill, OT  Progress: improving  Taken 8/2/2020 1601 by Ysabel Cobb RN  Plan of Care Reviewed With: patient;spouse   Pt worked with therapy, up in chair, pt encouraged to eat, will continue to monitor

## 2020-08-02 NOTE — PROGRESS NOTES
[facility] Medicine Services  PROGRESS NOTE    Patient Name: Sarita Alegre  : 1940  MRN: [id number]    Date of Admission: 2020  Primary Care Physician: Jade Quach APRN    Subjective   Subjective     CC:  F/U chest pain, SOA    HPI:   Pt seen and examined. Nursing notes reviewed. No issues overnight. Still unable to eat well due to dysphagia.       Review of Systems -    Gen- No fevers, chills  CV- No chest pain, palpitations  Resp- No cough, dyspnea  GI- No N/V/D, abd pain    All other systems reviewed and negative except any additional pertinent positives and negatives as discussed in HPI.      Objective   Objective     Vital Signs:   Temp:  [98 °F (36.7 °C)-98.4 °F (36.9 °C)] 98.4 °F (36.9 °C)  Heart Rate:  [] 110  Resp:  [14-18] 18  BP: ()/(41-93) 107/66        Physical Exam:  Gen-  Awake and alert, NAD  HENT-NCAT, mucous membranes moist, RIJ present  CV-RRR, S1 S2 normal, no m/r/g  Resp- CTAB   Abd-soft,NT, ND, normoactive BS  Ext-trace BLE edema  Neuro-A&Ox3, no focal deficits  Skin- no rashes  Psych-appropriate mood  Neuro - MAEW      Results Reviewed:  Results from last 7 days   Lab Units 20  0510 20  0345 20  0458  20  2135   WBC 10*3/mm3 8.84 8.47 10.58   < >  --    HEMOGLOBIN g/dL 10.7* 10.1* 11.5*   < >  --    HEMATOCRIT % 34.2 32.6* 35.5   < >  --    PLATELETS 10*3/mm3 120* 111* 142   < >  --    PROCALCITONIN ng/mL  --   --  2.17*  --  2.43*    < > = values in this interval not displayed.     Results from last 7 days   Lab Units 20  0345 20  0458 20  1918 20  0606 20  0604 20  0909 07/27/20  2135   SODIUM mmol/L 134* 133*  --  132* 130* 137 135*   POTASSIUM mmol/L 4.1 3.7 3.9 3.2* 3.8 4.0 3.9   CHLORIDE mmol/L 96* 97*  --  97* 98 99 99   CO2 mmol/L 24.0 21.0*  --  22.0 17.0* 20.0* 21.0*   BUN mg/dL 14 27*  --  21 26* 15 11   CREATININE mg/dL 2.71* 3.50*  --  3.02* 3.73* 3.56*  3.34*   GLUCOSE mg/dL 119* 108*  --  138* 185* 127* 181*   CALCIUM mg/dL 9.4 9.2  --  9.4 9.3 10.0 9.6   ALT (SGPT) U/L  --   --   --   --  9 8 10   AST (SGOT) U/L  --   --   --   --  23 26 35*   TROPONIN T ng/mL  --   --   --   --   --  0.033*  --    PROBNP pg/mL  --  52,812.0*  --   --   --   --   --      Estimated Creatinine Clearance: 17.4 mL/min (A) (by C-G formula based on SCr of 2.71 mg/dL (H)).    Microbiology Results Abnormal     Procedure Component Value - Date/Time    Blood Culture - Blood, Hand, Left [[id number]] Collected:  07/27/20 2307    Lab Status:  Final result Specimen:  Blood from Hand, Left Updated:  08/02/20 0000     Blood Culture No growth at 5 days    Blood Culture - Blood, Hand, Left [[id number]] Collected:  07/27/20 2255    Lab Status:  Final result Specimen:  Blood from Hand, Left Updated:  08/02/20 0000     Blood Culture No growth at 5 days    Urine Culture - Urine, Urine, Clean Catch [[id number]]  (Abnormal)  (Susceptibility) Collected:  07/28/20 0429    Lab Status:  Final result Specimen:  Urine, Clean Catch Updated:  07/30/20 0305     Urine Culture >100,000 CFU/mL Escherichia coli ESBL     Comment:   Consider infectious disease consult.  Susceptibility results may not correlate to clinical outcomes.       Susceptibility      Escherichia coli ESBL     NEDRA     Amikacin Susceptible     Ertapenem Susceptible     Gentamicin Resistant     Levofloxacin Resistant     Meropenem Susceptible     Nitrofurantoin Susceptible     Piperacillin + Tazobactam Susceptible     Tetracycline Resistant     Tobramycin Intermediate     Trimethoprim + Sulfamethoxazole Resistant                    MRSA Screen, PCR (Inpatient) - Swab, Nares [[id number]]  (Normal) Collected:  07/28/20 1610    Lab Status:  Final result Specimen:  Swab from Nares Updated:  07/28/20 1915     MRSA PCR Negative    Narrative:       MRSA Negative    COVID PRE-OP / PRE-PROCEDURE SCREENING ORDER (NO ISOLATION) - Swab, Nasopharynx [[id number]]  Collected:  07/26/20 2148    Lab Status:  Final result Specimen:  Swab from Nasopharynx Updated:  07/27/20 1907    Narrative:       The following orders were created for panel order COVID PRE-OP / PRE-PROCEDURE SCREENING ORDER (NO ISOLATION) - Swab, Nasopharynx.  Procedure                               Abnormality         Status                     ---------                               -----------         ------                     COVID-19,[facility], NP ...[[id number]]                      Final result                 Please view results for these tests on the individual orders.    COVID-19,[facility], NP SWAB IN LEXAR SALINE MEDIA 24-30 HR TAT - Swab, Nasopharynx [[id number]] Collected:  07/26/20 2148    Lab Status:  Final result Specimen:  Swab from Nasopharynx Updated:  07/27/20 1907     Reference Lab Report --     Comment: See scanned report          COVID19 Not Detected    COVID PRE-OP / PRE-PROCEDURE SCREENING ORDER (NO ISOLATION) - Swab, Nasopharynx [[id number]] Collected:  07/22/20 1806    Lab Status:  Final result Specimen:  Swab from Nasopharynx Updated:  07/23/20 0017    Narrative:       The following orders were created for panel order COVID PRE-OP / PRE-PROCEDURE SCREENING ORDER (NO ISOLATION) - Swab, Nasopharynx.  Procedure                               Abnormality         Status                     ---------                               -----------         ------                     Respiratory Panel PCR w/...[[id number]]  Normal              Final result                 Please view results for these tests on the individual orders.    Respiratory Panel PCR w/COVID-19(SARS-CoV-2) [facility] In-House, NP swab in UTM/VTM - Swab, Nasopharynx [[id number]]  (Normal) Collected:  07/22/20 1806    Lab Status:  Final result Specimen:  Swab from Nasopharynx Updated:  07/23/20 0017     ADENOVIRUS, PCR Not Detected     Coronavirus 229E Not Detected     Coronavirus HKU1 Not Detected     Coronavirus NL63 Not Detected      Coronavirus OC43 Not Detected     COVID19 Not Detected     Human Metapneumovirus Not Detected     Human Rhinovirus/Enterovirus Not Detected     Influenza A PCR Not Detected     Influenza A H1 Not Detected     Influenza A H1 2009 PCR Not Detected     Influenza A H3 Not Detected     Influenza B PCR Not Detected     Parainfluenza Virus 1 Not Detected     Parainfluenza Virus 2 Not Detected     Parainfluenza Virus 3 Not Detected     Parainfluenza Virus 4 Not Detected     RSV, PCR Not Detected     Bordetella pertussis pcr Not Detected     Bordetella parapertussis PCR Not Detected     Chlamydophila pneumoniae PCR Not Detected     Mycoplasma pneumo by PCR Not Detected    Narrative:       Fact sheet for providers: https://[employer]/wp-content/uploads/IMX4259-3697-MV6.1-EUA-Provider-Fact-Sheet-3.pdf    Fact sheet for patients: https://[employer]/wp-content/uploads/TEV2855-0882-YX0.1-EUA-Patient-Fact-Sheet-1.pdf  Fact sheet for providers: https://[employer]/wp-content/uploads/DSH3698-6067-VG1.1-EUA-Provider-Fact-Sheet-3.pdf    Fact sheet for patients: https://[employer]/wp-content/uploads/BXE7618-6729-IV7.1-EUA-Patient-Fact-Sheet-1.pdf          Imaging Results (Last 24 Hours)     ** No results found for the last 24 hours. **          Results for orders placed during the hospital encounter of 07/22/20   Adult Transthoracic Echo Complete W/ Cont if Necessary Per Protocol    Narrative · Estimated EF = 50-55%.  · Left ventricular systolic function is low normal.  · Left ventricular diastolic dysfunction.  · Right ventricular cavity is moderate-to-severely dilated.  · Moderately reduced right ventricular systolic function noted.  · Mild aortic valve regurgitation is present.  · Trace mitral valve regurgitation is present  · Moderate tricuspid valve regurgitation is present.  · Calculated right ventricular systolic pressure from tricuspid   regurgitation is 71 mmHg.          I have reviewed the  medications:  Scheduled Meds:    amiodarone 200 mg Oral Q12H   atorvastatin 40 mg Oral Daily   b complex-vitamin c-folic acid 1 tablet Oral Daily   budesonide-formoterol 2 puff Inhalation BID - RT   insulin lispro 0-7 Units Subcutaneous TID With Meals   ipratropium 0.5 mg Nebulization 4x Daily - RT   levothyroxine 112 mcg Oral QAM   midodrine 10 mg Oral TID AC   nystatin 5 mL Swish & Swallow 4x Daily   pantoprazole 40 mg Oral BID AC   pharmacy consult - MTM  Does not apply Daily   piperacillin-tazobactam 3.375 g Intravenous Q12H   polyethylene glycol 17 g Oral Daily   senna-docusate sodium 2 tablet Oral Nightly   sodium chloride 10 mL Intravenous Q12H     Continuous Infusions:     PRN Meds:.•  acetaminophen **OR** acetaminophen **OR** acetaminophen  •  aluminum-magnesium hydroxide-simethicone  •  bisacodyl  •  bisacodyl  •  dextrose  •  dextrose  •  glucagon (human recombinant)  •  ipratropium-albuterol  •  lidocaine  •  melatonin  •  ondansetron **OR** ondansetron  •  oxyCODONE-acetaminophen  •  promethazine  •  sennosides-docusate  •  sodium chloride    Assessment/Plan   Assessment & Plan     Active Hospital Problems    Diagnosis  POA   • **Persistent hypotension, multifactorial [I95.9]  Yes     Priority: High   • NSVT (nonsustained ventricular tachycardia) (CMS/HCC) [I47.2]  No     Priority: High   • Acute ESBL pyelonephritis and possible sepsis [N10]  Yes     Priority: High   • Left pleural effusion. Transudate of by protein and LDH criteria. 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]  Yes     Priority: Medium   • Atrial fibrillation, persistent (CMS/HCC) [I48.19]  Yes     Priority: Medium   • COPD (CMS/HCC) [J44.9]  Yes     Priority: Low   • GERD  (gastroesophageal reflux disease) [K21.9]  Yes     Priority: Low   • History of hypertension [I10]  Yes     Priority: Low   • Hypothyroidism [E03.9]  Yes     Priority: Low   • Type 2 diabetes mellitus (CMS/HCC) [E11.9]  Yes     Priority: Low   • (HFpEF) heart failure with preserved ejection fraction (CMS/HCC) [I50.30]  Yes     Priority: Low   • Obstructive sleep apnea syndrome [G47.33]  Yes     Priority: Low      Resolved Hospital Problems   No resolved problems to display.        Brief Hospital Course to date:  Sarita Alegre is a 80 y.o. female with hx of Afib, chronic diastolic CHF, ESRD on hemodialysis, COPD on home oxygen 2 liters, DM2, HTN, HLD, GERD, CIELO, hypothyroidism, recurrent ascites requiring paracentesis, chronic anemia, and depression.  She came to ER  7/22 due to left sided chest pain during dialysis. She was found to have a large left pleural effusion and was admitted to our service.  Pt underwent thoracentesis on 7/23 ultimately found to be transudate by Light's Criteria.  On 7/27, post-HD, pt became hypoglycemic which improved with D50.  A few hours later, she became significant hypotensively unresponsive to albumin and was transferred to the ICU for possible pressor support. She was ultimately found to have an ESBL E coli UTI and has since been on IV Zosyn.  She also had an episode of nonsustained Vtach and has been started on Amiodarone (Dr. Cooper).  Pt also has had complaints of dysphagia and GI has been consulted and, once she is more stable, plan to do an EGD.  Pt was transferred back to our service on 8/1/20.    Plan:    Sepsis due to ESBL E Coli UTI  --as mentioned, pt hypotensive on 7/27 requiring transfer to ICU. Pt also tachypnic at that time with RR of 38.  Ultimately found to have ESBL E coli UTI  --continue IV Zosyn D6/10     Hypotension  --started on midodrine, pressures much improved    Left sided chest pain  Recent fall  7/16 with left sided rib fractures  Large left  pleural effusion -transudative per Light's criteria  --IR guided thoracentesis 7/23 w  1.6 liters removed on L.     Dysphagia  --GI consulted, started PPI BID. Also started on nystatin to cover fungal esophagitis (unable to do Diflucan due to Amiodarone)  --Likely will do EGD with dilatation once she is stable    ESRD  - HD MWF  - minimal UOP. Stopped bumex     Acute on chronic diastolic CHF  -- EF 55 w dilated RV, RVSP, TV regurg.  --Follows with Dr. Cooper     Afib s/p PPM and Watchman device  Nonsustained VTach  --No anticoagulation as has a Watchman device.  --Cardiology following, on Amiodarone    History of cirrhosis/ascites/paracentesis  - small ascites currently    Hypothyroidism  --Continue Synthroid.    CIELO  --BiPAP/CPAP nightly.    COPD   Chronic hypoxic respiratory failure  --On 2 liters home oxygen.    DVT Prophylaxis:  allergy to heparin; mechanical      Disposition: I expect the patient to be discharged [place] at [place] 1-2 days.   to transport and has HD set up MWF per CM note from ICU.         CODE STATUS:   Code Status and Medical Interventions:   Ordered at: 07/28/20 1458     Limited Support to NOT Include:    Intubation     Level Of Support Discussed With:    Patient    Next of Kin (If No Surrogate)     Code Status:    No CPR     Medical Interventions (Level of Support Prior to Arrest):    Limited         Electronically signed by Yazmin Osorio MD, 08/02/20, 08:42.

## 2020-08-02 NOTE — PROGRESS NOTES
" LOS: 11 days   Patient Care Team:  Jade Quach APRN as PCP - General (Nurse Practitioner)  Percy Allison MD as PCP - Claims Attributed    Chief Complaint: ESRD    Subjective   Alert and engaging. SOB positive     Review of system:SOB  All other negative.    Objective     Vital Sign Min/Max for last 24 hours  Temp  Min: 97.7 °F (36.5 °C)  Max: 98.4 °F (36.9 °C)   BP  Min: 100/51  Max: 135/72   Pulse  Min: 88  Max: 110   Resp  Min: 14  Max: 20   SpO2  Min: 92 %  Max: 97 %   Flow (L/min)  Min: 2  Max: 3   No data recorded     Flowsheet Rows      First Filed Value   Admission Height  171.5 cm (67.5\") Documented at 07/22/2020 1503   Admission Weight  72.6 kg (160 lb) Documented at 07/22/2020 1503          I/O this shift:  In: 480 [P.O.:480]  Out: -   I/O last 3 completed shifts:  In: 400 [IV Piggyback:400]  Out: 2320 [Other:2320]    Objective:  General Appearance:  Ill-appearing, in no acute distress and not in pain.    Vital signs: (most recent): Blood pressure 100/51, pulse 109, temperature 97.7 °F (36.5 °C), temperature source Oral, resp. rate 20, height 170.2 cm (67\"), weight 79.9 kg (176 lb 2.4 oz), SpO2 95 %, not currently breastfeeding.    Output: No urine output.    HEENT: Normal HEENT exam.    Lungs:  Normal effort.  There are rales.    Heart: Normal rate.  S1 normal and S2 normal.    Abdomen: Abdomen is soft and flat.  Bowel sounds are normal.     Extremities: Normal range of motion.  (Edema positive. )  Neurological: (Alert, no focal deficit noted grossly. ).              Results Review:     I reviewed the patient's new clinical results.    WBC WBC   Date Value Ref Range Status   08/02/2020 8.84 3.40 - 10.80 10*3/mm3 Final   08/01/2020 8.47 3.40 - 10.80 10*3/mm3 Final   07/31/2020 10.58 3.40 - 10.80 10*3/mm3 Final      HGB Hemoglobin   Date Value Ref Range Status   08/02/2020 10.7 (L) 12.0 - 15.9 g/dL Final   08/01/2020 10.1 (L) 12.0 - 15.9 g/dL Final   07/31/2020 11.5 (L) 12.0 - 15.9 " g/dL Final      HCT Hematocrit   Date Value Ref Range Status   08/02/2020 34.2 34.0 - 46.6 % Final   08/01/2020 32.6 (L) 34.0 - 46.6 % Final   07/31/2020 35.5 34.0 - 46.6 % Final      Platlets No results found for: LABPLAT   MCV MCV   Date Value Ref Range Status   08/02/2020 94.7 79.0 - 97.0 fL Final   08/01/2020 94.5 79.0 - 97.0 fL Final   07/31/2020 90.8 79.0 - 97.0 fL Final          Sodium Sodium   Date Value Ref Range Status   08/02/2020 131 (L) 136 - 145 mmol/L Final   08/01/2020 134 (L) 136 - 145 mmol/L Final   07/31/2020 133 (L) 136 - 145 mmol/L Final      Potassium Potassium   Date Value Ref Range Status   08/02/2020 4.4 3.5 - 5.2 mmol/L Final     Comment:     Specimen hemolyzed.  Results may be affected.   08/01/2020 4.1 3.5 - 5.2 mmol/L Final   07/31/2020 3.7 3.5 - 5.2 mmol/L Final   07/30/2020 3.9 3.5 - 5.2 mmol/L Final     Comment:     Specimen hemolyzed.  Results may be affected.      Chloride Chloride   Date Value Ref Range Status   08/02/2020 94 (L) 98 - 107 mmol/L Final   08/01/2020 96 (L) 98 - 107 mmol/L Final   07/31/2020 97 (L) 98 - 107 mmol/L Final      CO2 CO2   Date Value Ref Range Status   08/02/2020 20.0 (L) 22.0 - 29.0 mmol/L Final   08/01/2020 24.0 22.0 - 29.0 mmol/L Final   07/31/2020 21.0 (L) 22.0 - 29.0 mmol/L Final      BUN BUN   Date Value Ref Range Status   08/02/2020 21 8 - 23 mg/dL Final   08/01/2020 14 8 - 23 mg/dL Final   07/31/2020 27 (H) 8 - 23 mg/dL Final      Creatinine Creatinine   Date Value Ref Range Status   08/02/2020 3.64 (H) 0.57 - 1.00 mg/dL Final   08/01/2020 2.71 (H) 0.57 - 1.00 mg/dL Final   07/31/2020 3.50 (H) 0.57 - 1.00 mg/dL Final      Calcium Calcium   Date Value Ref Range Status   08/02/2020 10.0 8.6 - 10.5 mg/dL Final   08/01/2020 9.4 8.6 - 10.5 mg/dL Final   07/31/2020 9.2 8.6 - 10.5 mg/dL Final      PO4 No results found for: CAPO4   Albumin Albumin   Date Value Ref Range Status   07/31/2020 3.00 (L) 3.50 - 5.20 g/dL Final      Magnesium Magnesium   Date  Value Ref Range Status   08/01/2020 2.1 1.6 - 2.4 mg/dL Final      Uric Acid No results found for: URICACID     Medication Review: Yes    Assessment/Plan       Persistent hypotension, multifactorial    Atrial fibrillation, persistent (CMS/HCC)    Obstructive sleep apnea syndrome    Left pleural effusion. Transudate of by protein and LDH criteria. Thoracentesis 7/23/2020    (HFpEF) heart failure with preserved ejection fraction (CMS/[facility])    Tachy-marcela syndrome (CMS/[facility])    History of hypertension    Type 2 diabetes mellitus (CMS/[facility])    GERD (gastroesophageal reflux disease)    Hypothyroidism    Pulmonary hypertension (CMS/[facility])    COPD (CMS/[facility])    ESRD (end stage renal disease) (CMS/[facility])    Acute ESBL pyelonephritis and possible sepsis    NSVT (nonsustained ventricular tachycardia) (CMS/[facility])    Dysphagia      Assessment:  (1.  ES[place]: [place] [facility] [place] [facility] ( Outpatient Nephrologist)  On HD since Jan 2020      2.  Anemia: MARQUITA on HD days     3.  Volume status: Issues with fluid overload due to ESRD, Cirhosis, CHF.      4.  Acid base and Elytes: Hyponatremia management with HD     5.  BMD:      6.  Recurrent fall: Multiple rib fracture: Underwent thoracentesis for hemothorax on this admission.     Recommendation.  HD tomorrow. UF as tolerated.   If still hypotensive and UF is limited than will have to consider CRRT.   Continue with Midodrine.     High risk patient with critical condition  Multiple medical problems  Case discussed with RN ).       Hue Antony MD  08/02/20  15:00

## 2020-08-02 NOTE — PROGRESS NOTES
"Clinical Nutrition   Reason For Visit: Follow-up protocol, Calorie count    Patient Name: Sarita Alegre  YOB: 1940  MRN: [id number]  Date of Encounter: 08/02/20 09:54  Admission date: 7/22/2020    Comments:  -Pt has been eating minimal amounts since admission (x11 days). Pt has stated that she does not want a feeding tube. The only other option at this point is to provide parenteral nutrition until pt is able to tolerate PO again and/or until pt is able to at least drink 3 [employer] Renal oral nutrition supplements/day       Nutrition Assessment     Admission Problem List:  L-sided chest pain, improved  L pleural effusion  A/C DHF  Multiple rib fractures from recent fall (7/16)  Recurrent ascites  S/p L thoracentesis (7/23)- 1.6 L fluid removed  ESRD, HD   N/V, new (7/25)  Persistent hypotension (transferred to the ICU 7/27)  Nonsustained V. tach  ESBL E. coli UTI  Dyspepsia  Dysphagia    Per GI note (7/31)- \"Will continue to follow.  As her respiratory condition improves may be able to proceed with EGD dilation at a later date.\"    Applicable PMH:  A.fib  PPM  CHF  HTN  DLP  Tachy-marcela syndrome  T2DM  Hyperparathyroidism  Hypothyroidism  COPD  CIELO  GERD  HH  GI bleed  ESRD, HD (Mon, Wed, Fri)  Cirrhosis  Anemia  Recurrent ascites, requiring recurrent large volumeparacentesis    Applicable Nutrition-Related Information:  (7/28) SLP bedside eval rec thin liquids, soft textures, whole  (7/30) calorie count ordered per intensivist  (7/31) SLP bedside re-eval rec soft textures, whole, thin liquids  (7/31) per RN note- \"Patient also verbalized that she does not want a feeding tube placed.\"  (8/1)  told RD that pt would not want a feeding tube     Reported/Observed/Food/Nutrition Related History     RD spoke with RN. RN reports that pt likes/currently tolerating mashed potatoes, mandarin oranges, peaches, and pears. States that eggs are currently causing N/V. Reports that pt drank some of the " [employer] Renal supplements yesterday (8/1).       Anthropometrics   Height: 67 in  Weight: 160 lb per stated weight (7/22)  BMI: 25  BMI classification: Overweight: 25.0-29.9kg/m2         Labs reviewed   Labs reviewed: Yes    Results from last 7 days   Lab Units 08/01/20  0345 07/31/20  0458 07/30/20  1918 07/30/20  0606 07/29/20  0604   SODIUM mmol/L 134* 133*  --  132* 130*   POTASSIUM mmol/L 4.1 3.7 3.9 3.2* 3.8   CHLORIDE mmol/L 96* 97*  --  97* 98   CO2 mmol/L 24.0 21.0*  --  22.0 17.0*   BUN mg/dL 14 27*  --  21 26*   CREATININE mg/dL 2.71* 3.50*  --  3.02* 3.73*   GLUCOSE mg/dL 119* 108*  --  138* 185*   CALCIUM mg/dL 9.4 9.2  --  9.4 9.3   PHOSPHORUS mg/dL  --  2.5  --   --   --    MAGNESIUM mg/dL 2.1  --   --  2.2 2.3     Results from last 7 days   Lab Units 08/02/20  0510 08/01/20  0345 07/31/20  0458  07/29/20  0604 07/28/20  0909   WBC 10*3/mm3 8.84 8.47 10.58   < > 19.99* 20.72*   ALBUMIN g/dL  --   --  3.00*  --  2.80* 4.00   PREALBUMIN mg/dL  --   --  4.6*  --   --   --     < > = values in this interval not displayed.     Results from last 7 days   Lab Units 08/02/20  0756 08/01/20 2015 08/01/20  1616 08/01/20  1132 08/01/20  0948 08/01/20  0731   GLUCOSE mg/dL 104 123 100 109 103 141*     Lab Results   Lab Value Date/Time    HGBA1C 4.80 07/23/2020 0835    HGBA1C 5.8 05/21/2020 1400    HGBA1C 5.49 02/27/2020 1424     Medications reviewed   Medications reviewed: Yes  Pertinent: nephro-fazal, insulin, protonix, antibiotics, miralax, pericolace  PRN: albumin, dulcolax, melatonin, zofran, percocet      Needs Assessment  (8/1)   Height used: 67 in/170 cm  Weight used: 160 lb/70 kg    Estimated Calories needs: ~1800 kcal/day  25-30 kcal/kg= 1464-5523 kcal    Estimated Protein needs:   1.0-1.2 g/kg= 70-84 g pro/day      Current Nutrition Prescription   PO: Diet Soft Texture; [employer]; Cardiac, Renal  Oral Nutrition Supplement: cafe mocha [employer] Renal 3x/day      Evaluation of Received Nutrient/Fluid  Intake-PO:  Calorie Count (8/1)=  580 kcal, 32%  28 g pro, 37%  6%/10 meals      Nutrition Diagnosis     Problem 1      Row Name 07/31/20 1438                 Nutrition Diagnoses Problem 1     Problem 1  Inadequate Intake/Infusion       Inadequate Intake Type  Oral       Macronutrient  Kcal;Protein       Micronutrient  Vitamin;Mineral       Etiology (related to)  Medical Diagnosis       Hepatic  Cirrhosis       Renal  ESRD;Hemodialysis       Signs/Symptoms (evidenced by)  PO Intake       Percent (%) intake recorded  0 %       Over number of meals  3         Status: ongoing, 6%/10 meals      Intervention   Intervention: Follow treatment progress, Care plan reviewed, Menu adjusted   -Pt has been eating minimal amounts since admission (x11 days)  -Food preferences noted and sent to diet office    -The only other option at this point is to provide parenteral nutrition until pt is able to tolerate PO again and/or until pt able to at least drink 3 [employer] Renal oral nutrition supplements/day      Goal:   General: Nutrition support treatment  PO: Tolerate PO   EN/PN: Initiate EN, Initiate PN       Monitoring/Evaluation:       Monitoring/Evaluation: Per protocol, PO intake, Supplement intake, Pertinent labs, Weight, GI status, Symptoms, Swallow function     Will Continue to follow per protocol  Jazmín Bull MS RD/LD CNSC  Time Spent: 25 minutes

## 2020-08-02 NOTE — THERAPY TREATMENT NOTE
Acute Care - Occupational Therapy Treatment Note  [facility]     Patient Name: Sarita Alegre  : 1940  MRN: [id number]  Today's Date: 2020             Admit Date: 2020       ICD-10-CM ICD-9-CM   1. Pleural effusion on left J90 511.9   2. Multiple fractures of ribs, left side, initial encounter for closed fracture S22.42XA 807.09   3. Chest pain, unspecified type R07.9 786.50   4. Impaired functional mobility, balance, gait, and endurance Z74.09 V49.89   5. Impaired mobility and ADLs Z74.09 V49.89    Z78.9    6. Dysphagia, unspecified type R13.10 787.20   7. Gastroesophageal reflux disease, esophagitis presence not specified K21.9 530.81     Patient Active Problem List   Diagnosis   • Atrial fibrillation, persistent (CMS/HCC)   • Pulmonary emphysema (CMS/HCC)   • Obstructive sleep apnea syndrome   • Severe chronic ulcerative colitis (CMS/HCC)   • Left pleural effusion. Transudate of by protein and LDH criteria. Thoracentesis 2020   • (HFpEF) heart failure with preserved ejection fraction (CMS/HCC)   • Tachy-marcela syndrome (CMS/HCC)   • History of hypertension   • Type 2 diabetes mellitus (CMS/HCC)   • Obesity   • GERD (gastroesophageal reflux disease)   • Hypothyroidism   • Pulmonary hypertension (CMS/HCC)   • COPD (CMS/HCC)   • Upper GI hemorrhage   • Anemia due to chronic kidney disease   • Supratherapeutic INR   • Iron deficiency anemia secondary to blood loss (chronic)   • Iron malabsorption   • Abnormal CT of the chest   • Hyperlipidemia LDL goal <100   • ESRD (end stage renal disease) (CMS/HCC)   • Persistent hypotension, multifactorial   • Acute ESBL pyelonephritis and possible sepsis   • NSVT (nonsustained ventricular tachycardia) (CMS/HCC)   • Dysphagia     Past Medical History:   Diagnosis Date   • Adenomatous polyp of stomach    • Allergic rhinitis    • Anemia    • Asthma    • Atrial fibrillation (CMS/HCC)     Hospitalized at [facility] 2015, presented after dizziness and atrial  fibrillation, treated with Rythmol and a beta blocker converted to sinus prior to discharge.   • BiPAP (biphasic positive airway pressure) dependence    • Chronic anticoagulation 6/14/2016   • Chronic diastolic congestive heart failure (CMS/HCC) 6/21/2016   • CKD (chronic kidney disease), stage III (CMS/[facility])      Status post temporary hemodialysis at [facility], 8/24/2015 through 9/29/2015, worsening 11/15  with hosp chf   • Closed fracture of fibula     Followed by Dr. Kennedy   • COPD (chronic obstructive pulmonary disease) (CMS/[facility])    • Depression 9/7/2016   • Diabetes mellitus (CMS/[facility])    • Diabetic peripheral neuropathy (CMS/[facility]) 12/29/2016   • Dyslipidemia    • Gastrointestinal bleeding, lower 6/15/2016   • GERD (gastroesophageal reflux disease)    • Hearing aid worn    • Hearing deficit 9/7/2016   • Hiatal hernia    • History of abnormal electrocardiogram     a bib, inferior infarct, st-t changes 10/12-referred to ER   • History of acute gastritis    • History of bilateral renal artery occlusion    • History of cataract    • History of deafness    • History of fracture of ankle 07/2015   • History of herpes zoster    • History of pneumonia    • History of transfusion    • Hyperparathyroidism (CMS/[facility])    • Hypertension    • Hypothyroidism 9/7/2016   • Insomnia    • Joint pain, knee    • Kidney failure    • Low back pain    • Obesity    • CIELO (obstructive sleep apnea)     UNTREATED; appeared to attempt to wear it sporadically 2007,  2008, split-night study of 03/10/2016 reported an overall AHI of 32.7/hr which required BiPAP therapy after failed CPAP attempt with final BiPAP 17 and EPAP 11 which still reported oxygen desaturations on 2 L O2, her lowest oxygen saturation was documented at 64% and several arrhythmias were observed during the study.   • Osteoarthritis 9/7/2016   • Pleural effusion      CT scan of the chest 8/28/2015, reports bilateral pleural effusions, right greater than left, simple in appearance with  atelectatic changes identified of the right lung base.  Chest x-ray 10/12/2015, left pleural effusion still seen.   • Pneumonia    • Recurrent oral ulcers    • Retinal artery occlusion 12/29/2016    h/o   • Secondary pulmonary hypertension     EVITA: 6/24/15, RVSP 66mmhg; moderate MR, LVEF 55â‚¬â€œ60%, no pericardial effusion or atrial thrombus, no ASD.   • Tachy-marcela syndrome (CMS/[facility]) 09/01/2016    PPM   • Tricuspid valve insufficiency 5/17/2016    Description: A.  Severe.   • Type 2 diabetes mellitus (CMS/[facility]) 9/7/2016   • Vision loss 12/29/2016    Of the left eye   • Wears glasses      Past Surgical History:   Procedure Laterality Date   • APPENDECTOMY     • ATRIAL APPENDAGE EXCLUSION LEFT WITH TRANSESOPHAGEAL ECHOCARDIOGRAM N/A 9/26/2017    Procedure: Atrial Appendage Occlusion - PER ASAP-TOO PROTOCOL;  Surgeon: Sandeep Lopes MD;  Location:  [facility];  Service:    • BLADDER SURGERY      x3   • CARDIAC CATHETERIZATION N/A 11/20/2018    Procedure: Left Heart Cath;  Surgeon: Radha Cooper MD;  Location:  [facility];  Service: Cardiology   • CARDIAC ELECTROPHYSIOLOGY PROCEDURE N/A 9/1/2016    Procedure: Pacemaker DC new;  Surgeon: Zafar Hudson MD;  Location:  [facility];  Service:    • CARDIAC PACEMAKER PLACEMENT     • CARDIOVERSION      .  Status post cardioversion, 8/25/2015 and 9/3/2015, secondary to atrial fibrillation.   • CATARACT EXTRACTION Bilateral     1996  and 1997   • COLONOSCOPY N/A 6/17/2016    Procedure: COLONOSCOPY;  Surgeon: Edy Muñoz MD;  Location:  [facility];  Service:    • ELBOW PROCEDURE Left 2006    Left elbow repair   • ENDOSCOPY N/A 4/17/2017    Procedure: ESOPHAGOGASTRODUODENOSCOPY AT BEDSIDE;  Surgeon: Ollie White MD;  Location:  [facility];  Service:    • EYE SURGERY     • HYSTERECTOMY  1972   • KNEE SURGERY Right 1982   • OTHER SURGICAL HISTORY      History of hearing aid check   •  PACEMAKER IMPLANTATION     • PARATHYROIDECTOMY     • UPPER GASTROINTESTINAL ENDOSCOPY         Therapy Treatment    Rehabilitation Treatment Summary     Row Name 08/02/20 1440             Treatment Time/Intention    Discipline  occupational therapist  -TA      Document Type  therapy note (daily note)  -TA      Subjective Information  complains of;pain  -TA      Mode of Treatment  occupational therapy  -TA      Patient/Family Observations  spouse present  -TA      Care Plan Review  care plan/treatment goals reviewed;risks/benefits reviewed;patient/other agree to care plan  -TA      Total Minutes, Occupational Therapy Treatment  24  -TA      Therapy Frequency (OT Eval)  daily  -TA      Patient Effort  good  -TA      Existing Precautions/Restrictions  fall;oxygen therapy device and L/min Left rib fractures  -TA      Recorded by [TA] Percy Gill, OT 08/02/20 1526      Row Name 08/02/20 1440             Vital Signs    Pre Systolic BP Rehab  -- VSS; RN cleared pt for tx  -TA      Pre Patient Position  Side Lying  -TA      Intra Patient Position  Standing  -TA      Post Patient Position  Sitting  -TA      Recorded by [TA] Percy Gill OT 08/02/20 1526      Row Name 08/02/20 1440             Cognitive Assessment/Intervention- PT/OT    Orientation Status (Cognition)  oriented x 3  -TA      Follows Commands (Cognition)  follows one step commands;75-90% accuracy  -TA      Cognitive Function (Cognitive)  safety deficit  -TA      Safety Deficit (Cognitive)  safety precautions awareness;safety precautions follow-through/compliance  -TA      Recorded by [TA] Percy Gill, OT 08/02/20 1526      Row Name 08/02/20 1440             Safety Issues, Functional Mobility    Safety Issues Affecting Function (Mobility)  awareness of need for assistance;insight into deficits/self awareness;safety precaution awareness;safety precautions follow-through/compliance  -TA      Impairments Affecting Function (Mobility)   balance;endurance/activity tolerance;pain;shortness of breath;strength  -TA      Recorded by [TA] Percy Gill, OT 08/02/20 1526      Row Name 08/02/20 1440             Bed Mobility Assessment/Treatment    Bed Mobility Assessment/Treatment  sidelying-sit  -TA      Sidelying-Sit [place] (Bed Mobility)  maximum assist (25% patient effort);verbal cues  -TA      Bed Mobility, Safety Issues  decreased use of arms for pushing/pulling;decreased use of legs for bridging/pushing;impaired trunk control for bed mobility  -TA      Assistive Device (Bed Mobility)  bed rails;head of bed elevated  -TA      Recorded by [TA] Percy Gill, OT 08/02/20 1526      Row Name 08/02/20 1440             Functional Mobility    Functional Mobility- Ind. Level  moderate assist (50% patient effort);verbal cues required  -TA      Functional Mobility- Device  other (see comments) BUE support  -TA      Functional Mobility-Distance (Feet)  -- steps from EOB>BSC>chair  -TA      Functional Mobility- Safety Issues  step length decreased;weight-shifting ability decreased;balance decreased during turns  -TA      Recorded by [TA] Percy Gill, OT 08/02/20 1526      Row Name 08/02/20 1440             Transfer Assessment/Treatment    Transfer Assessment/Treatment  sit-stand transfer;stand-sit transfer;toilet transfer  -TA      Recorded by [TA] Percy Gill, OT 08/02/20 1526      Row Name 08/02/20 1440             Sit-Stand Transfer    Sit-Stand [place] (Transfers)  moderate assist (50% patient effort);verbal cues  -TA      Assistive Device (Sit-Stand Transfers)  other (see comments) BUE support  -TA      Recorded by [TA] Percy Gill, OT 08/02/20 1526      Row Name 08/02/20 1440             Stand-Sit Transfer    Stand-Sit [place] (Transfers)  moderate assist (50% patient effort);verbal cues  -TA      Assistive Device (Stand-Sit Transfers)  other (see comments) BUE support  -TA      Recorded by [TA]  Percy Gill, OT 08/02/20 1526      Row Name 08/02/20 1440             Toilet Transfer    Type (Toilet Transfer)  sit-stand;stand-sit  -TA      [place] (Toilet Transfer)  moderate assist (50% patient effort);verbal cues  -TA      Assistive Device (Toilet Transfer)  other (see comments) BUE support  -TA      Recorded by [TA] Percy Gill, OT 08/02/20 1526      Row Name 08/02/20 1440             ADL Assessment/Intervention    05943 - OT Self Care/Mgmt Minutes  9  -TA      BADL Assessment/Intervention  toileting  -TA      Recorded by [TA] Percy Gill, OT 08/02/20 1526      Row Name 08/02/20 1440             Toileting Assessment/Training    [place] Level (Toileting)  adjust/manage clothing;perform perineal hygiene;dependent (less than 25% patient effort);other (see comments) OT supported pt, PCT w/post toilet hygiene  -TA      Assistive Devices (Toileting)  commode, bedside without drop arms  -TA      Toileting Position  supported sitting;supported standing  -TA      Recorded by [TA] Percy Gill, OT 08/02/20 1526      Row Name 08/02/20 1440             BADL Safety/Performance    Impairments, BADL Safety/Performance  balance;endurance/activity tolerance;pain;shortness of breath;strength  -TA      Cognitive Impairments, BADL Safety/Performance  awareness, need for assistance;insight into deficits/self awareness;safety precaution awareness;safety precaution follow-through  -TA      Skilled BADL Treatment/Intervention  BADL process/adaptation training  -TA      Recorded by [TA] Percy Gill, OT 08/02/20 1526      Row Name 08/02/20 1440             Therapeutic Exercise    62744 - OT Therapeutic Activity Minutes  15  -TA      Recorded by [TA] Percy Gill, OT 08/02/20 1526      Row Name 08/02/20 1440             Balance    Balance  static sitting balance;static standing balance  -TA      Recorded by [TA] Percy Gill, OT 08/02/20 1526      Row Name 08/02/20  1440             Static Sitting Balance    Level of [place] (Unsupported Sitting, Static Balance)  minimal assist, 75% patient effort;contact guard assist;other (see comments) Min progressing to CGA at EOB w/time, positioning  -TA      Sitting Position (Unsupported Sitting, Static Balance)  sitting on edge of bed  -TA      Time Able to Maintain Position (Unsupported Sitting, Static Balance)  2 to 3 minutes  -TA      Recorded by [TA] Percy Gill, OT 08/02/20 1526      Row Name 08/02/20 1440             Static Standing Balance    Level of [place] (Supported Standing, Static Balance)  moderate assist, 50 to 74% patient effort  -TA      Time Able to Maintain Position (Supported Standing, Static Balance)  45 to 60 seconds  -TA      Assistive Device Utilized (Supported Standing, Static Balance)  other (see comments) BUE support  -TA      Recorded by [TA] Percy Gill, OT 08/02/20 1526      Row Name 08/02/20 1440             Positioning and Restraints    Pre-Treatment Position  in bed  -TA      Post Treatment Position  chair  -TA      In Chair  notified nsg;reclined;call light within reach;encouraged to call for assist;exit alarm on;with family/caregiver;waffle cushion;legs elevated  -TA      Recorded by [TA] Percy Gill, OT 08/02/20 1526      Row Name 08/02/20 1440             Pain Assessment    Additional Documentation  Pain Scale: Numbers Pre/Post-Treatment (Group)  -TA      Recorded by [TA] Percy Gill, OT 08/02/20 1526      Row Name 08/02/20 1440             Pain Scale: Numbers Pre/Post-Treatment    Pain Scale: Numbers, Pretreatment  2/10  -TA      Pain Scale: Numbers, Post-Treatment  2/10  -TA      Pain Location - Side  Right  -TA      Pain Location - Orientation  posterior  -TA      Pain Location  hip  -TA      Pre/Post Treatment Pain Comment  tolerated  -TA      Pain Intervention(s)  Ambulation/increased activity;Repositioned  -TA      Recorded by [TA] Percy Gill,  OT 08/02/20 1526      Row Name 08/02/20 1440             Coping    Observed Emotional State  calm;cooperative;pleasant  -TA      Verbalized Emotional State  acceptance  -TA      Recorded by [TA] Percy Gill OT 08/02/20 1526      Row Name 08/02/20 1440             Plan of Care Review    Plan of Care Reviewed With  patient;spouse  -TA      Progress  improving  -TA      Recorded by [TA] Percy Gill OT 08/02/20 1526      Row Name 08/02/20 1440             Outcome Summary/Treatment Plan (OT)    Daily Summary of Progress (OT)  progress toward functional goals as expected  -TA      Plan for Continued Treatment (OT)  Continue per OT POC  -TA      Anticipated Discharge Disposition (OT)  skilled nursing facility  -TA      Recorded by [TA] Percy Gill OT 08/02/20 1526        User Key  (r) = Recorded By, (t) = Taken By, (c) = Cosigned By    Initials Name Effective Dates Discipline    TA Percy Gill OT 03/14/16 -  OT             Occupational Therapy Education                 Title: PT OT SLP Therapies (In Progress)     Topic: Occupational Therapy (In Progress)     Point: ADL training (In Progress)     Description:   Instruct learner(s) on proper safety adaptation and remediation techniques during self care or transfers.   Instruct in proper use of assistive devices.              Learning Progress Summary           Patient Acceptance, E, NR by CL at 7/30/2020 1510    Acceptance, E, NR by LC at 7/26/2020 0846                   Point: Home exercise program (In Progress)     Description:   Instruct learner(s) on appropriate technique for monitoring, assisting and/or progressing therapeutic exercises/activities.              Learning Progress Summary           Patient Acceptance, E, NR by CL at 7/30/2020 1510    Acceptance, E, NR by CL at 7/29/2020 1445                   Point: Precautions (Done)     Description:   Instruct learner(s) on prescribed precautions during self-care and functional  transfers.              Learning Progress Summary           Patient Acceptance, E, VU by TA at 8/2/2020 1526    Comment:  Body mechanics with bed mobility, fxl transfers; reinforced need for call for assist with OOB activities.    Acceptance, E, NR by CL at 7/30/2020 1510    Acceptance, E, NR by CL at 7/29/2020 1445    Acceptance, E, NR by LC at 7/26/2020 0846   Significant Other Acceptance, E, VU by TA at 8/2/2020 1526    Comment:  Body mechanics with bed mobility, fxl transfers; reinforced need for call for assist with OOB activities.                   Point: Body mechanics (Done)     Description:   Instruct learner(s) on proper positioning and spine alignment during self-care, functional mobility activities and/or exercises.              Learning Progress Summary           Patient Acceptance, E, VU by TA at 8/2/2020 1526    Comment:  Body mechanics with bed mobility, fxl transfers; reinforced need for call for assist with OOB activities.    Acceptance, E, NR by CL at 7/30/2020 1510    Acceptance, E, NR by LC at 7/26/2020 0846   Significant Other Acceptance, E, VU by TA at 8/2/2020 1526    Comment:  Body mechanics with bed mobility, fxl transfers; reinforced need for call for assist with OOB activities.                               User Key     Initials Effective Dates Name Provider Type Discipline    TA 03/14/16 -  Percy Gill, OT Occupational Therapist OT    CL 04/03/18 -  Yanelis Parmar OT Occupational Therapist OT     07/18/19 -  Gloria Blanc OT Occupational Therapist OT                OT Recommendation and Plan  Outcome Summary/Treatment Plan (OT)  Daily Summary of Progress (OT): progress toward functional goals as expected  Plan for Continued Treatment (OT): Continue per OT POC  Anticipated Discharge Disposition (OT): skilled nursing facility  Therapy Frequency (OT Eval): daily  Daily Summary of Progress (OT): progress toward functional goals as expected  Plan of Care Review  Plan of Care  Reviewed With: patient, spouse  Plan of Care Reviewed With: patient, spouse  Outcome Summary: VSS; Pt required Max A sidelying to sit; Mod A STS from EOB and BSC; dependent for clothing mgt/post toilet hygiene; Mod A to take steps from EOB>BSC>chair. Increased time and effort with fxl mobility. Continue per OT POC.  Outcome Measures     Row Name 08/02/20 1440             How much help from another is currently needed...    Putting on and taking off regular lower body clothing?  2  -TA      Bathing (including washing, rinsing, and drying)  2  -TA      Toileting (which includes using toilet bed pan or urinal)  1  -TA      Putting on and taking off regular upper body clothing  2  -TA      Taking care of personal grooming (such as brushing teeth)  3  -TA      Eating meals  3  -TA      AM-PAC 6 Clicks Score (OT)  13  -TA         Functional Assessment    Outcome Measure Options  AM-PAC 6 Clicks Daily Activity (OT)  -TA        User Key  (r) = Recorded By, (t) = Taken By, (c) = Cosigned By    Initials Name Provider Type    Percy Herbert OT Occupational Therapist           Time Calculation:   Time Calculation- OT     Row Name 08/02/20 1530 08/02/20 1440          Time Calculation- OT    OT Start Time  1440 ttc 24 minutes  -TA  --     Total Timed Code Minutes- OT  24 minute(s)  -TA  --     OT Received On  08/02/20  -TA  --     OT Goal Re-Cert Due Date  08/05/20  -TA  --        Timed Charges    50716 - OT Therapeutic Activity Minutes  --  15  -TA     78476 - OT Self Care/Mgmt Minutes  --  9  -TA       User Key  (r) = Recorded By, (t) = Taken By, (c) = Cosigned By    Initials Name Provider Type    Percy Herbert OT Occupational Therapist        Therapy Charges for Today     Code Description Service Date Service Provider Modifiers Qty    52879523380 HC OT THERAPEUTIC ACT EA 15 MIN 8/2/2020 Percy Gill OT GO 1    95367293633 HC OT SELF CARE/MGMT/TRAIN EA 15 MIN 8/2/2020 Percy Gill OT GO 1                Percy Gill, OT  8/2/2020

## 2020-08-02 NOTE — PLAN OF CARE
Problem: Patient Care Overview  Goal: Plan of Care Review  Outcome: Ongoing (interventions implemented as appropriate)  Flowsheets (Taken 8/2/2020 1440)  Outcome Summary: VSS; Pt required Max A sidelying to sit; Mod A STS from EOB and BSC; dependent for clothing mgt/post toilet hygiene; Mod A to take steps from EOB>BSC>chair. Increased time and effort with fxl mobility. Continue per OT POC.

## 2020-08-03 NOTE — PROGRESS NOTES
" LOS: 12 days   Patient Care Team:  Jade Quach APRN as PCP - General (Nurse Practitioner)  Percy Allison MD as PCP - Claims Attributed    Chief Complaint: ESRD    Subjective   Alert and engaging. SOB positive     Review of system:SOB  All other negative.    Objective     Vital Sign Min/Max for last 24 hours  Temp  Min: 97.2 °F (36.2 °C)  Max: 98.4 °F (36.9 °C)   BP  Min: 101/49  Max: 123/61   Pulse  Min: 81  Max: 104   Resp  Min: 16  Max: 20   SpO2  Min: 90 %  Max: 100 %   Flow (L/min)  Min: 2  Max: 3   Weight  Min: 80.4 kg (177 lb 4.8 oz)  Max: 80.4 kg (177 lb 4.8 oz)     Flowsheet Rows      First Filed Value   Admission Height  171.5 cm (67.5\") Documented at 07/22/2020 1503   Admission Weight  72.6 kg (160 lb) Documented at 07/22/2020 1503          No intake/output data recorded.  I/O last 3 completed shifts:  In: 530 [P.O.:530]  Out: -     Objective:  General Appearance:  Ill-appearing, in no acute distress and not in pain.    Vital signs: (most recent): Blood pressure 108/57, pulse 103, temperature 97.2 °F (36.2 °C), temperature source Temporal, resp. rate 16, height 170.2 cm (67\"), weight 80.4 kg (177 lb 4.8 oz), SpO2 97 %, not currently breastfeeding.    Output: No urine output.    HEENT: Normal HEENT exam.    Lungs:  Normal effort.  There are rales.    Heart: Normal rate.  S1 normal and S2 normal.    Abdomen: Abdomen is soft and flat.  Bowel sounds are normal.     Extremities: Normal range of motion.  (Edema positive. )  Neurological: (Alert, no focal deficit noted grossly. ).              Results Review:     I reviewed the patient's new clinical results.    WBC WBC   Date Value Ref Range Status   08/03/2020 8.01 3.40 - 10.80 10*3/mm3 Final   08/02/2020 8.84 3.40 - 10.80 10*3/mm3 Final   08/01/2020 8.47 3.40 - 10.80 10*3/mm3 Final      HGB Hemoglobin   Date Value Ref Range Status   08/03/2020 10.9 (L) 12.0 - 15.9 g/dL Final   08/02/2020 10.7 (L) 12.0 - 15.9 g/dL Final   08/01/2020 10.1 " (L) 12.0 - 15.9 g/dL Final      HCT Hematocrit   Date Value Ref Range Status   08/03/2020 34.9 34.0 - 46.6 % Final   08/02/2020 34.2 34.0 - 46.6 % Final   08/01/2020 32.6 (L) 34.0 - 46.6 % Final      Platlets No results found for: LABPLAT   MCV MCV   Date Value Ref Range Status   08/03/2020 92.6 79.0 - 97.0 fL Final   08/02/2020 94.7 79.0 - 97.0 fL Final   08/01/2020 94.5 79.0 - 97.0 fL Final          Sodium Sodium   Date Value Ref Range Status   08/03/2020 132 (L) 136 - 145 mmol/L Final   08/02/2020 131 (L) 136 - 145 mmol/L Final   08/01/2020 134 (L) 136 - 145 mmol/L Final      Potassium Potassium   Date Value Ref Range Status   08/03/2020 3.7 3.5 - 5.2 mmol/L Final   08/02/2020 4.4 3.5 - 5.2 mmol/L Final     Comment:     Specimen hemolyzed.  Results may be affected.   08/01/2020 4.1 3.5 - 5.2 mmol/L Final      Chloride Chloride   Date Value Ref Range Status   08/03/2020 93 (L) 98 - 107 mmol/L Final   08/02/2020 94 (L) 98 - 107 mmol/L Final   08/01/2020 96 (L) 98 - 107 mmol/L Final      CO2 CO2   Date Value Ref Range Status   08/03/2020 22.0 22.0 - 29.0 mmol/L Final   08/02/2020 20.0 (L) 22.0 - 29.0 mmol/L Final   08/01/2020 24.0 22.0 - 29.0 mmol/L Final      BUN BUN   Date Value Ref Range Status   08/03/2020 29 (H) 8 - 23 mg/dL Final   08/02/2020 21 8 - 23 mg/dL Final   08/01/2020 14 8 - 23 mg/dL Final      Creatinine Creatinine   Date Value Ref Range Status   08/03/2020 4.41 (H) 0.57 - 1.00 mg/dL Final   08/02/2020 3.64 (H) 0.57 - 1.00 mg/dL Final   08/01/2020 2.71 (H) 0.57 - 1.00 mg/dL Final      Calcium Calcium   Date Value Ref Range Status   08/03/2020 9.9 8.6 - 10.5 mg/dL Final   08/02/2020 10.0 8.6 - 10.5 mg/dL Final   08/01/2020 9.4 8.6 - 10.5 mg/dL Final      PO4 No results found for: CAPO4   Albumin No results found for: ALBUMIN   Magnesium Magnesium   Date Value Ref Range Status   08/01/2020 2.1 1.6 - 2.4 mg/dL Final      Uric Acid No results found for: URICACID     Medication Review:  Yes    Assessment/Plan       Persistent hypotension, multifactorial    Atrial fibrillation, persistent (CMS/HCC)    Obstructive sleep apnea syndrome    Left pleural effusion. Transudate of by protein and LDH criteria. Thoracentesis 7/23/2020    (HFpEF) heart failure with preserved ejection fraction (CMS/HCC)    Tachy-marcela syndrome (CMS/HCC)    History of hypertension    Type 2 diabetes mellitus (CMS/HCC)    GERD (gastroesophageal reflux disease)    Hypothyroidism    Pulmonary hypertension (CMS/HCC)    COPD (CMS/HCC)    ESRD (end stage renal disease) (CMS/[facility])    Acute ESBL pyelonephritis and possible sepsis    NSVT (nonsustained ventricular tachycardia) (CMS/[facility])    Dysphagia      Assessment:  (1.  ESRD: MWF [facility] Dr [facility] Cast ( Outpatient Nephrologist)  On HD since Jan 2020      2.  Anemia: MARQUITA on HD days     3.  Volume status: Issues with fluid overload due to ESRD, Cirhosis, CHF.      4.  Acid base and Elytes: Hyponatremia management with HD     5.  BMD:      6.  Recurrent fall: Multiple rib fracture: Underwent thoracentesis for hemothorax on this admission.     Recommendation.  HD today UF as tolerated.   If still hypotensive and UF is limited than will have to consider CRRT.   Continue with Midodrine.     High risk patient with critical condition  Multiple medical problems  Case discussed with RN ).       Hue Antony MD  08/03/20  13:50

## 2020-08-03 NOTE — ANESTHESIA POSTPROCEDURE EVALUATION
Patient: Sarita Alegre    Procedure Summary     Date:  08/03/20 Room / Location:   [facility]    Anesthesia Start:  1705 Anesthesia Stop:      Procedure:  ESOPHAGOGASTRODUODENOSCOPY (N/A ) Diagnosis:  (Dysphagia)    Surgeon:  Devon James MD Provider:  Aldair Fisher MD    Anesthesia Type:  general ASA Status:  4          Anesthesia Type: general    Vitals  Vitals Value Taken Time   BP 98/54 8/3/2020  5:22 PM   Temp     Pulse 83 8/3/2020  5:22 PM   Resp 20 8/3/2020  5:22 PM   SpO2 94 % 8/3/2020  5:22 PM           Post Anesthesia Care and Evaluation    Patient location during evaluation: bedside (ENDO 2)  Patient participation: complete - patient cannot participate  Level of consciousness: responsive to physical stimuli  Pain score: 0  Pain management: adequate  Airway patency: patent  Anesthetic complications: No anesthetic complications  PONV Status: none  Cardiovascular status: stable  Respiratory status: acceptable, nasal cannula and spontaneous ventilation  Hydration status: stable    Comments:  Report to ENDO RN at bedside and care accepted.

## 2020-08-03 NOTE — PROGRESS NOTES
Continued Stay Note  [facility]     Patient Name: Sarita Alegre  MRN: [id number]  Today's Date: 8/3/2020    Admit Date: 7/22/2020    Discharge Plan     Row Name 08/03/20 1353       Plan    Plan  [facility]    Patient/Family in Agreement with Plan  yes    Plan Comments  Per MD in am rounds, patient likely to be ready for dc in 1-2 days. Spoke w/ Page at the [facility]. They are able to accept patient on Wednesday following her HD. [facility] has been tentatively scheduled for Wednesday, 8/5, at 1530. CM will update a final note w/ number to call report once dc has been finalized. CM following.     Final Discharge Disposition Code  03 - skilled nursing facility (SNF)        Discharge Codes    No documentation.       Expected Discharge Date and Time     Expected Discharge Date Expected Discharge Time    Aug 3, 2020             Beverly Duarte RN

## 2020-08-03 NOTE — PROGRESS NOTES
"[facility] Daily Note       LOS: 12 days   Patient Care Team:  Jade Quach APRN as PCP - General (Nurse Practitioner)  Percy Allison MD as PCP - Claims Attributed    Chief Complaint: Dyspnea    Subjective     Subjective:       Review of Systems:   As above.    Medications:    amiodarone 200 mg Oral Q12H   atorvastatin 40 mg Oral Daily   b complex-vitamin c-folic acid 1 tablet Oral Daily   budesonide-formoterol 2 puff Inhalation BID - RT   insulin lispro 0-7 Units Subcutaneous TID With Meals   ipratropium 0.5 mg Nebulization 4x Daily - RT   levothyroxine 112 mcg Oral QAM   midodrine 10 mg Oral TID AC   nystatin 5 mL Swish & Swallow 4x Daily   pantoprazole 40 mg Oral BID AC   pharmacy consult - MTM  Does not apply Daily   piperacillin-tazobactam 3.375 g Intravenous Q12H   polyethylene glycol 17 g Oral Daily   senna-docusate sodium 2 tablet Oral Nightly   sodium chloride 10 mL Intravenous Q12H       Objective     Vital Sign Min/Max for last 24 hours  Temp  Min: 97.7 °F (36.5 °C)  Max: 98.4 °F (36.9 °C)   BP  Min: 100/51  Max: 117/67   Pulse  Min: 82  Max: 109   Resp  Min: 16  Max: 20   SpO2  Min: 90 %  Max: 100 %   Flow (L/min)  Min: 2  Max: 3   Weight  Min: 80.4 kg (177 lb 4.8 oz)  Max: 80.4 kg (177 lb 4.8 oz)      Intake/Output Summary (Last 24 hours) at 8/3/2020 0856  Last data filed at 8/2/2020 1700  Gross per 24 hour   Intake 530 ml   Output --   Net 530 ml        Flowsheet Rows      First Filed Value   Admission Height  171.5 cm (67.5\") Documented at 07/22/2020 1503   Admission Weight  72.6 kg (160 lb) Documented at 07/22/2020 1503          Physical Exam:    General: Awake and alert  Cardiovascular: Heart has a nondisplaced focal PMI. Irregular rate and rhythm with 2-3/6 HS murmur left sternal border, no gallop or rub.  Lungs: Clear without rales or wheezes. Equal expansion is noted.  Slightly decreased bases  Abdomen: Firm.  Extremities: Show no edema.   Skin: warm and " dry.     Results Review:    I reviewed the patient's new clinical results.  EKG:  Tele: A. jose d with controlled ventricular rate   Labs:    Results from last 7 days   Lab Units 08/02/20  0907 08/01/20 0345 07/31/20  0458  07/29/20  0604 07/28/20  0909 07/27/20  2135   SODIUM mmol/L 131* 134* 133*   < > 130* 137 135*   POTASSIUM mmol/L 4.4 4.1 3.7   < > 3.8 4.0 3.9   CHLORIDE mmol/L 94* 96* 97*   < > 98 99 99   CO2 mmol/L 20.0* 24.0 21.0*   < > 17.0* 20.0* 21.0*   BUN mg/dL 21 14 27*   < > 26* 15 11   CREATININE mg/dL 3.64* 2.71* 3.50*   < > 3.73* 3.56* 3.34*   CALCIUM mg/dL 10.0 9.4 9.2   < > 9.3 10.0 9.6   BILIRUBIN mg/dL  --   --   --   --  1.3* 1.7* 1.3*   ALK PHOS U/L  --   --   --   --  61 64 68   ALT (SGPT) U/L  --   --   --   --  9 8 10   AST (SGOT) U/L  --   --   --   --  23 26 35*   GLUCOSE mg/dL 104* 119* 108*   < > 185* 127* 181*    < > = values in this interval not displayed.     Results from last 7 days   Lab Units 08/02/20  0510 08/01/20  0345 07/31/20  0458  07/29/20  0604 07/28/20  0909   WBC 10*3/mm3 8.84 8.47 10.58   < > 19.99* 20.72*   HEMOGLOBIN g/dL 10.7* 10.1* 11.5*   < > 12.7 12.0   HEMATOCRIT % 34.2 32.6* 35.5   < > 40.4 40.2   PLATELETS 10*3/mm3 120* 111* 142   < > 187 196   MONOCYTES % %  --   --   --   --  2.0* 6.0    < > = values in this interval not displayed.     Lab Results   Component Value Date    TROPONINI 0.02 11/05/2015    TROPONINI 0.01 10/11/2015    TROPONINI 0.05 06/21/2015    TROPONINT 0.033 (C) 07/28/2020    TROPONINT 0.039 (C) 07/24/2020    TROPONINT 0.037 (C) 07/23/2020     Lab Results   Component Value Date    CHOL 84 02/27/2020    CHOL 140 09/25/2018     Lab Results   Component Value Date    TRIG 64 02/27/2020    TRIG 165 (H) 09/25/2018    TRIG 83 11/09/2015     Lab Results   Component Value Date    HDL 36 (L) 02/27/2020    HDL 34 (L) 09/25/2018    HDL 30 (L) 11/09/2015     No components found for: LDLCALC  Lab Results   Component Value Date    INR 1.18 (H) 07/23/2020     INR 1.00 12/05/2017    INR 1.01 09/25/2017    PROTIME 14.7 (H) 07/23/2020    PROTIME 10.9 12/05/2017    PROTIME 11.0 09/25/2017     Ejection fraction: 50 to 55%    Assessment   Assessment:    1. Acute/chronic diastolic heart failure  2. Nonsustained ventricular tachycardia, 2 minutes on 7/28/2020  3. Moderate to large left pleural effusion status post thoracentesis  4. Pulmonary hypertension  5. Recurrent ascites  6. Chest pain with a history of previous near normal coronaries in 2018  7. Atrial fibrillation with borderline rate control  8. Tachybradycardia syndrome status post permanent pacemaker implantation  9. Hyperlipidemia  10. Type 2 diabetes mellitus  11. End-stage renal disease on dialysis  12. Status post recent fall with left-sided rib fractures.      Plan:    Change amiodarone to 200 mg daily for nonsustained ventricular tachycardia  No further cardiac testing at this time as the patient has near normal coronaries from 2018 cath and EF is essentially unchanged by echo this admission.  Continue midodrine for hypotension  Antibiotics per intensivist for probable sepsis  No CPR or intubation however defibrillation of an unstable rhythm is acceptable.      Radha Cooper MD  08/03/20  08:56

## 2020-08-03 NOTE — PLAN OF CARE
Problem: Patient Care Overview  Goal: Plan of Care Review  Outcome: Ongoing (interventions implemented as appropriate)  Flowsheets  Taken 8/3/2020 1633  Progress: no change  Taken 8/3/2020 0843  Plan of Care Reviewed With: patient   Pt had hemodialysis and EGD today

## 2020-08-03 NOTE — PROGRESS NOTES
"Clinical Nutrition   Reason For Visit: Follow-up protocol, Calorie count    Patient Name: Sarita Alegre  YOB: 1940  MRN: [id number]  Date of Encounter: 08/03/20 15:19  Admission date: 7/22/2020    Comments:  Calorie count completed. Patient continues to be unable to meet estimated kcal/protein needs for >7 days. Pt has been eating minimal amounts since admission (x11 days). Previous RD discussed enteral nutrition support with , however,  did not appear to desire feeding tube placement. Patient limited code, however, desires for artifical nutrition not expressed in patients Code Status at this time.    Pending GOC, RD recommends initiation of enteral nutrition via keofeed. Cont to monitor patient oral/supplemental intake, GI recommendations from EGD, and provide recommendations for nutrition support as appropriate     Nutrition Assessment     Admission Problem List:  L-sided chest pain, improved  L pleural effusion  A/C DHF  Multiple rib fractures from recent fall (7/16)  Recurrent ascites  S/p L thoracentesis (7/23)- 1.6 L fluid removed  ESRD, HD   N/V, new (7/25)  Persistent hypotension (transferred to the ICU 7/27)  Nonsustained V. tach  ESBL E. coli UTI  Dyspepsia  Dysphagia    Per GI note (7/31)- \"Will continue to follow.  As her respiratory condition improves may be able to proceed with EGD dilation at a later date.\"  (8/3) pending EGD    Applicable PMH:  A.fib  PPM  CHF  HTN  DLP  Tachy-marcela syndrome  T2DM  Hyperparathyroidism  Hypothyroidism  COPD  CIELO  GERD  HH  GI bleed  ESRD, HD (Mon, Wed, Fri)  Cirrhosis  Anemia  Recurrent ascites, requiring recurrent large volumeparacentesis    Applicable Nutrition-Related Information:  (7/28) SLP bedside eval rec thin liquids, soft textures, whole  (7/30) calorie count ordered per intensivist  (7/31) SLP bedside re-eval rec soft textures, whole, thin liquids  (7/31) per RN note- \"Patient also verbalized that she does not want a feeding " "tube placed.\"  (8/1)  told RD that pt would not want a feeding tube     Reported/Observed/Food/Nutrition Related History      Spoke with patients RN. Patient off of floor for dialysis. Awaiting EGD later today for reported swallowing difficulties.  not present to discuss oral intake/aggressive nutrition interventions. Patient continues with minimal PO intake this admission. RD messaged attending to discuss patients current nutrition status. MD hopeful for increased oral intake following possible dilation procedure.      Anthropometrics     Height: 67 in  Weight: 160 lb per stated weight (7/22)  BMI: 25  BMI classification: Overweight: 25.0-29.9kg/m2         Labs reviewed   Labs reviewed: Yes    Results from last 7 days   Lab Units 08/03/20  1024 08/02/20  0907 08/01/20  0345 07/31/20  0458  07/30/20  0606 07/29/20  0604   SODIUM mmol/L 132* 131* 134* 133*  --  132* 130*   POTASSIUM mmol/L 3.7 4.4 4.1 3.7   < > 3.2* 3.8   CHLORIDE mmol/L 93* 94* 96* 97*  --  97* 98   CO2 mmol/L 22.0 20.0* 24.0 21.0*  --  22.0 17.0*   BUN mg/dL 29* 21 14 27*  --  21 26*   CREATININE mg/dL 4.41* 3.64* 2.71* 3.50*  --  3.02* 3.73*   GLUCOSE mg/dL 110* 104* 119* 108*  --  138* 185*   CALCIUM mg/dL 9.9 10.0 9.4 9.2  --  9.4 9.3   PHOSPHORUS mg/dL  --   --   --  2.5  --   --   --    MAGNESIUM mg/dL  --   --  2.1  --   --  2.2 2.3    < > = values in this interval not displayed.     Results from last 7 days   Lab Units 08/03/20  1024 08/02/20  0510 08/01/20  0345 07/31/20  0458  07/29/20  0604 07/28/20  0909   WBC 10*3/mm3 8.01 8.84 8.47 10.58   < > 19.99* 20.72*   ALBUMIN g/dL  --   --   --  3.00*  --  2.80* 4.00   PREALBUMIN mg/dL  --   --   --  4.6*  --   --   --     < > = values in this interval not displayed.     Results from last 7 days   Lab Units 08/03/20  1235 08/03/20  0753 08/02/20  1921 08/02/20  1618 08/02/20  1152 08/02/20  0756   GLUCOSE mg/dL 90 93 120 141* 115 104     Lab Results   Lab Value Date/Time    HGBA1C " 4.80 07/23/2020 0835    HGBA1C 5.8 05/21/2020 1400    HGBA1C 5.49 02/27/2020 1424     Medications reviewed   Medications reviewed: Yes  Pertinent: nephro-fazal, insulin, protonix, antibiotics, miralax, pericolace  PRN: albumin, dulcolax, melatonin, zofran, percocet      Needs Assessment  (8/1)   Height used: 67 in/170 cm  Weight used: 160 lb/70 kg    Estimated Calories needs: ~1800 kcal/day  25-30 kcal/kg= 3816-7299 kcal    Estimated Protein needs:   1.0-1.2 g/kg= 70-84 g pro/day      Current Nutrition Prescription     PO: NPO Diet  Oral Nutrition Supplement: [employer] Renal 3x/day  Evaluation of Received Nutrient/Fluid Intake-PO:  Calorie Count (8/2) - only breakfast and lunch documented, 0% of dinner consumed  ~35 kcal kcal, ~2% est needs  ~0 g pro, )%  11% of past 8 documented meals      Nutrition Diagnosis     Problem 1      Row Name 07/31/20 1438                 Nutrition Diagnoses Problem 1     Problem 1  Inadequate Intake/Infusion       Inadequate Intake Type  Oral       Macronutrient  Kcal;Protein       Micronutrient  Vitamin;Mineral       Etiology (related to)  Medical Diagnosis       Hepatic  Cirrhosis       Renal  ESRD;Hemodialysis       Signs/Symptoms (evidenced by)  PO Intake       Percent (%) intake recorded  0 %       Over number of meals  3         Status: ongoing      Intervention   Follow treatment progress, Care plan reviewed, Menu adjusted   -Pt has been eating minimal amounts since admission (x11 days). Calorie count shows continued inability to meet estimated needs  -Patient meets criteria for initiating enteral nutrition support due to prolonged inability to meet estimated kcal/protein needs. Per previous RD, patients  reported unsure of placing feeding tube, however, desires for artifical nutrition not expressed in patients Code Status at this time       Goal:   General: Nutrition support treatment  PO: Tolerate PO   EN/PN: Initiate EN       Monitoring/Evaluation:       Per  protocol, PO intake, Supplement intake, Pertinent labs, Weight, GI status, Symptoms, Swallow function     Will Continue to follow per protocol  Gloria Dhillon RDN, LD  Time Spent: 25 minutes

## 2020-08-03 NOTE — POST-PROCEDURE NOTE
EGDi Esophagitis   bx taken.  Dilated 45-48 Fr  Mild gastropathy stomach  -bx      REC FU results of bx and of dilation

## 2020-08-03 NOTE — PROGRESS NOTES
[facility] Medicine Services  PROGRESS NOTE    Patient Name: Sarita Alegre  : 1940  MRN: [id number]    Date of Admission: 2020  Primary Care Physician: Jade Quach APRN    Subjective   Subjective     CC:  F/U chest pain, SOA    HPI:   Pt seen and examined. Nursing notes reviewed. No issues overnight. Going for EGD today      Review of Systems -    Gen- No fevers, chills  CV- No chest pain, palpitations  Resp- No cough, dyspnea  GI- No N/V/D, abd pain    All other systems reviewed and negative except any additional pertinent positives and negatives as discussed in HPI.      Objective   Objective     Vital Signs:   Temp:  [97.7 °F (36.5 °C)-98.4 °F (36.9 °C)] 97.7 °F (36.5 °C)  Heart Rate:  [] 82  Resp:  [16-20] 16  BP: (100-117)/(49-89) 117/67        Physical Exam:  Gen-  Awake and alert, NAD  HENT-NCAT, mucous membranes moist, RIJ present  CV-RRR, S1 S2 normal, no m/r/g  Resp- CTAB   Abd-soft,NT, ND, normoactive BS  Ext-trace BLE edema  Neuro-A&Ox3, no focal deficits  Skin- no rashes  Psych-appropriate mood  Neuro - MAEW      Results Reviewed:  Results from last 7 days   Lab Units 20  0510 20  0345 20  0458  20  2135   WBC 10*3/mm3 8.84 8.47 10.58   < >  --    HEMOGLOBIN g/dL 10.7* 10.1* 11.5*   < >  --    HEMATOCRIT % 34.2 32.6* 35.5   < >  --    PLATELETS 10*3/mm3 120* 111* 142   < >  --    PROCALCITONIN ng/mL  --   --  2.17*  --  2.43*    < > = values in this interval not displayed.     Results from last 7 days   Lab Units 20  0907 20  0345 20  0458  20  0604 20  0909 20  2135   SODIUM mmol/L 131* 134* 133*   < > 130* 137 135*   POTASSIUM mmol/L 4.4 4.1 3.7   < > 3.8 4.0 3.9   CHLORIDE mmol/L 94* 96* 97*   < > 98 99 99   CO2 mmol/L 20.0* 24.0 21.0*   < > 17.0* 20.0* 21.0*   BUN mg/dL 21 14 27*   < > 26* 15 11   CREATININE mg/dL 3.64* 2.71* 3.50*   < > 3.73* 3.56* 3.34*   GLUCOSE mg/dL 104*  119* 108*   < > 185* 127* 181*   CALCIUM mg/dL 10.0 9.4 9.2   < > 9.3 10.0 9.6   ALT (SGPT) U/L  --   --   --   --  9 8 10   AST (SGOT) U/L  --   --   --   --  23 26 35*   TROPONIN T ng/mL  --   --   --   --   --  0.033*  --    PROBNP pg/mL  --   --  52,812.0*  --   --   --   --     < > = values in this interval not displayed.     Estimated Creatinine Clearance: 13 mL/min (A) (by C-G formula based on SCr of 3.64 mg/dL (H)).    Microbiology Results Abnormal     Procedure Component Value - Date/Time    Blood Culture - Blood, Hand, Left [[id number]] Collected:  07/27/20 2307    Lab Status:  Final result Specimen:  Blood from Hand, Left Updated:  08/02/20 0000     Blood Culture No growth at 5 days    Blood Culture - Blood, Hand, Left [[id number]] Collected:  07/27/20 2255    Lab Status:  Final result Specimen:  Blood from Hand, Left Updated:  08/02/20 0000     Blood Culture No growth at 5 days    Urine Culture - Urine, Urine, Clean Catch [[id number]]  (Abnormal)  (Susceptibility) Collected:  07/28/20 0429    Lab Status:  Final result Specimen:  Urine, Clean Catch Updated:  07/30/20 0305     Urine Culture >100,000 CFU/mL Escherichia coli ESBL     Comment:   Consider infectious disease consult.  Susceptibility results may not correlate to clinical outcomes.       Susceptibility      Escherichia coli ESBL     NEDRA     Amikacin Susceptible     Ertapenem Susceptible     Gentamicin Resistant     Levofloxacin Resistant     Meropenem Susceptible     Nitrofurantoin Susceptible     Piperacillin + Tazobactam Susceptible     Tetracycline Resistant     Tobramycin Intermediate     Trimethoprim + Sulfamethoxazole Resistant                    MRSA Screen, PCR (Inpatient) - Swab, Nares [[id number]]  (Normal) Collected:  07/28/20 1610    Lab Status:  Final result Specimen:  Swab from Nares Updated:  07/28/20 1915     MRSA PCR Negative    Narrative:       MRSA Negative    COVID PRE-OP / PRE-PROCEDURE SCREENING ORDER (NO ISOLATION) - Swab,  Nasopharynx [[id number]] Collected:  07/26/20 2148    Lab Status:  Final result Specimen:  Swab from Nasopharynx Updated:  07/27/20 1907    Narrative:       The following orders were created for panel order COVID PRE-OP / PRE-PROCEDURE SCREENING ORDER (NO ISOLATION) - Swab, Nasopharynx.  Procedure                               Abnormality         Status                     ---------                               -----------         ------                     COVID-19,[facility], NP ...[[id number]]                      Final result                 Please view results for these tests on the individual orders.    COVID-19,[facility], NP SWAB IN LEXAR SALINE MEDIA 24-30 HR TAT - Swab, Nasopharynx [[id number]] Collected:  07/26/20 2148    Lab Status:  Final result Specimen:  Swab from Nasopharynx Updated:  07/27/20 1907     Reference Lab Report --     Comment: See scanned report          COVID19 Not Detected    COVID PRE-OP / PRE-PROCEDURE SCREENING ORDER (NO ISOLATION) - Swab, Nasopharynx [[id number]] Collected:  07/22/20 1806    Lab Status:  Final result Specimen:  Swab from Nasopharynx Updated:  07/23/20 0017    Narrative:       The following orders were created for panel order COVID PRE-OP / PRE-PROCEDURE SCREENING ORDER (NO ISOLATION) - Swab, Nasopharynx.  Procedure                               Abnormality         Status                     ---------                               -----------         ------                     Respiratory Panel PCR w/...[[id number]]  Normal              Final result                 Please view results for these tests on the individual orders.    Respiratory Panel PCR w/COVID-19(SARS-CoV-2) [facility] In-House, NP swab in UTM/VTM - Swab, Nasopharynx [[id number]]  (Normal) Collected:  07/22/20 1806    Lab Status:  Final result Specimen:  Swab from Nasopharynx Updated:  07/23/20 0017     ADENOVIRUS, PCR Not Detected     Coronavirus 229E Not Detected     Coronavirus HKU1 Not Detected     Coronavirus  NL63 Not Detected     Coronavirus OC43 Not Detected     COVID19 Not Detected     Human Metapneumovirus Not Detected     Human Rhinovirus/Enterovirus Not Detected     Influenza A PCR Not Detected     Influenza A H1 Not Detected     Influenza A H1 2009 PCR Not Detected     Influenza A H3 Not Detected     Influenza B PCR Not Detected     Parainfluenza Virus 1 Not Detected     Parainfluenza Virus 2 Not Detected     Parainfluenza Virus 3 Not Detected     Parainfluenza Virus 4 Not Detected     RSV, PCR Not Detected     Bordetella pertussis pcr Not Detected     Bordetella parapertussis PCR Not Detected     Chlamydophila pneumoniae PCR Not Detected     Mycoplasma pneumo by PCR Not Detected    Narrative:       Fact sheet for providers: https://[email or web address]/wp-content/uploads/MAO0896-3403-AY4.1-EUA-Provider-Fact-Sheet-3.pdf    Fact sheet for patients: https://[employer]/wp-content/uploads/WVZ0079-8847-PI7.1-EUA-Patient-Fact-Sheet-1.pdf  Fact sheet for providers: https://[employer]/wp-content/uploads/HBA4454-7165-KT1.1-EUA-Provider-Fact-Sheet-3.pdf    Fact sheet for patients: https://[employer]/wp-content/uploads/XTC6226-2101-JZ5.1-EUA-Patient-Fact-Sheet-1.pdf          Imaging Results (Last 24 Hours)     ** No results found for the last 24 hours. **          Results for orders placed during the hospital encounter of 07/22/20   Adult Transthoracic Echo Complete W/ Cont if Necessary Per Protocol    Narrative · Estimated EF = 50-55%.  · Left ventricular systolic function is low normal.  · Left ventricular diastolic dysfunction.  · Right ventricular cavity is moderate-to-severely dilated.  · Moderately reduced right ventricular systolic function noted.  · Mild aortic valve regurgitation is present.  · Trace mitral valve regurgitation is present  · Moderate tricuspid valve regurgitation is present.  · Calculated right ventricular systolic pressure from tricuspid   regurgitation is 71 mmHg.          I  have reviewed the medications:  Scheduled Meds:    [START ON 8/4/2020] amiodarone 200 mg Oral Q24H   atorvastatin 40 mg Oral Daily   b complex-vitamin c-folic acid 1 tablet Oral Daily   budesonide-formoterol 2 puff Inhalation BID - RT   insulin lispro 0-7 Units Subcutaneous TID With Meals   ipratropium 0.5 mg Nebulization 4x Daily - RT   levothyroxine 112 mcg Oral QAM   midodrine 10 mg Oral TID AC   nystatin 5 mL Swish & Swallow 4x Daily   pantoprazole 40 mg Oral BID AC   pharmacy consult - MTM  Does not apply Daily   piperacillin-tazobactam 3.375 g Intravenous Q12H   polyethylene glycol 17 g Oral Daily   senna-docusate sodium 2 tablet Oral Nightly   sodium chloride 10 mL Intravenous Q12H     Continuous Infusions:     PRN Meds:.•  acetaminophen **OR** acetaminophen **OR** acetaminophen  •  albumin human  •  aluminum-magnesium hydroxide-simethicone  •  bisacodyl  •  bisacodyl  •  dextrose  •  dextrose  •  glucagon (human recombinant)  •  ipratropium-albuterol  •  lidocaine  •  melatonin  •  ondansetron **OR** ondansetron  •  oxyCODONE-acetaminophen  •  promethazine  •  sennosides-docusate  •  sodium chloride    Assessment/Plan   Assessment & Plan     Active Hospital Problems    Diagnosis  POA   • **Persistent hypotension, multifactorial [I95.9]  Yes     Priority: High   • NSVT (nonsustained ventricular tachycardia) (CMS/HCC) [I47.2]  No     Priority: High   • Acute ESBL pyelonephritis and possible sepsis [N10]  Yes     Priority: High   • Left pleural effusion. Transudate of by protein and LDH criteria. 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]  Yes     Priority: Medium   • Atrial fibrillation, persistent (CMS/HCC) [I48.19]  Yes     Priority: Medium   • COPD  (CMS/[facility]) [J44.9]  Yes     Priority: Low   • GERD (gastroesophageal reflux disease) [K21.9]  Yes     Priority: Low   • History of hypertension [I10]  Yes     Priority: Low   • Hypothyroidism [E03.9]  Yes     Priority: Low   • Type 2 diabetes mellitus (CMS/[facility]) [E11.9]  Yes     Priority: Low   • (HFpEF) heart failure with preserved ejection fraction (CMS/[facility]) [I50.30]  Yes     Priority: Low   • Obstructive sleep apnea syndrome [G47.33]  Yes     Priority: Low      Resolved Hospital Problems   No resolved problems to display.        Brief Hospital Course to date:  Sarita Alegre is a 80 y.o. female with hx of Afib, chronic diastolic CHF, ESRD on hemodialysis, COPD on home oxygen 2 liters, DM2, HTN, HLD, GERD, CIELO, hypothyroidism, recurrent ascites requiring paracentesis, chronic anemia, and depression.  She came to ER  7/22 due to left sided chest pain during dialysis. She was found to have a large left pleural effusion and was admitted to our service.  Pt underwent thoracentesis on 7/23 ultimately found to be transudate by Light's Criteria.  On 7/27, post-HD, pt became hypoglycemic which improved with D50.  A few hours later, she became significant hypotensively unresponsive to albumin and was transferred to the ICU for possible pressor support. She was ultimately found to have an ESBL E coli UTI and has since been on IV Zosyn.  She also had an episode of nonsustained Vtach and has been started on Amiodarone (Dr. Cooper).  Pt also has had complaints of dysphagia and GI has been consulted and, once she is more stable, plan to do an EGD.  Pt was transferred back to our service on 8/1/20.    Plan:    Sepsis due to ESBL E Coli UTI  --as mentioned, pt hypotensive on 7/27 requiring transfer to ICU. Pt also tachypnic at that time with RR of 38.  Ultimately found to have ESBL E coli UTI  --continue IV Zosyn D 7/10     Hypotension  --started on midodrine, pressures much improved    Left sided chest pain  Recent fall   7/16 with left sided rib fractures  Large left pleural effusion -transudative per Light's criteria  --IR guided thoracentesis 7/23 w  1.6 liters removed on L.     Dysphagia  --GI consulted, started PPI BID. Also started on nystatin to cover fungal esophagitis (unable to do Diflucan due to Amiodarone)  --EGD planned for today, NPO for procedure    ESRD  - HD MWF  - minimal UOP. Stopped bumex     Acute on chronic diastolic CHF  -- EF 55 w dilated RV, RVSP, TV regurg.  --Follows with Dr. Cooper     Afib s/p PPM and Watchman device  Nonsustained VTach  --No anticoagulation as has a Watchman device.  --Cardiology following, on Amiodarone    History of cirrhosis/ascites/paracentesis  - small ascites currently    Hypothyroidism  --Continue Synthroid.    CIELO  --BiPAP/CPAP nightly.    COPD   Chronic hypoxic respiratory failure  --On 2 liters home oxygen.    DVT Prophylaxis:  allergy to heparin; mechanical      Disposition: I expect the patient to be discharged [place] at [place] 1-2 days.   to transport and has HD set up MWF per CM note from ICU.         CODE STATUS:   Code Status and Medical Interventions:   Ordered at: 07/28/20 1458     Limited Support to NOT Include:    Intubation     Level Of Support Discussed With:    Patient    Next of Kin (If No Surrogate)     Code Status:    No CPR     Medical Interventions (Level of Support Prior to Arrest):    Limited         Electronically signed by Yazmin Osorio MD, 08/03/20, 09:06.

## 2020-08-03 NOTE — PLAN OF CARE
A&Ox4, mood/affect appropriate. Speech clear/logical. Lung sound severely diminished. Coccyx red/blanchable. Q2 T&R by nursing in place. VSS. BP closely monitored. Call light in reach.

## 2020-08-03 NOTE — PLAN OF CARE
HD in progress.    Problem: Hemodialysis (Adult)  Goal: Signs and Symptoms of Listed Potential Problems Will be Absent, Minimized or Managed (Hemodialysis)  Outcome: Ongoing (interventions implemented as appropriate)  Flowsheets (Taken 8/1/2020 0327 by Zaire Ayon RN)  Problems Assessed (Hemodialysis): all  Problems Present (Hemodialysis): electrolyte imbalance;fluid imbalance

## 2020-08-03 NOTE — ANESTHESIA PREPROCEDURE EVALUATION
Anesthesia Evaluation     Patient summary reviewed and Nursing notes reviewed                Airway   Mallampati: II  TM distance: >3 FB  Neck ROM: full  No difficulty expected  Dental - normal exam     Pulmonary - normal exam   (+) pleural effusion (s/p thoracentesis 1.5 weeks ago), a smoker Former, COPD, asthma,sleep apnea (BiPAP),   Cardiovascular - normal exam    (+) pacemaker pacemaker, hypertension, dysrhythmias Atrial Fib, CHF , hyperlipidemia,     ROS comment: Echo 7/20: EF 50%, mild AR, mod TR, RVSP 71  LHC 11/18: near normal coronaries    Neuro/Psych- negative ROS  GI/Hepatic/Renal/Endo    (+)  GERD, GI bleeding , renal disease (dialysis today) ESRD and dialysis, diabetes mellitus, thyroid problem hypothyroidism    Musculoskeletal     Abdominal  - normal exam    Bowel sounds: normal.   Substance History - negative use     OB/GYN negative ob/gyn ROS         Other   arthritis,                      Anesthesia Plan    ASA 4     general   (Propofol)  intravenous induction     Anesthetic plan, all risks, benefits, and alternatives have been provided, discussed and informed consent has been obtained with: patient.    Plan discussed with CRNA.

## 2020-08-04 NOTE — THERAPY TREATMENT NOTE
Patient Name: Sarita Alegre  : 1940    MRN: [id number]                              Today's Date: 2020       Admit Date: 2020    Visit Dx:     ICD-10-CM ICD-9-CM   1. Pleural effusion on left J90 511.9   2. Multiple fractures of ribs, left side, initial encounter for closed fracture S22.42XA 807.09   3. Chest pain, unspecified type R07.9 786.50   4. Impaired functional mobility, balance, gait, and endurance Z74.09 V49.89   5. Impaired mobility and ADLs Z74.09 V49.89    Z78.9    6. Dysphagia, unspecified type R13.10 787.20   7. Gastroesophageal reflux disease, esophagitis presence not specified K21.9 530.81   8. Dysphagia R13.10 787.20     Patient Active Problem List   Diagnosis   • Atrial fibrillation, persistent (CMS/HCC)   • Pulmonary emphysema (CMS/HCC)   • Obstructive sleep apnea syndrome   • Severe chronic ulcerative colitis (CMS/HCC)   • Left pleural effusion. Transudate of by protein and LDH criteria. Thoracentesis 2020   • (HFpEF) heart failure with preserved ejection fraction (CMS/HCC)   • Tachy-marcela syndrome (CMS/HCC)   • History of hypertension   • Type 2 diabetes mellitus (CMS/HCC)   • Obesity   • GERD (gastroesophageal reflux disease)   • Hypothyroidism   • Pulmonary hypertension (CMS/HCC)   • COPD (CMS/HCC)   • Upper GI hemorrhage   • Anemia due to chronic kidney disease   • Supratherapeutic INR   • Iron deficiency anemia secondary to blood loss (chronic)   • Iron malabsorption   • Abnormal CT of the chest   • Hyperlipidemia LDL goal <100   • ESRD (end stage renal disease) (CMS/HCC)   • Persistent hypotension, multifactorial   • Acute ESBL pyelonephritis and possible sepsis   • NSVT (nonsustained ventricular tachycardia) (CMS/HCC)   • Dysphagia     Past Medical History:   Diagnosis Date   • Adenomatous polyp of stomach    • Allergic rhinitis    • Anemia    • Asthma    • Atrial fibrillation (CMS/HCC)     Hospitalized at [facility] 2015, presented after dizziness and atrial  fibrillation, treated with Rythmol and a beta blocker converted to sinus prior to discharge.   • BiPAP (biphasic positive airway pressure) dependence    • Chronic anticoagulation 6/14/2016   • Chronic diastolic congestive heart failure (CMS/HCC) 6/21/2016   • CKD (chronic kidney disease), stage III (CMS/[facility])      Status post temporary hemodialysis at [facility], 8/24/2015 through 9/29/2015, worsening 11/15  with hosp chf   • Closed fracture of fibula     Followed by Dr. Kennedy   • COPD (chronic obstructive pulmonary disease) (CMS/[facility])    • Depression 9/7/2016   • Diabetes mellitus (CMS/[facility])    • Diabetic peripheral neuropathy (CMS/[facility]) 12/29/2016   • Dyslipidemia    • Gastrointestinal bleeding, lower 6/15/2016   • GERD (gastroesophageal reflux disease)    • Hearing aid worn    • Hearing deficit 9/7/2016   • Hiatal hernia    • History of abnormal electrocardiogram     a bib, inferior infarct, st-t changes 10/12-referred to ER   • History of acute gastritis    • History of bilateral renal artery occlusion    • History of cataract    • History of deafness    • History of fracture of ankle 07/2015   • History of herpes zoster    • History of pneumonia    • History of transfusion    • Hyperparathyroidism (CMS/[facility])    • Hypertension    • Hypothyroidism 9/7/2016   • Insomnia    • Joint pain, knee    • Kidney failure    • Low back pain    • Obesity    • CIELO (obstructive sleep apnea)     UNTREATED; appeared to attempt to wear it sporadically 2007,  2008, split-night study of 03/10/2016 reported an overall AHI of 32.7/hr which required BiPAP therapy after failed CPAP attempt with final BiPAP 17 and EPAP 11 which still reported oxygen desaturations on 2 L O2, her lowest oxygen saturation was documented at 64% and several arrhythmias were observed during the study.   • Osteoarthritis 9/7/2016   • Pleural effusion      CT scan of the chest 8/28/2015, reports bilateral pleural effusions, right greater than left, simple in appearance with  atelectatic changes identified of the right lung base.  Chest x-ray 10/12/2015, left pleural effusion still seen.   • Pneumonia    • Recurrent oral ulcers    • Retinal artery occlusion 12/29/2016    h/o   • Secondary pulmonary hypertension     EVITA: 6/24/15, RVSP 66mmhg; moderate MR, LVEF 55â‚¬â€œ60%, no pericardial effusion or atrial thrombus, no ASD.   • Tachy-marcela syndrome (CMS/[facility]) 09/01/2016    PPM   • Tricuspid valve insufficiency 5/17/2016    Description: A.  Severe.   • Type 2 diabetes mellitus (CMS/[facility]) 9/7/2016   • Vision loss 12/29/2016    Of the left eye   • Wears glasses      Past Surgical History:   Procedure Laterality Date   • APPENDECTOMY     • ATRIAL APPENDAGE EXCLUSION LEFT WITH TRANSESOPHAGEAL ECHOCARDIOGRAM N/A 9/26/2017    Procedure: Atrial Appendage Occlusion - PER ASAP-TOO PROTOCOL;  Surgeon: Sandeep Lopes MD;  Location:  [facility];  Service:    • BLADDER SURGERY      x3   • CARDIAC CATHETERIZATION N/A 11/20/2018    Procedure: Left Heart Cath;  Surgeon: Radha Cooper MD;  Location:  [facility];  Service: Cardiology   • CARDIAC ELECTROPHYSIOLOGY PROCEDURE N/A 9/1/2016    Procedure: Pacemaker DC new;  Surgeon: Zafar Hudson MD;  Location:  [facility];  Service:    • CARDIAC PACEMAKER PLACEMENT     • CARDIOVERSION      .  Status post cardioversion, 8/25/2015 and 9/3/2015, secondary to atrial fibrillation.   • CATARACT EXTRACTION Bilateral     1996  and 1997   • COLONOSCOPY N/A 6/17/2016    Procedure: COLONOSCOPY;  Surgeon: Edy Muñoz MD;  Location:  [facility];  Service:    • ELBOW PROCEDURE Left 2006    Left elbow repair   • ENDOSCOPY N/A 4/17/2017    Procedure: ESOPHAGOGASTRODUODENOSCOPY AT BEDSIDE;  Surgeon: Ollie White MD;  Location:  [facility];  Service:    • ENDOSCOPY N/A 8/3/2020    Procedure: ESOPHAGOGASTRODUODENOSCOPY;  Surgeon: Devon James MD;  Location:  [facility];  Service:  Gastroenterology;  Laterality: N/A;  esophageal dilatation with savory 45, 48   • EYE SURGERY     • HYSTERECTOMY  1972   • KNEE SURGERY Right 1982   • OTHER SURGICAL HISTORY      History of hearing aid check   • PACEMAKER IMPLANTATION     • PARATHYROIDECTOMY     • UPPER GASTROINTESTINAL ENDOSCOPY       General Information     Row Name 08/04/20 1431 08/04/20 1412       PT Evaluation Time/Intention    Document Type  progress note/recertification  -SC  therapy note (daily note)  -SC    Mode of Treatment  --  physical therapy  -[facility] Name 08/04/20 1412          General Information    Patient Profile Reviewed?  yes  -SC     Existing Precautions/Restrictions  fall;oxygen therapy device and L/min rib fractures  -SC     Row Name 08/04/20 1412          Cognitive Assessment/Intervention- PT/OT    Orientation Status (Cognition)  oriented x 3  -SC     Cognitive Assessment/Intervention Comment  alert, following  -[facility] Name 08/04/20 1412          Safety Issues, Functional Mobility    Safety Issues Affecting Function (Mobility)  insight into deficits/self awareness;problem solving  -SC     Impairments Affecting Function (Mobility)  balance;endurance/activity tolerance;pain;shortness of breath;strength  -SC       User Key  (r) = Recorded By, (t) = Taken By, (c) = Cosigned By    Initials Name Provider Type    SC Rachel Dos Santos, PT Physical Therapist        Mobility     Row Name 08/04/20 1415          Bed Mobility Assessment/Treatment    Bed Mobility Assessment/Treatment  supine-sit;scooting/bridging  -SC     Rolling Left [place] (Bed Mobility)  verbal cues;minimum assist (75% patient effort)  -SC     Scooting/Bridging [place] (Bed Mobility)  verbal cues;minimum assist (75% patient effort)  -SC     Supine-Sit [place] (Bed Mobility)  verbal cues;minimum assist (75% patient effort);1 person assist  -SC     Assistive Device (Bed Mobility)  bed rails;head of bed elevated  -SC     Comment (Bed Mobility)  working  on sequencing getting up. Able to bridge to edge of bed, roll and grab railing. Required minimal assist with legs and upper trunk  -SC     Row Name 08/04/20 1415          Transfer Assessment/Treatment    Comment (Transfers)  Transfered to chair with walker. required repeted cues to push up . Stood multiple timed for bowel cleaning with repeted cues for hand placement  -SC     Row Name 08/04/20 1415          Bed-Chair Transfer    Bed-Chair [place] (Transfers)  verbal cues;2 person assist;minimum assist (75% patient effort)  -SC     Assistive Device (Bed-Chair Transfers)  walker, front-wheeled  -SC     Row Name 08/04/20 1415          Sit-Stand Transfer    Sit-Stand [place] (Transfers)  verbal cues;2 person assist;minimum assist (75% patient effort)  -SC     Assistive Device (Sit-Stand Transfers)  walker, front-wheeled  -SC     Row Name 08/04/20 1415          Gait/Stairs Assessment/Training    Gait/Stairs Assessment/Training  gait/ambulation independence  -SC     [place] Level (Gait)  verbal cues;2 person assist;minimum assist (75% patient effort)  -SC     Assistive Device (Gait)  walker, front-wheeled  -SC     Distance in Feet (Gait)  32  -SC     Pattern (Gait)  step-through  -SC     Deviations/Abnormal Patterns (Gait)  gait speed decreased;stride length decreased  -SC     Bilateral Gait Deviations  forward flexed posture  -SC     Comment (Gait/Stairs)  Working on controling walker in room. Cues to stand closer to walker and keep chest up. Did require min mayela with turning.  Demonstrated unsteady gait  -SC       User Key  (r) = Recorded By, (t) = Taken By, (c) = Cosigned By    Initials Name Provider Type    SC Rachel Dos Santos, PT Physical Therapist        Obj/Interventions     Row Name 08/04/20 1420          Therapeutic Exercise    Upper Extremity Range of Motion (Therapeutic Exercise)  shoulder abduction/adduction, bilateral;shoulder flexion/extension, bilateral  -SC     Lower Extremity (Therapeutic  Exercise)  heel slides, bilateral;gluteal sets;quad sets, bilateral  -SC     Lower Extremity Range of Motion (Therapeutic Exercise)  ankle dorsiflexion/plantar flexion, bilateral  -SC     Exercise Type (Therapeutic Exercise)  AAROM (active assistive range of motion);AROM (active range of motion)  -SC     Sets/Reps (Therapeutic Exercise)  10  -SC     Expected Outcome (Therapeutic Exercise)  facilitate normal movement patterns  -SC     Comment (Therapeutic Exercise)  cues for technique  -SC     Row Name 08/04/20 1420          Dynamic Standing Balance    Level of [place], Reaches Outside Midline (Standing, Dynamic Balance)  2 person assist;minimal assist, 75% patient effort  -SC     Assistive Device Utilized (Supported Standing, Dynamic Balance)  walker, rolling  -SC       User Key  (r) = Recorded By, (t) = Taken By, (c) = Cosigned By    Initials Name Provider Type    SC Rachel Dos Santos, PT Physical Therapist        Goals/Plan    No documentation.       Clinical Impression     [facility] Name 08/04/20 1428          Pain Assessment    Additional Documentation  Pain Scale: FACES Pre/Post-Treatment (Group)  -SC     Row Name 08/04/20 1423          Pain Scale: Numbers Pre/Post-Treatment    Pain Location  other (see comments) ribs  -SC     Pain Intervention(s)  Repositioned  -[facility] Name 08/04/20 1423          Pain Scale: FACES Pre/Post-Treatment    Pain: FACES Scale, Pretreatment  2-->hurts little bit  -SC     Pain: FACES Scale, Post-Treatment  2-->hurts little bit  -[facility] Name 08/04/20 1428          Plan of Care Review    Progress  improving  -SC     Outcome Summary  Patient able to participate well with PT. She demonstrated improving strength and mobilituy, ambulating 32 feet with minimal assist  -SC     Row Name 08/04/20 1425          Physical Therapy Clinical Impression    Criteria for Skilled Interventions Met (PT Clinical Impression)  yes;treatment indicated  -SC     Rehab Potential (PT Clinical Summary)  good,  to achieve stated therapy goals  -SC     Row Name 08/04/20 1423          Positioning and Restraints    Pre-Treatment Position  in bed  -SC     Post Treatment Position  chair  -SC     In Chair  notified nsg;reclined;sitting;call light within reach  -SC       User Key  (r) = Recorded By, (t) = Taken By, (c) = Cosigned By    Initials Name Provider Type    Rachel John PT Physical Therapist        Outcome Measures     Row Name 08/04/20 1424          How much help from another person do you currently need...    Turning from your back to your side while in flat bed without using bedrails?  2  -SC     Moving from lying on back to sitting on the side of a flat bed without bedrails?  2  -SC     Moving to and from a bed to a chair (including a wheelchair)?  3  -SC     Standing up from a chair using your arms (e.g., wheelchair, bedside chair)?  3  -SC     Climbing 3-5 steps with a railing?  1  -SC     To walk in hospital room?  3  -SC     AM-PAC 6 Clicks Score (PT)  14  -SC     Row Name 08/04/20 1424          Functional Assessment    Outcome Measure Options  AM-PAC 6 Clicks Basic Mobility (PT)  -SC       User Key  (r) = Recorded By, (t) = Taken By, (c) = Cosigned By    Initials Name Provider Type    Rachel John PT Physical Therapist        Physical Therapy Education                 Title: PT OT SLP Therapies (In Progress)     Topic: Physical Therapy (Done)     Point: Mobility training (Done)     Description:   Instruct learner(s) on safety and technique for assisting patient out of bed, chair or wheelchair.  Instruct in the proper use of assistive devices, such as walker, crutches, cane or brace.              Patient Friendly Description:   It's important to get you on your feet again, but we need to do so in a way that is safe for you. Falling has serious consequences, and your personal safety is the most important thing of all.        When it's time to get out of bed, one of us or a family member will sit next  to you on the bed to give you support.     If your doctor or nurse tells you to use a walker, crutches, a cane, or a brace, be sure you use it every time you get out of bed, even if you think you don't need it.    Learning Progress Summary           Patient Eager, E,TB,D, NR,VU,DU by SC at 8/4/2020 1426    Comment:  reviewed benefits of activity    Acceptance, E,D, VU,NR by  at 7/31/2020 1316    Acceptance, E,D, NR by  at 7/29/2020 1445    Acceptance, E, VU,DU by  at 7/23/2020 1028   Significant Other Acceptance, E, VU,DU by  at 7/23/2020 1028                   Point: Home exercise program (Done)     Description:   Instruct learner(s) on appropriate technique for monitoring, assisting and/or progressing patient with therapeutic exercises and activities.              Learning Progress Summary           Patient Eager, E,TB,D, NR,VU,DU by SC at 8/4/2020 1426    Comment:  reviewed benefits of activity    Acceptance, E,D, VU,NR by  at 7/31/2020 1316    Acceptance, E,D, NR by  at 7/29/2020 1445    Acceptance, E, VU,DU by  at 7/23/2020 1028   Significant Other Acceptance, E, VU,DU by  at 7/23/2020 1028                   Point: Body mechanics (Done)     Description:   Instruct learner(s) on proper positioning and spine alignment for patient and/or caregiver during mobility tasks and/or exercises.              Learning Progress Summary           Patient Eager, E,TB,D, NR,VU,DU by SC at 8/4/2020 1426    Comment:  reviewed benefits of activity    Acceptance, E,D, VU,NR by  at 7/31/2020 1316    Acceptance, E,D, NR by  at 7/29/2020 1445    Acceptance, E, VU,DU by  at 7/23/2020 1028   Significant Other Acceptance, E, VU,DU by  at 7/23/2020 1028                   Point: Precautions (Done)     Description:   Instruct learner(s) on prescribed precautions during mobility and gait tasks              Learning Progress Summary           Patient Eager, E,TB,D, NR,VU,DU by SC at 8/4/2020 1426    Comment:  reviewed  benefits of activity    Acceptance, E,D, VU,NR by  at 7/31/2020 1316    Acceptance, E,D, NR by  at 7/29/2020 1445    Acceptance, E, VU,DU by  at 7/23/2020 1028   Significant Other Acceptance, E, VU,DU by  at 7/23/2020 1028                               User Key     Initials Effective Dates Name Provider Type Discipline    SC 06/19/15 -  Rachel Dos Santos, PT Physical Therapist PT     06/19/15 -  Rachel Irwin, PT Physical Therapist PT     03/19/20 -  Joyce Bentley, PT Student PT Student PT              PT Recommendation and Plan     Outcome Summary/Treatment Plan (PT)  Anticipated Discharge Disposition (PT): skilled nursing facility  Plan of Care Reviewed With: patient  Progress: improving  Outcome Summary: Patient able to participate well with PT. She demonstrated improving strength and mobilituy, ambulating 32 feet with minimal assist     Time Calculation:   PT Charges     Row Name 08/04/20 1326             Time Calculation    Start Time  1326  -SC      PT Received On  08/04/20  -SC      PT Goal Re-Cert Due Date  08/09/20  -SC         Time Calculation- PT    Total Timed Code Minutes- PT  30 minute(s)  -SC         Timed Charges    12571 - PT Therapeutic Exercise Minutes  5  -SC      26630 - Gait Training Minutes   15  -SC      96536 - PT Therapeutic Activity Minutes  10  -SC        User Key  (r) = Recorded By, (t) = Taken By, (c) = Cosigned By    Initials Name Provider Type    SC Levon, Shearon A, PT Physical Therapist        Therapy Charges for Today     Code Description Service Date Service Provider Modifiers Qty    06468007371 HC GAIT TRAINING EA 15 MIN 8/4/2020 Levon, Shearon A, PT GP 1    86709977011 HC PT THERAPEUTIC ACT EA 15 MIN 8/4/2020 Levon, Shearon A, PT GP 1    74403624975 HC PT THER SUPP EA 15 MIN 8/4/2020 Levon, Shearon A, PT GP 2          PT G-Codes  Outcome Measure Options: AM-PAC 6 Clicks Basic Mobility (PT)  AM-PAC 6 Clicks Score (PT): 14  AM-PAC 6 Clicks Score (OT): 13    Shearon A.  Levon, PT  8/4/2020

## 2020-08-04 NOTE — PROGRESS NOTES
Blood pressure is stable on midodrine and heart rate is stable on amiodarone.  Nothing further to add at this time.  We will sign off.    Radha Cooper MD, FACC

## 2020-08-04 NOTE — PLAN OF CARE
Problem: Patient Care Overview  Goal: Plan of Care Review  Flowsheets  Taken 8/4/2020 1429  Plan of Care Reviewed With: patient  Taken 8/4/2020 1423  Outcome Summary: Patient able to participate well with PT. She demonstrated improving strength and mobility, ambulating 32 feet with minimal assist

## 2020-08-04 NOTE — PLAN OF CARE
Problem: Patient Care Overview  Goal: Plan of Care Review  Flowsheets (Taken 8/4/2020 0512)  Progress: no change  Plan of Care Reviewed With: patient  Outcome Summary: VSS, voids well, tele afib, will continue to monitor for changes.     Problem: Pain, Chronic (Adult)  Goal: Acceptable Pain/Comfort Level and Functional Ability    Acceptable Pain/Comfort Level and Functional Ability: making progress toward outcome     Problem: Fall Risk (Adult)  Goal: Absence of Fall    Absence of Fall: making progress toward outcome     Problem: Skin Injury Risk (Adult)  Goal: Skin Health and Integrity    Skin Health and Integrity: making progress toward outcome     Problem: Hemodialysis (Adult)  Goal: Signs and Symptoms of Listed Potential Problems Will be Absent, Minimized or Managed (Hemodialysis)    Problems Assessed (Hemodialysis): all  Problems Present (Hemodialysis): electrolyte imbalance;fluid imbalance

## 2020-08-04 NOTE — PROGRESS NOTES
" LOS: 13 days   Patient Care Team:  Jade Quach APRN as PCP - General (Nurse Practitioner)  Percy Allison MD as PCP - Claims Attributed    Chief Complaint: ESRD    Subjective   Alert and engaging. SOB positive     Review of system:SOB  All other negative.    Objective     Vital Sign Min/Max for last 24 hours  Temp  Min: 97.8 °F (36.6 °C)  Max: 98.3 °F (36.8 °C)   BP  Min: 92/60  Max: 126/57   Pulse  Min: 83  Max: 116   Resp  Min: 16  Max: 22   SpO2  Min: 88 %  Max: 100 %   Flow (L/min)  Min: 3  Max: 4   No data recorded     Flowsheet Rows      First Filed Value   Admission Height  171.5 cm (67.5\") Documented at 07/22/2020 1503   Admission Weight  72.6 kg (160 lb) Documented at 07/22/2020 1503          I/O this shift:  In: 350 [P.O.:300; IV Piggyback:50]  Out: -   I/O last 3 completed shifts:  In: 300 [I.V.:100; IV Piggyback:200]  Out: 1540 [Other:1540]    Objective:  General Appearance:  Ill-appearing, in no acute distress and not in pain.    Vital signs: (most recent): Blood pressure 105/78, pulse 86, temperature 98 °F (36.7 °C), temperature source Oral, resp. rate 16, height 170.2 cm (67\"), weight 80.4 kg (177 lb 4.8 oz), SpO2 98 %, not currently breastfeeding.    Output: No urine output.    HEENT: Normal HEENT exam.    Lungs:  Normal effort.  There are rales.    Heart: Normal rate.  S1 normal and S2 normal.    Abdomen: Abdomen is soft and flat.  Bowel sounds are normal.     Extremities: Normal range of motion.  (Edema positive. )  Neurological: (Alert, no focal deficit noted grossly. ).              Results Review:     I reviewed the patient's new clinical results.    WBC WBC   Date Value Ref Range Status   08/04/2020 8.16 3.40 - 10.80 10*3/mm3 Final   08/03/2020 8.01 3.40 - 10.80 10*3/mm3 Final   08/02/2020 8.84 3.40 - 10.80 10*3/mm3 Final      HGB Hemoglobin   Date Value Ref Range Status   08/04/2020 9.8 (L) 12.0 - 15.9 g/dL Final   08/03/2020 10.9 (L) 12.0 - 15.9 g/dL Final   08/02/2020 " 10.7 (L) 12.0 - 15.9 g/dL Final      HCT Hematocrit   Date Value Ref Range Status   08/04/2020 31.7 (L) 34.0 - 46.6 % Final   08/03/2020 34.9 34.0 - 46.6 % Final   08/02/2020 34.2 34.0 - 46.6 % Final      Platlets No results found for: LABPLAT   MCV MCV   Date Value Ref Range Status   08/04/2020 94.9 79.0 - 97.0 fL Final   08/03/2020 92.6 79.0 - 97.0 fL Final   08/02/2020 94.7 79.0 - 97.0 fL Final          Sodium Sodium   Date Value Ref Range Status   08/04/2020 134 (L) 136 - 145 mmol/L Final   08/03/2020 132 (L) 136 - 145 mmol/L Final   08/02/2020 131 (L) 136 - 145 mmol/L Final      Potassium Potassium   Date Value Ref Range Status   08/04/2020 3.6 3.5 - 5.2 mmol/L Final   08/03/2020 3.7 3.5 - 5.2 mmol/L Final   08/03/2020 3.7 3.5 - 5.2 mmol/L Final   08/02/2020 4.4 3.5 - 5.2 mmol/L Final     Comment:     Specimen hemolyzed.  Results may be affected.      Chloride Chloride   Date Value Ref Range Status   08/04/2020 94 (L) 98 - 107 mmol/L Final   08/03/2020 93 (L) 98 - 107 mmol/L Final   08/02/2020 94 (L) 98 - 107 mmol/L Final      CO2 CO2   Date Value Ref Range Status   08/04/2020 22.0 22.0 - 29.0 mmol/L Final   08/03/2020 22.0 22.0 - 29.0 mmol/L Final   08/02/2020 20.0 (L) 22.0 - 29.0 mmol/L Final      BUN BUN   Date Value Ref Range Status   08/04/2020 22 8 - 23 mg/dL Final   08/03/2020 29 (H) 8 - 23 mg/dL Final   08/02/2020 21 8 - 23 mg/dL Final      Creatinine Creatinine   Date Value Ref Range Status   08/04/2020 3.08 (H) 0.57 - 1.00 mg/dL Final   08/03/2020 4.41 (H) 0.57 - 1.00 mg/dL Final   08/02/2020 3.64 (H) 0.57 - 1.00 mg/dL Final      Calcium Calcium   Date Value Ref Range Status   08/04/2020 9.6 8.6 - 10.5 mg/dL Final   08/03/2020 9.9 8.6 - 10.5 mg/dL Final   08/02/2020 10.0 8.6 - 10.5 mg/dL Final      PO4 No results found for: CAPO4   Albumin No results found for: ALBUMIN   Magnesium No results found for: MG   Uric Acid No results found for: URICACID     Medication Review: Yes    Assessment/Plan        Persistent hypotension, multifactorial    Atrial fibrillation, persistent (CMS/HCC)    Obstructive sleep apnea syndrome    Left pleural effusion. Transudate of by protein and LDH criteria. Thoracentesis 7/23/2020    (HFpEF) heart failure with preserved ejection fraction (CMS/[facility])    Tachy-marcela syndrome (CMS/[facility])    History of hypertension    Type 2 diabetes mellitus (CMS/[facility])    GERD (gastroesophageal reflux disease)    Hypothyroidism    Pulmonary hypertension (CMS/HCC)    COPD (CMS/[facility])    ESRD (end stage renal disease) (CMS/[facility])    Acute ESBL pyelonephritis and possible sepsis    NSVT (nonsustained ventricular tachycardia) (CMS/[facility])    Dysphagia      Assessment:  (1.  ESRD: MWF [facility] access hospital, [facility] [place] Dr [facility] Cast ( Outpatient Nephrologist)  On HD since Jan 2020      2.  Anemia: MARQUITA on HD days     3.  Volume status: Issues with fluid overload due to ESRD, Cirhosis, CHF.      4.  Acid base and Elytes: Hyponatremia management with HD     5.  BMD:      6.  Recurrent fall: Multiple rib fracture: Underwent thoracentesis for hemothorax on this admission.     Recommendation.  HD tomorrow. UF as tolerated.   Continue with Midodrine.     High risk patient with critical condition  Multiple medical problems  Case discussed with RN ).       Hue Antony MD  08/04/20  13:06

## 2020-08-04 NOTE — PROGRESS NOTES
Continued Stay Note  [facility]     Patient Name: Sarita Alegre  MRN: [id number]  Today's Date: 8/4/2020    Admit Date: 7/22/2020    Discharge Plan     Row Name 08/04/20 1149       Plan    Plan  [facility]    Patient/Family in Agreement with Plan  yes    Plan Comments  Per Page dunlap/ [facility], they are able to accept patient pending a negative covid test. That has been ordered. Awaiting a current PT eval, they are to see patient this afternoon. D/w MD in am rounds, patient's home meds Linzess and Spiriva will need to be held until patient discharges from rehab. [facility] will not be able to accept patient on either of those medications. CM following.     Final Discharge Disposition Code  03 - skilled nursing facility (SNF)        Discharge Codes    No documentation.       Expected Discharge Date and Time     Expected Discharge Date Expected Discharge Time    Aug 3, 2020             Beverly Duarte RN

## 2020-08-04 NOTE — PROGRESS NOTES
"GI Daily Progress Note  Subjective:    Chief Complaint:  Follow up dysphagia     Ms. Alegre is currently eating lunch and states she is feeling some better today.    Her dysphagia is improved.      Objective:    /78 (BP Location: Left arm, Patient Position: Lying)   Pulse 86   Temp 98 °F (36.7 °C) (Oral)   Resp 16   Ht 170.2 cm (67\")   Wt 80.4 kg (177 lb 4.8 oz)   LMP  (LMP Unknown) Comment: mammogram 2017   SpO2 98%   BMI 27.77 kg/m²     Physical Exam   Constitutional: She is oriented to person, place, and time.   Elderly frail female    Cardiovascular: Normal rate and regular rhythm.   Pulmonary/Chest: Effort normal. No respiratory distress.   Abdominal: Soft. Bowel sounds are normal. There is no tenderness.   Neurological: She is alert and oriented to person, place, and time.   Psychiatric: Her behavior is normal.   Nursing note and vitals reviewed.      Lab  Lab Results   Component Value Date    WBC 8.16 08/04/2020    HGB 9.8 (L) 08/04/2020    HGB 10.9 (L) 08/03/2020    HGB 10.7 (L) 08/02/2020    MCV 94.9 08/04/2020     (L) 08/04/2020    INR 1.18 (H) 07/23/2020    INR 1.00 12/05/2017    INR 1.01 09/25/2017    INR 1.08 05/01/2017    INR 1.54 04/18/2017       Lab Results   Component Value Date    GLUCOSE 87 08/04/2020    BUN 22 08/04/2020    CREATININE 3.08 (H) 08/04/2020    EGFRIFNONA 14 (L) 08/04/2020    EGFRIFAFRI  08/04/2020      Comment:      <15 Indicative of kidney failure.    BCR 7.1 08/04/2020     (L) 08/04/2020    K 3.6 08/04/2020    CO2 22.0 08/04/2020    CALCIUM 9.6 08/04/2020    PROTENTOTREF 7.1 12/14/2016    ALBUMIN 3.00 (L) 07/31/2020    ALKPHOS 61 07/29/2020    BILITOT 1.3 (H) 07/29/2020    BILIDIR 1.0 (H) 11/07/2015    ALT 9 07/29/2020    AST 23 07/29/2020       Assessment:    Esophageal dysphagia s/p savory dilatation to 48 Fr  Esophagitis, likely reflux.  Biopsies pending.   Mild portal gastropathy    Plan:    >>> Recommend BID PPI for 1 month, then q daily  >>> Will " follow up esophageal and antrum biopsy results.        Will sign off.   Please call for questions or concerns.      COLLEEN Rudd  08/04/20  13:01

## 2020-08-04 NOTE — PLAN OF CARE
Problem: Patient Care Overview  Goal: Plan of Care Review  Outcome: Ongoing (interventions implemented as appropriate)  Flowsheets (Taken 8/4/2020 1825)  Plan of Care Reviewed With: patient  Note:   Pt A&OX4, at times forgetful. VSS this shift, pt now on 2L O2 (decrease from 3L). Afib on tele. Right IJ CDI. AV fistula with thrill, dialysis MWF. LUE bruised/swollen, elevating on pillows. Several loose watery dark brown BM noted today. Pt up with 2 assist, walker/gb. COVID swab sent to lab for possible d[facility]. Coccyx red blanchable, zguard being applied.

## 2020-08-04 NOTE — PROGRESS NOTES
[facility] Medicine Services  PROGRESS NOTE    Patient Name: Sarita Alegre  : 1940  MRN: [id number]    Date of Admission: 2020  Primary Care Physician: Jade Quach APRN    Subjective   Subjective     CC:  F/U chest pain, SOA    HPI:   Pt seen and examined. Nursing notes reviewed. No issues overnight. Had EGD yesterday. Pt states that her appetite has improved and she ate some this am.       Review of Systems -    Gen- No fevers, chills  CV- No chest pain, palpitations  Resp- No cough, dyspnea  GI- No N/V/D, abd pain    All other systems reviewed and negative except any additional pertinent positives and negatives as discussed in HPI.      Objective   Objective     Vital Signs:   Temp:  [97.2 °F (36.2 °C)-98.3 °F (36.8 °C)] 98.3 °F (36.8 °C)  Heart Rate:  [] 89  Resp:  [16-22] 18  BP: ()/(51-71) 121/51        Physical Exam:  Gen-  Awake and alert, NAD  HENT-NCAT, mucous membranes moist, RIJ present  CV-RRR, S1 S2 normal, no m/r/g  Resp- CTAB   Abd-soft,NT, ND, normoactive BS  Ext-trace BLE edema  Neuro-A&Ox3, no focal deficits  Skin- no rashes  Psych-appropriate mood  Neuro - MAEW      Results Reviewed:  Results from last 7 days   Lab Units 20  0533 20  1024 20  0510  20  0458   WBC 10*3/mm3 8.16 8.01 8.84   < > 10.58   HEMOGLOBIN g/dL 9.8* 10.9* 10.7*   < > 11.5*   HEMATOCRIT % 31.7* 34.9 34.2   < > 35.5   PLATELETS 10*3/mm3 135* 128* 120*   < > 142   PROCALCITONIN ng/mL  --   --   --   --  2.17*    < > = values in this interval not displayed.     Results from last 7 days   Lab Units 20  0829 20  1511 20  1024 20  0907  20  0458  20  0604   SODIUM mmol/L 134*  --  132* 131*   < > 133*   < > 130*   POTASSIUM mmol/L 3.6 3.7 3.7 4.4   < > 3.7   < > 3.8   CHLORIDE mmol/L 94*  --  93* 94*   < > 97*   < > 98   CO2 mmol/L 22.0  --  22.0 20.0*   < > 21.0*   < > 17.0*   BUN mg/dL   --  * 21    < > 27*   < > 26*   CREATININE mg/dL 3.08*  --  4.41* 3.64*   < > 3.50*   < > 3.73*   GLUCOSE mg/dL 87  --  110* 104*   < > 108*   < > 185*   CALCIUM mg/dL 9.6  --  9.9 10.0   < > 9.2   < > 9.3   ALT (SGPT) U/L  --   --   --   --   --   --   --  9   AST (SGOT) U/L  --   --   --   --   --   --   --  23   PROBNP pg/mL  --   --   --   --   --  52,812.0*  --   --     < > = values in this interval not displayed.     Estimated Creatinine Clearance: 15.4 mL/min (A) (by C-G formula based on SCr of 3.08 mg/dL (H)).    Microbiology Results Abnormal     Procedure Component Value - Date/Time    Blood Culture - Blood, Hand, Left [[id number]] Collected:  07/27/20 2307    Lab Status:  Final result Specimen:  Blood from Hand, Left Updated:  08/02/20 0000     Blood Culture No growth at 5 days    Blood Culture - Blood, Hand, Left [[id number]] Collected:  07/27/20 2255    Lab Status:  Final result Specimen:  Blood from Hand, Left Updated:  08/02/20 0000     Blood Culture No growth at 5 days    Urine Culture - Urine, Urine, Clean Catch [[id number]]  (Abnormal)  (Susceptibility) Collected:  07/28/20 0429    Lab Status:  Final result Specimen:  Urine, Clean Catch Updated:  07/30/20 0305     Urine Culture >100,000 CFU/mL Escherichia coli ESBL     Comment:   Consider infectious disease consult.  Susceptibility results may not correlate to clinical outcomes.       Susceptibility      Escherichia coli ESBL     NEDRA     Amikacin Susceptible     Ertapenem Susceptible     Gentamicin Resistant     Levofloxacin Resistant     Meropenem Susceptible     Nitrofurantoin Susceptible     Piperacillin + Tazobactam Susceptible     Tetracycline Resistant     Tobramycin Intermediate     Trimethoprim + Sulfamethoxazole Resistant                    MRSA Screen, PCR (Inpatient) - Swab, Nares [[id number]]  (Normal) Collected:  07/28/20 1610    Lab Status:  Final result Specimen:  Swab from Nares Updated:  07/28/20 1915     MRSA PCR Negative    Narrative:       MRSA  Negative    COVID PRE-OP / PRE-PROCEDURE SCREENING ORDER (NO ISOLATION) - Swab, Nasopharynx [[id number]] Collected:  07/26/20 2148    Lab Status:  Final result Specimen:  Swab from Nasopharynx Updated:  07/27/20 1907    Narrative:       The following orders were created for panel order COVID PRE-OP / PRE-PROCEDURE SCREENING ORDER (NO ISOLATION) - Swab, Nasopharynx.  Procedure                               Abnormality         Status                     ---------                               -----------         ------                     COVID-19,[facility], NP ...[[id number]]                      Final result                 Please view results for these tests on the individual orders.    COVID-19,[facility], NP SWAB IN LEXAR SALINE MEDIA 24-30 HR TAT - Swab, Nasopharynx [[id number]] Collected:  07/26/20 2148    Lab Status:  Final result Specimen:  Swab from Nasopharynx Updated:  07/27/20 1907     Reference Lab Report --     Comment: See scanned report          COVID19 Not Detected    COVID PRE-OP / PRE-PROCEDURE SCREENING ORDER (NO ISOLATION) - Swab, Nasopharynx [[id number]] Collected:  07/22/20 1806    Lab Status:  Final result Specimen:  Swab from Nasopharynx Updated:  07/23/20 0017    Narrative:       The following orders were created for panel order COVID PRE-OP / PRE-PROCEDURE SCREENING ORDER (NO ISOLATION) - Swab, Nasopharynx.  Procedure                               Abnormality         Status                     ---------                               -----------         ------                     Respiratory Panel PCR w/...[[id number]]  Normal              Final result                 Please view results for these tests on the individual orders.    Respiratory Panel PCR w/COVID-19(SARS-CoV-2) [facility] In-House, NP swab in UTM/VTM - Swab, Nasopharynx [[id number]]  (Normal) Collected:  07/22/20 1806    Lab Status:  Final result Specimen:  Swab from Nasopharynx Updated:  07/23/20 0017     ADENOVIRUS, PCR Not Detected      Coronavirus 229E Not Detected     Coronavirus HKU1 Not Detected     Coronavirus NL63 Not Detected     Coronavirus OC43 Not Detected     COVID19 Not Detected     Human Metapneumovirus Not Detected     Human Rhinovirus/Enterovirus Not Detected     Influenza A PCR Not Detected     Influenza A H1 Not Detected     Influenza A H1 2009 PCR Not Detected     Influenza A H3 Not Detected     Influenza B PCR Not Detected     Parainfluenza Virus 1 Not Detected     Parainfluenza Virus 2 Not Detected     Parainfluenza Virus 3 Not Detected     Parainfluenza Virus 4 Not Detected     RSV, PCR Not Detected     Bordetella pertussis pcr Not Detected     Bordetella parapertussis PCR Not Detected     Chlamydophila pneumoniae PCR Not Detected     Mycoplasma pneumo by PCR Not Detected    Narrative:       Fact sheet for providers: https://docs.[employer]/wp-content/uploads/GRM7787-4495-FU4.1-EUA-Provider-Fact-Sheet-3.pdf    Fact sheet for patients: https://docs.[employer]/wp-content/uploads/XIR6020-6703-EB0.1-EUA-Patient-Fact-Sheet-1.pdf  Fact sheet for providers: https://docs.[employer]/wp-content/uploads/KZC8519-8000-PP6.1-EUA-Provider-Fact-Sheet-3.pdf    Fact sheet for patients: https://[employer]/wp-content/uploads/ZLJ7528-4369-OD1.1-EUA-Patient-Fact-Sheet-1.pdf          Imaging Results (Last 24 Hours)     ** No results found for the last 24 hours. **          Results for orders placed during the hospital encounter of 07/22/20   Adult Transthoracic Echo Complete W/ Cont if Necessary Per Protocol    Narrative · Estimated EF = 50-55%.  · Left ventricular systolic function is low normal.  · Left ventricular diastolic dysfunction.  · Right ventricular cavity is moderate-to-severely dilated.  · Moderately reduced right ventricular systolic function noted.  · Mild aortic valve regurgitation is present.  · Trace mitral valve regurgitation is present  · Moderate tricuspid valve regurgitation is present.  · Calculated right  ventricular systolic pressure from tricuspid   regurgitation is 71 mmHg.          I have reviewed the medications:  Scheduled Meds:    amiodarone 200 mg Oral Q24H   atorvastatin 40 mg Oral Daily   b complex-vitamin c-folic acid 1 tablet Oral Daily   budesonide-formoterol 2 puff Inhalation BID - RT   insulin lispro 0-7 Units Subcutaneous TID With Meals   ipratropium 0.5 mg Nebulization 4x Daily - RT   levothyroxine 112 mcg Oral QAM   midodrine 10 mg Oral TID AC   nystatin 5 mL Swish & Swallow 4x Daily   pantoprazole 40 mg Oral BID AC   pharmacy consult - MTM  Does not apply Daily   piperacillin-tazobactam 3.375 g Intravenous Q12H   polyethylene glycol 17 g Oral Daily   senna-docusate sodium 2 tablet Oral Nightly   sodium chloride 10 mL Intravenous Q12H     Continuous Infusions:    lactated ringers 30 mL/hr     PRN Meds:.•  acetaminophen **OR** acetaminophen **OR** acetaminophen  •  aluminum-magnesium hydroxide-simethicone  •  bisacodyl  •  bisacodyl  •  dextrose  •  dextrose  •  glucagon (human recombinant)  •  ipratropium-albuterol  •  lactated ringers  •  lidocaine  •  melatonin  •  ondansetron **OR** ondansetron  •  oxyCODONE-acetaminophen  •  promethazine  •  sennosides-docusate  •  sodium chloride    Assessment/Plan   Assessment & Plan     Active Hospital Problems    Diagnosis  POA   • **Persistent hypotension, multifactorial [I95.9]  Yes     Priority: High   • NSVT (nonsustained ventricular tachycardia) (CMS/[facility]) [I47.2]  No     Priority: High   • Acute ESBL pyelonephritis and possible sepsis [N10]  Yes     Priority: High   • Left pleural effusion. Transudate of by protein and LDH criteria. 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]  Yes     Priority: Medium    • Atrial fibrillation, persistent (CMS/HCC) [I48.19]  Yes     Priority: Medium   • COPD (CMS/HCC) [J44.9]  Yes     Priority: Low   • GERD (gastroesophageal reflux disease) [K21.9]  Yes     Priority: Low   • History of hypertension [I10]  Yes     Priority: Low   • Hypothyroidism [E03.9]  Yes     Priority: Low   • Type 2 diabetes mellitus (CMS/HCC) [E11.9]  Yes     Priority: Low   • (HFpEF) heart failure with preserved ejection fraction (CMS/HCC) [I50.30]  Yes     Priority: Low   • Obstructive sleep apnea syndrome [G47.33]  Yes     Priority: Low      Resolved Hospital Problems   No resolved problems to display.        Brief Hospital Course to date:  Sarita Alegre is a 80 y.o. female with hx of Afib, chronic diastolic CHF, ESRD on hemodialysis, COPD on home oxygen 2 liters, DM2, HTN, HLD, GERD, CIELO, hypothyroidism, recurrent ascites requiring paracentesis, chronic anemia, and depression.  She came to ER  7/22 due to left sided chest pain during dialysis. She was found to have a large left pleural effusion and was admitted to our service.  Pt underwent thoracentesis on 7/23 ultimately found to be transudate by Light's Criteria.  On 7/27, post-HD, pt became hypoglycemic which improved with D50.  A few hours later, she became significant hypotensively unresponsive to albumin and was transferred to the ICU for possible pressor support. She was ultimately found to have an ESBL E coli UTI and has since been on IV Zosyn.  She also had an episode of nonsustained Vtach and has been started on Amiodarone (Dr. Cooper).  Pt also has had complaints of dysphagia and GI has been consulted and, once she is more stable, plan to do an EGD.  Pt was transferred back to our service on 8/1/20.    Plan:    Sepsis due to ESBL E Coli UTI  --as mentioned, pt hypotensive on 7/27 requiring transfer to ICU. Pt also tachypnic at that time with RR of 38.  Ultimately found to have ESBL E coli UTI  --continue IV Zosyn D 8/10, can  transition to PO ABX tomorrow upon transfer to rehab facility (looks like Macrobid is only option for oral ABX)    Hypotension  --started on midodrine, pressures much improved    Left sided chest pain  Recent fall  7/16 with left sided rib fractures  Large left pleural effusion -transudative per Light's criteria  --IR guided thoracentesis 7/23 w  1.6 liters removed on L.     Dysphagia  --GI consulted, started PPI BID. Also started on nystatin to cover fungal esophagitis (unable to do Diflucan due to Amiodarone)  --EGD showed LA grade B esophagitis, no definite stricture was found but dilatation was performed. Biopsies PENDING.     ESRD  - HD MWF  - minimal UOP. Stopped bumex     Acute on chronic diastolic CHF  -- EF 55 w dilated RV, RVSP, TV regurg.  --Follows with Dr. Cooper     Afib s/p PPM and Watchman device  Nonsustained VTach  --No anticoagulation as has a Watchman device.  --Cardiology following, on Amiodarone    History of cirrhosis/ascites/paracentesis  - small ascites currently    Hypothyroidism  --Continue Synthroid.    CIELO  --BiPAP/CPAP nightly.    COPD   Chronic hypoxic respiratory failure  --On 2 liters home oxygen.    DVT Prophylaxis:  allergy to heparin; mechanical      Disposition: I expect the patient to be discharged [place] [facility] tomorrow.  to transport and has HD set up MWF. Discussed with pt's spouse, Osvaldo, today.         CODE STATUS:   Code Status and Medical Interventions:   Ordered at: 07/28/20 1458     Limited Support to NOT Include:    Intubation     Level Of Support Discussed With:    Patient    Next of Kin (If No Surrogate)     Code Status:    No CPR     Medical Interventions (Level of Support Prior to Arrest):    Limited         Electronically signed by Yazmin Osorio MD, 08/04/20, 09:22.

## 2020-08-05 PROBLEM — N10 ACUTE PYELONEPHRITIS: Status: RESOLVED | Noted: 2020-01-01 | Resolved: 2020-01-01

## 2020-08-05 NOTE — PROGRESS NOTES
" LOS: 14 days   Patient Care Team:  Jade Quach APRN as PCP - General (Nurse Practitioner)  Percy Allison MD as PCP - Claims Attributed    Chief Complaint: ESRD    Subjective   Alert and engaging. No new complaints.     Review of system:SOB  All other negative.    Objective     Vital Sign Min/Max for last 24 hours  Temp  Min: 96.8 °F (36 °C)  Max: 98.8 °F (37.1 °C)   BP  Min: 90/58  Max: 120/52   Pulse  Min: 79  Max: 106   Resp  Min: 12  Max: 18   SpO2  Min: 90 %  Max: 100 %   Flow (L/min)  Min: 2  Max: 3   No data recorded     Flowsheet Rows      First Filed Value   Admission Height  171.5 cm (67.5\") Documented at 07/22/2020 1503   Admission Weight  72.6 kg (160 lb) Documented at 07/22/2020 1503          No intake/output data recorded.  I/O last 3 completed shifts:  In: 650 [P.O.:600; IV Piggyback:50]  Out: -     Objective:  General Appearance:  Ill-appearing, in no acute distress and not in pain.    Vital signs: (most recent): Blood pressure (P) 100/61, pulse (P) 79, temperature 96.8 °F (36 °C), temperature source Axillary, resp. rate 16, height 170.2 cm (67\"), weight 80.4 kg (177 lb 4.8 oz), SpO2 98 %, not currently breastfeeding.    Output: No urine output.    HEENT: Normal HEENT exam.    Lungs:  Normal effort and normal respiratory rate.    Heart: Normal rate.  S1 normal and S2 normal.    Abdomen: Abdomen is soft and flat.  Bowel sounds are normal.     Extremities: Normal range of motion.  (Trace edema )  Neurological: (Alert, no focal deficit noted grossly. ).              Results Review:     I reviewed the patient's new clinical results.    WBC WBC   Date Value Ref Range Status   08/05/2020 8.08 3.40 - 10.80 10*3/mm3 Final   08/04/2020 8.16 3.40 - 10.80 10*3/mm3 Final   08/03/2020 8.01 3.40 - 10.80 10*3/mm3 Final      HGB Hemoglobin   Date Value Ref Range Status   08/05/2020 9.6 (L) 12.0 - 15.9 g/dL Final   08/04/2020 9.8 (L) 12.0 - 15.9 g/dL Final   08/03/2020 10.9 (L) 12.0 - 15.9 g/dL " Final      HCT Hematocrit   Date Value Ref Range Status   08/05/2020 29.9 (L) 34.0 - 46.6 % Final   08/04/2020 31.7 (L) 34.0 - 46.6 % Final   08/03/2020 34.9 34.0 - 46.6 % Final      Platlets No results found for: LABPLAT   MCV MCV   Date Value Ref Range Status   08/05/2020 91.4 79.0 - 97.0 fL Final   08/04/2020 94.9 79.0 - 97.0 fL Final   08/03/2020 92.6 79.0 - 97.0 fL Final          Sodium Sodium   Date Value Ref Range Status   08/05/2020 132 (L) 136 - 145 mmol/L Final   08/04/2020 134 (L) 136 - 145 mmol/L Final   08/03/2020 132 (L) 136 - 145 mmol/L Final      Potassium Potassium   Date Value Ref Range Status   08/05/2020 3.4 (L) 3.5 - 5.2 mmol/L Final   08/04/2020 3.6 3.5 - 5.2 mmol/L Final   08/03/2020 3.7 3.5 - 5.2 mmol/L Final   08/03/2020 3.7 3.5 - 5.2 mmol/L Final      Chloride Chloride   Date Value Ref Range Status   08/05/2020 94 (L) 98 - 107 mmol/L Final   08/04/2020 94 (L) 98 - 107 mmol/L Final   08/03/2020 93 (L) 98 - 107 mmol/L Final      CO2 CO2   Date Value Ref Range Status   08/05/2020 22.0 22.0 - 29.0 mmol/L Final   08/04/2020 22.0 22.0 - 29.0 mmol/L Final   08/03/2020 22.0 22.0 - 29.0 mmol/L Final      BUN BUN   Date Value Ref Range Status   08/05/2020 28 (H) 8 - 23 mg/dL Final   08/04/2020 22 8 - 23 mg/dL Final   08/03/2020 29 (H) 8 - 23 mg/dL Final      Creatinine Creatinine   Date Value Ref Range Status   08/05/2020 4.34 (H) 0.57 - 1.00 mg/dL Final   08/04/2020 3.08 (H) 0.57 - 1.00 mg/dL Final   08/03/2020 4.41 (H) 0.57 - 1.00 mg/dL Final      Calcium Calcium   Date Value Ref Range Status   08/05/2020 9.5 8.6 - 10.5 mg/dL Final   08/04/2020 9.6 8.6 - 10.5 mg/dL Final   08/03/2020 9.9 8.6 - 10.5 mg/dL Final      PO4 No results found for: CAPO4   Albumin No results found for: ALBUMIN   Magnesium No results found for: MG   Uric Acid No results found for: URICACID     Medication Review: Yes    Assessment/Plan       Persistent hypotension, multifactorial    Atrial fibrillation, persistent  (CMS/HCC)    Obstructive sleep apnea syndrome    Left pleural effusion. Transudate of by protein and LDH criteria. Thoracentesis 7/23/2020    (HFpEF) heart failure with preserved ejection fraction (CMS/HCC)    Tachy-marcela syndrome (CMS/[facility])    History of hypertension    Type 2 diabetes mellitus (CMS/HCC)    GERD (gastroesophageal reflux disease)    Hypothyroidism    Pulmonary hypertension (CMS/HCC)    COPD (CMS/HCC)    ESRD (end stage renal disease) (CMS/[facility])    Acute ESBL pyelonephritis and possible sepsis    NSVT (nonsustained ventricular tachycardia) (CMS/[facility])    Dysphagia      Assessment:  (1.  ESRD: MWF [facility] [place] Dr Mercy Cast ( Outpatient Nephrologist)  On HD since Jan 2020      2.  Anemia: MARQUITA on HD days     3.  Volume status: Issues with fluid overload due to ESRD, Cirhosis, CHF.      4.  Acid base and Elytes: Hyponatremia management with HD     5.  BMD:      6.  Recurrent fall: Multiple rib fracture: Underwent thoracentesis for hemothorax on this admission.     Recommendation.  Patient seen on dialysis. UF as tolerated.   Continue with Midodrine.     High risk patient with critical condition  Multiple medical problems  Case discussed with RN ).       Hue Antony MD  08/05/20  13:46

## 2020-08-05 NOTE — DISCHARGE SUMMARY
[facility] Medicine Services  TRANSFER SUMMARY    Patient Name: Sarita Alegre  : 1940  MRN: [id number]    Date of Admission: 2020  Date of Discharge:  2020  Length of Stay: 14  Primary Care Physician: Jade Quach APRN    Consults     Date and Time Order Name Status Description    2020 0932 Inpatient Gastroenterology Consult Completed     2020 0030 Inpatient Cardiology Consult      2020 0030 Inpatient Nephrology Consult            Hospital Course     Presenting Problem:   Pleural effusion on left [J90]  Pleural effusion on left [J90]    Active Hospital Problems    Diagnosis  POA   • **Persistent hypotension, multifactorial [I95.9]  Yes   • Dysphagia [R13.10]  Unknown   • NSVT (nonsustained ventricular tachycardia) (CMS/[facility]) [I47.2]  No   • ESRD (end stage renal disease) (CMS/[facility]) [N18.6]  Yes   • COPD (CMS/[facility]) [J44.9]  Yes   • Pulmonary hypertension (CMS/[facility]) [I27.20]  Yes   • GERD (gastroesophageal reflux disease) [K21.9]  Yes   • History of hypertension [I10]  Yes   • Hypothyroidism [E03.9]  Yes   • Type 2 diabetes mellitus (CMS/[facility]) [E11.9]  Yes   • Tachy-marcela syndrome (CMS/[facility]) [I49.5]  Yes   • (HFpEF) heart failure with preserved ejection fraction (CMS/[facility]) [I50.30]  Yes   • Left pleural effusion. Transudate of by protein and LDH criteria. Thoracentesis 2020 [J90]  Yes   • Atrial fibrillation, persistent (CMS/[facility]) [I48.19]  Yes   • Obstructive sleep apnea syndrome [G47.33]  Yes      Resolved Hospital Problems    Diagnosis Date Resolved POA   • Acute ESBL pyelonephritis and possible sepsis [N10] 2020 Yes          Hospital Course:  Sarita Alegre is a 80 y.o. female  with hx of Afib, chronic diastolic CHF, ESRD on hemodialysis, COPD on home oxygen 2 liters, DM2, HTN, HLD, GERD, CIELO, hypothyroidism, recurrent ascites requiring paracentesis, chronic anemia, and depression.  She came to ER   due to left sided chest pain  during dialysis. She was found to have a large left pleural effusion and was admitted to our service.  Pt underwent thoracentesis on 7/23 ultimately found to be transudate by Light's Criteria.  On 7/27, post-HD, pt became hypoglycemic which improved with D50.  A few hours later, she became significant hypotensively unresponsive to albumin and was transferred to the ICU for possible pressor support. She was ultimately found to have an ESBL E coli UTI and has since been on IV Zosyn.  She also had an episode of nonsustained Vtach and has been started on Amiodarone (Dr. Cooper).  Pt also has had complaints of dysphagia and GI has been consulted and, once she is more stable, plan to do an EGD.  Pt was transferred back to our service on 8/1/20.        Sepsis due to ESBL E Coli UTI  --as mentioned, pt hypotensive on 7/27 requiring transfer to ICU. Pt also tachypnic at that time with RR of 38.  Ultimately found to have ESBL E coli UTI  --continued on IV Zosyn D 9/10 today. Was planning to transition to PO ABX today upon transfer to rehab facility but looks like Macrobid is only option for oral ABX and she is allergic.  As she is now asymptomatic and has had 9 days of IV abx, will dc abx on transfer.      Hypotension  --started on midodrine, pressures much improved  --much improved.  Continue midodrine on transfer and f/u cards [facility] next week   --Dr Cooper fu as noted below     Left sided chest pain  Recent fall  7/16 with left sided rib fractures  Large left pleural effusion -transudative per Light's criteria  --IR guided thoracentesis 7/23 w  1.6 liters removed on L.   --improved, no further cp complaints, no soa      Dysphagia  --GI consulted, started PPI BID. Also planned on nystatin to cover fungal esophagitis (unable to do Diflucan due to Amiodarone)  --EGD showed LA grade B esophagitis, no definite stricture was found but dilatation was performed. Biopsies PENDING.   --improved  --f/u [facility]      ESRD  -  HD MWF  - minimal UOP. Stopped bumex. I spoke with Dr Antony via phone today. He is ok with pt transfer to rehab today after HD on CURRENT med regimen  --she will resume outpt HD on her prior MWF ruba at [facility] in [place] on Friday. CM arranged and  to transport      Acute on chronic diastolic CHF  -- EF 55 w dilated RV, RVSP, TV regurg.  --Follows with Dr. Cooper.  Will f/u [facility] next week and Dr Cooper in 2-3 weeks     Afib s/p PPM and Watchman device  Nonsustained VTach  --No anticoagulation as has a Watchman device.  --Cardiology following, on Amiodarone  --f/u [facility] and Dr Cooper as noted     History of cirrhosis/ascites/paracentesis  - small ascites currently  --stable      Hypothyroidism  --Continue Synthroid.  --f/u PCP for further mgmt      CIELO  --BiPAP/CPAP nightly.  --oxygen     COPD   Chronic hypoxic respiratory failure  --On 2 liters home oxygen chronic/ continue      Today pt is seen resting up in the bed in [facility].  No visitors at bs.  States she feels better, just really weak still.  No cp or soa.  Awaiting HD today. Labs a little off but drawn pre HD today.  This should correct with HD.  She is currently hemodynamically stable and afebrile on current med regimen.  All services okay with transfer to rehab today.  CM arranging for resuming outpt HD at [facility] as noted.  Next HD due on Friday of this week.  Plans for pt to go to HD today and may transfer this afternoon.  F/u all services as noted below.      Discharge Follow Up Recommendations for outpatient labs/diagnostics:  Clear for transfer to rehab today per all services.    Card meds per cards as below  GI recommends PPI bid x 1 month, then daily     NO Spiriva or Linzess at rehab due to cost issues.  May resume on dc to her home from rehab.     F/u PCP 1 week of dc  F/u [facility] in 1 week of dc  F/u Dr Cooper in 2-3 weeks  F/u Dr Youngblood with nephrology in 3-4 weeks  F/u [facility] in 4 weeks  CM has arranged for pt to resume prior  outpt HD on her M/W/F schedule at [facility] in [place] on dc.  She went to HD here today.  Next HD outpt on Friday.    Day of Discharge     HPI:   Pt is seen resting up in bed this morning in NAD.  Awake and alert.  No visitors at bs. States she feels okay today.  No pain. No n/v, dizziness, soa or h/a. Does still feel very weak.  Tolerating diet.  No choking reported. Better since EGD and dilation. No new issues.  Feels ready to go to rehab today as planned.     Review of Systems  Gen- No fevers, chills  CV- No chest pain, palpitations  Resp- No cough, dyspnea  GI- No N/V/D, abd pain      Vital Signs:   Temp:  [96.8 °F (36 °C)-98.8 °F (37.1 °C)] 96.8 °F (36 °C)  Heart Rate:  [] 87  Resp:  [12-18] 16  BP: ()/(52-72) 100/54     Physical Exam:  Constitutional: No acute distress, awake, alert.  Sitting up in bed.  No visitors at bs  HENT: NCAT, mucous membranes moist  Respiratory: Clear to auscultation bilaterally A/P, respiratory effort normal on chronic 2LNC with sats 98%  Cardiovascular: RRR, pacer, no murmurs, rubs, or gallops, palpable pedal pulses bilaterally  Gastrointestinal: Positive bowel sounds, soft, nontender, nondistended  Musculoskeletal: No bilateral ankle edema.  BARILLAS spontaneously   Psychiatric: Appropriate affect, cooperative and calm   Neurologic: Oriented x 3, mild STM loss. strength symmetric in all extremities but generally mild weak, Cranial Nerves grossly intact to confrontation, speech clear and appropriate. Follows commands   Skin: No rashes. Pallor. Rt IJ TLDL c/d/i.  RUE AV fistula with +Thrill/bruit.       Pertinent Results     Results from last 7 days   Lab Units 08/05/20  0718 08/04/20  0829 08/04/20  0533 08/03/20  1511 08/03/20  1024 08/02/20  0907 08/02/20  0510 08/01/20  0345 07/31/20  0458  07/30/20  0606   WBC 10*3/mm3 8.08  --  8.16  --  8.01  --  8.84 8.47 10.58  --  14.15*   HEMOGLOBIN g/dL 9.6*  --  9.8*  --  10.9*  --  10.7* 10.1* 11.5*  --  11.1*   HEMATOCRIT % 29.9*   --  31.7*  --  34.9  --  34.2 32.6* 35.5  --  34.8   PLATELETS 10*3/mm3 141  --  135*  --  128*  --  120* 111* 142  --  165   SODIUM mmol/L 132* 134*  --   --  132* 131*  --  134* 133*  --  132*   POTASSIUM mmol/L 3.4* 3.6  --  3.7 3.7 4.4  --  4.1 3.7   < > 3.2*   CHLORIDE mmol/L 94* 94*  --   --  93* 94*  --  96* 97*  --  97*   CO2 mmol/L 22.0 22.0  --   --  22.0 20.0*  --  24.0 21.0*  --  22.0   BUN mg/dL 28* 22  --   --  29* 21  --  14 27*  --  21   CREATININE mg/dL 4.34* 3.08*  --   --  4.41* 3.64*  --  2.71* 3.50*  --  3.02*   GLUCOSE mg/dL 100* 87  --   --  110* 104*  --  119* 108*  --  138*   CALCIUM mg/dL 9.5 9.6  --   --  9.9 10.0  --  9.4 9.2  --  9.4    < > = values in this interval not displayed.           Invalid input(s): PROT, LABALBU        Invalid input(s): TG, LDLCALC, LDLREALC      Brief Urine Lab Results  (Last result in the past 365 days)      Color   Clarity   Blood   Leuk Est   Nitrite   Protein   CREAT   Urine HCG        07/28/20 0429 Yellow Clear Large (3+) Moderate (2+) Positive 100 mg/dL (2+)               Microbiology Results Abnormal     Procedure Component Value - Date/Time    Blood Culture - Blood, Hand, Left [[id number]] Collected:  07/27/20 2307    Lab Status:  Final result Specimen:  Blood from Hand, Left Updated:  08/02/20 0000     Blood Culture No growth at 5 days    Blood Culture - Blood, Hand, Left [[id number]] Collected:  07/27/20 2255    Lab Status:  Final result Specimen:  Blood from Hand, Left Updated:  08/02/20 0000     Blood Culture No growth at 5 days    Urine Culture - Urine, Urine, Clean Catch [[id number]]  (Abnormal)  (Susceptibility) Collected:  07/28/20 0429    Lab Status:  Final result Specimen:  Urine, Clean Catch Updated:  07/30/20 0305     Urine Culture >100,000 CFU/mL Escherichia coli ESBL     Comment:   Consider infectious disease consult.  Susceptibility results may not correlate to clinical outcomes.       Susceptibility      Escherichia coli ESBL     NEDRA      Amikacin Susceptible     Ertapenem Susceptible     Gentamicin Resistant     Levofloxacin Resistant     Meropenem Susceptible     Nitrofurantoin Susceptible     Piperacillin + Tazobactam Susceptible     Tetracycline Resistant     Tobramycin Intermediate     Trimethoprim + Sulfamethoxazole Resistant                    MRSA Screen, PCR (Inpatient) - Swab, Nares [[id number]]  (Normal) Collected:  07/28/20 1610    Lab Status:  Final result Specimen:  Swab from Nares Updated:  07/28/20 1915     MRSA PCR Negative    Narrative:       MRSA Negative    COVID PRE-OP / PRE-PROCEDURE SCREENING ORDER (NO ISOLATION) - Swab, Nasopharynx [[id number]] Collected:  07/26/20 2148    Lab Status:  Final result Specimen:  Swab from Nasopharynx Updated:  07/27/20 1907    Narrative:       The following orders were created for panel order COVID PRE-OP / PRE-PROCEDURE SCREENING ORDER (NO ISOLATION) - Swab, Nasopharynx.  Procedure                               Abnormality         Status                     ---------                               -----------         ------                     COVID-19,[facility], NP ...[[id number]]                      Final result                 Please view results for these tests on the individual orders.    COVID-19,[facility], NP SWAB IN LEXAR SALINE MEDIA 24-30 HR TAT - Swab, Nasopharynx [[id number]] Collected:  07/26/20 2148    Lab Status:  Final result Specimen:  Swab from Nasopharynx Updated:  07/27/20 1907     Reference Lab Report --     Comment: See scanned report          COVID19 Not Detected    COVID PRE-OP / PRE-PROCEDURE SCREENING ORDER (NO ISOLATION) - Swab, Nasopharynx [[id number]] Collected:  07/22/20 1806    Lab Status:  Final result Specimen:  Swab from Nasopharynx Updated:  07/23/20 0017    Narrative:       The following orders were created for panel order COVID PRE-OP / PRE-PROCEDURE SCREENING ORDER (NO ISOLATION) - Swab, Nasopharynx.  Procedure                               Abnormality          Status                     ---------                               -----------         ------                     Respiratory Panel PCR w/...[[id number]]  Normal              Final result                 Please view results for these tests on the individual orders.    Respiratory Panel PCR w/COVID-19(SARS-CoV-2) [facility] In-House, NP swab in UTM/VTM - Swab, Nasopharynx [[id number]]  (Normal) Collected:  07/22/20 1806    Lab Status:  Final result Specimen:  Swab from Nasopharynx Updated:  07/23/20 0017     ADENOVIRUS, PCR Not Detected     Coronavirus 229E Not Detected     Coronavirus HKU1 Not Detected     Coronavirus NL63 Not Detected     Coronavirus OC43 Not Detected     COVID19 Not Detected     Human Metapneumovirus Not Detected     Human Rhinovirus/Enterovirus Not Detected     Influenza A PCR Not Detected     Influenza A H1 Not Detected     Influenza A H1 2009 PCR Not Detected     Influenza A H3 Not Detected     Influenza B PCR Not Detected     Parainfluenza Virus 1 Not Detected     Parainfluenza Virus 2 Not Detected     Parainfluenza Virus 3 Not Detected     Parainfluenza Virus 4 Not Detected     RSV, PCR Not Detected     Bordetella pertussis pcr Not Detected     Bordetella parapertussis PCR Not Detected     Chlamydophila pneumoniae PCR Not Detected     Mycoplasma pneumo by PCR Not Detected    Narrative:       Fact sheet for providers: https://[employer]/wp-content/uploads/DVC3896-8673-BB4.1-EUA-Provider-Fact-Sheet-3.pdf    Fact sheet for patients: https://docs.[employer]/wp-content/uploads/EJA0097-8866-JL9.1-EUA-Patient-Fact-Sheet-1.pdf  Fact sheet for providers: https://docs.[employer]/wp-content/uploads/XDS0697-1041-VT5.1-EUA-Provider-Fact-Sheet-3.pdf    Fact sheet for patients: https://[employer]/wp-content/uploads/ENC7980-0421-KM3.1-EUA-Patient-Fact-Sheet-1.pdf          Imaging Results (All)     Procedure Component Value Units Date/Time    XR Chest 1 View [[id number]] Collected:  07/28/20 0004       Updated:  07/28/20 0006    Narrative:       CR Chest 1 Vw    INDICATION:   Central line placement.     COMPARISON:    7/27/2020 at 1934 hours    FINDINGS:  Single portable AP view(s) of the chest.    New right IJ line tip is in the SVC in good position. No pneumothorax is seen. Remainder of the exam is stable from earlier this evening.       Impression:       Line tip in the SVC with no pneumothorax.    Signer Name: Rickie Wetzel MD   Signed: 7/28/2020 12:04 AM   Workstation Name: [email or web address]    [facility]    CT Abdomen Pelvis Without Contrast [[id number]] Collected:  07/26/20 1630     Updated:  07/27/20 2230    Narrative:       EXAMINATION: CT ABDOMEN/PELVIS WO CONTRAST - 07/26/2020      INDICATION: Vomiting. L87-Qgdzfnb effusion, not elsewhere classified;  S22.42XA-Multiple fractures of ribs, left side, initial encounter for  closed fracture; R07.9-Chest pain, unspecified; Z74.09-Other reduced  mobility;  Z78.9-Other specified health status . . .      TECHNIQUE: 5 mm unenhanced images through the abdomen and pelvis.     The radiation dose reduction device was turned on for each scan per the  ALARA (As Low as Reasonably Achievable) protocol.     COMPARISON: Abdominal CT scan 11/20/2016. Also CT thoracentesis from  07/23/2020.     FINDINGS: Included images of the lung bases today show small right  pleural effusion, significantly smaller left pleural effusion than the  large effusion seen on 07/23/2020. There is mild ascites, mostly  adjacent the liver and in the paracolic gutters, insufficient for volume  paracentesis. Dense contrast mixed with stool in the transverse colon  shows considerable streak artifact and mildly limits detail of the upper  abdomen. No significant abnormalities are seen of the liver, spleen,  pancreas, adrenal glands, or kidneys. Bowel loops are normal in caliber.  Some dense contrast remains in the cecum as well. There is only a small  amount of pelvic ascites.  Bladder is mildly distended and normal in  appearance. No bowel wall edema or pneumatosis is seen. No free air or  adenopathy is seen.       Impression:       1. Significantly improved left pleural effusion following 07/23/2020  thoracentesis. Stable small right pleural effusion.  2. Small amount of ascites, minimally if at all increased from prior  study.  3. Dense contrast mixed with stool in the transverse colon and in the  cecum. No evidence to suggest significant obstruction from a barium  impaction.  4. No other evidence of acute intra-abdominal or intrapelvic disease is  seen elsewhere.     DICTATED:   07/26/2020  EDITED/ls :   07/26/2020      This report was finalized on 7/27/2020 10:27 PM by Dr. Ralph Escobar MD.       XR Chest 1 View [[id number]] Collected:  07/27/20 2035     Updated:  07/27/20 2037    Narrative:       PROCEDURE: CR Chest 1 Vw    COMPARISON: 7/24/2020    INDICATIONS: hypotension recent pleural effusion;Pleural effusion, not elsewhere classified;Multiple fractures of ribs, left side, initial encounter for closed fracture;Chest pain, unspecified;Other reduced mobility;Other reduced mobility;Other specified  health status      TECHNIQUE: Single AP  view of the chest    FINDINGS: Cardiomediastinal silhouette is stable and enlarged with continued left pleural effusion in consolidation left lower lobe. Mild interstitial edema seen on the right with small right pleural effusion, new. Pacemaker present. Osseous structures  stable. The fifth left rib is fractured but there is suggestion of other left rib formations.      Impression:       Trace right new pleural effusion. Continued consolidation seen on the left.         Signer Name: Parul Fisher MD   Signed: 7/27/2020 8:35 PM   Workstation Name: [facility]    [facility]    XR Chest 1 View [[id number]] Collected:  07/24/20 1549     Updated:  07/24/20 2301    Narrative:       EXAMINATION: XR CHEST 1 VW - 07/24/2020        INDICATION:  V94-Idxgtqx effusion, not elsewhere classified;  S22.42XA-Multiple fractures of ribs, left side, initial encounter for  closed fracture; R07.9-Chest pain, unspecified; Z74.09-Other reduced  mobility.     COMPARISON: 07/22/2020     FINDINGS: Left-sided dual lead pacemaker is noted. Heart shadow is  probably normal in size, partly obscured by the patient's decreasing  left pleural effusion, still moderate in extent. There is mildly  improved aeration of the left upper lung. Diffuse right lung  interstitial changes appear a little improved, probably resolving  interstitial edema. Skin fold shadow is superimposed over the right  chest. Allowing for this, no pneumothorax is seen.       Impression:       1. Improved aeration of the left lung, apparently with decreasing  effusion and improving left basilar atelectasis. Moderately extensive  residual atelectasis.  2. No evidence of pneumothorax.  3. Persistent but mildly improved pulmonary vascular congestion.      DICTATED:   07/24/2020  EDITED/ls :   07/24/2020      This report was finalized on 7/24/2020 10:58 PM by Dr. Ralph Escobar MD.       XR Chest 1 View [[id number]] Collected:  07/22/20 1556     Updated:  07/23/20 1706    Narrative:          EXAMINATION: XR CHEST 1 VW-07/22/2020:      INDICATION: Chest Pain, triage protocol.      COMPARISON: 09/12/2019.     FINDINGS: 09/12/2019 portable chest reveals moderate-to-large pleural  effusion on the left. Ill-defined opacification left lung base.  Prominence of the pulmonary vascularity. Degenerative changes seen  within the spine. Pacer leads in satisfactory position.           Impression:       Moderate-to-large pleural effusion on the left with mild  increased markings at the left lung base. Increased pulmonary  vascularity bilaterally.     D:  07/22/2020  E:  07/22/2020     This report was finalized on 7/23/2020 3:57 PM by Dr. Vickie Brito MD.       CT Guided Thoracentesis [[id number]] Collected:   07/23/20 1537     Updated:  07/23/20 1706    Narrative:       PROCEDURE: CT-guided thoracentesis     Procedural Personnel  Attending physician(s): FAIZAN Meyer M.D.  Fellow physician(s): None  Resident physician(s): None  Advanced practice provider(s): None     Pre-procedure diagnosis: Reported Trauma. Rib fractures. Left side  pleural fluid collection  Post-procedure diagnosis: Same  Indication: Diagnostic/therapeutic  Additional clinical history: None     Complications: No immediate complications.       Impression:          CT-guided thoracentesis with drainage of 1600 mL of bloody fluid.  Portion sent for requested laboratory analysis.  Hemothorax     Plan:      Resume care by clinical team.  _______________________________________________________________     PROCEDURE SUMMARY:  - Limited thoracic CT  - CT-guided thoracentesis  - Additional procedure(s): None     PROCEDURE DETAILS:     Pre-procedure  Consent: Informed consent for the procedure including risks, benefits  and alternatives was obtained and time-out was performed prior to the  procedure.  Preparation: The site was prepared and draped using maximal sterile  barrier technique including cutaneous antisepsis.     Anesthesia/sedation  Level of anesthesia/sedation: No sedation  Anesthesia/sedation administered by: Not applicable  Total intra-service sedation time (minutes): 20     Limited thoracic CT  Limited thoracic CT was performed. A safe window for thoracentesis was  identified.   Left hemithorax findings: Large left-sided pleural fluid collection     Thoracentesis  Local anesthesia was administered. The pleural space was accessed using  trocar technique  and fluid return confirmed position. The fluid was  drained. The catheter was removed, and a sterile bandage was applied.  Catheter placed: 6.5 [place]  Post-drainage hemithorax findings: No immediate complication     Radiation Dose  CT dose length product (mGy-cm): 480     Additional  Details  Additional description of procedure: None  Equipment details: None  Specimens removed: Bloody pleural fluid  Estimated blood loss (mL): Less than 10  Standardized report: Thoracentesis     Attestation  I was present and scrubbed for the entire procedure. Imaging reviewed.  Agree with final report as written.        This report was finalized on 7/23/2020 3:39 PM by Jordan Meyer.             Results for orders placed during the hospital encounter of 07/22/20   Adult Transthoracic Echo Complete W/ Cont if Necessary Per Protocol    Narrative · Estimated EF = 50-55%.  · Left ventricular systolic function is low normal.  · Left ventricular diastolic dysfunction.  · Right ventricular cavity is moderate-to-severely dilated.  · Moderately reduced right ventricular systolic function noted.  · Mild aortic valve regurgitation is present.  · Trace mitral valve regurgitation is present  · Moderate tricuspid valve regurgitation is present.  · Calculated right ventricular systolic pressure from tricuspid   regurgitation is 71 mmHg.           Order Current Status    COVID PRE-OP / PRE-PROCEDURE SCREENING ORDER (NO ISOLATION) - Swab, Nasopharynx In process    COVID-19,[facility], NP SWAB IN LEXAR SAL[place] MEDIA 24-30 HR TAT - Swab, Nasopharynx In process          Discharge Details        Discharge Medications      New Medications      Instructions Start Date   acetaminophen 325 MG tablet  Commonly known as:  TYLENOL   650 mg, Oral, Every 4 Hours PRN      amiodarone 200 MG tablet  Commonly known as:  PACERONE   200 mg, Oral, Every 24 Hours Scheduled   Start Date:  August 6, 2020     b complex-vitamin c-folic acid 0.8 MG tablet tablet  Replaces:  triphrocaps 1 MG capsule capsule   1 tablet, Oral, Daily   Start Date:  August 6, 2020     bisacodyl 5 MG EC tablet  Commonly known as:  DULCOLAX   5 mg, Oral, Daily PRN      insulin lispro 100 UNIT/ML injection  Commonly known as:  humaLOG   0-7 Units, Subcutaneous, 3 Times Daily  With Meals      melatonin 5 MG tablet tablet   5 mg, Oral, Nightly PRN      midodrine 10 MG tablet  Commonly known as:  PROAMATINE   10 mg, Oral, 3 Times Daily Before Meals      nystatin [id number] UNIT/ML suspension  Commonly known as:  MYCOSTATIN   500,000 Units, Swish & Swallow, 4 Times Daily      oxyCODONE-acetaminophen 5-325 MG per tablet  Commonly known as:  PERCOCET   1 tablet, Oral, Every 8 Hours PRN      pantoprazole 40 MG EC tablet  Commonly known as:  PROTONIX   40 mg, Oral, 2 Times Daily Before Meals      polyethylene glycol 17 g packet  Commonly known as:  MIRALAX   17 g, Oral, Daily   Start Date:  August 6, 2020     sennosides-docusate 8.6-50 MG per tablet  Commonly known as:  PERICOLACE   2 tablets, Oral, Nightly         Changes to Medications      Instructions Start Date   fluticasone 50 MCG/ACT nasal spray  Commonly known as:  Flonase  What changed:  when to take this   2 sprays, Each Nare, 2 Times Daily - RT      levothyroxine 112 MCG tablet  Commonly known as:  SYNTHROID, LEVOTHROID  What changed:  how much to take   112 mcg, Oral, Every Morning   Start Date:  August 6, 2020        Continue These Medications      Instructions Start Date   albuterol sulfate  (90 Base) MCG/ACT inhaler  Commonly known as:  ProAir HFA   2 puffs, Inhalation, 4 Times Daily PRN      aspirin 81 MG chewable tablet   81 mg, Oral, Daily      atorvastatin 40 MG tablet  Commonly known as:  LIPITOR   40 mg, Oral, Daily      budesonide-formoterol 160-4.5 MCG/ACT inhaler  Commonly known as:  SYMBICORT   2 puffs, Inhalation, 2 Times Daily      DULoxetine 30 MG capsule  Commonly known as:  CYMBALTA   90 mg, Oral, Daily      levocetirizine 5 MG tablet  Commonly known as:  Xyzal Allergy 24HR   2.5 mg, Oral, Every Evening      montelukast 10 MG tablet  Commonly known as:  SINGULAIR   10 mg, Oral, Daily      tiotropium bromide monohydrate 2.5 MCG/ACT aerosol solution inhaler  Commonly known as:  Spiriva Respimat   2 puffs,  Inhalation, Daily - RT         Stop These Medications    bumetanide 1 MG tablet  Commonly known as:  BUMEX     erythromycin 5 MG/GM ophthalmic ointment  Commonly known as:  ROMYCIN     HAIR/SKIN/NAILS PO     HYDROcodone-acetaminophen 7.5-325 MG per tablet  Commonly known as:  NORCO     ketotifen 0.025 % ophthalmic solution  Commonly known as:  ZADITOR     lidocaine-prilocaine 2.5-2.5 % cream  Commonly known as:  EMLA     Linzess 145 MCG capsule capsule  Generic drug:  linaclotide     metoprolol tartrate 25 MG tablet  Commonly known as:  LOPRESSOR     spironolactone 25 MG tablet  Commonly known as:  ALDACTONE     triphrocaps 1 MG capsule capsule  Replaced by:  b complex-vitamin c-folic acid 0.8 MG tablet tablet            Allergies   Allergen Reactions   • Propafenone Other (See Comments)     Hepatic failure   • Grass Cough   • Heparin Other (See Comments)   • Molds & Smuts Cough         Discharge Disposition:  Rehab Facility or Unit (DC - External)    Discharge Diet:  Diet Order   Procedures   • Diet Regular; Renal, Consistent Carbohydrate       Discharge Activity:  Activity Instructions     Activity as Tolerated      Measure Blood Pressure      Measure Weight              CODE STATUS:    Code Status and Medical Interventions:   Ordered at: 07/28/20 1458     Limited Support to NOT Include:    Intubation     Level Of Support Discussed With:    Patient    Next of Kin (If No Surrogate)     Code Status:    No CPR     Medical Interventions (Level of Support Prior to Arrest):    Limited         Future Appointments   Date Time Provider Department Center   9/15/2020  1:00 PM NURSE PULMO [facility] None   9/17/2020  1:30 PM [facility], P[facility] LAB 3 [facility] None   9/17/2020  2:00 PM Johan Thao MD [facility] None   11/25/2020  3:30 PM Radha Cooper MD [facility] None       Additional Instructions for the Follow-ups that You Need to Schedule     Discharge Follow-up with  PCP   As directed       Currently Documented PCP:    Jade Quach APRN    PCP Phone Number:    [phone]     Follow Up Details:  PCP 1 week of dc (please ruba appt prior to dc)         Discharge Follow-up with Specialty: [facility] in 1 week (please ruba appt prior to dc)   As directed      Specialty:  [facility] in 1 week (please ruba appt prior to dc)         Discharge Follow-up with Specified Provider: [facility] in 4 weeks (please ruba appt prior to dc)   As directed      To:  [facility] in 4 weeks (please ruba appt prior to dc)         Discharge Follow-up with Specified Provider: CM arranging for pt to resume prior outpt HD at [facility] on M/W/F schedule.  Next HD due Friday.   As directed      To:  CM arranging for pt to resume prior outpt HD at [facility] on M/W/F schedule.  Next HD due Friday.         Discharge Follow-up with Specified Provider: Dr Youngblood with nephrology in 3-4 weeks (please ruba appt prior to dc)   As directed      To:  Dr Youngblood with nephrology in 3-4 weeks (please ruba appt prior to dc)         Discharge Follow-up with Specified Provider: Dr Cooper in 2-3 weeks (please ruba appt prior to dc)   As directed      To:  Dr Cooper in 2-3 weeks (please ruba appt prior to dc)                   Electronically signed by KAILASH Tejeda, 08/05/20, 2:10 PM.    Time Spent on Discharge: I spent 55 minutes on this discharge activity which included: face-to-face encounter with the patient, reviewing the data in the system, coordination of the care with the nursing staff as well as consultants, documentation, and entering orders.

## 2020-08-05 NOTE — PLAN OF CARE
Problem: Patient Care Overview  Goal: Plan of Care Review  Flowsheets (Taken 8/5/2020 6879)  Progress: no change  Plan of Care Reviewed With: patient  Outcome Summary: VSS, voids, slightly confused, tele afib, will continue to monitor for changes.     Problem: Pain, Chronic (Adult)  Goal: Acceptable Pain/Comfort Level and Functional Ability    Acceptable Pain/Comfort Level and Functional Ability: making progress toward outcome     Problem: Fall Risk (Adult)  Goal: Absence of Fall    Absence of Fall: making progress toward outcome     Problem: Skin Injury Risk (Adult)  Goal: Skin Health and Integrity    Skin Health and Integrity: making progress toward outcome

## 2020-08-05 NOTE — PLAN OF CARE
HD in progress.    Problem: Hemodialysis (Adult)  Goal: Signs and Symptoms of Listed Potential Problems Will be Absent, Minimized or Managed (Hemodialysis)  Outcome: Ongoing (interventions implemented as appropriate)  Flowsheets (Taken 8/5/2020 1204)  Problems Assessed (Hemodialysis): all  Problems Present (Hemodialysis): electrolyte imbalance; fluid imbalance

## 2020-08-05 NOTE — NURSING NOTE
Prior to central line removal, order for the removal of catheter was verified, patient was assessed, necessary materials were gathered and patient was educated regarding procedure .    Patient was positioned flat to ensure that the insertion site was at or below the level of the heart.    Hands were washed, clean gloves were applied and central line dressing was gently removed. Catheter exit site was not cultured.     A new pair of clean gloves were then applied. Insertion site was cleansed with 2% Chlorhexidine swab using a circular motion beginning at the insertion site and moving outward for 30 seconds and allowed to dry.     Clamp on line was present and clamped. 3 sutures were removed prior to removing catheter.    Patient was instructed to perform Valsalva maneuver as catheter was withdrawn.  Patient also held breath until after catheter was removed.    The central line was grasped at the insertion site and slowly pulled outward parallel to the skin. Resistance was not met.    After central line was completely removed, a sterile 4x4 gauze pad was used to apply light pressure until bleeding stopped. At that time, petroleum-based gauze and a sterile occlusive dressing was applied to exit site.     Patient was instructed to keep dressing in place for at least 24 hours and to remain in a flat or reclining position for 30 minutes post-removal.     Catheter was inspected after removal and Intact. Tip of central line was not sent for culture. Patient tolerated procedure.

## 2020-08-05 NOTE — PROGRESS NOTES
Case Management Discharge Note      Final Note: Per [facility]/ [facility], they are able to accept patient today. Nurse to call report to [phone], dc summary to be pulled from [facility]. Patient has a current negative covid test. [facility] arranged for today at 1530. Please have patient at the [address], maternity entrance by 1515. Patient notified and agreeable. CM [facility].     Destination - Selection Complete      Service Provider Request Status Selected Services Address Phone Number Fax Number    [facility] [place] [facility] [address] [facility] [id number] [phone]              Transportation Services  Ambulance: [facility] Transportation    Final Discharge Disposition Code: 03 - skilled nursing facility (SNF)

## 2020-08-17 PROBLEM — J96.02 ACUTE RESPIRATORY FAILURE WITH HYPOXIA AND HYPERCAPNIA (HCC): Status: ACTIVE | Noted: 2020-01-01

## 2020-08-17 PROBLEM — J44.9 COPD (CHRONIC OBSTRUCTIVE PULMONARY DISEASE) (HCC): Chronic | Status: ACTIVE | Noted: 2017-04-15

## 2020-08-17 PROBLEM — N39.0 E. COLI UTI (URINARY TRACT INFECTION): Status: ACTIVE | Noted: 2020-01-01

## 2020-08-17 PROBLEM — J96.01 ACUTE RESPIRATORY FAILURE WITH HYPOXIA (HCC): Status: ACTIVE | Noted: 2020-01-01

## 2020-08-17 PROBLEM — J96.21 ACUTE ON CHRONIC RESPIRATORY FAILURE WITH HYPOXIA (HCC): Status: ACTIVE | Noted: 2020-01-01

## 2020-08-17 PROBLEM — N18.6 ESRD (END STAGE RENAL DISEASE) (HCC): Chronic | Status: ACTIVE | Noted: 2020-01-01

## 2020-08-17 PROBLEM — B96.20 E. COLI UTI (URINARY TRACT INFECTION): Status: ACTIVE | Noted: 2020-01-01

## 2020-08-17 PROBLEM — A41.9 SEPSIS (HCC): Status: ACTIVE | Noted: 2020-01-01

## 2020-08-17 PROBLEM — I95.9 HYPOTENSION: Chronic | Status: ACTIVE | Noted: 2020-01-01

## 2020-08-17 PROBLEM — I27.20 PULMONARY HYPERTENSION (HCC): Chronic | Status: ACTIVE | Noted: 2017-04-15

## 2020-08-17 NOTE — PLAN OF CARE
Patient arrived from ED on nasal canula, guppy breathing, with BP in 60's. Levophed initiated and quickly titrated up to .14 BG 29 on arrival. D50 given. Nonrebreather applied. After talking with  it was decided to keep patient on Levophed until family could arrive and then pursue comfort care. Morphine given for patient comfort. Robinol given for secretions. Nonrebreather removed and Nasal canula applied for patient comfort. Family at bedside.

## 2020-08-17 NOTE — PROGRESS NOTES
Discharge Planning Assessment  [facility]     Patient Name: Sarita Alegre  MRN: [id number]  Today's Date: 8/17/2020    Admit Date: 8/17/2020    Discharge Needs Assessment     Row Name 08/17/20 1454       Living Environment    Lives With  spouse    Name(s) of Who Lives With Patient  Hernando    Current Living Arrangements  home/apartment/condo    Primary Care Provided by  spouse/significant other    Provides Primary Care For  no one, unable/limited ability to care for self    Family Caregiver if Needed  spouse    Family Caregiver Names  Hernando    Quality of Family Relationships  involved;helpful    Able to Return to Prior Arrangements  yes       Resource/Environmental Concerns    Resource/Environmental Concerns  none    Transportation Concerns  car, none       Transition Planning    Patient/Family Anticipates Transition to  home with family    Patient/Family Anticipated Services at Transition  none    Transportation Anticipated  family or friend will provide       Discharge Needs Assessment    Readmission Within the Last 30 Days  previous discharge plan unsuccessful    Concerns to be Addressed  discharge planning    Equipment Currently Used at Home  walker, rolling;shower chair;oxygen raised toilet seat    Anticipated Changes Related to Illness  none    Equipment Needed After Discharge  none    Outpatient/Agency/Support Group Needs  outpatient hemodialysis;inpatient rehabilitation facility [facility]        Discharge Plan     Row Name 08/17/20 1500       Plan    Plan  initial    Plan Comments  Pt lives in [place] with her . She is dependent on him for ADL's. She uses O2 at home from [facility]. She has hemodialysis M,Wed, Fri at [facility]- spouse transports her. She arrives here today from [facility]. she has used [facility] in the past PCP is Jade Depaul        Destination      Coordination has not been started for this encounter.      Durable Medical Equipment      Coordination  has not been started for this encounter.      Dialysis/Infusion      Coordination has not been started for this encounter.      Home Medical Care      Coordination has not been started for this encounter.      Therapy      Coordination has not been started for this encounter.      Community Resources      Coordination has not been started for this encounter.          Demographic Summary    No documentation.       Functional Status     Row Name 08/17/20 1452       Functional Status, IADL    Medications  independent    Meal Preparation  assistive person    Housekeeping  completely dependent    Laundry  completely dependent    Shopping  completely dependent       Mental Status Summary    Recent Changes in Mental Status/Cognitive Functioning  unable to assess       Employment/    Employment Status  retired        Psychosocial    No documentation.       Abuse/Neglect    No documentation.       Legal    No documentation.       Substance Abuse    No documentation.       Patient Forms    No documentation.           Laura Grigsby RN

## 2020-08-17 NOTE — CONSULTS
Patient Care Team:  Jade Quach APRN as PCP - General (Nurse Practitioner)  Percy Allison MD as PCP - Claims Attributed    Chief complaint: ESRD    Subjective     Patient is a resident at the [facility] in [place]. Today staff noted decreased responsiveness and contacted EMS, and upon their arrival her FSBG was noted to be 20 mg/dl. She was administered an amp of D50 along with D10 with improvement in mentation and BG to 137 mg/dl. She was taken to [facility] ED for further evaluation. On ED arrival she was noted to be hypoxic with SpO2 in the 70's - she was placed on a NRB mask with improvement. ABG was obtained with pH 7.329 / CO2 47.9 / HCO3 25.2. EKG showed atrial fib (rate 96 bpm) with RBBB and PV or aberrantly conducted complexes. Lab work up was significant for elevated troponin (0.227), sodium 130, K 3.2, sCr 3.62, BUN 15, , ALT 66, alk phos 159, total bili 5.3, AG 16, lactate 4.3, WBC 11.4, Hgb 7.5, Hct 23.4, and Plt 142. UA showed positive nitrite, moderate (2+) leukocytes, and WBC too many to count. CXR showed bibasilar pulmonary opacifications (L>R) with a small left pleural effusion. Patient is well known to renal service. She is on HD per [facility]. Hx of intradialytic hypotension requires midodrine to raise th blood pressure.       Review of Systems   Unable to perform ROS: Acuity of condition        Past Medical History:   Diagnosis Date   • Adenomatous polyp of stomach    • Allergic rhinitis    • Anemia    • Asthma    • Atrial fibrillation (CMS/HCC)     Hospitalized at [facility] 6/2015, presented after dizziness and atrial fibrillation, treated with Rythmol and a beta blocker converted to sinus prior to discharge.   • BiPAP (biphasic positive airway pressure) dependence    • Chronic anticoagulation 6/14/2016   • Chronic diastolic congestive heart failure (CMS/HCC) 6/21/2016   • CKD (chronic kidney disease), stage III (CMS/HCC)      Status post temporary hemodialysis at [facility], 8/24/2015  through 9/29/2015, worsening 11/15  with hosp chf   • Closed fracture of fibula     Followed by Dr. Kennedy   • COPD (chronic obstructive pulmonary disease) (CMS/[facility])    • Depression 9/7/2016   • Diabetes mellitus (CMS/[facility])    • Diabetic peripheral neuropathy (CMS/[facility]) 12/29/2016   • Dyslipidemia    • Gastrointestinal bleeding, lower 6/15/2016   • GERD (gastroesophageal reflux disease)    • Hearing aid worn    • Hearing deficit 9/7/2016   • Hiatal hernia    • History of abnormal electrocardiogram     a bib, inferior infarct, st-t changes 10/12-referred to ER   • History of acute gastritis    • History of bilateral renal artery occlusion    • History of cataract    • History of deafness    • History of fracture of ankle 07/2015   • History of herpes zoster    • History of pneumonia    • History of transfusion    • Hyperparathyroidism (CMS/[facility])    • Hypertension    • Hypothyroidism 9/7/2016   • Insomnia    • Joint pain, knee    • Kidney failure    • Low back pain    • Obesity    • CIELO (obstructive sleep apnea)     UNTREATED; appeared to attempt to wear it sporadically 2007,  2008, split-night study of 03/10/2016 reported an overall AHI of 32.7/hr which required BiPAP therapy after failed CPAP attempt with final BiPAP 17 and EPAP 11 which still reported oxygen desaturations on 2 L O2, her lowest oxygen saturation was documented at 64% and several arrhythmias were observed during the study.   • Osteoarthritis 9/7/2016   • Pleural effusion      CT scan of the chest 8/28/2015, reports bilateral pleural effusions, right greater than left, simple in appearance with atelectatic changes identified of the right lung base.  Chest x-ray 10/12/2015, left pleural effusion still seen.   • Pneumonia    • Recurrent oral ulcers    • Retinal artery occlusion 12/29/2016    h/o   • Secondary pulmonary hypertension     EVITA: 6/24/15, RVSP 66mmhg; moderate MR, LVEF 55â‚¬â€œ60%, no pericardial effusion or atrial thrombus, no ASD.   •  Tachy-marcela syndrome (CMS/[facility]) 09/01/2016    PPM   • Tricuspid valve insufficiency 5/17/2016    Description: A.  Severe.   • Type 2 diabetes mellitus (CMS/[facility]) 9/7/2016   • Vision loss 12/29/2016    Of the left eye   • Wears glasses    ,   Past Surgical History:   Procedure Laterality Date   • APPENDECTOMY     • ATRIAL APPENDAGE EXCLUSION LEFT WITH TRANSESOPHAGEAL ECHOCARDIOGRAM N/A 9/26/2017    Procedure: Atrial Appendage Occlusion - PER ASAP-TOO PROTOCOL;  Surgeon: Sandeep Lopes MD;  Location:  [facility];  Service:    • BLADDER SURGERY      x3   • CARDIAC CATHETERIZATION N/A 11/20/2018    Procedure: Left Heart Cath;  Surgeon: Radha Cooper MD;  Location:  [facility];  Service: Cardiology   • CARDIAC ELECTROPHYSIOLOGY PROCEDURE N/A 9/1/2016    Procedure: Pacemaker DC new;  Surgeon: Zafar Hudson MD;  Location:  [facility];  Service:    • CARDIAC PACEMAKER PLACEMENT     • CARDIOVERSION      .  Status post cardioversion, 8/25/2015 and 9/3/2015, secondary to atrial fibrillation.   • CATARACT EXTRACTION Bilateral     1996  and 1997   • COLONOSCOPY N/A 6/17/2016    Procedure: COLONOSCOPY;  Surgeon: Edy Muñoz MD;  Location:  [facility];  Service:    • ELBOW PROCEDURE Left 2006    Left elbow repair   • ENDOSCOPY N/A 4/17/2017    Procedure: ESOPHAGOGASTRODUODENOSCOPY AT BEDSIDE;  Surgeon: Ollie White MD;  Location:  [facility];  Service:    • ENDOSCOPY N/A 8/3/2020    Procedure: ESOPHAGOGASTRODUODENOSCOPY;  Surgeon: Devon James MD;  Location:  [facility];  Service: Gastroenterology;  Laterality: N/A;  esophageal dilatation with savory 45, 48   • EYE SURGERY     • HYSTERECTOMY  1972   • KNEE SURGERY Right 1982   • OTHER SURGICAL HISTORY      History of hearing aid check   • PACEMAKER IMPLANTATION     • PARATHYROIDECTOMY     • UPPER GASTROINTESTINAL ENDOSCOPY     ,   Family History   Problem Relation Age of Onset   •  Asthma Mother    • Allergies Mother    • Colonic polyp Mother    • Mitral valve prolapse Mother    • Other Mother         a fib- rain knee replacement   • Heart disease Father    • Lung cancer Father         he was smoker   • Diabetes Other    • Colon cancer Son    • Breast cancer Neg Hx    • Ovarian cancer Neg Hx    ,   Social History     Tobacco Use   • Smoking status: Former Smoker     Packs/day: 3.00     Years: 25.00     Pack years: 75.00     Types: Cigarettes     Last attempt to quit: 1982     Years since quittin.3   • Smokeless tobacco: Never Used   Substance Use Topics   • Alcohol use: No   • Drug use: No   ,   Medications Prior to Admission   Medication Sig Dispense Refill Last Dose   • acetaminophen (TYLENOL) 325 MG tablet Take 2 tablets by mouth Every 4 (Four) Hours As Needed for Mild Pain .      • albuterol sulfate HFA (ProAir HFA) 108 (90 Base) MCG/ACT inhaler Inhale 2 puffs 4 (Four) Times a Day As Needed for Wheezing or Shortness of Air. 1 inhaler 11 Taking   • amiodarone (PACERONE) 200 MG tablet Take 1 tablet by mouth Daily. 30 tablet 0    • aspirin 81 MG chewable tablet Chew 1 tablet Daily. 30 tablet 6 Taking   • atorvastatin (LIPITOR) 40 MG tablet Take 1 tablet by mouth Daily. 90 tablet 3    • b complex-vitamin c-folic acid (NEPHRO-LISHA) 0.8 MG tablet tablet Take 1 tablet by mouth Daily. 30 tablet     • bisacodyl (DULCOLAX) 5 MG EC tablet Take 1 tablet by mouth Daily As Needed for Constipation.      • budesonide-formoterol (SYMBICORT) 160-4.5 MCG/ACT inhaler Inhale 2 puffs 2 (Two) Times a Day. 1 inhaler 11 Taking   • DULoxetine (CYMBALTA) 30 MG capsule Take 3 capsules by mouth Daily. 270 capsule 2 Taking   • fluticasone (Flonase) 50 MCG/ACT nasal spray 2 sprays by Each Nare route 2 (Two) Times a Day. 18.2 mL 11 Taking   • folic acid (FOLVITE) 400 MCG tablet Take 800 mcg by mouth Daily.      • levocetirizine (Xyzal Allergy 24HR) 5 MG tablet Take 0.5 tablets by mouth Every Evening. 30 tablet  11 Taking   • levothyroxine (SYNTHROID, LEVOTHROID) 112 MCG tablet Take 1 tablet by mouth Every Morning.      • lidocaine-prilocaine (EMLA) 2.5-2.5 % cream Apply 1 application topically to the appropriate area as directed 3 (Three) Times a Week. On right arm prior to dialysis on Mondays, Wednesdays, Fridays      • melatonin 5 MG tablet tablet Take 1 tablet by mouth At Night As Needed (insomnia).      • midodrine (PROAMATINE) 10 MG tablet Take 1 tablet by mouth 3 (Three) Times a Day Before Meals.      • montelukast (SINGULAIR) 10 MG tablet Take 1 tablet by mouth Daily. 30 tablet 11 Taking   • oxyCODONE-acetaminophen (PERCOCET) 5-325 MG per tablet Take 1 tablet by mouth Every 8 (Eight) Hours As Needed for Moderate Pain . 9 tablet 0    • pantoprazole (PROTONIX) 40 MG EC tablet Take 1 tablet by mouth 2 (Two) Times a Day Before Meals.      • polyethylene glycol (MIRALAX) 17 g packet Take 17 g by mouth Daily.      • sennosides-docusate (PERICOLACE) 8.6-50 MG per tablet Take 2 tablets by mouth Every Night.      • tiotropium bromide monohydrate (Spiriva Respimat) 2.5 MCG/ACT aerosol solution inhaler Inhale 2 puffs Daily. 1 inhaler 11 Taking    and Scheduled Meds:       Objective     Vital Signs  Temp:  [94.4 °F (34.7 °C)-97.5 °F (36.4 °C)] 97.5 °F (36.4 °C)  Heart Rate:  [83-98] 83  Resp:  [26] 26  BP: ()/(21-87) 69/48    I/O this shift:  In: 550 [IV Piggyback:550]  Out: -   No intake/output data recorded.    Physical Exam   Constitutional: She appears distressed.   HENT:   Head: Normocephalic and atraumatic.   Eyes: Pupils are equal, round, and reactive to light. EOM are normal.   Cardiovascular: Normal rate and regular rhythm.   Pulmonary/Chest: Effort normal. She has rales.   Abdominal: Soft.   Musculoskeletal: She exhibits edema.   Neurological: She is alert.   Skin: Skin is warm and dry.       Results Review:    I reviewed the patient's new clinical results.    WBC WBC   Date Value Ref Range Status   08/17/2020  11.40 (H) 3.40 - 10.80 10*3/mm3 Final      HGB Hemoglobin   Date Value Ref Range Status   08/17/2020 7.5 (L) 12.0 - 15.9 g/dL Final      HCT Hematocrit   Date Value Ref Range Status   08/17/2020 23.4 (L) 34.0 - 46.6 % Final      Platlets No results found for: LABPLAT   MCV MCV   Date Value Ref Range Status   08/17/2020 90.7 79.0 - 97.0 fL Final          Sodium Sodium   Date Value Ref Range Status   08/17/2020 130 (L) 136 - 145 mmol/L Final      Potassium Potassium   Date Value Ref Range Status   08/17/2020 3.2 (L) 3.5 - 5.2 mmol/L Final      Chloride Chloride   Date Value Ref Range Status   08/17/2020 89 (L) 98 - 107 mmol/L Final      CO2 CO2   Date Value Ref Range Status   08/17/2020 25.0 22.0 - 29.0 mmol/L Final      BUN BUN   Date Value Ref Range Status   08/17/2020 15 8 - 23 mg/dL Final      Creatinine Creatinine   Date Value Ref Range Status   08/17/2020 3.62 (H) 0.57 - 1.00 mg/dL Final      Calcium Calcium   Date Value Ref Range Status   08/17/2020 9.7 8.6 - 10.5 mg/dL Final      PO4 No results found for: CAPO4   Albumin Albumin   Date Value Ref Range Status   08/17/2020 3.80 3.50 - 5.20 g/dL Final      Magnesium Magnesium   Date Value Ref Range Status   08/17/2020 2.1 1.6 - 2.4 mg/dL Final      Uric Acid No results found for: URICACID         Assessment/Plan       Sepsis    Persistent AF     Pulmonary emphysema    CIELO     Severe chronic UC    Chronic L pleural effusion s/p thoracentesis 7/23/2020    Ascites s/p paracenteses     HFpEF 50-55%     Tachy-marcela syndrome s/p PPM     T2DM     GERD    Hypothyroidism on Rx     Pulmonary HTN    COPD on 2L NC home O2     ESRD on MWF HD     Persistent hypotension on midodrine     Acute respiratory failure with hypoxia and hypercapnia     Recent ESBL E. coli UTI (sensitive to Zosyn)       Assessment:  (ESRD: MWF ruba for HD.   Anemia: MARQUITA on HD days  Hypoxic respiratory failure: COVID negative. On non rebreather.   Septic shock may require low dose pressors   Hypokalemia:  management with HD   Hyponatremia: Management with HD).       I discussed the patients findings and my recommendations with family    Delmar Napoles MD  08/17/20  15:30

## 2020-08-17 NOTE — ED PROVIDER NOTES
Subjective   Mrs. Jimenes is brought from the [facility] [place] with lethargy.  Staff noted low blood sugar.  EMS noted blood sugar less than 20.  She was given D10 in route and her blood sugar has come up.  Her mental status has not improved however and she has been found to have low oxygen saturations.  She is unable to give any history.  She was in the hospital from July 22 through August 5.  She has end stage renal disease and chronic hypotension maintained on Midrin.  She had a pleural effusion which was tapped.  She had sepsis from ESBL E. coli.      History provided by:  Nursing home and EMS personnel  History limited by:  Mental status change  Altered Mental Status   Presenting symptoms: lethargy    Severity:  Severe  Most recent episode:  Today  Episode history:  Single  Timing:  Constant  Progression:  Unchanged  Chronicity:  New  Context: nursing home resident and recent illness        Review of Systems   Unable to perform ROS: Mental status change       Past Medical History:   Diagnosis Date   • Adenomatous polyp of stomach    • Allergic rhinitis    • Anemia    • Asthma    • Atrial fibrillation (CMS/[facility])     Hospitalized at [facility] 6/2015, presented after dizziness and atrial fibrillation, treated with Rythmol and a beta blocker converted to sinus prior to discharge.   • BiPAP (biphasic positive airway pressure) dependence    • Chronic anticoagulation 6/14/2016   • Chronic diastolic congestive heart failure (CMS/HCC) 6/21/2016   • CKD (chronic kidney disease), stage III (CMS/[facility])      Status post temporary hemodialysis at [facility], 8/24/2015 through 9/29/2015, worsening 11/15  with hosp chf   • Closed fracture of fibula     Followed by Dr. Kennedy   • COPD (chronic obstructive pulmonary disease) (CMS/[facility])    • Depression 9/7/2016   • Diabetes mellitus (CMS/[facility])    • Diabetic peripheral neuropathy (CMS/[facility]) 12/29/2016   • Dyslipidemia    • Gastrointestinal bleeding, lower 6/15/2016   • GERD (gastroesophageal reflux  disease)    • Hearing aid worn    • Hearing deficit 9/7/2016   • Hiatal hernia    • History of abnormal electrocardiogram     a bib, inferior infarct, st-t changes 10/12-referred to ER   • History of acute gastritis    • History of bilateral renal artery occlusion    • History of cataract    • History of deafness    • History of fracture of ankle 07/2015   • History of herpes zoster    • History of pneumonia    • History of transfusion    • Hyperparathyroidism (CMS/[facility])    • Hypertension    • Hypothyroidism 9/7/2016   • Insomnia    • Joint pain, knee    • Kidney failure    • Low back pain    • Obesity    • CIELO (obstructive sleep apnea)     UNTREATED; appeared to attempt to wear it sporadically 2007,  2008, split-night study of 03/10/2016 reported an overall AHI of 32.7/hr which required BiPAP therapy after failed CPAP attempt with final BiPAP 17 and EPAP 11 which still reported oxygen desaturations on 2 L O2, her lowest oxygen saturation was documented at 64% and several arrhythmias were observed during the study.   • Osteoarthritis 9/7/2016   • Pleural effusion      CT scan of the chest 8/28/2015, reports bilateral pleural effusions, right greater than left, simple in appearance with atelectatic changes identified of the right lung base.  Chest x-ray 10/12/2015, left pleural effusion still seen.   • Pneumonia    • Recurrent oral ulcers    • Retinal artery occlusion 12/29/2016    h/o   • Secondary pulmonary hypertension     EVITA: 6/24/15, RVSP 66mmhg; moderate MR, LVEF 55â‚¬â€œ60%, no pericardial effusion or atrial thrombus, no ASD.   • Tachy-marcela syndrome (CMS/[facility]) 09/01/2016    PPM   • Tricuspid valve insufficiency 5/17/2016    Description: A.  Severe.   • Type 2 diabetes mellitus (CMS/[facility]) 9/7/2016   • Vision loss 12/29/2016    Of the left eye   • Wears glasses        Allergies   Allergen Reactions   • Propafenone Other (See Comments)     Hepatic failure   • Grass Cough   • Heparin Other (See Comments)   • Molds  & Smuts Cough       Past Surgical History:   Procedure Laterality Date   • APPENDECTOMY     • ATRIAL APPENDAGE EXCLUSION LEFT WITH TRANSESOPHAGEAL ECHOCARDIOGRAM N/A 9/26/2017    Procedure: Atrial Appendage Occlusion - PER ASAP-TOO PROTOCOL;  Surgeon: Sandeep Lopes MD;  Location:  [facility];  Service:    • BLADDER SURGERY      x3   • CARDIAC CATHETERIZATION N/A 11/20/2018    Procedure: Left Heart Cath;  Surgeon: Radha Cooper MD;  Location:  [facility];  Service: Cardiology   • CARDIAC ELECTROPHYSIOLOGY PROCEDURE N/A 9/1/2016    Procedure: Pacemaker DC new;  Surgeon: Zafar Hudson MD;  Location:  [facility];  Service:    • CARDIAC PACEMAKER PLACEMENT     • CARDIOVERSION      .  Status post cardioversion, 8/25/2015 and 9/3/2015, secondary to atrial fibrillation.   • CATARACT EXTRACTION Bilateral     1996  and 1997   • COLONOSCOPY N/A 6/17/2016    Procedure: COLONOSCOPY;  Surgeon: Edy Muñoz MD;  Location:  [facility];  Service:    • ELBOW PROCEDURE Left 2006    Left elbow repair   • ENDOSCOPY N/A 4/17/2017    Procedure: ESOPHAGOGASTRODUODENOSCOPY AT BEDSIDE;  Surgeon: Ollie White MD;  Location:  [facility];  Service:    • ENDOSCOPY N/A 8/3/2020    Procedure: ESOPHAGOGASTRODUODENOSCOPY;  Surgeon: Devon James MD;  Location:  [facility];  Service: Gastroenterology;  Laterality: N/A;  esophageal dilatation with savory 45, 48   • EYE SURGERY     • HYSTERECTOMY  1972   • KNEE SURGERY Right 1982   • OTHER SURGICAL HISTORY      History of hearing aid check   • PACEMAKER IMPLANTATION     • PARATHYROIDECTOMY     • UPPER GASTROINTESTINAL ENDOSCOPY         Family History   Problem Relation Age of Onset   • Asthma Mother    • Allergies Mother    • Colonic polyp Mother    • Mitral valve prolapse Mother    • Other Mother         a fib- rain knee replacement   • Heart disease Father    • Lung cancer Father         he was smoker    • Diabetes Other    • Colon cancer Son    • Breast cancer Neg Hx    • Ovarian cancer Neg Hx        Social History     Socioeconomic History   • Marital status:      Spouse name: Not on file   • Number of children: Not on file   • Years of education: Not on file   • Highest education level: Not on file   Tobacco Use   • Smoking status: Former Smoker     Packs/day: 3.00     Years: 25.00     Pack years: 75.00     Types: Cigarettes     Last attempt to quit: 1982     Years since quittin.3   • Smokeless tobacco: Never Used   Substance and Sexual Activity   • Alcohol use: No   • Drug use: No   • Sexual activity: Defer     Partners: Female     Birth control/protection: None     Comment:    Social History Narrative    Lives with  uses a walker            Objective   Physical Exam   Constitutional: She appears well-developed and well-nourished. She appears lethargic. No distress.   She is awake and looking around but does not attempt to answer questions.  She is lethargic.  She is pushing away people who are attempting to draw blood   HENT:   Head: Normocephalic and atraumatic.   Eyes: No scleral icterus.   Neck: Normal range of motion. Neck supple. JVD present.   Cardiovascular: Normal rate. Exam reveals no friction rub.   No murmur heard.  Pulmonary/Chest: Effort normal. No respiratory distress. She has no wheezes. She has rales.   She has rhonchi and rales in the entire left lung, the right lung is clear   Abdominal: Soft. She exhibits no distension. There is no tenderness. There is no guarding.   Musculoskeletal: She exhibits no edema or tenderness.   Neurological: She appears lethargic.   She is moving all extremities but is somewhat lethargic but awake and looks around and pushes people away   Skin: Capillary refill takes 2 to 3 seconds.   Skin is cool pale and dry.  Her toes are cyanotic   Psychiatric: Her mood appears not anxious. She is not agitated.   Nursing note and vitals  reviewed.      Critical Care  Performed by: Cesar Mehta MD  Authorized by: Miles Ramos DO     Critical care provider statement:     Critical care time (minutes):  35    Critical care was necessary to treat or prevent imminent or life-threatening deterioration of the following conditions:  Circulatory failure, CNS failure or compromise, respiratory failure and metabolic crisis    Critical care was time spent personally by me on the following activities:  Discussions with consultants, evaluation of patient's response to treatment, obtaining history from patient or surrogate, review of old charts, re-evaluation of patient's condition, pulse oximetry, ordering and review of radiographic studies, ordering and review of laboratory studies and ordering and performing treatments and interventions               ED Course  ED Course as of Aug 17 1548   Mon Aug 17, 2020   1001 I have reviewed her records.  Her sepsis screen is positive so have ordered antibiotics.  Will interview her  when he gets here.  She has a DO NOT RESUSCITATE order accompanying her.  It looks like this was signed the day after she left the hospital.  Saturations were in the low 80s on room air and her nurse advises me there was good correlation of the waveform.  We currently have her on nonrebreather.  Will attempt to titrate that down    [DT]   1137 I spoke with Mrs. Walker .  He tells me he has noticed for a couple of days that she has had increasing trouble breathing and is not eating.  He confirms that she would not want to be on life support.  Laboratory exam shows new elevation of her LFTs.  Her hemoglobin is worse than baseline.  She is due to dialyze today.  Have paged to the nephrologist on call and will arrange dialysis.  I will discuss with the ICU attending and arrange admission to the ICU.  Blood pressures generally are very low.  Currently reading in the 50s although the blood pressure cuff is on her  forearm.  She has a dialysis shunt in the right arm and an IV in the left bicep.    [DT]   1146 I discussed with Dr. Napoles.  He is familiar with her.  He confirms that her blood pressures typically run very low.  He will arrange emergency dialysis.    [DT]   1148 We have rechecked her blood pressure on her thigh and her still getting readings of around 60.  She would not tolerate any fluid boluses and is not a candidate for pressors.  I had set her up right to assist with her breathing we have laying her flat again.  She will go to dialysis and be admitted to the ICU.    [DT]   1223 I discussed with Dr. Juarez.  Her LFT elevation is new and she does not seem tender but she is lethargic limiting abdominal exam.  Will obtain CT scan of her abdomen and pelvis.  Will send COVID swab.  He will admit her to the ICU.    [DT]      ED Course User Index  [DT] Cesar Mehta MD                                           MDM  Number of Diagnoses or Management Options  Acute on chronic respiratory failure with hypoxia (CMS/HCC): new and requires workup  End stage renal disease (CMS/HCC):   Sepsis without acute organ dysfunction, due to unspecified organism (CMS/HCC): new and requires workup     Amount and/or Complexity of Data Reviewed  Clinical lab tests: reviewed and ordered  Tests in the radiology section of CPT®: ordered and reviewed  Decide to obtain previous medical records or to obtain history from someone other than the patient: yes  Obtain history from someone other than the patient: yes  Review and summarize past medical records: yes  Discuss the patient with other providers: yes  Independent visualization of images, tracings, or specimens: yes    Critical Care  Total time providing critical care: 30-74 minutes    Patient Progress  Patient progress: improved      Final diagnoses:   Acute on chronic respiratory failure with hypoxia (CMS/HCC)   Sepsis without acute organ dysfunction, due to unspecified organism  (CMS/HCC)   End stage renal disease (CMS/HCC)            Cesar Mehta MD  08/17/20 3543

## 2020-08-17 NOTE — H&P
Intensive Care Admission Note     Chief Complaint:  Sepsis (CMS/[facility])    History of Present Illness     Sarita Alegre is a 80 y.o. female who presents to [facility] ED 08/17/20 with lethargy and hypoglycemia.     Patient is a resident at the [facility] in [place]. Today staff noted decreased responsiveness and contacted EMS, and upon their arrival her FSBG was noted to be 20 mg/dl. She was administered an amp of D50 along with D10 with improvement in mentation and BG to 137 mg/dl. She was taken to [facility] ED for further evaluation.     On ED arrival she was noted to be hypoxic with SpO2 in the 70's - she was placed on a NRB mask with improvement. ABG was obtained with pH 7.329 / CO2 47.9 / HCO3 25.2. EKG showed atrial fib (rate 96 bpm) with RBBB and PV or aberrantly conducted complexes. Lab work up was significant for elevated troponin (0.227), sodium 130, K 3.2, sCr 3.62, BUN 15, , ALT 66, alk phos 159, total bili 5.3, AG 16, lactate 4.3, WBC 11.4, Hgb 7.5, Hct 23.4, and Plt 142. UA showed positive nitrite, moderate (2+) leukocytes, and WBC too many to count. CXR showed bibasilar pulmonary opacifications (L>R) with a small left pleural effusion. Due to septic concern BC x2 were drawn and she was given empiric doses of both Zosyn and Vancomycin. Ms. Alegre will be admitted to the ICU for further management.     Of note, patient was recently at our facility from 7/22-8/5/20 where she was admitted with sepsis secondary to ESBL E. Coli UTI, treated with 9 days of Zosyn. She was also found to have a large left pleural effusion and underwent thoracentesis on 7/23 with 1.6 L removed. Hospital course was complicated by hypotension (started on midodrine) and nonsustained V-tach (started on Amiodarone). She eventually stabilized and was transferred to rehab.      On arrival to the ICU the patient is encephalopathic unable to follow commands.  She is hypotensive with a map of 50 and hypothermic.  Family not immediately available  upon my evaluation.  Currently on 6 L nasal cannula.  Patient received vancomycin and Zosyn in the ER, as well as supplemental oxygen.  Patient is a DNR/DNI.    Problem List, Surgical History, Family, Social History, and ROS     Patient Active Problem List    Diagnosis   • *Sepsis [A41.9]   • Acute respiratory failure with hypoxia and hypercapnia  [J96.01, J96.02]   • Recent ESBL E. coli UTI (sensitive to Zosyn)  [N39.0, B96.20]   • Dysphagia [R13.10]   • NSVT (nonsustained ventricular tachycardia) (CMS/HCC) [I47.2]   • Persistent hypotension on midodrine  [I95.9]   • ESRD on MWF HD  [N18.6]   • Hyperlipidemia LDL goal <100 [E78.5]   • Iron deficiency anemia secondary to blood loss (chronic) [D50.0]   • Iron malabsorption [K90.9]   • Pulmonary HTN [I27.20]   • COPD on 2L NC home O2  [J44.9]   • Upper GI hemorrhage [K92.2]   • Anemia due to chronic kidney disease [N18.9, D63.1]   • Supratherapeutic INR [R79.1]   • History of hypertension [I10]   • T2DM  [E11.9]   • Obesity [E66.9]   • GERD [K21.9]   • Hypothyroidism on Rx  [E03.9]   • Tachy-marcela syndrome s/p PPM  [I49.5]   • Ascites s/p paracenteses  [R18.8]   • HFpEF 50-55%  [I50.30]   • Persistent AF  [I48.19]   • Pulmonary emphysema [J43.9]   • CIELO  [G47.33]   • Severe chronic UC [K51.90]   • Chronic L pleural effusion s/p thoracentesis 7/23/2020 [J90]     Past Surgical History:   Procedure Laterality Date   • APPENDECTOMY     • ATRIAL APPENDAGE EXCLUSION LEFT WITH TRANSESOPHAGEAL ECHOCARDIOGRAM N/A 9/26/2017    Procedure: Atrial Appendage Occlusion - PER ASAP-TOO PROTOCOL;  Surgeon: Sandeep Lopes MD;  Location: [facility] EP INVASIVE LOCATION;  Service:    • BLADDER SURGERY      x3   • CARDIAC CATHETERIZATION N/A 11/20/2018    Procedure: Left Heart Cath;  Surgeon: Radha Cooper MD;  Location: [facility] CATH INVASIVE LOCATION;  Service: Cardiology   • CARDIAC ELECTROPHYSIOLOGY PROCEDURE N/A 9/1/2016    Procedure: Pacemaker DC new;  Surgeon: Zafar TOVAR  MD Tristan;  Location:  [facility] EP INVASIVE LOCATION;  Service:    • CARDIAC PACEMAKER PLACEMENT     • CARDIOVERSION      .  Status post cardioversion, 8/25/2015 and 9/3/2015, secondary to atrial fibrillation.   • CATARACT EXTRACTION Bilateral     1996  and 1997   • COLONOSCOPY N/A 6/17/2016    Procedure: COLONOSCOPY;  Surgeon: Edy Muñoz MD;  Location:  [facility];  Service:    • ELBOW PROCEDURE Left 2006    Left elbow repair   • ENDOSCOPY N/A 4/17/2017    Procedure: ESOPHAGOGASTRODUODENOSCOPY AT BEDSIDE;  Surgeon: Ollie White MD;  Location:  [facility];  Service:    • ENDOSCOPY N/A 8/3/2020    Procedure: ESOPHAGOGASTRODUODENOSCOPY;  Surgeon: Devon James MD;  Location:  [facility];  Service: Gastroenterology;  Laterality: N/A;  esophageal dilatation with savory 45, 48   • EYE SURGERY     • HYSTERECTOMY  1972   • KNEE SURGERY Right 1982   • OTHER SURGICAL HISTORY      History of hearing aid check   • PACEMAKER IMPLANTATION     • PARATHYROIDECTOMY     • UPPER GASTROINTESTINAL ENDOSCOPY         Allergies   Allergen Reactions   • Propafenone Other (See Comments)     Hepatic failure   • Grass Cough   • Heparin Other (See Comments)   • Molds & Smuts Cough     No current facility-administered medications on file prior to encounter.      Current Outpatient Medications on File Prior to Encounter   Medication Sig   • acetaminophen (TYLENOL) 325 MG tablet Take 2 tablets by mouth Every 4 (Four) Hours As Needed for Mild Pain .   • albuterol sulfate HFA (ProAir HFA) 108 (90 Base) MCG/ACT inhaler Inhale 2 puffs 4 (Four) Times a Day As Needed for Wheezing or Shortness of Air.   • amiodarone (PACERONE) 200 MG tablet Take 1 tablet by mouth Daily.   • aspirin 81 MG chewable tablet Chew 1 tablet Daily.   • atorvastatin (LIPITOR) 40 MG tablet Take 1 tablet by mouth Daily.   • b complex-vitamin c-folic acid (NEPHRO-LISHA) 0.8 MG tablet tablet Take 1 tablet by mouth Daily.   • bisacodyl (DULCOLAX) 5 MG  EC tablet Take 1 tablet by mouth Daily As Needed for Constipation.   • budesonide-formoterol (SYMBICORT) 160-4.5 MCG/ACT inhaler Inhale 2 puffs 2 (Two) Times a Day.   • DULoxetine (CYMBALTA) 30 MG capsule Take 3 capsules by mouth Daily.   • fluticasone (Flonase) 50 MCG/ACT nasal spray 2 sprays by Each Nare route 2 (Two) Times a Day.   • folic acid (FOLVITE) 400 MCG tablet Take 800 mcg by mouth Daily.   • levocetirizine (Xyzal Allergy 24HR) 5 MG tablet Take 0.5 tablets by mouth Every Evening.   • levothyroxine (SYNTHROID, LEVOTHROID) 112 MCG tablet Take 1 tablet by mouth Every Morning.   • lidocaine-prilocaine (EMLA) 2.5-2.5 % cream Apply 1 application topically to the appropriate area as directed 3 (Three) Times a Week. On right arm prior to dialysis on Mondays, Wednesdays, Fridays   • melatonin 5 MG tablet tablet Take 1 tablet by mouth At Night As Needed (insomnia).   • midodrine (PROAMATINE) 10 MG tablet Take 1 tablet by mouth 3 (Three) Times a Day Before Meals.   • montelukast (SINGULAIR) 10 MG tablet Take 1 tablet by mouth Daily.   • oxyCODONE-acetaminophen (PERCOCET) 5-325 MG per tablet Take 1 tablet by mouth Every 8 (Eight) Hours As Needed for Moderate Pain .   • pantoprazole (PROTONIX) 40 MG EC tablet Take 1 tablet by mouth 2 (Two) Times a Day Before Meals.   • polyethylene glycol (MIRALAX) 17 g packet Take 17 g by mouth Daily.   • sennosides-docusate (PERICOLACE) 8.6-50 MG per tablet Take 2 tablets by mouth Every Night.   • tiotropium bromide monohydrate (Spiriva Respimat) 2.5 MCG/ACT aerosol solution inhaler Inhale 2 puffs Daily.   • [DISCONTINUED] insulin lispro (humaLOG) 100 UNIT/ML injection Inject 0-7 Units under the skin into the appropriate area as directed 3 (Three) Times a Day With Meals.     MEDICATION LIST AND ALLERGIES REVIEWED.    Family History   Problem Relation Age of Onset   • Asthma Mother    • Allergies Mother    • Colonic polyp Mother    • Mitral valve prolapse Mother    • Other Mother   "       a fib- rain knee replacement   • Heart disease Father    • Lung cancer Father         he was smoker   • Diabetes Other    • Colon cancer Son    • Breast cancer Neg Hx    • Ovarian cancer Neg Hx      Social History     Tobacco Use   • Smoking status: Former Smoker     Packs/day: 3.00     Years: 25.00     Pack years: 75.00     Types: Cigarettes     Last attempt to quit: 1982     Years since quittin.3   • Smokeless tobacco: Never Used   Substance Use Topics   • Alcohol use: No   • Drug use: No     Social History     Social History Narrative    Lives with  uses a walker      FAMILY AND SOCIAL HISTORY REVIEWED.    Review of Systems   Unable to perform ROS: Mental status change     ALL OTHER SYSTEMS REVIEWED AND ARE NEGATIVE.    Physical Exam and Clinical Information   BP (!) 80/28   Pulse 86   Temp 94.4 °F (34.7 °C) (Rectal)   Resp 26   Ht 170.2 cm (67\")   Wt 80.3 kg (177 lb)   LMP  (LMP Unknown) Comment: mammogram 2017   SpO2 93%   BMI 27.72 kg/m²   GENERAL : Elderly frail female, in moderate distress.  SKIN : Normal temperature, turgor and texture; no rash, ulcers or subcutaneous nodules  EYES : conjunctiva clear, Pupils equal and reactive.  HENMT : Atraumatic; dry pale mucous membranes  NECK : Trachea midline; FROM, supple, no thyromegaly or lymphadenopathy  RESPIRATORY/THORAX : Mild increase in respiratory effort, Coarse breath sounds bilaterally  CARDIOVASCULAR : Normal S1/S2, RRR. 2+ ext edema.  GASTROINTESTINAL : Soft, NT/ND. BS x 4 normoactive. No hepatosplenomegaly.  MUSCULOSKELETAL : No cyanosis, clubbing, or ischemia  NEUROLOGICAL: Moving all extremities, withdraws from pain, unable to answer questions or communicate.    Results from last 7 days   Lab Units 20  1005   WBC 10*3/mm3 11.40*   HEMOGLOBIN g/dL 7.5*   PLATELETS 10*3/mm3 142     Results from last 7 days   Lab Units 20  1005   SODIUM mmol/L 130*   POTASSIUM mmol/L 3.2*   CO2 mmol/L 25.0   BUN mg/dL 15 "   CREATININE mg/dL 3.62*   MAGNESIUM mg/dL 2.1   GLUCOSE mg/dL 93     Estimated Creatinine Clearance: 13.1 mL/min (A) (by C-G formula based on SCr of 3.62 mg/dL (H)).      Results from last 7 days   Lab Units 08/17/20  1045   PH, ARTERIAL pH units 7.329*   PCO2, ARTERIAL mm Hg 47.9*   PO2 ART mm Hg 189.0*     Lab Results   Component Value Date    LACTATE 4.3 (C) 08/17/2020            I reviewed the patient's results/ images and I agree with the reports.     Impression     Sepsis    Persistent AF     Pulmonary emphysema    CIELO     Severe chronic UC    Chronic L pleural effusion s/p thoracentesis 7/23/2020    Ascites s/p paracenteses     HFpEF 50-55%     Tachy-marcela syndrome s/p PPM     T2DM     GERD    Hypothyroidism on Rx     Pulmonary HTN    COPD on 2L NC home O2     ESRD on MWF HD     Persistent hypotension on midodrine     Acute respiratory failure with hypoxia and hypercapnia     Recent ESBL E. coli UTI (sensitive to Zosyn)       Plan/Recommendations     80-year-old female with past medical history significant for COPD, obstructive sleep apnea, persistent A. fib, chronic left-sided pleural effusion, heart failure with preserved ejection fraction, diabetes mellitus, hypothyroidism, pulmonary hypertension.  Who presents to [facility] ICU on 8/17/2020 with septic shock shock likely secondary to urinary source.  CT of the chest on 8/17/2020 showed left-sided chronic pleural effusion with associated.  Patient underwent a left-sided thoracentesis on July 28, 2020 consistent with a transudate of effusion.  As well patient is chronically hypotensive on midodrine at baseline.    · Patient to be admitted to the ICU with acute on chronic hypoxic respiratory failure, and septic shock secondary to likely urinary source.  With multiple chronic medical comorbidities including atrial fibrillation chronic chronic left-sided transudative pleural effusion, and end-stage renal disease on dialysis.  · I had a long  conversation with the patient  Hernando Alegre regarding the patient's goals of care.  At this point time the patient appears to be in septic shock with acute hypoxic respiratory failure, with multiple chronic medical comorbidities.  I did discuss case with renal patient is too sick at this time for CRRT given the patient's severe shock.  Patient was maxed on norepinephrine at our conversation with a map in the 50s.  I discussed with him the patient's options which were to continue to pursue aggressive care with central line placement, arterial line placement, and likely dialysis catheter placement.  Patient had already expressed wishes that they did not want to go on to mechanical ventilation and that she wanted to be a DNR.  I explained to the patient's  that if I did put the patient on mechanical ventilation it would likely be for long-term as she is too chronically ill to likely recover.  Therefore he agreed to keep the patient a DNR/DNI.  Addition of this on explained to him that even if I was as aggressive as possible the likely scenario the patient would pass away from her current illnesses.  I do suspect this is from a urinary tract infection as she had previously grown out an ESBL at the last admission.  Considering all options the patient has been did not want to pursue any further aggressive measures and decided to make the patient comfort care.  Patient's family is on the way from [place] and we will completely withdraw care once family is here.  · Continue norepinephrine at current dose with no further escalation of care, until the patient's family has arrived and then we will stop  · Morphine 4 mg every 1 hours as needed for severe pain and air hunger.  · Ativan every 4 hours for agitation  · Robinul  · Patient to go comfort care, if the patient were to live through the evening then we would pursue hospice care in the morning.    Critical Care time spent in direct patient care: 40 minutes  (excluding procedure time, if applicable) including high complexity decision making to assess, manipulate, and support vital organ system failure in this individual who has impairment of one or more vital organ systems such that there is a high probability of imminent or life threatening deterioration in the patient's condition.    Donna Fox, CARY, APRN, [facility]-BC  Pulmonary and Critical Care Medicine     ROGER Ramos DO  Pulmonary and Critical Care Medicine     CC: Jade Quach, APRN

## 2020-08-18 LAB — BACTERIA SPEC AEROBE CULT: NO GROWTH

## 2020-08-18 NOTE — DISCHARGE SUMMARY
Admit date: 2020  Date/Time of Death:  2020 @ 2148    Admission Diagnosis:  Present on Admission:  • ESRD on MWF HD   • GERD  • Hypothyroidism on Rx   • Pulmonary HTN  • COPD on 2L NC home O2   • Persistent AF   • Pulmonary emphysema  • CIELO   • Severe chronic UC  • Chronic L pleural effusion s/p thoracentesis 2020  • HFpEF 50-55%   • Tachy-marcela syndrome s/p PPM   • T2DM   • Acute respiratory failure with hypoxia and hypercapnia   • Sepsis  • Ascites s/p paracenteses   • Recent ESBL E. coli UTI (sensitive to Zosyn)   • Persistent hypotension on midodrine   • Acute on chronic respiratory failure with hypoxia (CMS/HCC)  Septic Shock        Discharge Diagnosis:   Problem List Items Addressed This Visit        Unprioritized    Acute on chronic respiratory failure with hypoxia (CMS/HCC) - Primary      Other Visit Diagnoses     Sepsis without acute organ dysfunction, due to unspecified organism (CMS/HCC)        End stage renal disease (CMS/[facility])                Hospital Course:  Sarita Alegre is a 80 y.o. female who was admitted on  from [facility] for decreased responsiveness.  Glucose was 20 upon EMS arrival.  She was treated for hypoglycemia and had improvement in mentation.  Upon arrival to the ED she was hypoxic w/ evidence of shock and hypothermia.  Empiric therapy was started for septic shock to include pressors and antibiotics and she was admitted to the ICU.  Nephrology was initially consulted to aid in management of her HD schedule.  The patient had previously discussed her plan of care with her  and had expressed her wishes to be a DNR/DNI.  A conversation about reasonable goals of care and prognosis was had between Dr. Ramos and the patient's  and the decision was reached to make pursue comfort measures only.  Pressor therapy was continued to allow family time to gather at the bedside.  The patient  @ 2148.      Procedures Performed:  none         Consults:    Dr. KVNG Napoles, Nephrology -       Condition on Discharge:         Kadeem NELSON Lai, APRN  20  22:23    Time: Discharge 23 min

## 2020-08-22 LAB
BACTERIA SPEC AEROBE CULT: NORMAL
BACTERIA SPEC AEROBE CULT: NORMAL

## (undated) DEVICE — Device: Brand: MEDEX

## (undated) DEVICE — CATH DIAG EXPO .052 PIG 6F 110CM

## (undated) DEVICE — ENDOGATOR HYBRID TUBING KIT FOR USE WITH ENDOGATOR IRRIGATION PUMP, OLYMPUS PUMP, GI4000 ESU, AND TORRENT IRRIGATION PUMP.: Brand: ENDOGATOR KIT

## (undated) DEVICE — TBG 02 CRUSH RESIST LF CLR 7FT

## (undated) DEVICE — INTRO ACCSR BLNT TP

## (undated) DEVICE — LIMB HOLDERS: Brand: DEROYAL

## (undated) DEVICE — ERBE NESSY®PLATE 170 SPLIT; 168CM²; CABLE 3M: Brand: ERBE

## (undated) DEVICE — Device

## (undated) DEVICE — HYBRID CO2 TUBING/CAP SET FOR OLYMPUS® SCOPES & CO2 SOURCE: Brand: ERBE

## (undated) DEVICE — CATH DIAG EXPO M/ PK 6FR FL4/FR4 PIG 3PK

## (undated) DEVICE — SPNG VERSALON 4X4 4PLY NONSTRL LF BG/200

## (undated) DEVICE — ST EXT IV LG BORE NDLESS FLTR LL 17IN

## (undated) DEVICE — THE BITE BLOCK MAXI, LATEX FREE STRAP IS USED TO PROTECT THE ENDOSCOPE INSERTION TUBE FROM BEING BITTEN BY THE PATIENT.

## (undated) DEVICE — SYR LUERLOK 50ML

## (undated) DEVICE — CONTN GRAD MEAS TRIANG 32OZ BLK

## (undated) DEVICE — SYS TRNSEP ACC BRK ACROSS A/ 71CM

## (undated) DEVICE — PK CATH CARD 10

## (undated) DEVICE — "MH-948 A/W CHANNEL CLEANING ADPTR -VIDEO": Brand: AW CHANNEL CLEANING ADAPTE

## (undated) DEVICE — DECANT BG O JET

## (undated) DEVICE — SUCTION CANISTER, 1000CC,SAFELINER: Brand: DEROYAL

## (undated) DEVICE — INTRO SHEATH PRELUDE IDEAL SPRNG COIL .021 6F 16X80CM

## (undated) DEVICE — Device: Brand: ENDOGATOR

## (undated) DEVICE — CONTRL CONTRST CHMBRD W/TBG72IN REUS

## (undated) DEVICE — TUBING, SUCTION, 1/4" X 10', STRAIGHT: Brand: MEDLINE

## (undated) DEVICE — DOME MONITORING W BONDED STPCK BIOTRANS2

## (undated) DEVICE — CATH ULTRASND ECHO ACUNAV FOR ACUSON 8F 90CM

## (undated) DEVICE — INTRO SHEATH ENGAGE W/50 GW .038 7F12

## (undated) DEVICE — DECANTER: Brand: UNBRANDED

## (undated) DEVICE — ST EXT IV SMARTSITE 2VLV SP M LL 5ML IV1

## (undated) DEVICE — INTRO SHEATH 8F60CM

## (undated) DEVICE — "MH-443 SUCTION VALVE F/EVIS140 EVIS160": Brand: SUCTION VALVE

## (undated) DEVICE — LEFT ATRIAL APPENDAGE CLOSURE DEVICE WITH DELIVERY SYSTEM
Type: IMPLANTABLE DEVICE | Status: NON-FUNCTIONAL
Brand: WATCHMAN®

## (undated) DEVICE — LUBE GEL ENDOGLIDE 1.1OZ

## (undated) DEVICE — DEV COMP RAD PRELUDESYNC 24CM

## (undated) DEVICE — SET PRIMARY GRVTY 10DP MALE LL 104IN

## (undated) DEVICE — INTRO SHEATH ENGAGE W/50 GW .038 9F12

## (undated) DEVICE — "MH-438 A/W VLVE F/140 EVIS-140": Brand: AIR/WATER VALVE

## (undated) DEVICE — LEX ELECTRO PHYSIOLOGY: Brand: MEDLINE INDUSTRIES, INC.

## (undated) DEVICE — SOL NACL 0.9PCT 1000ML

## (undated) DEVICE — SOL IRR H2O BTL 1000ML STRL

## (undated) DEVICE — ST INF PRI SMRTSTE 20DRP 2VLV 24ML 117

## (undated) DEVICE — ACCESS SHEATH WITH DILATOR: Brand: WATCHMAN® ACCESS SYSTEM

## (undated) DEVICE — GW DIAG .032

## (undated) DEVICE — FIAPC® PROBE W/ FILTER 3000 A OD 2.3MM/6.9FR; L 3M/9.8FT: Brand: ERBE

## (undated) DEVICE — KT MANIFOLD CATHLAB CUST

## (undated) DEVICE — KT ORCA ORCAPOD DISP STRL

## (undated) DEVICE — SINGLE-USE BIOPSY FORCEPS: Brand: RADIAL JAW 4

## (undated) DEVICE — CATH DIAG EXPO .056 FL3.5 6F 100CM